# Patient Record
Sex: FEMALE | Race: BLACK OR AFRICAN AMERICAN | Employment: FULL TIME | URBAN - METROPOLITAN AREA
[De-identification: names, ages, dates, MRNs, and addresses within clinical notes are randomized per-mention and may not be internally consistent; named-entity substitution may affect disease eponyms.]

---

## 2018-12-01 LAB — DIABETIC RETINOPATHY: NEGATIVE

## 2019-10-18 LAB
AVERAGE GLUCOSE: NORMAL
HBA1C MFR BLD: 14 %

## 2019-11-05 ENCOUNTER — OFFICE VISIT (OUTPATIENT)
Dept: ENDOCRINOLOGY | Age: 54
End: 2019-11-05
Payer: COMMERCIAL

## 2019-11-05 VITALS
HEART RATE: 101 BPM | WEIGHT: 185 LBS | SYSTOLIC BLOOD PRESSURE: 122 MMHG | BODY MASS INDEX: 28.04 KG/M2 | HEIGHT: 68 IN | OXYGEN SATURATION: 99 % | DIASTOLIC BLOOD PRESSURE: 84 MMHG

## 2019-11-05 DIAGNOSIS — N20.0 NEPHROLITHIASIS: ICD-10-CM

## 2019-11-05 DIAGNOSIS — E78.2 MIXED HYPERLIPIDEMIA: ICD-10-CM

## 2019-11-05 DIAGNOSIS — E83.52 HYPERCALCEMIA: ICD-10-CM

## 2019-11-05 DIAGNOSIS — I10 ESSENTIAL HYPERTENSION: ICD-10-CM

## 2019-11-05 PROCEDURE — 99243 OFF/OP CNSLTJ NEW/EST LOW 30: CPT | Performed by: INTERNAL MEDICINE

## 2019-11-05 RX ORDER — FLASH GLUCOSE SENSOR
KIT MISCELLANEOUS
Qty: 1 EACH | Refills: 0 | COMMUNITY
Start: 2019-11-05 | End: 2020-11-10

## 2019-11-05 RX ORDER — GLIMEPIRIDE 4 MG/1
4 TABLET ORAL
COMMUNITY
Start: 2019-08-29

## 2019-11-05 RX ORDER — FLASH GLUCOSE SCANNING READER
EACH MISCELLANEOUS
Qty: 1 DEVICE | Refills: 0 | COMMUNITY
Start: 2019-11-05 | End: 2020-11-10

## 2019-11-05 RX ORDER — HYDROCHLOROTHIAZIDE 50 MG/1
50 TABLET ORAL DAILY
COMMUNITY

## 2019-11-05 RX ORDER — FLASH GLUCOSE SENSOR
KIT MISCELLANEOUS
Qty: 2 EACH | Refills: 3 | Status: SHIPPED | OUTPATIENT
Start: 2019-11-05 | End: 2020-11-10

## 2019-11-07 PROBLEM — I10 ESSENTIAL HYPERTENSION: Status: ACTIVE | Noted: 2019-11-07

## 2019-11-07 PROBLEM — E78.2 MIXED HYPERLIPIDEMIA: Status: ACTIVE | Noted: 2019-11-07

## 2019-11-07 PROBLEM — E83.52 HYPERCALCEMIA: Status: ACTIVE | Noted: 2019-11-07

## 2019-11-07 PROBLEM — N20.0 NEPHROLITHIASIS: Status: ACTIVE | Noted: 2019-11-07

## 2020-11-02 ENCOUNTER — TELEPHONE (OUTPATIENT)
Dept: ENDOCRINOLOGY | Age: 55
End: 2020-11-02

## 2020-11-03 NOTE — TELEPHONE ENCOUNTER
Left VM for patient that I will call her as soon as I have an appointment open up. Patient to call office back if she has any questions or concerns.

## 2020-11-09 ENCOUNTER — TELEPHONE (OUTPATIENT)
Dept: ENDOCRINOLOGY | Age: 55
End: 2020-11-09

## 2020-11-09 NOTE — TELEPHONE ENCOUNTER
Left message for patient to call office back. Explained she can schedule with someone at our  for anytime on Tuesday.

## 2020-11-09 NOTE — TELEPHONE ENCOUNTER
Patient called back to confirm she wanted to take the appointment the Tuesday before thanksgiving that she received a VM about

## 2020-11-10 ENCOUNTER — TELEPHONE (OUTPATIENT)
Dept: ENDOCRINOLOGY | Age: 55
End: 2020-11-10

## 2020-11-10 ENCOUNTER — OFFICE VISIT (OUTPATIENT)
Dept: ENDOCRINOLOGY | Age: 55
End: 2020-11-10
Payer: COMMERCIAL

## 2020-11-10 VITALS
TEMPERATURE: 97.5 F | HEART RATE: 91 BPM | DIASTOLIC BLOOD PRESSURE: 87 MMHG | BODY MASS INDEX: 27.28 KG/M2 | RESPIRATION RATE: 16 BRPM | SYSTOLIC BLOOD PRESSURE: 144 MMHG | WEIGHT: 180 LBS | HEIGHT: 68 IN

## 2020-11-10 PROCEDURE — 99214 OFFICE O/P EST MOD 30 MIN: CPT | Performed by: INTERNAL MEDICINE

## 2020-11-10 RX ORDER — FLASH GLUCOSE SENSOR
KIT MISCELLANEOUS
Qty: 1 EACH | Refills: 0 | Status: SHIPPED | OUTPATIENT
Start: 2020-11-10 | End: 2022-10-17

## 2020-11-10 RX ORDER — INSULIN GLARGINE 100 [IU]/ML
30 INJECTION, SOLUTION SUBCUTANEOUS NIGHTLY
Qty: 5 PEN | Refills: 3 | Status: SHIPPED | OUTPATIENT
Start: 2020-11-10 | End: 2022-10-17

## 2020-11-10 RX ORDER — PEN NEEDLE, DIABETIC 31 GX5/16"
1 NEEDLE, DISPOSABLE MISCELLANEOUS DAILY
Qty: 100 EACH | Refills: 6 | Status: SHIPPED | OUTPATIENT
Start: 2020-11-10 | End: 2022-10-17

## 2020-11-10 NOTE — PROGRESS NOTES
Vineet Ramírez is a 50 y.o. female is referred to endocrine by Dr Dank Faulkner  for evaluation and management of uncontrolled type 2 diabetes. Vineet Ramírez was diagnosed with Diabetes mellitus in 2004 with prediabetes . Diabetes was diagnosed at routine screening [facility] 38 she managed with diet for a few years on review of chart it appears that patient has uncontrolled diabetes for years and was previously advised by her primary care physician to improve her glycemia but patient continued to have uncontrolled diabetes for years she is noted to have an A1c of 9.1 in 2010 and her diabetes control appears to have worsened over the years since July 2018 her A1c has been 14 or above. Vineet Ramírez got diabetic education in the past.  Comorbid conditions: Neuropathy, Nephropathy and Retinopathy    Current diabetic medications include:metformin and Amaryl 4 mg     INTERIM:    Diabetes   She presents for her follow-up diabetic visit. She has type 2 diabetes mellitus. No MedicAlert identification noted. The initial diagnosis of diabetes was made 15 years ago. Her disease course has been worsening. There are no hypoglycemic associated symptoms. Associated symptoms include polyuria and weight loss. There are no hypoglycemic complications. Symptoms are worsening. Diabetic complications include peripheral neuropathy and retinopathy. Risk factors for coronary artery disease include diabetes mellitus, dyslipidemia, hypertension and post-menopausal. Current diabetic treatment includes oral agent (dual therapy). She is compliant with treatment some of the time. Her weight is decreasing steadily. She is following a generally unhealthy diet. Meal planning includes avoidance of concentrated sweets. She has not had a previous visit with a dietitian. She rarely participates in exercise. Her home blood glucose trend is increasing steadily. An ACE inhibitor/angiotensin II receptor blocker is being taken.  She sees a podiatrist.Eye exam is current. pt has not been seen for over a year and has not taken the medication as prescribed  , she is still reluctant to take insulin injections but due to worsening glycemia she is now decided to take insulin   She was told about options like Omnipod and VGO   She has Freestyle ge but doesn't use it       Weight trend: stable  Prior visit with dietician: no  Current diet: on average, 3 meals per day  Current exercise: occasional    She has been obese but was able to lose almost 80 lbs with diet and exercise since 2015   She has hypercalcemia and was seen in the Er for possible Kidney stones in the past ---she denies any exogenous calcium intake but takes MVI which possibly has calcium  High calcium noted since 12/17.     VIT D deficiency for years  with levels as low as 16 ----she has been taking otc she takes aprox 4000 Iu daily last level was 39 on oct 2018   She has gone through menopausal changes since 2017   Patient also has essential hypertension and hyperlipidemia    Past Medical History:   Diagnosis Date    [facility] mellitus ([facility] [address]     Hypertension       Patient Active Problem List   Diagnosis    Uncontrolled type 2 diabetes mellitus with complication, with long-term current use of insulin ([facility] [address]    Hypercalcemia    Essential hypertension    Mixed hyperlipidemia    Nephrolithiasis     Past Surgical History:   Procedure Laterality Date    BREAST BIOPSY      1980's benign      Social History     Socioeconomic History    Marital status: Single     Spouse name: Not on file    Number of children: Not on file    Years of education: Not on file    Highest education level: Not on file   Occupational History    Not on file   Social Needs    Financial resource strain: Not on file    Food insecurity     Worry: Not on file     Inability: Not on file    Transportation needs     Medical: Not on file     Non-medical: Not on file   Tobacco Use    Smoking status: Never Smoker    Smokeless tobacco: Never Used   Substance and Sexual Activity    Alcohol use: No    Drug use: No    Sexual activity: Not on file   Lifestyle    Physical activity     Days per week: Not on file     Minutes per session: Not on file    Stress: Not on file   Relationships    Social connections     Talks on phone: Not on file     Gets together: Not on file     Attends Sabianist service: Not on file     Active member of club or organization: Not on file     Attends meetings of clubs or organizations: Not on file     Relationship status: Not on file    Intimate partner violence     Fear of current or ex partner: Not on file     Emotionally abused: Not on file     Physically abused: Not on file     Forced sexual activity: Not on file   Other Topics Concern    Not on file   Social History Narrative    Not on file     Family History   Problem Relation Age of Onset    Diabetes Father     Diabetes Maternal Grandmother     Diabetes Maternal Grandfather     Diabetes Paternal Grandmother     Diabetes Paternal Grandfather      Current Outpatient Medications   Medication Sig Dispense Refill    Multiple Vitamins-Minerals (MULTIVITAMIN ADULTS PO) Take by mouth      VITAMIN D PO Take by mouth      insulin glargine (LANTUS SOLOSTAR) 100 UNIT/ML injection pen Inject 30 Units into the skin nightly 5 pen 3    Insulin Pen Needle (B-D UF III MINI PEN NEEDLES) 31G X 5 MM MISC 1 each by Does not apply route daily 100 each 6    Continuous Blood Gluc Sensor (FREESTYLE MIA 14 DAY SENSOR) MISC As directed by physician 1 each 0    metFORMIN (GLUCOPHAGE) 1000 MG tablet Take 1,000 mg by mouth Take 1,000 mg by mouth bid      glimepiride (AMARYL) 4 MG tablet Take 4 mg by mouth Take 4 mg by mouth      hydrochlorothiazide (HYDRODIURIL) 50 MG tablet Take 50 mg by mouth daily       No current facility-administered medications for this visit.       Allergies   Allergen Reactions    Ace Inhibitors      Dry cough per pt     Family Status   Relation Name currently not on statins. ldl 175 in oct 2020     ----Nephrolithiasis  No recent kidney stones  Calcium elevated probably hyperparathyroid will check levels       Reviewed and/or ordered clinical lab results yes   Reviewed and/or ordered radiology tests Yes  Reviewed and/or ordered other diagnostic tests yes   Made a decision to obtain old records yes   Reviewed and summarized old records yes     Harvey Juárez was counseled regarding symptoms of current diagnosis, course and complications of disease if inadequately treated, side effects of medications, diagnosis, treatment options, and prognosis, risks, benefits, complications, and alternatives of treatment, labs, imaging and other studies and treatment targets and goals. She understands instructions and counseling      Return in about 4 weeks (around 12/8/2020). Please note that some or all of this report was generated using voice recognition software. Please notify me in case of any questions about the content of this document, as some errors in transcription may have occurred .

## 2020-11-10 NOTE — TELEPHONE ENCOUNTER
PT was seen in office today and was told to follow up in a month. The first appt the doctor had was 2/9 for an in person appt. She would like to be seen in office before that. Please call Pt and advise of an appt date and time.    #[phone]

## 2020-11-10 NOTE — TELEPHONE ENCOUNTER
Called patient to notify her that I have her on my reminder list to call her in 1 month if there are any cancellations.

## 2020-12-02 NOTE — PROGRESS NOTES
Letter sent out to patient to schedule appt and to call the office to discuss lab results. Multiple VM's left over the past week.

## 2020-12-16 ENCOUNTER — TELEPHONE (OUTPATIENT)
Dept: ENDOCRINOLOGY | Age: 55
End: 2020-12-16

## 2022-01-08 ENCOUNTER — APPOINTMENT (OUTPATIENT)
Dept: CT IMAGING | Age: 57
End: 2022-01-08
Payer: COMMERCIAL

## 2022-01-08 ENCOUNTER — HOSPITAL ENCOUNTER (EMERGENCY)
Age: 57
Discharge: HOME | End: 2022-01-08
Payer: COMMERCIAL

## 2022-01-08 VITALS
OXYGEN SATURATION: 96 % | HEIGHT: 68 IN | SYSTOLIC BLOOD PRESSURE: 110 MMHG | HEART RATE: 100 BPM | TEMPERATURE: 98 F | WEIGHT: 155.42 LBS | RESPIRATION RATE: 18 BRPM | BODY MASS INDEX: 23.56 KG/M2 | DIASTOLIC BLOOD PRESSURE: 95 MMHG

## 2022-01-08 DIAGNOSIS — R10.9 RIGHT FLANK PAIN: ICD-10-CM

## 2022-01-08 DIAGNOSIS — N30.01 ACUTE CYSTITIS WITH HEMATURIA: Primary | ICD-10-CM

## 2022-01-08 DIAGNOSIS — E11.65 TYPE 2 DIABETES MELLITUS WITH HYPERGLYCEMIA, WITHOUT LONG-TERM CURRENT USE OF INSULIN (HCC): ICD-10-CM

## 2022-01-08 DIAGNOSIS — R30.0 DYSURIA: ICD-10-CM

## 2022-01-08 LAB
A/G RATIO: 0.8 (ref 1.1–2.2)
ALBUMIN SERPL-MCNC: 3.4 G/DL (ref 3.4–5)
ALP BLD-CCNC: 171 U/L (ref 40–129)
ALT SERPL-CCNC: 7 U/L (ref 10–40)
ANION GAP SERPL CALCULATED.3IONS-SCNC: 17 MMOL/L (ref 3–16)
AST SERPL-CCNC: 9 U/L (ref 15–37)
BACTERIA: ABNORMAL /HPF
BASOPHILS ABSOLUTE: 0.1 K/UL (ref 0–0.2)
BASOPHILS RELATIVE PERCENT: 0.6 %
BILIRUB SERPL-MCNC: 0.5 MG/DL (ref 0–1)
BILIRUBIN URINE: NEGATIVE
BLOOD, URINE: ABNORMAL
BUN BLDV-MCNC: 15 MG/DL (ref 7–20)
CALCIUM SERPL-MCNC: 10 MG/DL (ref 8.3–10.6)
CHLORIDE BLD-SCNC: 90 MMOL/L (ref 99–110)
CLARITY: ABNORMAL
CO2: 22 MMOL/L (ref 21–32)
COLOR: YELLOW
CREAT SERPL-MCNC: 0.9 MG/DL (ref 0.6–1.1)
EOSINOPHILS ABSOLUTE: 0.1 K/UL (ref 0–0.6)
EOSINOPHILS RELATIVE PERCENT: 0.5 %
EPITHELIAL CELLS, UA: 4 /HPF (ref 0–5)
GFR AFRICAN AMERICAN: >60
GFR NON-AFRICAN AMERICAN: >60
GLUCOSE BLD-MCNC: 338 MG/DL (ref 70–99)
GLUCOSE BLD-MCNC: 490 MG/DL (ref 70–99)
GLUCOSE URINE: >=1000 MG/DL
HCG QUALITATIVE: NEGATIVE
HCT VFR BLD CALC: 41 % (ref 36–48)
HEMOGLOBIN: 13.4 G/DL (ref 12–16)
HYALINE CASTS: 0 /LPF (ref 0–8)
KETONES, URINE: 15 MG/DL
LEUKOCYTE ESTERASE, URINE: ABNORMAL
LIPASE: 46 U/L (ref 13–60)
LYMPHOCYTES ABSOLUTE: 1.3 K/UL (ref 1–5.1)
LYMPHOCYTES RELATIVE PERCENT: 9.5 %
MCH RBC QN AUTO: 27.2 PG (ref 26–34)
MCHC RBC AUTO-ENTMCNC: 32.8 G/DL (ref 31–36)
MCV RBC AUTO: 83.1 FL (ref 80–100)
MICROSCOPIC EXAMINATION: YES
MONOCYTES ABSOLUTE: 1.1 K/UL (ref 0–1.3)
MONOCYTES RELATIVE PERCENT: 8.1 %
NEUTROPHILS ABSOLUTE: 11.2 K/UL (ref 1.7–7.7)
NEUTROPHILS RELATIVE PERCENT: 81.3 %
NITRITE, URINE: POSITIVE
PDW BLD-RTO: 13.3 % (ref 12.4–15.4)
PERFORMED ON: ABNORMAL
PH UA: 6.5 (ref 5–8)
PLATELET # BLD: 383 K/UL (ref 135–450)
PMV BLD AUTO: 8.4 FL (ref 5–10.5)
POTASSIUM REFLEX MAGNESIUM: 3.8 MMOL/L (ref 3.5–5.1)
PROTEIN UA: ABNORMAL MG/DL
RBC # BLD: 4.94 M/UL (ref 4–5.2)
RBC UA: 14 /HPF (ref 0–4)
SODIUM BLD-SCNC: 129 MMOL/L (ref 136–145)
SPECIFIC GRAVITY UA: 1.03 (ref 1–1.03)
TOTAL PROTEIN: 7.7 G/DL (ref 6.4–8.2)
URINE REFLEX TO CULTURE: YES
UROBILINOGEN, URINE: 0.2 E.U./DL
WBC # BLD: 13.8 K/UL (ref 4–11)
WBC UA: >900 /HPF (ref 0–5)

## 2022-01-08 PROCEDURE — 96361 HYDRATE IV INFUSION ADD-ON: CPT

## 2022-01-08 PROCEDURE — 99284 EMERGENCY DEPT VISIT MOD MDM: CPT

## 2022-01-08 PROCEDURE — 6370000000 HC RX 637 (ALT 250 FOR IP): Performed by: NURSE PRACTITIONER

## 2022-01-08 PROCEDURE — 83690 ASSAY OF LIPASE: CPT

## 2022-01-08 PROCEDURE — 87086 URINE CULTURE/COLONY COUNT: CPT

## 2022-01-08 PROCEDURE — 85025 COMPLETE CBC W/AUTO DIFF WBC: CPT

## 2022-01-08 PROCEDURE — 2580000003 HC RX 258: Performed by: NURSE PRACTITIONER

## 2022-01-08 PROCEDURE — 87088 URINE BACTERIA CULTURE: CPT

## 2022-01-08 PROCEDURE — 96375 TX/PRO/DX INJ NEW DRUG ADDON: CPT

## 2022-01-08 PROCEDURE — 6360000002 HC RX W HCPCS: Performed by: NURSE PRACTITIONER

## 2022-01-08 PROCEDURE — 96365 THER/PROPH/DIAG IV INF INIT: CPT

## 2022-01-08 PROCEDURE — 84703 CHORIONIC GONADOTROPIN ASSAY: CPT

## 2022-01-08 PROCEDURE — 81001 URINALYSIS AUTO W/SCOPE: CPT

## 2022-01-08 PROCEDURE — 87186 SC STD MICRODIL/AGAR DIL: CPT

## 2022-01-08 PROCEDURE — 36415 COLL VENOUS BLD VENIPUNCTURE: CPT

## 2022-01-08 PROCEDURE — 74176 CT ABD & PELVIS W/O CONTRAST: CPT

## 2022-01-08 PROCEDURE — 80053 COMPREHEN METABOLIC PANEL: CPT

## 2022-01-08 RX ORDER — KETOROLAC TROMETHAMINE 15 MG/ML
30 INJECTION, SOLUTION INTRAMUSCULAR; INTRAVENOUS ONCE
Status: COMPLETED | OUTPATIENT
Start: 2022-01-08 | End: 2022-01-08

## 2022-01-08 RX ORDER — NITROFURANTOIN 25; 75 MG/1; MG/1
100 CAPSULE ORAL 2 TIMES DAILY
Qty: 20 CAPSULE | Refills: 0 | Status: SHIPPED | OUTPATIENT
Start: 2022-01-08 | End: 2022-01-18

## 2022-01-08 RX ORDER — PHENAZOPYRIDINE HYDROCHLORIDE 100 MG/1
100 TABLET, FILM COATED ORAL 3 TIMES DAILY PRN
Qty: 12 TABLET | Refills: 0 | Status: SHIPPED | OUTPATIENT
Start: 2022-01-08 | End: 2022-01-11

## 2022-01-08 RX ORDER — ACETAMINOPHEN AND CODEINE PHOSPHATE 300; 30 MG/1; MG/1
1 TABLET ORAL 3 TIMES DAILY PRN
Qty: 10 TABLET | Refills: 0 | Status: SHIPPED | OUTPATIENT
Start: 2022-01-08 | End: 2022-01-11

## 2022-01-08 RX ORDER — ONDANSETRON 2 MG/ML
4 INJECTION INTRAMUSCULAR; INTRAVENOUS ONCE
Status: COMPLETED | OUTPATIENT
Start: 2022-01-08 | End: 2022-01-08

## 2022-01-08 RX ORDER — 0.9 % SODIUM CHLORIDE 0.9 %
1000 INTRAVENOUS SOLUTION INTRAVENOUS ONCE
Status: COMPLETED | OUTPATIENT
Start: 2022-01-08 | End: 2022-01-08

## 2022-01-08 RX ORDER — ONDANSETRON 4 MG/1
4 TABLET, ORALLY DISINTEGRATING ORAL EVERY 8 HOURS PRN
Qty: 20 TABLET | Refills: 0 | Status: SHIPPED | OUTPATIENT
Start: 2022-01-08 | End: 2022-10-17

## 2022-01-08 RX ADMIN — KETOROLAC TROMETHAMINE 30 MG: 15 INJECTION, SOLUTION INTRAMUSCULAR; INTRAVENOUS at 17:10

## 2022-01-08 RX ADMIN — INSULIN HUMAN 10 UNITS: 100 INJECTION, SOLUTION PARENTERAL at 17:23

## 2022-01-08 RX ADMIN — ONDANSETRON 4 MG: 2 INJECTION INTRAMUSCULAR; INTRAVENOUS at 17:08

## 2022-01-08 RX ADMIN — SODIUM CHLORIDE 1000 ML: 9 INJECTION, SOLUTION INTRAVENOUS at 17:06

## 2022-01-08 RX ADMIN — CEFTRIAXONE 1000 MG: 1 INJECTION, POWDER, FOR SOLUTION INTRAMUSCULAR; INTRAVENOUS at 17:15

## 2022-01-08 ASSESSMENT — ENCOUNTER SYMPTOMS
DIARRHEA: 0
COLOR CHANGE: 0
COUGH: 0
BACK PAIN: 0
ABDOMINAL PAIN: 1
VOMITING: 1
NAUSEA: 1
WHEEZING: 0
SHORTNESS OF BREATH: 0

## 2022-01-08 ASSESSMENT — PAIN - FUNCTIONAL ASSESSMENT
PAIN_FUNCTIONAL_ASSESSMENT: PREVENTS OR INTERFERES SOME ACTIVE ACTIVITIES AND ADLS
PAIN_FUNCTIONAL_ASSESSMENT: PREVENTS OR INTERFERES SOME ACTIVE ACTIVITIES AND ADLS

## 2022-01-08 ASSESSMENT — PAIN DESCRIPTION - LOCATION
LOCATION: FLANK
LOCATION: FLANK

## 2022-01-08 ASSESSMENT — PAIN SCALES - GENERAL
PAINLEVEL_OUTOF10: 7
PAINLEVEL_OUTOF10: 0
PAINLEVEL_OUTOF10: 7

## 2022-01-08 ASSESSMENT — PAIN DESCRIPTION - DESCRIPTORS
DESCRIPTORS: SHARP
DESCRIPTORS: ACHING

## 2022-01-08 ASSESSMENT — PAIN DESCRIPTION - PROGRESSION: CLINICAL_PROGRESSION: NOT CHANGED

## 2022-01-08 ASSESSMENT — PAIN DESCRIPTION - PAIN TYPE
TYPE: ACUTE PAIN
TYPE: ACUTE PAIN

## 2022-01-08 ASSESSMENT — PAIN DESCRIPTION - ONSET: ONSET: ON-GOING

## 2022-01-08 ASSESSMENT — PAIN DESCRIPTION - FREQUENCY
FREQUENCY: CONTINUOUS
FREQUENCY: CONTINUOUS

## 2022-01-08 ASSESSMENT — PAIN DESCRIPTION - ORIENTATION
ORIENTATION: RIGHT
ORIENTATION: RIGHT

## 2022-01-08 ASSESSMENT — PAIN SCALES - WONG BAKER: WONGBAKER_NUMERICALRESPONSE: 6

## 2022-01-08 NOTE — ED NOTES
Bed: E-43  Expected date:   Expected time:   Means of arrival:   Comments:  Yonathan SILVA flank pain     Darcy Rowell RN  01/08/22 6117

## 2022-01-08 NOTE — ED NOTES
Two unsuccessful attempts made at starting IV. Pod RN asked to take a look.       Danny Blair RN  01/08/22 7781

## 2022-01-08 NOTE — ED NOTES
Pod partner made two unsuccessful attempts at IV start. Charge nurse notified of need for ultrasound guided placement.       Eriberto Jimenes RN  01/08/22 3292

## 2022-01-08 NOTE — ED PROVIDER NOTES
**ADVANCED PRACTICE PROVIDER, I HAVE EVALUATED THIS PATIENT**        [address]      Pt Name: Jonnie Peñaloza  FBL:[id number]  [place] 1965  Date of evaluation: 1/8/2022  Provider: ARMAND Adler CNP      Chief Complaint:    Chief Complaint   Patient presents with    Flank Pain     right sided flank pain onset 3 days ago. nausea this am.  diarrhea for 3 days. denies fever. dysuria         Nursing Notes, Past Medical Hx, Past Surgical Hx, Social Hx, Allergies, and Family Hx were all reviewed and agreed with or any disagreements were addressed in the HPI.    HPI: (Location, Duration, Timing, Severity, Quality, Assoc Sx, Context, Modifying factors)    Chief Complaint of right flank pain wrapping around to his abdomen and burning with urination for the past 3 days. This is a  49 y.o. female who presents with right flank pain that wraps around to the abdomen, also has burning with urination. Patient states she has had the symptoms for the past 3 days. Has had some nausea with one episode of vomiting this morning. Denies any diarrhea or blood in stool. She denies any cough, congestion, fever or chills, she is concerned she has UTI again. Rates pain 7 out of 10. Denies take any medicine for symptoms, no additional complaints, no additional aggravating or relieving factors. Patient presents awake, alert and in no acute distress or toxic appearance.      PastMedical/Surgical History:      Diagnosis Date    Diabetes mellitus ([facility] [address]     Hypertension          Procedure Laterality Date    BREAST BIOPSY      1980's benign        Medications:  Previous Medications    CONTINUOUS BLOOD GLUC SENSOR (FREESTYLE MIA 14 DAY SENSOR) MISC    As directed by physician    GLIMEPIRIDE (AMARYL) 4 MG TABLET    Take 4 mg by mouth Take 4 mg by mouth    HYDROCHLOROTHIAZIDE (HYDRODIURIL) 50 MG TABLET    Take 50 mg by mouth daily    INSULIN GLARGINE (LANTUS SOLOSTAR) 100 UNIT/ML INJECTION PEN    Inject 30 Units into the skin nightly    INSULIN PEN NEEDLE (B-D UF III MINI PEN NEEDLES) 31G X 5 MM MISC    1 each by Does not apply route daily    METFORMIN (GLUCOPHAGE) 1000 MG TABLET    Take 1,000 mg by mouth Take 1,000 mg by mouth bid    MULTIPLE VITAMINS-MINERALS (MULTIVITAMIN ADULTS PO)    Take by mouth    VITAMIN D PO    Take by mouth         Review of Systems:  (2-9 systems needed)  Review of Systems   Constitutional: Negative for chills and fever. HENT: Negative for congestion. Respiratory: Negative for cough, shortness of breath and wheezing. Cardiovascular: Negative for chest pain. Gastrointestinal: Positive for abdominal pain, nausea and vomiting. Negative for diarrhea. Patient complains of right flank pain and right lower quadrant abdominal pain that started 3 days ago along with nausea 1 episode of vomiting this morning but has been dealing with dysuria as well   Genitourinary: Positive for dysuria and flank pain. Negative for frequency, hematuria and urgency. Musculoskeletal: Negative for back pain. Skin: Negative for color change. Neurological: Negative for weakness, numbness and headaches. \"Positives and Pertinent negatives as per HPI\"    Physical Exam:  Physical Exam  Vitals and nursing note reviewed. Constitutional:       Appearance: She is well-developed. She is not diaphoretic. HENT:      Head: Normocephalic. Right Ear: External ear normal.      Left Ear: External ear normal.   Eyes:      General: No scleral icterus. Right eye: No discharge. Left eye: No discharge. Cardiovascular:      Rate and Rhythm: Normal rate. Pulmonary:      Effort: Pulmonary effort is normal. No respiratory distress. Breath sounds: Normal breath sounds. Abdominal:      Palpations: Abdomen is soft. Tenderness: There is no abdominal tenderness. There is right CVA tenderness.       Comments: Abdomen is soft and nondistended. Bowel sounds are hypoactive, patient has right-sided CVA tenderness but no abdominal tenderness, guarding or rebound tenderness. No ascites or rigidity. No rebound tenderness tachycardia's point. Musculoskeletal:         General: Normal range of motion. Cervical back: Normal range of motion and neck supple. Skin:     General: Skin is warm. Capillary Refill: Capillary refill takes less than 2 seconds. Coloration: Skin is not pale. Neurological:      General: No focal deficit present. Mental Status: She is alert and oriented to person, place, and time. GCS: GCS eye subscore is 4. GCS verbal subscore is 5. GCS motor subscore is 6.    Psychiatric:         Behavior: Behavior normal.         MEDICAL DECISION MAKING    Vitals:    Vitals:    01/08/22 1245 01/08/22 1247 01/08/22 1717   BP:  (!) 162/95 139/88   Pulse:  100    Resp:  18    Temp:  98 °F (36.7 °C)    TempSrc:  Oral    SpO2:  98% 96%   Weight: 155 lb 6.8 oz (70.5 kg)     Height: 5' 8\" (1.727 m)         LABS:  Labs Reviewed   CBC WITH AUTO DIFFERENTIAL - Abnormal; Notable for the following components:       Result Value    WBC 13.8 (*)     Neutrophils Absolute 11.2 (*)     All other components within normal limits    Narrative:     Performed at:  [facility]  [employer] [facility] [address]   Phone [phone]   COMPREHENSIVE METABOLIC PANEL W/ REFLEX TO MG FOR LOW K - Abnormal; Notable for the following components:    Sodium 129 (*)     Chloride 90 (*)     Anion Gap 17 (*)     Glucose 490 (*)     Albumin/Globulin Ratio 0.8 (*)     Alkaline Phosphatase 171 (*)     ALT 7 (*)     AST 9 (*)     All other components within normal limits    Narrative:     Performed at:  [facility]  [address] [facility] [employer] [address]   Phone [phone]   URINE RT REFLEX TO CULTURE - Abnormal; Notable for the following components:    Clarity, UA TURBID (*)     Glucose, Ur >=1000 (*)     Ketones, Urine 15 (*)     Blood, Urine SMALL (*)     Protein, UA TRACE (*)     Nitrite, Urine POSITIVE (*)     Leukocyte Esterase, Urine MODERATE (*)     All other components within normal limits    Narrative:     Performed at:  [address] [address]   Phone [phone]   MICROSCOPIC URINALYSIS - Abnormal; Notable for the following components:    Bacteria, UA 4+ (*)     WBC, UA >900 (*)     RBC, UA 14 (*)     All other components within normal limits    Narrative:     Performed at:  [address] [address]   Phone [phone]   POCT GLUCOSE - Abnormal; Notable for the following components:    POC Glucose 338 (*)     All other components within normal limits    Narrative:     Performed at:  [place] [address]   Phone [phone]   CULTURE, URINE   CULTURE, URINE   LIPASE    Narrative:     Performed at:  [address] [address]   Phone [phone]   HCG, SERUM, QUALITATIVE    Narrative:     Performed at:  [address] [address]   Phone [phone] of labs reviewed and were negative at this time or not returned at the time of this note. RADIOLOGY:   Non-plain film images such as CT, Ultrasound and MRI are read by the radiologist. Wanda POSEY, APRN - CNP have directly visualized the radiologic plain film image(s) with the below findings:      Interpretation per the Radiologist below, if available at the time of this note:    CT ABDOMEN PELVIS WO CONTRAST Additional Contrast? None   Final Result      1. No definite urinary tract calculi are identified.   However there appears   to be mild right hydroureter with miriam-ureteral edema seen proximally. This   could represent residual changes from a recently passed calculus versus   infection, correlate clinically. 2.  Patchy and somewhat ground-glass opacity in both lower lung fields   thought to be on the basis of scarring and/or atelectasis unless pneumonitis   is suspected clinically. 3.  A normal appearing appendix is identified. 4.  2.6 cm probable ovarian cyst in the posterior right pelvis though given   lack of intravenous contrast its definite relationship with the right ovary   cannot be confirmed. MEDICAL DECISION MAKING / ED COURSE:    Because of high probability of sudden clinical deterioration of the patient's condition and risk of further deterioration, critical care time required my full attention to the patient's condition; which included chart data review, documentation, medication ordering, reviewing the patient's old records, reevaluation patient's cardiac, pulmonary and neurological status. Reevaluation of vital signs. Consultations with ED attending and admitting physician. Ordering, interpreting reviewing diagnostic testing. Therefore a critical care time was 35 minutes of direct attention to the patient's condition did not include time spent on procedures.     PROCEDURES:   Procedures    None    Patient was given:  Medications   0.9 % sodium chloride bolus (1,000 mLs IntraVENous New Bag 1/8/22 1706)   ketorolac (TORADOL) injection 30 mg (30 mg IntraVENous Given 1/8/22 1710)   ondansetron (ZOFRAN) injection 4 mg (4 mg IntraVENous Given 1/8/22 1708)   cefTRIAXone (ROCEPHIN) 1000 mg IVPB in 50 mL D5W minibag (1,000 mg IntraVENous New Bag 1/8/22 1715)   insulin regular (HUMULIN R;NOVOLIN R) injection 10 Units (10 Units IntraVENous Given 1/8/22 1723)       Patient complains of right flank pain and right lower quadrant abdominal pain that started 3 days ago along with nausea 1 episode of vomiting this morning but has been dealing with dysuria as well. After evaluation and examination the patient and her staff initiated protocols. Upon my exam I ordered IV access, IV fluids, medications and CT scan of the abdomen. CBC shows a leukocytosis with white count 13,800, no acute anemia. Metabolic panel shows a gap of 17, blood sugar 490 and a sodium of 129, patient was ordered fluids and insulin. Alk phos is elevated at 171 however other liver functions and lipase are normal.  Urine shows positive nitrates, greater than 900 WBCs and 4+ bacteria, patient was ordered IV antibiotics. Urine sent for culturing, patient's last urine culture was in October showed E. Coli. She was given insulin, blood sugar improved, however, she does need to monitor this more closely due to being having a UTI. CT the abdomen shows no definitive urinary tract calculi identified however there appears to be mild right hydroureter with periureteral or edema with concerns of recent passing of a calculus. She does have some groundglass opacity in both lung fields however, patient has no upper respiratory or respiratory symptoms. Appendix is normal she does have a questionable ovarian cyst in the right pelvis however, this is a coincidental finding. After patient was given fluids, antibiotics, insulin, she does report feeling better, vital signs are stable, I believe she can go home with outpatient follow-up. At this time no concerns for acute surgical abdomen, no concerns for dehydration, sepsis or other emergent etiology. Therefore, shared medical decision was made to the patient myself we agreed the patient could be discharged home with outpatient follow-up. Patient was discharged home with referral back to PCP, start Macrobid, Zofran and Pyridium as prescribed. She can take Tylenol 3 for pain. Return the ER for worsening symptoms. The patient tolerated their visit well. I evaluated the patient. The physician was available for consultation as needed.   The patient and / or the family were informed of the results of any tests, a time was given to answer questions, a plan was proposed and they agreed with plan. Patient verbalized understanding of discharge instructions and was discharged from the department in stable condition    CLINICAL IMPRESSION:  1. Acute cystitis with hematuria    2. Dysuria    3. Type 2 diabetes mellitus with hyperglycemia, without long-term current use of insulin ([address]    4. Right flank pain        DISPOSITION Decision To Discharge 01/08/2022 07:50:17 PM      PATIENT REFERRED TO:  Biju Baires MD  [address]    Schedule an appointment as soon as possible for a visit in 2 days  Follow-up with your family doctor on Monday or Tuesday of next week for reevaluation    [facility]  [address] [address]  [phone]  Go to   If symptoms worsen      DISCHARGE MEDICATIONS:  New Prescriptions    ACETAMINOPHEN-CODEINE (TYLENOL/CODEINE #3) 300-30 MG PER TABLET    Take 1 tablet by mouth 3 times daily as needed for Pain for up to 3 days.     NITROFURANTOIN, MACROCRYSTAL-MONOHYDRATE, (MACROBID) 100 MG CAPSULE    Take 1 capsule by mouth 2 times daily for 10 days    ONDANSETRON (ZOFRAN ODT) 4 MG DISINTEGRATING TABLET    Take 1 tablet by mouth every 8 hours as needed for Nausea    PHENAZOPYRIDINE (PYRIDIUM) 100 MG TABLET    Take 1 tablet by mouth 3 times daily as needed for Pain       DISCONTINUED MEDICATIONS:  Discontinued Medications    No medications on file              (Please note the MDM and HPI sections of this note were completed with a voice recognition program.  Efforts were made to edit the dictations but occasionally words are mis-transcribed.)    Electronically signed, ARMAND Buck CNP,           ARMAND Buck CNP  01/08/22 1954

## 2022-01-09 NOTE — ED NOTES
Discharge and education instructions reviewed. Patient verbalized understanding, teach-back successful. Patient denied questions at this time. No acute distress noted. Patient instructed to follow-up as noted - return to emergency department if symptoms worsen. Patient verbalized understanding. Discharged per EDMD with discharged instructions.        Hailee Jin RN  01/08/22 0960

## 2022-01-10 LAB
ORGANISM: ABNORMAL
URINE CULTURE, ROUTINE: ABNORMAL

## 2022-10-12 ENCOUNTER — TELEPHONE (OUTPATIENT)
Dept: ENDOCRINOLOGY | Age: 57
End: 2022-10-12

## 2022-10-12 DIAGNOSIS — E83.52 HYPERCALCEMIA: Primary | ICD-10-CM

## 2022-10-12 DIAGNOSIS — E11.9 TYPE 2 DIABETES MELLITUS WITHOUT COMPLICATION, UNSPECIFIED WHETHER LONG TERM INSULIN USE (HCC): ICD-10-CM

## 2022-10-13 DIAGNOSIS — E11.9 TYPE 2 DIABETES MELLITUS WITHOUT COMPLICATION, UNSPECIFIED WHETHER LONG TERM INSULIN USE (HCC): ICD-10-CM

## 2022-10-13 DIAGNOSIS — E83.52 HYPERCALCEMIA: ICD-10-CM

## 2022-10-13 LAB
A/G RATIO: 1.8 (ref 1.1–2.2)
ALBUMIN SERPL-MCNC: 4.4 G/DL (ref 3.4–5)
ALP BLD-CCNC: 137 U/L (ref 40–129)
ALT SERPL-CCNC: 11 U/L (ref 10–40)
ANION GAP SERPL CALCULATED.3IONS-SCNC: 13 MMOL/L (ref 3–16)
AST SERPL-CCNC: 12 U/L (ref 15–37)
BILIRUB SERPL-MCNC: 0.5 MG/DL (ref 0–1)
BUN BLDV-MCNC: 14 MG/DL (ref 7–20)
CALCIUM SERPL-MCNC: 9.5 MG/DL (ref 8.3–10.6)
CHLORIDE BLD-SCNC: 100 MMOL/L (ref 99–110)
CHOLESTEROL, TOTAL: 208 MG/DL (ref 0–199)
CO2: 24 MMOL/L (ref 21–32)
CREAT SERPL-MCNC: 0.8 MG/DL (ref 0.6–1.1)
ESTIMATED AVERAGE GLUCOSE: 378.1 MG/DL
GFR AFRICAN AMERICAN: >60
GFR NON-AFRICAN AMERICAN: >60
GLUCOSE BLD-MCNC: 239 MG/DL (ref 70–99)
HBA1C MFR BLD: 14.8 %
HDLC SERPL-MCNC: 48 MG/DL (ref 40–60)
LDL CHOLESTEROL CALCULATED: 122 MG/DL
PARATHYROID HORMONE INTACT: 38.3 PG/ML (ref 14–72)
PHOSPHORUS: 4.2 MG/DL (ref 2.5–4.9)
POTASSIUM SERPL-SCNC: 4.1 MMOL/L (ref 3.5–5.1)
SODIUM BLD-SCNC: 137 MMOL/L (ref 136–145)
TOTAL PROTEIN: 6.8 G/DL (ref 6.4–8.2)
TRIGL SERPL-MCNC: 191 MG/DL (ref 0–150)
VLDLC SERPL CALC-MCNC: 38 MG/DL

## 2022-10-17 ENCOUNTER — OFFICE VISIT (OUTPATIENT)
Dept: ENDOCRINOLOGY | Age: 57
End: 2022-10-17
Payer: COMMERCIAL

## 2022-10-17 VITALS
BODY MASS INDEX: 25.31 KG/M2 | WEIGHT: 167 LBS | HEIGHT: 68 IN | SYSTOLIC BLOOD PRESSURE: 181 MMHG | DIASTOLIC BLOOD PRESSURE: 100 MMHG | RESPIRATION RATE: 16 BRPM | HEART RATE: 84 BPM

## 2022-10-17 DIAGNOSIS — E11.65 POORLY CONTROLLED TYPE 2 DIABETES MELLITUS WITH COMPLICATION (HCC): Primary | ICD-10-CM

## 2022-10-17 DIAGNOSIS — I10 ESSENTIAL HYPERTENSION: ICD-10-CM

## 2022-10-17 DIAGNOSIS — E78.2 MIXED HYPERLIPIDEMIA: ICD-10-CM

## 2022-10-17 DIAGNOSIS — E11.8 POORLY CONTROLLED TYPE 2 DIABETES MELLITUS WITH COMPLICATION (HCC): Primary | ICD-10-CM

## 2022-10-17 PROCEDURE — 99214 OFFICE O/P EST MOD 30 MIN: CPT | Performed by: INTERNAL MEDICINE

## 2022-10-17 PROCEDURE — 3046F HEMOGLOBIN A1C LEVEL >9.0%: CPT | Performed by: INTERNAL MEDICINE

## 2022-10-17 RX ORDER — DULAGLUTIDE 0.75 MG/.5ML
0.75 INJECTION, SOLUTION SUBCUTANEOUS WEEKLY
Qty: 4 ADJUSTABLE DOSE PRE-FILLED PEN SYRINGE | Refills: 3 | Status: SHIPPED | OUTPATIENT
Start: 2022-10-17

## 2022-10-17 NOTE — PROGRESS NOTES
Irving Rubi is a 56 y.o. female is seen sporadically for management of uncontrolled type 2 diabetes. Irving Rubi was diagnosed with Diabetes mellitus in 2004 with prediabetes . Diabetes was diagnosed at routine screening chelsey ge 38 she managed with diet for a few years on review of chart it appears that patient has uncontrolled diabetes for years and was previously advised by her primary care physician to improve her glycemia but patient continued to have uncontrolled diabetes for years she is noted to have an A1c of 9.1 in 2010 and her diabetes control appears to have worsened over the years since July 2018 her A1c has been 14 or above. Irving Rubi got diabetic education in the past.  Comorbid conditions: Neuropathy, Nephropathy and Retinopathy    Current diabetic medications include:metformin and Amaryl 4 mg     INTERIM:    Diabetes  She presents for her follow-up diabetic visit. She has type 2 diabetes mellitus. No MedicAlert identification noted. The initial diagnosis of diabetes was made 15 years ago. Her disease course has been worsening. There are no hypoglycemic associated symptoms. Associated symptoms include polyuria and weight loss. There are no hypoglycemic complications. Symptoms are worsening. Diabetic complications include peripheral neuropathy and retinopathy. Risk factors for coronary artery disease include diabetes mellitus, dyslipidemia, hypertension and post-menopausal. Current diabetic treatment includes oral agent (dual therapy). She is compliant with treatment some of the time. Her weight is decreasing steadily. She is following a generally unhealthy diet. Meal planning includes avoidance of concentrated sweets. She has not had a previous visit with a dietitian. She rarely participates in exercise. Her home blood glucose trend is increasing steadily. An ACE inhibitor/angiotensin II receptor blocker is being taken. She sees a podiatrist.Eye exam is current.      she has not been seen for over 2 year and takes metformin and Glimeperide every day. Previously she was prescribed insulin which she never took for more than a few weeks    She has Freestyle ge but doesn't use it       Weight trend: stable  Prior visit with dietician: no  Current diet: on average, 3 meals per day  Current exercise: occasional    She has been obese but was able to lose almost 80 lbs with diet and exercise since 2015   She has hypercalcemia and was seen in the Er for possible Kidney stones in the past ---she denies any exogenous calcium intake but takes MVI which possibly has calcium  High calcium noted since 12/17.     VIT D deficiency for years  with levels as low as 16 ----she has been taking otc she takes aprox 4000 Iu daily last level was 39 on oct 2018   She has gone through menopausal changes since 2017   Patient also has essential hypertension and hyperlipidemia    Past Medical History:   Diagnosis Date    Diabetes mellitus ([facility] [address]     Hypertension       Patient Active Problem List   Diagnosis    Uncontrolled type 2 diabetes mellitus with complication, with long-term current use of insulin    Hypercalcemia    Essential hypertension    Mixed hyperlipidemia    Nephrolithiasis     Past Surgical History:   Procedure Laterality Date    BREAST BIOPSY      1980's benign      Social History     Socioeconomic History    Marital status: Single     Spouse name: Not on file    Number of children: Not on file    Years of education: Not on file    Highest education level: Not on file   Occupational History    Not on file   Tobacco Use    Smoking status: Never    Smokeless tobacco: Never   Vaping Use    Vaping Use: Never used   Substance and Sexual Activity    Alcohol use: No    Drug use: No    Sexual activity: Not on file   Other Topics Concern    Not on file   Social History Narrative    Not on file     Social Determinants of Health     Financial Resource Strain: Not on file   Food Insecurity: Not on file   Transportation Needs: Not on file   Physical Activity: Not on file   Stress: Not on file   Social Connections: Not on file   Intimate Partner Violence: Not on file   Housing Stability: Not on file     Family History   Problem Relation Age of Onset    Diabetes Father     Diabetes Maternal Grandmother     Diabetes Maternal Grandfather     Diabetes Paternal Grandmother     Diabetes Paternal Grandfather      Current Outpatient Medications   Medication Sig Dispense Refill    canagliflozin-metFORMIN HCl (INVOKAMET) 150-1000 MG Take 1 tablet by mouth 2 times daily (with meals) 60 tablet 3    Dulaglutide (TRULICITY) 2.35 TC/5.8XT SOPN Inject 0.75 mg into the skin once a week 4 Adjustable Dose Pre-filled Pen Syringe 3    Multiple Vitamins-Minerals (MULTIVITAMIN ADULTS PO) Take by mouth      glimepiride (AMARYL) 4 MG tablet Take 4 mg by mouth Take 4 mg by mouth      hydrochlorothiazide (HYDRODIURIL) 50 MG tablet Take 50 mg by mouth daily       No current facility-administered medications for this visit. Allergies   Allergen Reactions    Ace Inhibitors      Dry cough per pt     Family Status   Relation Name Status    Father  (Not Specified)    MGM  (Not Specified)    MGF  (Not Specified)    PGM  (Not Specified)    PGF  (Not Specified)       Review of Systems  I have reviewed the review of system questionnaire filled by the patient .   Patient was advised to contact PCP for non endocrine signs and symptoms       OBJECTIVE:  BP (!) 181/100   Pulse 84   Resp 16   Ht 5' 8\" (1.727 m)   Wt 167 lb (75.8 kg)   LMP 06/01/2011   BMI 25.39 kg/m²    Wt Readings from Last 3 Encounters:   10/17/22 167 lb (75.8 kg)   01/08/22 155 lb 6.8 oz (70.5 kg)   11/10/20 180 lb (81.6 kg)       Constitutional: no acute distress, well appearing and well nourished  Psychiatric: oriented to person, place and time, judgement and insight and normal, recent and remote memory and intact and mood and affect are normal  Skin: skin and subcutaneous tissue is normal without mass, normal turgor  Head and Face: examination of head and face revealed no abnormalities  Eyes: no lid or conjunctival swelling, erythema or discharge, pupils are normal  Ears/Nose: external inspection of ears and nose revealed no abnormalities, hearing is grossly normal  Oropharynx/Mouth/Face: lips, tongue and gums are normal with no lesions, the voice quality was normal  Neck: neck is supple and symmetric, with midline trachea and no masses, thyroid is normal  Lymphatics: normal cervical lymph nodes, normal supraclavicular nodes  Pulmonary: no increased work of breathing or signs of respiratory distress, lungs are clear to auscultation  Cardiovascular: normal heart rate and rhythm, normal S1 and S2,   Musculoskeletal: normal gait and station and exam of the digits and nails are normal  Neurological: normal coordination and normal general cortical function      Lab Results   Component Value Date/Time    LABA1C 14.8 10/13/2022 07:46 AM    LABA1C 14.0 10/18/2019 12:00 AM         ASSESSMENT/PLAN:  1. Uncontrolled type 2 diabetes mellitus with complication, with long-term current use of insulin 14  A1c 14.8   I had a lengthy discussion with the patient about  her  uncontrolled diabetes. We discussed progression of diabetes in detail and also the incidence of complications associated with uncontrolled diabetes. Kalyn Pa was advised that lifestyle modification is the key to better control of her Diabetes. We discussed carbohydrate restriction in detail. Fear of injection doesn't want to take injection   We discussed other alternatives and I will switch her to Arverne and also start her on Trulicity. Again advised patient that at this level of A1c my first recommendation would be insulin. Patient told me that she does not want to take insulin and only takes it for a few weeks after she sees me and then she will stop taking it.   With current limitations will try SGLT2 inhibitors with metformin along with GLP-1 agonist all possible side effect discussed with the patient in detail. Jaime Arriaga was advised to check her  fingerstick glucose at least 3-4 times daily. She is on  Amaryl and metformin thousand milligrams twice daily. Patient was advised that sending of her fingerstick blood glucose logs is crucial in management of her  diabetes. I will adjust the  doses of diabetic medications  according to sent data. Health Maintenance       Last Eye Exam: advised to have annual dilated eye exam. her last eye exam was within a year she has retinopathy  Last Podiatry Exam: Follows with podiatrist as well as primary care physician  Foot care discussed with the patient  Microalbumin/Creatinine Ratio:  microalbumin to creatinine ratio was elevated at 61    . Education: Reviewed ABCs of diabetes management (respective goals in parentheses): A1C (<7), blood pressure (<130/80), and cholesterol (LDL <100). Additional Education: referred to Diabetes Educator. --- Hypercalcemia  Patient is noted to have hypercalcemia on her blood work for years she also has history of nephrolithiasis  I have ordered PTH vitamin D as well as repeat calcium level after hydration    --- Essential hypertension  Blood pressure control is NOT adequate on hydrochlorothiazide, patient is reluctant to admit that her blood pressure was high today she thinks it is our machine whenever it is taken manually it is always normal she will go back home and check it and if it stays elevated will call the office  Jaime Arriaga denies any chest pain, denies any Shortness Of Breath  she is advised to go the Er if  she develops any of these signs or symptoms. Patient will benefit from being on an ACE inhibitor or ARB but she does not want to add any new medication    --- Mixed hyperlipidemia  She is currently not on statins.   ldl 175 in oct 2020     ----Nephrolithiasis  No recent kidney stones  Calcium elevated probably hyperparathyroid will check levels       Reviewed and/or ordered clinical lab results yes   Reviewed and/or ordered radiology tests Yes  Reviewed and/or ordered other diagnostic tests yes   Made a decision to obtain old records yes   Reviewed and summarized old records yes     Devendra No was counseled regarding symptoms of current diagnosis, course and complications of disease if inadequately treated, side effects of medications, diagnosis, treatment options, and prognosis, risks, benefits, complications, and alternatives of treatment, labs, imaging and other studies and treatment targets and goals. She understands instructions and counseling      Return in about 3 months (around 1/17/2023). Please note that some or all of this report was generated using voice recognition software. Please notify me in case of any questions about the content of this document, as some errors in transcription may have occurred .

## 2022-10-24 ENCOUNTER — TELEPHONE (OUTPATIENT)
Dept: ENDOCRINOLOGY | Age: 57
End: 2022-10-24

## 2022-10-25 NOTE — TELEPHONE ENCOUNTER
Do you want to try to change it to something else or do you want the patient to call insurance to see what is covered?
Fax from [address] has been started on Invokamet 150-1000mg
[facility] - Rx #: [id number]  Outcome  Additional Information Required  This request cannot be processed due to the medication is not covered by the plan.
Left VM for patient with information.
Please advise patient to find out if any of the [place] or Malathi Manner is covered then we can switch it to what ever is covered
None

## 2022-12-12 DIAGNOSIS — E11.65 POORLY CONTROLLED TYPE 2 DIABETES MELLITUS WITH COMPLICATION (HCC): Primary | ICD-10-CM

## 2022-12-12 DIAGNOSIS — E11.8 POORLY CONTROLLED TYPE 2 DIABETES MELLITUS WITH COMPLICATION (HCC): Primary | ICD-10-CM

## 2022-12-13 RX ORDER — DULAGLUTIDE 0.75 MG/.5ML
INJECTION, SOLUTION SUBCUTANEOUS
Qty: 6 ML | Refills: 1 | Status: SHIPPED | OUTPATIENT
Start: 2022-12-13 | End: 2023-02-06

## 2023-02-04 DIAGNOSIS — I10 ESSENTIAL HYPERTENSION: ICD-10-CM

## 2023-02-04 DIAGNOSIS — E11.65 POORLY CONTROLLED TYPE 2 DIABETES MELLITUS WITH COMPLICATION (HCC): ICD-10-CM

## 2023-02-04 DIAGNOSIS — E78.2 MIXED HYPERLIPIDEMIA: ICD-10-CM

## 2023-02-04 DIAGNOSIS — E11.8 POORLY CONTROLLED TYPE 2 DIABETES MELLITUS WITH COMPLICATION (HCC): ICD-10-CM

## 2023-02-04 LAB
A/G RATIO: 1.3 (ref 1.1–2.2)
ALBUMIN SERPL-MCNC: 4 G/DL (ref 3.4–5)
ALP BLD-CCNC: 137 U/L (ref 40–129)
ALT SERPL-CCNC: 10 U/L (ref 10–40)
ANION GAP SERPL CALCULATED.3IONS-SCNC: 13 MMOL/L (ref 3–16)
AST SERPL-CCNC: 12 U/L (ref 15–37)
BILIRUB SERPL-MCNC: 0.5 MG/DL (ref 0–1)
BUN BLDV-MCNC: 22 MG/DL (ref 7–20)
CALCIUM SERPL-MCNC: 10.1 MG/DL (ref 8.3–10.6)
CHLORIDE BLD-SCNC: 103 MMOL/L (ref 99–110)
CHOLESTEROL, TOTAL: 184 MG/DL (ref 0–199)
CO2: 24 MMOL/L (ref 21–32)
CREAT SERPL-MCNC: 0.9 MG/DL (ref 0.6–1.1)
CREATININE URINE: 31.3 MG/DL (ref 28–259)
GFR SERPL CREATININE-BSD FRML MDRD: >60 ML/MIN/{1.73_M2}
GLUCOSE BLD-MCNC: 92 MG/DL (ref 70–99)
HDLC SERPL-MCNC: 44 MG/DL (ref 40–60)
LDL CHOLESTEROL CALCULATED: 107 MG/DL
MICROALBUMIN UR-MCNC: 10.8 MG/DL
MICROALBUMIN/CREAT UR-RTO: 345 MG/G (ref 0–30)
POTASSIUM SERPL-SCNC: 4.8 MMOL/L (ref 3.5–5.1)
SODIUM BLD-SCNC: 140 MMOL/L (ref 136–145)
TOTAL PROTEIN: 7 G/DL (ref 6.4–8.2)
TRIGL SERPL-MCNC: 163 MG/DL (ref 0–150)
TSH SERPL DL<=0.05 MIU/L-ACNC: 1.63 UIU/ML (ref 0.27–4.2)
VLDLC SERPL CALC-MCNC: 33 MG/DL

## 2023-02-05 LAB
ESTIMATED AVERAGE GLUCOSE: 165.7 MG/DL
HBA1C MFR BLD: 7.4 %

## 2023-02-06 ENCOUNTER — TELEPHONE (OUTPATIENT)
Dept: ENDOCRINOLOGY | Age: 58
End: 2023-02-06

## 2023-02-06 ENCOUNTER — OFFICE VISIT (OUTPATIENT)
Dept: ENDOCRINOLOGY | Age: 58
End: 2023-02-06
Payer: COMMERCIAL

## 2023-02-06 VITALS
DIASTOLIC BLOOD PRESSURE: 93 MMHG | RESPIRATION RATE: 18 BRPM | HEART RATE: 95 BPM | HEIGHT: 68 IN | SYSTOLIC BLOOD PRESSURE: 156 MMHG | BODY MASS INDEX: 26.83 KG/M2 | WEIGHT: 177 LBS

## 2023-02-06 DIAGNOSIS — I10 ESSENTIAL HYPERTENSION: ICD-10-CM

## 2023-02-06 DIAGNOSIS — E11.65 POORLY CONTROLLED TYPE 2 DIABETES MELLITUS WITH COMPLICATION (HCC): Primary | ICD-10-CM

## 2023-02-06 DIAGNOSIS — E11.8 POORLY CONTROLLED TYPE 2 DIABETES MELLITUS WITH COMPLICATION (HCC): Primary | ICD-10-CM

## 2023-02-06 DIAGNOSIS — E78.2 MIXED HYPERLIPIDEMIA: ICD-10-CM

## 2023-02-06 PROCEDURE — 3077F SYST BP >= 140 MM HG: CPT | Performed by: INTERNAL MEDICINE

## 2023-02-06 PROCEDURE — 99214 OFFICE O/P EST MOD 30 MIN: CPT | Performed by: INTERNAL MEDICINE

## 2023-02-06 PROCEDURE — 3080F DIAST BP >= 90 MM HG: CPT | Performed by: INTERNAL MEDICINE

## 2023-02-06 PROCEDURE — 3051F HG A1C>EQUAL 7.0%<8.0%: CPT | Performed by: INTERNAL MEDICINE

## 2023-02-06 RX ORDER — LOSARTAN POTASSIUM AND HYDROCHLOROTHIAZIDE 25; 100 MG/1; MG/1
TABLET ORAL
COMMUNITY
Start: 2023-02-01

## 2023-02-06 RX ORDER — AMLODIPINE BESYLATE 5 MG/1
5 TABLET ORAL DAILY
COMMUNITY
Start: 2022-11-28

## 2023-02-06 RX ORDER — DULAGLUTIDE 1.5 MG/.5ML
1.5 INJECTION, SOLUTION SUBCUTANEOUS WEEKLY
Qty: 12 ADJUSTABLE DOSE PRE-FILLED PEN SYRINGE | Refills: 4 | Status: SHIPPED | OUTPATIENT
Start: 2023-02-06

## 2023-02-06 NOTE — PROGRESS NOTES
Ashkan Leone is a 56 y.o. female is seen sporadically for management of uncontrolled type 2 diabetes. Ashkan Leone was diagnosed with Diabetes mellitus in 2004 with prediabetes . Diabetes was diagnosed at routine screening chelsey ge 38 she managed with diet for a few years on review of chart it appears that patient has uncontrolled diabetes for years and was previously advised by her primary care physician to improve her glycemia but patient continued to have uncontrolled diabetes for years she is noted to have an A1c of 9.1 in 2010 and her diabetes control appears to have worsened over the years since July 2018 her A1c has been 14 or above. Ashkan Leone got diabetic education in the past.  Comorbid conditions: Neuropathy, Nephropathy and Retinopathy    Current diabetic medications include:metformin and Amaryl 4 mg     INTERIM:    Diabetes  She presents for her follow-up diabetic visit. She has type 2 diabetes mellitus. No MedicAlert identification noted. The initial diagnosis of diabetes was made 15 years ago. Her disease course has been worsening. There are no hypoglycemic associated symptoms. Associated symptoms include polyuria and weight loss. There are no hypoglycemic complications. Symptoms are worsening. Diabetic complications include peripheral neuropathy and retinopathy. Risk factors for coronary artery disease include diabetes mellitus, dyslipidemia, hypertension and post-menopausal. Current diabetic treatment includes oral agent (dual therapy). She is compliant with treatment some of the time. Her weight is decreasing steadily. She is following a generally unhealthy diet. Meal planning includes avoidance of concentrated sweets. She has not had a previous visit with a dietitian. She rarely participates in exercise. Her home blood glucose trend is increasing steadily. An ACE inhibitor/angiotensin II receptor blocker is being taken. She sees a podiatrist.Eye exam is current.        Since last visit she has been working on her diet and exercise and started taking Trulicity 2.99 mg weekly, she has not lost any weight in fact she had increased her caloric intake to gain weight and has gained 10 pounds since last visit. She has been taking glimepiride and metformin 1000 mg twice a day      Weight trend: stable  Prior visit with dietician: no  Current diet: on average, 3 meals per day  Current exercise: occasional    She has been obese but was able to lose almost 80 lbs with diet and exercise since 2015   She has hypercalcemia and was seen in the Er for possible Kidney stones in the past ---she denies any exogenous calcium intake but takes MVI which possibly has calcium  High calcium noted since 12/17.     VIT D deficiency for years  with levels as low as 16 ----she has been taking otc she takes aprox 4000 Iu daily last level was 39 on oct 2018   She has gone through menopausal changes since 2017   Patient also has essential hypertension and hyperlipidemia    Past Medical History:   Diagnosis Date    Diabetes mellitus ([facility] [address]     Hypertension       Patient Active Problem List   Diagnosis    Uncontrolled type 2 diabetes mellitus with complication, with long-term current use of insulin    Hypercalcemia    Essential hypertension    Mixed hyperlipidemia    Nephrolithiasis     Past Surgical History:   Procedure Laterality Date    BREAST BIOPSY      1980's benign      Social History     Socioeconomic History    Marital status: Single     Spouse name: Not on file    Number of children: Not on file    Years of education: Not on file    Highest education level: Not on file   Occupational History    Not on file   Tobacco Use    Smoking status: Never    Smokeless tobacco: Never   Vaping Use    Vaping Use: Never used   Substance and Sexual Activity    Alcohol use: No    Drug use: No    Sexual activity: Not on file   Other Topics Concern    Not on file   Social History Narrative    Not on file     Social Determinants of Health Financial Resource Strain: Not on file   Food Insecurity: Not on file   Transportation Needs: Not on file   Physical Activity: Not on file   Stress: Not on file   Social Connections: Not on file   Intimate Partner Violence: Not on file   Housing Stability: Not on file     Family History   Problem Relation Age of Onset    Diabetes Father     Diabetes Maternal Grandmother     Diabetes Maternal Grandfather     Diabetes Paternal Grandmother     Diabetes Paternal Grandfather      Current Outpatient Medications   Medication Sig Dispense Refill    amLODIPine (NORVASC) 5 MG tablet Take 5 mg by mouth daily      losartan-hydroCHLOROthiazide (HYZAAR) 100-25 MG per tablet       metFORMIN (GLUCOPHAGE) 1000 MG tablet in the morning and at bedtime      dulaglutide (TRULICITY) 1.5 QO/2.2PL SC injection Inject 0.5 mLs into the skin once a week 12 Adjustable Dose Pre-filled Pen Syringe 4    Multiple Vitamins-Minerals (MULTIVITAMIN ADULTS PO) Take by mouth       No current facility-administered medications for this visit. Allergies   Allergen Reactions    Ace Inhibitors      Dry cough per pt     Family Status   Relation Name Status    Father  (Not Specified)    MGM  (Not Specified)    MGF  (Not Specified)    PGM  (Not Specified)    PGF  (Not Specified)       Review of Systems  I have reviewed the review of system questionnaire filled by the patient .   Patient was advised to contact PCP for non endocrine signs and symptoms       OBJECTIVE:  BP (!) 156/93   Pulse 95   Resp 18   Ht 5' 8\" (1.727 m)   Wt 177 lb (80.3 kg)   LMP 06/01/2011   BMI 26.91 kg/m²    Wt Readings from Last 3 Encounters:   02/06/23 177 lb (80.3 kg)   10/17/22 167 lb (75.8 kg)   01/08/22 155 lb 6.8 oz (70.5 kg)       Constitutional: no acute distress, well appearing and well nourished  Psychiatric: oriented to person, place and time, judgement and insight and normal, recent and remote memory and intact and mood and affect are normal  Skin: skin and subcutaneous tissue is normal without mass, normal turgor  Head and Face: examination of head and face revealed no abnormalities  Eyes: no lid or conjunctival swelling, erythema or discharge, pupils are normal  Ears/Nose: external inspection of ears and nose revealed no abnormalities, hearing is grossly normal  Oropharynx/Mouth/Face: lips, tongue and gums are normal with no lesions, the voice quality was normal  Neck: neck is supple and symmetric, with midline trachea and no masses, thyroid is normal  Lymphatics: normal cervical lymph nodes, normal supraclavicular nodes  Pulmonary: no increased work of breathing or signs of respiratory distress, lungs are clear to auscultation  Cardiovascular: normal heart rate and rhythm, normal S1 and S2,   Musculoskeletal: normal gait and station and exam of the digits and nails are normal  Neurological: normal coordination and normal general cortical function      Lab Results   Component Value Date/Time    LABA1C 7.4 02/04/2023 09:27 AM    LABA1C 14.8 10/13/2022 07:46 AM    LABA1C 14.0 10/18/2019 12:00 AM         ASSESSMENT/PLAN:  1. Uncontrolled type 2 diabetes mellitus with complication, with long-term current use of insulin 14  A1c 14.8>>7.4   I had a lengthy discussion with the patient about  her  uncontrolled diabetes. We discussed progression of diabetes in detail and also the incidence of complications associated with uncontrolled diabetes. Tristan Alexandre was advised that lifestyle modification is the key to better control of her Diabetes. We discussed carbohydrate restriction in detail. Metformin 1000 mg BID  Glimeperide 4 mg daily --- advised patient to stop taking glimepiride  Trulicity 3.40 mg weekly Increase dose to 1.5 mg weekly.   Patient will find out from her insurance if they cover Amadeo Inks which will be added  Patient does have microalbuminuria    Health Maintenance       Last Eye Exam: advised to have annual dilated eye exam. her last eye exam was within a year she has retinopathy  Last Podiatry Exam: Follows with podiatrist as well as primary care physician  Foot care discussed with the patient  Microalbumin/Creatinine Ratio:  microalbumin to creatinine ratio was elevated at 61    . Education: Reviewed ABCs of diabetes management (respective goals in parentheses): A1C (<7), blood pressure (<130/80), and cholesterol (LDL <100). Additional Education: referred to Diabetes Educator. --- Hypercalcemia  Patient is noted to have hypercalcemia on her blood work for years she also has history of nephrolithiasis  I have ordered PTH vitamin D as well as repeat calcium level after hydration    --- Essential hypertension  Blood pressure control is NOT adequate on hydrochlorothiazide,   Advised to keep a blood pressure log and send to primary care physician. Dary Rodriguez denies any chest pain, denies any Shortness Of Breath  she is advised to go the Er if  she develops any of these signs or symptoms. Losartan hydrochlorothiazide combination nowShe is on    --- Mixed hyperlipidemia  She is currently not on statins. ldl 175 in oct 2020     ----Nephrolithiasis  No recent kidney stones  Calcium elevated probably hyperparathyroid will check levels       Reviewed and/or ordered clinical lab results yes   Reviewed and/or ordered radiology tests Yes  Reviewed and/or ordered other diagnostic tests yes   Made a decision to obtain old records yes   Reviewed and summarized old records yes     Dary Rodriguez was counseled regarding symptoms of current diagnosis, course and complications of disease if inadequately treated, side effects of medications, diagnosis, treatment options, and prognosis, risks, benefits, complications, and alternatives of treatment, labs, imaging and other studies and treatment targets and goals. She understands instructions and counseling      Return in about 6 months (around 8/6/2023).     Please note that some or all of this report was generated using voice recognition software. Please notify me in case of any questions about the content of this document, as some errors in transcription may have occurred .

## 2023-04-16 PROCEDURE — 99283 EMERGENCY DEPT VISIT LOW MDM: CPT

## 2023-04-17 ENCOUNTER — HOSPITAL ENCOUNTER (EMERGENCY)
Age: 58
Discharge: HOME | End: 2023-04-17
Payer: COMMERCIAL

## 2023-04-17 VITALS
SYSTOLIC BLOOD PRESSURE: 160 MMHG | BODY MASS INDEX: 26.61 KG/M2 | HEART RATE: 97 BPM | DIASTOLIC BLOOD PRESSURE: 93 MMHG | WEIGHT: 175 LBS | OXYGEN SATURATION: 98 % | RESPIRATION RATE: 18 BRPM | TEMPERATURE: 98.1 F

## 2023-04-17 DIAGNOSIS — L23.7 CONTACT DERMATITIS DUE TO POISON IVY: Primary | ICD-10-CM

## 2023-04-17 PROCEDURE — 6370000000 HC RX 637 (ALT 250 FOR IP): Performed by: PHYSICIAN ASSISTANT

## 2023-04-17 RX ORDER — CETIRIZINE HYDROCHLORIDE 10 MG/1
10 TABLET ORAL
Status: COMPLETED | OUTPATIENT
Start: 2023-04-17 | End: 2023-04-17

## 2023-04-17 RX ORDER — METHYLPREDNISOLONE 4 MG/1
TABLET ORAL
Qty: 21 TABLET | Refills: 0 | Status: SHIPPED | OUTPATIENT
Start: 2023-04-17 | End: 2023-04-17 | Stop reason: SDUPTHER

## 2023-04-17 RX ORDER — METHYLPREDNISOLONE 4 MG/1
TABLET ORAL
Qty: 21 TABLET | Refills: 0 | Status: SHIPPED | OUTPATIENT
Start: 2023-04-17

## 2023-04-17 RX ORDER — PREDNISONE 20 MG/1
40 TABLET ORAL ONCE
Status: COMPLETED | OUTPATIENT
Start: 2023-04-17 | End: 2023-04-17

## 2023-04-17 RX ORDER — HYDROXYZINE PAMOATE 25 MG/1
25-50 CAPSULE ORAL 3 TIMES DAILY PRN
Qty: 30 CAPSULE | Refills: 0 | Status: SHIPPED | OUTPATIENT
Start: 2023-04-17 | End: 2023-05-01

## 2023-04-17 RX ORDER — HYDROXYZINE PAMOATE 25 MG/1
25-50 CAPSULE ORAL 3 TIMES DAILY PRN
Qty: 30 CAPSULE | Refills: 0 | Status: SHIPPED | OUTPATIENT
Start: 2023-04-17 | End: 2023-04-17 | Stop reason: SDUPTHER

## 2023-04-17 RX ADMIN — CETIRIZINE HYDROCHLORIDE 10 MG: 10 TABLET, FILM COATED ORAL at 00:51

## 2023-04-17 RX ADMIN — PREDNISONE 40 MG: 20 TABLET ORAL at 00:51

## 2023-04-17 ASSESSMENT — PAIN - FUNCTIONAL ASSESSMENT: PAIN_FUNCTIONAL_ASSESSMENT: 0-10

## 2023-04-17 ASSESSMENT — PAIN SCALES - GENERAL: PAINLEVEL_OUTOF10: 0

## 2023-04-17 NOTE — ED NOTES
.Pt discharged at this time. Discharge instructions and medications reviewed,  Questions were answered. PT verbalized understanding. Follow up appointments were discussed.          [facility]  04/17/23 [id number]

## 2023-04-17 NOTE — ED PROVIDER NOTES
mis-transcribed.)    Electronically signed, Waldemar Garduno PA-C,           Waldemar Garduno PA-C  04/17/23 [id number]

## 2023-04-17 NOTE — ED TRIAGE NOTES
Patient has complaint of rash on left arm and bilateral legs. Erythema and edema noted on the left arm. No acute distress noted. Patient A&O x4.

## 2023-04-17 NOTE — DISCHARGE INSTRUCTIONS
Home in stable condition to keep skin cool, start using a good moisturizing lotion or cream to help with extreme dryness of skin, use medications as written, and monitor for gradual improvement. Follow with your family doctor in about 5 days for recheck and further care. In the meantime you may still stay on the antibiotic. Return to the ER for unexplained fever over 100.4, difficulty breathing or swallowing, severe worsening pain or redness or other emergent worsening or concern.

## 2023-04-28 DIAGNOSIS — E11.8 POORLY CONTROLLED TYPE 2 DIABETES MELLITUS WITH COMPLICATION (HCC): ICD-10-CM

## 2023-04-28 DIAGNOSIS — I10 ESSENTIAL HYPERTENSION: ICD-10-CM

## 2023-04-28 DIAGNOSIS — E11.65 POORLY CONTROLLED TYPE 2 DIABETES MELLITUS WITH COMPLICATION (HCC): ICD-10-CM

## 2023-04-28 LAB
ALBUMIN SERPL-MCNC: 3.9 G/DL (ref 3.4–5)
ALBUMIN/GLOB SERPL: 1.3 {RATIO} (ref 1.1–2.2)
ALP SERPL-CCNC: 179 U/L (ref 40–129)
ALT SERPL-CCNC: 11 U/L (ref 10–40)
ANION GAP SERPL CALCULATED.3IONS-SCNC: 12 MMOL/L (ref 3–16)
AST SERPL-CCNC: 10 U/L (ref 15–37)
BILIRUB SERPL-MCNC: <0.2 MG/DL (ref 0–1)
BUN SERPL-MCNC: 26 MG/DL (ref 7–20)
CALCIUM SERPL-MCNC: 9.8 MG/DL (ref 8.3–10.6)
CHLORIDE SERPL-SCNC: 102 MMOL/L (ref 99–110)
CHOLEST SERPL-MCNC: 254 MG/DL (ref 0–199)
CO2 SERPL-SCNC: 24 MMOL/L (ref 21–32)
CREAT SERPL-MCNC: 1 MG/DL (ref 0.6–1.1)
CREAT UR-MCNC: 81.1 MG/DL (ref 28–259)
EST. AVERAGE GLUCOSE BLD GHB EST-MCNC: 223.1 MG/DL
GFR SERPLBLD CREATININE-BSD FMLA CKD-EPI: >60 ML/MIN/{1.73_M2}
GLUCOSE SERPL-MCNC: 354 MG/DL (ref 70–99)
HBA1C MFR BLD: 9.4 %
HDLC SERPL-MCNC: 52 MG/DL (ref 40–60)
LDLC SERPL CALC-MCNC: ABNORMAL MG/DL
LDLC SERPL-MCNC: 150 MG/DL
MICROALBUMIN UR DL<=1MG/L-MCNC: 25 MG/DL
MICROALBUMIN/CREAT UR: 308.3 MG/G (ref 0–30)
POTASSIUM SERPL-SCNC: 4.8 MMOL/L (ref 3.5–5.1)
PROT SERPL-MCNC: 6.8 G/DL (ref 6.4–8.2)
SODIUM SERPL-SCNC: 138 MMOL/L (ref 136–145)
TRIGL SERPL-MCNC: 348 MG/DL (ref 0–150)
TSH SERPL DL<=0.005 MIU/L-ACNC: 1.75 UIU/ML (ref 0.27–4.2)
VLDLC SERPL CALC-MCNC: ABNORMAL MG/DL

## 2023-05-19 ENCOUNTER — TELEPHONE (OUTPATIENT)
Dept: ENDOCRINOLOGY | Age: 58
End: 2023-05-19

## 2023-07-03 RX ORDER — DULAGLUTIDE 0.75 MG/.5ML
INJECTION, SOLUTION SUBCUTANEOUS
Qty: 6 ML | Refills: 0 | Status: SHIPPED | OUTPATIENT
Start: 2023-07-03

## 2023-07-20 ENCOUNTER — OFFICE VISIT (OUTPATIENT)
Dept: ENDOCRINOLOGY | Age: 58
End: 2023-07-20
Payer: COMMERCIAL

## 2023-07-20 VITALS
RESPIRATION RATE: 14 BRPM | WEIGHT: 181 LBS | TEMPERATURE: 98 F | DIASTOLIC BLOOD PRESSURE: 85 MMHG | BODY MASS INDEX: 27.43 KG/M2 | HEART RATE: 76 BPM | HEIGHT: 68 IN | SYSTOLIC BLOOD PRESSURE: 170 MMHG

## 2023-07-20 DIAGNOSIS — E78.2 MIXED HYPERLIPIDEMIA: ICD-10-CM

## 2023-07-20 DIAGNOSIS — I10 ESSENTIAL HYPERTENSION: ICD-10-CM

## 2023-07-20 DIAGNOSIS — E11.8 POORLY CONTROLLED TYPE 2 DIABETES MELLITUS WITH COMPLICATION (HCC): Primary | ICD-10-CM

## 2023-07-20 DIAGNOSIS — E11.65 POORLY CONTROLLED TYPE 2 DIABETES MELLITUS WITH COMPLICATION (HCC): Primary | ICD-10-CM

## 2023-07-20 PROCEDURE — 99214 OFFICE O/P EST MOD 30 MIN: CPT | Performed by: INTERNAL MEDICINE

## 2023-07-20 PROCEDURE — 3046F HEMOGLOBIN A1C LEVEL >9.0%: CPT | Performed by: INTERNAL MEDICINE

## 2023-07-20 PROCEDURE — 3077F SYST BP >= 140 MM HG: CPT | Performed by: INTERNAL MEDICINE

## 2023-07-20 PROCEDURE — 3079F DIAST BP 80-89 MM HG: CPT | Performed by: INTERNAL MEDICINE

## 2023-07-20 RX ORDER — GLIMEPIRIDE 4 MG/1
TABLET ORAL
COMMUNITY
Start: 2023-04-14

## 2023-07-20 RX ORDER — DULAGLUTIDE 1.5 MG/.5ML
1.5 INJECTION, SOLUTION SUBCUTANEOUS WEEKLY
Qty: 4 ADJUSTABLE DOSE PRE-FILLED PEN SYRINGE | Refills: 3 | Status: SHIPPED | OUTPATIENT
Start: 2023-07-20

## 2023-07-20 RX ORDER — HYDROXYZINE HYDROCHLORIDE 25 MG/1
TABLET, FILM COATED ORAL
COMMUNITY
Start: 2023-05-16

## 2023-11-01 RX ORDER — DULAGLUTIDE 0.75 MG/.5ML
INJECTION, SOLUTION SUBCUTANEOUS
Qty: 6 ML | OUTPATIENT
Start: 2023-11-01

## 2023-11-06 ENCOUNTER — TELEPHONE (OUTPATIENT)
Dept: ENDOCRINOLOGY | Age: 58
End: 2023-11-06

## 2023-11-06 DIAGNOSIS — E11.8 POORLY CONTROLLED TYPE 2 DIABETES MELLITUS WITH COMPLICATION (HCC): Primary | ICD-10-CM

## 2023-11-06 DIAGNOSIS — E11.65 POORLY CONTROLLED TYPE 2 DIABETES MELLITUS WITH COMPLICATION (HCC): Primary | ICD-10-CM

## 2023-11-06 RX ORDER — DULAGLUTIDE 1.5 MG/.5ML
1.5 INJECTION, SOLUTION SUBCUTANEOUS WEEKLY
Qty: 2 ML | Refills: 2 | Status: SHIPPED | OUTPATIENT
Start: 2023-11-06 | End: 2023-11-06 | Stop reason: SDUPTHER

## 2023-11-06 RX ORDER — DULAGLUTIDE 1.5 MG/.5ML
1.5 INJECTION, SOLUTION SUBCUTANEOUS WEEKLY
Qty: 6 ML | Refills: 1 | Status: SHIPPED | OUTPATIENT
Start: 2023-11-06

## 2023-11-06 NOTE — TELEPHONE ENCOUNTER
Call from [facility] asking to change the rx to a 3 month or 6ML with zero refills per pt's request    Re: Terrence Waggoner # [phone] - Henrique Messer

## 2023-11-06 NOTE — TELEPHONE ENCOUNTER
Refill needed    dulaglutide (TRULICITY) 1.5 QG/9.1CO SC injection   [facility] [id number] - [id number], [address] [place] [address] Lissa Hendrickson, [id number] [facility]   Phone:  [phone]  Fax:  [phone]

## 2024-01-20 DIAGNOSIS — E11.65 POORLY CONTROLLED TYPE 2 DIABETES MELLITUS WITH COMPLICATION (HCC): ICD-10-CM

## 2024-01-20 DIAGNOSIS — E11.8 POORLY CONTROLLED TYPE 2 DIABETES MELLITUS WITH COMPLICATION (HCC): ICD-10-CM

## 2024-01-20 DIAGNOSIS — I10 ESSENTIAL HYPERTENSION: ICD-10-CM

## 2024-01-20 DIAGNOSIS — E78.2 MIXED HYPERLIPIDEMIA: ICD-10-CM

## 2024-01-20 LAB
ALBUMIN SERPL-MCNC: 3.5 G/DL (ref 3.4–5)
ALBUMIN/GLOB SERPL: 1.4 {RATIO} (ref 1.1–2.2)
ALP SERPL-CCNC: 147 U/L (ref 40–129)
ALT SERPL-CCNC: 15 U/L (ref 10–40)
ANION GAP SERPL CALCULATED.3IONS-SCNC: 10 MMOL/L (ref 3–16)
AST SERPL-CCNC: 19 U/L (ref 15–37)
BILIRUB SERPL-MCNC: 0.3 MG/DL (ref 0–1)
BUN SERPL-MCNC: 27 MG/DL (ref 7–20)
CALCIUM SERPL-MCNC: 8.8 MG/DL (ref 8.3–10.6)
CHLORIDE SERPL-SCNC: 107 MMOL/L (ref 99–110)
CHOLEST SERPL-MCNC: 221 MG/DL (ref 0–199)
CO2 SERPL-SCNC: 22 MMOL/L (ref 21–32)
CREAT SERPL-MCNC: 1.3 MG/DL (ref 0.6–1.1)
CREAT UR-MCNC: 77.7 MG/DL (ref 28–259)
EST. AVERAGE GLUCOSE BLD GHB EST-MCNC: 180 MG/DL
GFR SERPLBLD CREATININE-BSD FMLA CKD-EPI: 48 ML/MIN/{1.73_M2}
GLUCOSE SERPL-MCNC: 185 MG/DL (ref 70–99)
HBA1C MFR BLD: 7.9 %
HDLC SERPL-MCNC: 53 MG/DL (ref 40–60)
LDLC SERPL CALC-MCNC: 146 MG/DL
MICROALBUMIN UR DL<=1MG/L-MCNC: 277.2 MG/DL
MICROALBUMIN/CREAT UR: 3567.6 MG/G (ref 0–30)
POTASSIUM SERPL-SCNC: 3.9 MMOL/L (ref 3.5–5.1)
PROT SERPL-MCNC: 6 G/DL (ref 6.4–8.2)
SODIUM SERPL-SCNC: 139 MMOL/L (ref 136–145)
TRIGL SERPL-MCNC: 112 MG/DL (ref 0–150)
TSH SERPL DL<=0.005 MIU/L-ACNC: 1.99 UIU/ML (ref 0.27–4.2)
VLDLC SERPL CALC-MCNC: 22 MG/DL

## 2024-01-23 ENCOUNTER — OFFICE VISIT (OUTPATIENT)
Dept: ENDOCRINOLOGY | Age: 59
End: 2024-01-23
Payer: COMMERCIAL

## 2024-01-23 ENCOUNTER — HOSPITAL ENCOUNTER (INPATIENT)
Age: 59
LOS: 3 days | Discharge: HOME | End: 2024-01-26
Attending: EMERGENCY MEDICINE | Admitting: INTERNAL MEDICINE
Payer: COMMERCIAL

## 2024-01-23 ENCOUNTER — APPOINTMENT (OUTPATIENT)
Dept: GENERAL RADIOLOGY | Age: 59
End: 2024-01-23
Payer: COMMERCIAL

## 2024-01-23 VITALS
TEMPERATURE: 98 F | DIASTOLIC BLOOD PRESSURE: 100 MMHG | BODY MASS INDEX: 29.1 KG/M2 | HEART RATE: 76 BPM | SYSTOLIC BLOOD PRESSURE: 195 MMHG | HEIGHT: 68 IN | WEIGHT: 192 LBS | RESPIRATION RATE: 14 BRPM

## 2024-01-23 DIAGNOSIS — E78.2 MIXED HYPERLIPIDEMIA: ICD-10-CM

## 2024-01-23 DIAGNOSIS — I10 MALIGNANT HYPERTENSION: Primary | ICD-10-CM

## 2024-01-23 DIAGNOSIS — I10 ESSENTIAL HYPERTENSION: ICD-10-CM

## 2024-01-23 DIAGNOSIS — E11.8 POORLY CONTROLLED TYPE 2 DIABETES MELLITUS WITH COMPLICATION (HCC): Primary | ICD-10-CM

## 2024-01-23 DIAGNOSIS — E11.65 POORLY CONTROLLED TYPE 2 DIABETES MELLITUS WITH COMPLICATION (HCC): Primary | ICD-10-CM

## 2024-01-23 PROBLEM — I16.0 HYPERTENSIVE URGENCY: Status: ACTIVE | Noted: 2024-01-23

## 2024-01-23 LAB
ALBUMIN SERPL-MCNC: 3.7 G/DL (ref 3.4–5)
ALBUMIN/GLOB SERPL: 1.1 {RATIO} (ref 1.1–2.2)
ALP SERPL-CCNC: 142 U/L (ref 40–129)
ALT SERPL-CCNC: 20 U/L (ref 10–40)
ANION GAP SERPL CALCULATED.3IONS-SCNC: 13 MMOL/L (ref 3–16)
AST SERPL-CCNC: 28 U/L (ref 15–37)
BACTERIA URNS QL MICRO: NORMAL /HPF
BASOPHILS # BLD: 0.1 K/UL (ref 0–0.2)
BASOPHILS NFR BLD: 1 %
BILIRUB SERPL-MCNC: <0.2 MG/DL (ref 0–1)
BILIRUB UR QL STRIP.AUTO: NEGATIVE
BUN SERPL-MCNC: 26 MG/DL (ref 7–20)
CALCIUM SERPL-MCNC: 9 MG/DL (ref 8.3–10.6)
CHLORIDE SERPL-SCNC: 107 MMOL/L (ref 99–110)
CLARITY UR: CLEAR
CO2 SERPL-SCNC: 21 MMOL/L (ref 21–32)
COLOR UR: YELLOW
CREAT SERPL-MCNC: 1.4 MG/DL (ref 0.6–1.1)
DEPRECATED RDW RBC AUTO: 14.8 % (ref 12.4–15.4)
EOSINOPHIL # BLD: 0.4 K/UL (ref 0–0.6)
EOSINOPHIL NFR BLD: 4.8 %
EPI CELLS #/AREA URNS AUTO: 1 /HPF (ref 0–5)
GFR SERPLBLD CREATININE-BSD FMLA CKD-EPI: 44 ML/MIN/{1.73_M2}
GLUCOSE BLD-MCNC: 133 MG/DL (ref 70–99)
GLUCOSE SERPL-MCNC: 129 MG/DL (ref 70–99)
GLUCOSE UR STRIP.AUTO-MCNC: 100 MG/DL
HCT VFR BLD AUTO: 37.2 % (ref 36–48)
HGB BLD-MCNC: 12.5 G/DL (ref 12–16)
HGB UR QL STRIP.AUTO: ABNORMAL
HYALINE CASTS #/AREA URNS AUTO: 1 /LPF (ref 0–8)
KETONES UR STRIP.AUTO-MCNC: NEGATIVE MG/DL
LEUKOCYTE ESTERASE UR QL STRIP.AUTO: NEGATIVE
LYMPHOCYTES # BLD: 2.6 K/UL (ref 1–5.1)
LYMPHOCYTES NFR BLD: 28 %
MCH RBC QN AUTO: 27.5 PG (ref 26–34)
MCHC RBC AUTO-ENTMCNC: 33.7 G/DL (ref 31–36)
MCV RBC AUTO: 81.6 FL (ref 80–100)
MONOCYTES # BLD: 0.7 K/UL (ref 0–1.3)
MONOCYTES NFR BLD: 7.3 %
NEUTROPHILS # BLD: 5.5 K/UL (ref 1.7–7.7)
NEUTROPHILS NFR BLD: 58.9 %
NITRITE UR QL STRIP.AUTO: NEGATIVE
NT-PROBNP SERPL-MCNC: 1051 PG/ML (ref 0–124)
PERFORMED ON: ABNORMAL
PH UR STRIP.AUTO: 6 [PH] (ref 5–8)
PLATELET # BLD AUTO: 267 K/UL (ref 135–450)
PMV BLD AUTO: 8.3 FL (ref 5–10.5)
POTASSIUM SERPL-SCNC: 4.6 MMOL/L (ref 3.5–5.1)
PROT SERPL-MCNC: 7 G/DL (ref 6.4–8.2)
PROT UR STRIP.AUTO-MCNC: 300 MG/DL
RBC # BLD AUTO: 4.56 M/UL (ref 4–5.2)
RBC CLUMPS #/AREA URNS AUTO: 2 /HPF (ref 0–4)
SODIUM SERPL-SCNC: 141 MMOL/L (ref 136–145)
SP GR UR STRIP.AUTO: 1.01 (ref 1–1.03)
UA COMPLETE W REFLEX CULTURE PNL UR: ABNORMAL
UA DIPSTICK W REFLEX MICRO PNL UR: YES
URN SPEC COLLECT METH UR: ABNORMAL
UROBILINOGEN UR STRIP-ACNC: 0.2 E.U./DL
WBC # BLD AUTO: 9.3 K/UL (ref 4–11)
WBC #/AREA URNS AUTO: 1 /HPF (ref 0–5)

## 2024-01-23 PROCEDURE — 3077F SYST BP >= 140 MM HG: CPT | Performed by: INTERNAL MEDICINE

## 2024-01-23 PROCEDURE — 96365 THER/PROPH/DIAG IV INF INIT: CPT

## 2024-01-23 PROCEDURE — 3051F HG A1C>EQUAL 7.0%<8.0%: CPT | Performed by: INTERNAL MEDICINE

## 2024-01-23 PROCEDURE — 2000000000 HC ICU R&B

## 2024-01-23 PROCEDURE — 6360000002 HC RX W HCPCS: Performed by: INTERNAL MEDICINE

## 2024-01-23 PROCEDURE — 81001 URINALYSIS AUTO W/SCOPE: CPT

## 2024-01-23 PROCEDURE — 3080F DIAST BP >= 90 MM HG: CPT | Performed by: INTERNAL MEDICINE

## 2024-01-23 PROCEDURE — 6360000002 HC RX W HCPCS: Performed by: EMERGENCY MEDICINE

## 2024-01-23 PROCEDURE — 2580000003 HC RX 258: Performed by: EMERGENCY MEDICINE

## 2024-01-23 PROCEDURE — 71046 X-RAY EXAM CHEST 2 VIEWS: CPT

## 2024-01-23 PROCEDURE — 99214 OFFICE O/P EST MOD 30 MIN: CPT | Performed by: INTERNAL MEDICINE

## 2024-01-23 PROCEDURE — 83880 ASSAY OF NATRIURETIC PEPTIDE: CPT

## 2024-01-23 PROCEDURE — 85025 COMPLETE CBC W/AUTO DIFF WBC: CPT

## 2024-01-23 PROCEDURE — 93005 ELECTROCARDIOGRAM TRACING: CPT | Performed by: EMERGENCY MEDICINE

## 2024-01-23 PROCEDURE — 80053 COMPREHEN METABOLIC PANEL: CPT

## 2024-01-23 PROCEDURE — 99285 EMERGENCY DEPT VISIT HI MDM: CPT

## 2024-01-23 RX ORDER — SODIUM CHLORIDE 9 MG/ML
INJECTION, SOLUTION INTRAVENOUS PRN
Status: DISCONTINUED | OUTPATIENT
Start: 2024-01-23 | End: 2024-01-26 | Stop reason: HOSPADM

## 2024-01-23 RX ORDER — POTASSIUM CHLORIDE 20 MEQ/1
40 TABLET, EXTENDED RELEASE ORAL PRN
Status: DISCONTINUED | OUTPATIENT
Start: 2024-01-23 | End: 2024-01-26 | Stop reason: HOSPADM

## 2024-01-23 RX ORDER — MAGNESIUM SULFATE IN WATER 40 MG/ML
2000 INJECTION, SOLUTION INTRAVENOUS PRN
Status: DISCONTINUED | OUTPATIENT
Start: 2024-01-23 | End: 2024-01-26 | Stop reason: HOSPADM

## 2024-01-23 RX ORDER — DEXTROSE MONOHYDRATE 100 MG/ML
INJECTION, SOLUTION INTRAVENOUS CONTINUOUS PRN
Status: DISCONTINUED | OUTPATIENT
Start: 2024-01-23 | End: 2024-01-26 | Stop reason: HOSPADM

## 2024-01-23 RX ORDER — ONDANSETRON 2 MG/ML
4 INJECTION INTRAMUSCULAR; INTRAVENOUS EVERY 6 HOURS PRN
Status: DISCONTINUED | OUTPATIENT
Start: 2024-01-23 | End: 2024-01-26 | Stop reason: HOSPADM

## 2024-01-23 RX ORDER — LOSARTAN POTASSIUM 100 MG/1
100 TABLET ORAL DAILY
Status: DISCONTINUED | OUTPATIENT
Start: 2024-01-24 | End: 2024-01-26 | Stop reason: HOSPADM

## 2024-01-23 RX ORDER — FUROSEMIDE 10 MG/ML
20 INJECTION INTRAMUSCULAR; INTRAVENOUS ONCE
Status: COMPLETED | OUTPATIENT
Start: 2024-01-23 | End: 2024-01-23

## 2024-01-23 RX ORDER — SODIUM CHLORIDE 0.9 % (FLUSH) 0.9 %
5-40 SYRINGE (ML) INJECTION EVERY 12 HOURS SCHEDULED
Status: DISCONTINUED | OUTPATIENT
Start: 2024-01-23 | End: 2024-01-26 | Stop reason: HOSPADM

## 2024-01-23 RX ORDER — POTASSIUM CHLORIDE 7.45 MG/ML
10 INJECTION INTRAVENOUS PRN
Status: DISCONTINUED | OUTPATIENT
Start: 2024-01-23 | End: 2024-01-26 | Stop reason: HOSPADM

## 2024-01-23 RX ORDER — POLYETHYLENE GLYCOL 3350 17 G/17G
17 POWDER, FOR SOLUTION ORAL DAILY PRN
Status: DISCONTINUED | OUTPATIENT
Start: 2024-01-23 | End: 2024-01-26 | Stop reason: HOSPADM

## 2024-01-23 RX ORDER — HYDRALAZINE HYDROCHLORIDE 20 MG/ML
10 INJECTION INTRAMUSCULAR; INTRAVENOUS EVERY 6 HOURS PRN
Status: DISCONTINUED | OUTPATIENT
Start: 2024-01-23 | End: 2024-01-24

## 2024-01-23 RX ORDER — AMOXICILLIN 500 MG
1 CAPSULE ORAL DAILY
COMMUNITY

## 2024-01-23 RX ORDER — ACETAMINOPHEN 325 MG/1
650 TABLET ORAL EVERY 6 HOURS PRN
Status: DISCONTINUED | OUTPATIENT
Start: 2024-01-23 | End: 2024-01-26 | Stop reason: HOSPADM

## 2024-01-23 RX ORDER — SODIUM CHLORIDE 0.9 % (FLUSH) 0.9 %
5-40 SYRINGE (ML) INJECTION PRN
Status: DISCONTINUED | OUTPATIENT
Start: 2024-01-23 | End: 2024-01-26 | Stop reason: HOSPADM

## 2024-01-23 RX ORDER — ENOXAPARIN SODIUM 100 MG/ML
40 INJECTION SUBCUTANEOUS DAILY
Status: DISCONTINUED | OUTPATIENT
Start: 2024-01-24 | End: 2024-01-26 | Stop reason: HOSPADM

## 2024-01-23 RX ORDER — ACETAMINOPHEN 650 MG/1
650 SUPPOSITORY RECTAL EVERY 6 HOURS PRN
Status: DISCONTINUED | OUTPATIENT
Start: 2024-01-23 | End: 2024-01-26 | Stop reason: HOSPADM

## 2024-01-23 RX ORDER — IBUPROFEN 800 MG
400 TABLET ORAL DAILY
COMMUNITY

## 2024-01-23 RX ORDER — AMLODIPINE BESYLATE 5 MG/1
5 TABLET ORAL DAILY
Status: DISCONTINUED | OUTPATIENT
Start: 2024-01-24 | End: 2024-01-24

## 2024-01-23 RX ORDER — INSULIN LISPRO 100 [IU]/ML
0-4 INJECTION, SOLUTION INTRAVENOUS; SUBCUTANEOUS NIGHTLY
Status: DISCONTINUED | OUTPATIENT
Start: 2024-01-23 | End: 2024-01-24

## 2024-01-23 RX ORDER — INSULIN LISPRO 100 [IU]/ML
0-4 INJECTION, SOLUTION INTRAVENOUS; SUBCUTANEOUS
Status: DISCONTINUED | OUTPATIENT
Start: 2024-01-24 | End: 2024-01-24

## 2024-01-23 RX ADMIN — FUROSEMIDE 20 MG: 10 INJECTION, SOLUTION INTRAMUSCULAR; INTRAVENOUS at 22:52

## 2024-01-23 RX ADMIN — SODIUM CHLORIDE 5 MG/HR: 9 INJECTION, SOLUTION INTRAVENOUS at 19:46

## 2024-01-23 ASSESSMENT — LIFESTYLE VARIABLES
HOW OFTEN DO YOU HAVE A DRINK CONTAINING ALCOHOL: NEVER
HOW MANY STANDARD DRINKS CONTAINING ALCOHOL DO YOU HAVE ON A TYPICAL DAY: PATIENT DOES NOT DRINK

## 2024-01-23 ASSESSMENT — PAIN - FUNCTIONAL ASSESSMENT: PAIN_FUNCTIONAL_ASSESSMENT: NONE - DENIES PAIN

## 2024-01-23 ASSESSMENT — PAIN SCALES - GENERAL: PAINLEVEL_OUTOF10: 0

## 2024-01-23 NOTE — PROGRESS NOTES
Galina Nick is a 58 y.o. female is seen sporadically for management of uncontrolled type 2 diabetes. Galina Nick was diagnosed with Diabetes mellitus in 2004 with prediabetes .  Diabetes was diagnosed at routine screening [facility] ge 38 she managed with diet for a few years on review of chart it appears that patient has uncontrolled diabetes for years and was previously advised by her primary care physician to improve her glycemia but patient continued to have uncontrolled diabetes for years she is noted to have an A1c of 9.1 in 2010 and her diabetes control appears to have worsened over the years since July 2018 her A1c has been 14 or above.   Galina Nick got diabetic education in the past.  Comorbid conditions: Neuropathy, Nephropathy and Retinopathy    She has been obese but was able to lose almost 80 lbs with diet and exercise since 2015   She has hypercalcemia and was seen in the Er for possible Kidney stones in the past ---she denies any exogenous calcium intake but takes MVI which possibly has calcium  High calcium noted since 12/17.    VIT D deficiency for years  with levels as low as 16 ----she has been taking otc she takes aprox 4000 Iu daily last level was 36 on oct 2018   She has gone through menopausal changes since 2017   Patient also has essential hypertension and hyperlipidemia    INTERIM:    Diabetes  She presents for her follow-up diabetic visit. She has type 2 diabetes mellitus. No MedicAlert identification noted. The initial diagnosis of diabetes was made 15 years ago. Her disease course has been worsening. There are no hypoglycemic associated symptoms. Associated symptoms include polyuria and weight loss. There are no hypoglycemic complications. Symptoms are worsening. Diabetic complications include peripheral neuropathy and retinopathy. Risk factors for coronary artery disease include diabetes mellitus, dyslipidemia, hypertension and post-menopausal. Current diabetic treatment includes

## 2024-01-23 NOTE — ED PROVIDER NOTES
Epithelial Cells, UA 1 0 - 5 /HPF   EKG 12 Lead   Result Value Ref Range    Ventricular Rate 78 BPM    Atrial Rate 78 BPM    P-R Interval 162 ms    QRS Duration 88 ms    Q-T Interval 408 ms    QTc Calculation (Bazett) 465 ms    P Axis 62 degrees    R Axis 24 degrees    T Axis 53 degrees    Diagnosis       Normal sinus rhythmPossible Left atrial enlargementBorderline ECG       SEP-1  Is this patient to be included in the SEP-1 Core Measure due to severe sepsis or septic shock?   {Dks0AdpHgYg:84415}     Screenings                    Medications Given During ED Visit  Medications   niCARdipine (CARDENE) 25 mg in sodium chloride 0.9 % 250 mL infusion ([id number]) (5 mg/hr IntraVENous New Bag 1/23/24 1946)       Records Reviewed  ***  Labs from 1/2/2024  - Significant albuminuria.  - JAYY with elevated creatinine of 1.3    Social Determinants of Health  {Social Determinants of Health (EM 2023) (Optional):80227}    Chronic Conditions affecting care   has a past medical history of Diabetes mellitus (HCC) and Hypertension.    MDM and ED Course  ***([facility], [place])    Patient presented to the emergency department today after being directed here by her endocrinologist.  She had outpatient labs performed on Saturday that showed JAYY and proteinuria.  Both findings were new.  Patient was also found to have markedly elevated blood pressure.  She was 214/105 on arrival here.  Despite this, patient denied having any associated chest pain, shortness of breath, or other cardiac symptoms.  She also denied neurologic symptoms and headache.  Labs performed here confirmed JAYY with a creatinine of 1.4.  As well as proteinuria.  In addition, patient had a significantly elevated BNP, likely representative of heart strain secondary to her hypertension.  Fortunately, EKG showed no evidence of hypertensive changes such as LVH or ST segment changes.  I have initiated a Cardene IV drip in the emergency department to reduce her afterload.    The total

## 2024-01-24 ENCOUNTER — APPOINTMENT (OUTPATIENT)
Dept: MRI IMAGING | Age: 59
End: 2024-01-24
Payer: COMMERCIAL

## 2024-01-24 LAB
ANION GAP SERPL CALCULATED.3IONS-SCNC: 9 MMOL/L (ref 3–16)
BUN SERPL-MCNC: 23 MG/DL (ref 7–20)
CALCIUM SERPL-MCNC: 8.8 MG/DL (ref 8.3–10.6)
CHLORIDE SERPL-SCNC: 108 MMOL/L (ref 99–110)
CO2 SERPL-SCNC: 24 MMOL/L (ref 21–32)
CREAT SERPL-MCNC: 1.3 MG/DL (ref 0.6–1.1)
EKG ATRIAL RATE: 78 BPM
EKG DIAGNOSIS: NORMAL
EKG P AXIS: 62 DEGREES
EKG P-R INTERVAL: 162 MS
EKG Q-T INTERVAL: 360 MS
EKG QRS DURATION: 88 MS
EKG QTC CALCULATION (BAZETT): 410 MS
EKG R AXIS: 24 DEGREES
EKG T AXIS: 53 DEGREES
EKG VENTRICULAR RATE: 78 BPM
EST. AVERAGE GLUCOSE BLD GHB EST-MCNC: 177.2 MG/DL
GFR SERPLBLD CREATININE-BSD FMLA CKD-EPI: 48 ML/MIN/{1.73_M2}
GLUCOSE BLD-MCNC: 122 MG/DL (ref 70–99)
GLUCOSE BLD-MCNC: 159 MG/DL (ref 70–99)
GLUCOSE BLD-MCNC: 198 MG/DL (ref 70–99)
GLUCOSE BLD-MCNC: 242 MG/DL (ref 70–99)
GLUCOSE SERPL-MCNC: 226 MG/DL (ref 70–99)
HBA1C MFR BLD: 7.8 %
PERFORMED ON: ABNORMAL
POTASSIUM SERPL-SCNC: 4.1 MMOL/L (ref 3.5–5.1)
SODIUM SERPL-SCNC: 141 MMOL/L (ref 136–145)
TSH SERPL DL<=0.005 MIU/L-ACNC: 2.82 UIU/ML (ref 0.27–4.2)

## 2024-01-24 PROCEDURE — 83036 HEMOGLOBIN GLYCOSYLATED A1C: CPT

## 2024-01-24 PROCEDURE — 84244 ASSAY OF RENIN: CPT

## 2024-01-24 PROCEDURE — 82570 ASSAY OF URINE CREATININE: CPT

## 2024-01-24 PROCEDURE — 84443 ASSAY THYROID STIM HORMONE: CPT

## 2024-01-24 PROCEDURE — 2000000000 HC ICU R&B

## 2024-01-24 PROCEDURE — 2580000003 HC RX 258: Performed by: INTERNAL MEDICINE

## 2024-01-24 PROCEDURE — 82043 UR ALBUMIN QUANTITATIVE: CPT

## 2024-01-24 PROCEDURE — 82088 ASSAY OF ALDOSTERONE: CPT

## 2024-01-24 PROCEDURE — 6370000000 HC RX 637 (ALT 250 FOR IP): Performed by: INTERNAL MEDICINE

## 2024-01-24 PROCEDURE — 80048 BASIC METABOLIC PNL TOTAL CA: CPT

## 2024-01-24 PROCEDURE — 2580000003 HC RX 258: Performed by: EMERGENCY MEDICINE

## 2024-01-24 PROCEDURE — 84156 ASSAY OF PROTEIN URINE: CPT

## 2024-01-24 PROCEDURE — 6360000002 HC RX W HCPCS: Performed by: INTERNAL MEDICINE

## 2024-01-24 PROCEDURE — 70551 MRI BRAIN STEM W/O DYE: CPT

## 2024-01-24 PROCEDURE — 6360000002 HC RX W HCPCS: Performed by: EMERGENCY MEDICINE

## 2024-01-24 PROCEDURE — 93306 TTE W/DOPPLER COMPLETE: CPT

## 2024-01-24 PROCEDURE — 93010 ELECTROCARDIOGRAM REPORT: CPT | Performed by: INTERNAL MEDICINE

## 2024-01-24 RX ORDER — INSULIN GLARGINE 100 [IU]/ML
15 INJECTION, SOLUTION SUBCUTANEOUS NIGHTLY
Status: DISCONTINUED | OUTPATIENT
Start: 2024-01-24 | End: 2024-01-26 | Stop reason: HOSPADM

## 2024-01-24 RX ORDER — NIFEDIPINE 30 MG/1
30 TABLET, EXTENDED RELEASE ORAL 2 TIMES DAILY
Status: DISCONTINUED | OUTPATIENT
Start: 2024-01-24 | End: 2024-01-26 | Stop reason: HOSPADM

## 2024-01-24 RX ORDER — HYDRALAZINE HYDROCHLORIDE 20 MG/ML
10 INJECTION INTRAMUSCULAR; INTRAVENOUS EVERY 4 HOURS PRN
Status: DISCONTINUED | OUTPATIENT
Start: 2024-01-24 | End: 2024-01-26 | Stop reason: HOSPADM

## 2024-01-24 RX ORDER — INSULIN LISPRO 100 [IU]/ML
0-8 INJECTION, SOLUTION INTRAVENOUS; SUBCUTANEOUS
Status: DISCONTINUED | OUTPATIENT
Start: 2024-01-24 | End: 2024-01-26 | Stop reason: HOSPADM

## 2024-01-24 RX ORDER — DEXTROSE MONOHYDRATE 100 MG/ML
INJECTION, SOLUTION INTRAVENOUS CONTINUOUS PRN
Status: DISCONTINUED | OUTPATIENT
Start: 2024-01-24 | End: 2024-01-24

## 2024-01-24 RX ORDER — GLUCAGON 1 MG/ML
1 KIT INJECTION PRN
Status: DISCONTINUED | OUTPATIENT
Start: 2024-01-24 | End: 2024-01-26 | Stop reason: HOSPADM

## 2024-01-24 RX ORDER — INSULIN LISPRO 100 [IU]/ML
0-4 INJECTION, SOLUTION INTRAVENOUS; SUBCUTANEOUS NIGHTLY
Status: DISCONTINUED | OUTPATIENT
Start: 2024-01-24 | End: 2024-01-26 | Stop reason: HOSPADM

## 2024-01-24 RX ADMIN — INSULIN LISPRO 2 UNITS: 100 INJECTION, SOLUTION INTRAVENOUS; SUBCUTANEOUS at 11:51

## 2024-01-24 RX ADMIN — AMLODIPINE BESYLATE 5 MG: 5 TABLET ORAL at 08:04

## 2024-01-24 RX ADMIN — SODIUM CHLORIDE, PRESERVATIVE FREE 10 ML: 5 INJECTION INTRAVENOUS at 19:54

## 2024-01-24 RX ADMIN — NIFEDIPINE 30 MG: 30 TABLET, EXTENDED RELEASE ORAL at 19:54

## 2024-01-24 RX ADMIN — HYDRALAZINE HYDROCHLORIDE 10 MG: 20 INJECTION, SOLUTION INTRAMUSCULAR; INTRAVENOUS at 11:51

## 2024-01-24 RX ADMIN — INSULIN GLARGINE 15 UNITS: 100 INJECTION, SOLUTION SUBCUTANEOUS at 22:05

## 2024-01-24 RX ADMIN — NIFEDIPINE 30 MG: 30 TABLET, EXTENDED RELEASE ORAL at 10:45

## 2024-01-24 RX ADMIN — LOSARTAN POTASSIUM 100 MG: 100 TABLET, FILM COATED ORAL at 08:04

## 2024-01-24 RX ADMIN — ENOXAPARIN SODIUM 40 MG: 100 INJECTION SUBCUTANEOUS at 08:30

## 2024-01-24 RX ADMIN — SODIUM CHLORIDE 5 MG/HR: 9 INJECTION, SOLUTION INTRAVENOUS at 01:40

## 2024-01-24 ASSESSMENT — PAIN SCALES - GENERAL
PAINLEVEL_OUTOF10: 0

## 2024-01-24 NOTE — PLAN OF CARE
Problem: Cardiovascular - Adult  Goal: Maintains optimal cardiac output and hemodynamic stability  Outcome: Progressing  Flowsheets (Taken 1/23/2024 2257)  Maintains optimal cardiac output and hemodynamic stability:   Monitor blood pressure and heart rate   Monitor urine output and notify Licensed Independent Practitioner for values outside of normal range

## 2024-01-24 NOTE — PROGRESS NOTES
Pt admitted to CVU 2, attached to tele box 2. Nicardipine infusing @ 5 mg/ hr. BP -158/88 (109). Pt is A&O x4,. PIV in LAC. Pt educated on medications. All questions answered. Call light in reach

## 2024-01-24 NOTE — PROGRESS NOTES
Hospitalist Progress Note      PCP: Tristan Lei MD    Date of Admission: 1/23/2024    Chief Complaint: Blurry vision and HTN    Hospital Course: 59 yo F with DM 2, HTN came to ER with blurry vision.  Admitted as inpatient to CVU for hypertensive urgency.  Started on Cardene gtt.  MRI Brain requested for [place]UZ and UC HTN.  Changed to Nifedipine from Amlodipine.  Renal consulted for CKD.  Renal artery US requested.    Subjective:  Patient with HA.  No CP, SOB, abdominal pain or fevers.         Medications:  Reviewed    Infusion Medications    niCARdipine Stopped (01/24/24 1047)    sodium chloride      dextrose       Scheduled Medications    NIFEdipine  30 mg Oral BID    insulin lispro  0-8 Units SubCUTAneous TID WC    insulin lispro  0-4 Units SubCUTAneous Nightly    sodium chloride flush  5-40 mL IntraVENous 2 times per day    enoxaparin  40 mg SubCUTAneous Daily    losartan  100 mg Oral Daily     PRN Meds: glucagon (rDNA), hydrALAZINE, sodium chloride flush, sodium chloride, potassium chloride **OR** potassium alternative oral replacement **OR** potassium chloride, magnesium sulfate, polyethylene glycol, acetaminophen **OR** acetaminophen, ondansetron, perflutren lipid microspheres, dextrose bolus **OR** dextrose bolus, dextrose      Intake/Output Summary (Last 24 hours) at 1/24/2024 1540  Last data filed at 1/24/2024 1400  Gross per 24 hour   Intake 987.39 ml   Output 2825 ml   Net -1837.61 ml       Physical Exam Performed:    /86   Pulse 96   Temp 98.7 °F (37.1 °C) (Temporal)   Resp 20   Ht 1.727 m (5' 8\")   Wt 86.1 kg (189 lb 14.4 oz)   LMP 06/01/2011   SpO2 100%   BMI 28.87 kg/m²     General appearance: No apparent distress, appears stated age and cooperative.  HEENT: Pupils equal, round, and reactive to light. Conjunctivae/corneas clear.  Neck: Supple, with full range of motion. No jugular venous distention. Trachea midline.  Respiratory:  Normal respiratory effort. Clear to

## 2024-01-24 NOTE — PROGRESS NOTES
Pt returned to CVU from MRI at this time. Connected to monitor, bed in lowest position, call light within reach. Pt denies needs.    Leon Garcia RN  1/24/2024

## 2024-01-24 NOTE — ACP (ADVANCE CARE PLANNING)
Advanced Care Planning Note.    Purpose of Encounter: Advanced care planning in light of hypertensive urgency  Parties In Attendance: Patient  Decisional Capacity: Yes  Subjective: Patient with HA  Objective: Cr 1.3  Goals of Care Determination: Patient wants full support (CPR, vent, surgery, HD, trach, PEG)  Plan:  Cardene gtt, MRI Brain, Echo, Renal artery US, PT/OT, Renal consult  Code Status: Full code   Time spent on Advanced care Plannin minutes  Advanced Care Planning Documents: Completed advanced directives on chart, sister and son share the POA.    Danielito Villasenor MD  2024 3:40 PM

## 2024-01-24 NOTE — ED NOTES
ED TO INPATIENT SBAR HANDOFF    Patient Name: Galina Nick   :  1965  58 y.o.   MRN:  [id number]  Preferred Name    ED Room #:  ED-000202  Family/Caregiver Present no   Restraints no   Sitter no   Sepsis Risk Score Sepsis Risk Score: 2.55    Situation  Code Status: Full Code .    Allergies: Ace inhibitors  Weight: Patient Vitals for the past 96 hrs (Last 3 readings):   Weight   24 1619 87.1 kg (192 lb)     Arrived from: home  Chief Complaint:   Chief Complaint   Patient presents with    Hypertension     Pt coming over to ed after see her endocrinologist, pt hypertension sys 200;s/110's.  Pt denies chest pain, does have some blurry vision and headache since Saturday.  Vision is blurry while reading.       Hospital Problem/Diagnosis:  Principal Problem:    Hypertensive urgency  Resolved Problems:    * No resolved hospital problems. *    Imaging:   XR CHEST (2 VW)   Final Result   No acute cardiopulmonary findings           Abnormal labs:   Abnormal Labs Reviewed   COMPREHENSIVE METABOLIC PANEL W/ REFLEX TO MG FOR LOW K - Abnormal; Notable for the following components:       Result Value    Glucose 129 (*)     BUN 26 (*)     Creatinine 1.4 (*)     Est, Glom Filt Rate 44 (*)     Alkaline Phosphatase 142 (*)     All other components within normal limits   BRAIN NATRIURETIC PEPTIDE - Abnormal; Notable for the following components:    Pro-BNP 1,051 (*)     All other components within normal limits   URINALYSIS WITH REFLEX TO CULTURE - Abnormal; Notable for the following components:    Glucose, Ur 100 (*)     Blood, Urine TRACE (*)     All other components within normal limits     Critical values:      Abnormal Assessment Findings:     Background  History:   Past Medical History:   Diagnosis Date    Diabetes mellitus (HCC)     Hypertension        Assessment    Vitals/MEWS: MEWS Score: 3  Level of Consciousness: Alert (0)   Vitals:    24 2032 24 2047 24   BP: 134/64  126/63 (!) 150/80 (!) 125/96   Pulse: 78 80 81 77   Resp: 15 21 19 20   Temp:       TempSrc:       SpO2: 100% 99% 100% 100%   Weight:       Height:         FiO2 (%):   O2 Flow Rate: O2 Device: None (Room air)    Cardiac Rhythm:    Pain Assessment:  [x] Verbal [] Plascencia Moser Scale  Pain Scale: Pain Assessment  Pain Assessment: None - Denies Pain  Last documented pain score (0-10 scale)    Last documented pain medication administered:   Mental Status: oriented, alert, coherent, logical, thought processes intact, and able to concentrate and follow conversation  Orientation Level:    NIH Score:    C-SSRS: Risk of Suicide: No Risk  Bedside swallow:    Shahbaz Coma Scale (GCS): Shahbaz Coma Scale  Eye Opening: Spontaneous  Best Verbal Response: Oriented  Best Motor Response: Obeys commands  Alpine Coma Scale Score: 15  Active LDA's:   Peripheral IV 01/23/24 Left Antecubital (Active)   Site Assessment Clean, dry & intact 01/23/24 1730   Line Status Brisk blood return 01/23/24 1730     PO Status:   Pertinent or High Risk Medications/Drips:    If Yes, please provide details:   Pending Blood Product Administration:        You may also review the ED PT Care Timeline found under the Summary Nursing Index tab.    Recommendation    Pending orders   Plan for Discharge (if known):   Additional Comments:    If any further questions, please call Sending RN at [phone]    Electronically signed by: Electronically signed by Мария Alamo RN on 1/23/2024 at 10:14 PM

## 2024-01-24 NOTE — PROGRESS NOTES
Dr. Villasenor placed orders to d/c Amlodipine, start pt on Nifedipine and to wean Cardene gtt off (MD aware that pt already took Amlodipine this AM, per MD ok to start nifedipine at this time). BP suitable at this time to stop gtt, Nifedipine given. Will continue to monitor.    Leon Garcia, RN  1/24/2024

## 2024-01-24 NOTE — CONSULTS
[facility]   Nephrology progress Note  [phone]  [phone]   [employer]         Reason for Consult:  hypertensive emergency    HISTORY OF PRESENT ILLNESS:      The patient is a 58 y.o. female with significant past medical history of hypertension, diabetes who presents with hypertensive emergency from endocrinology office. Has long standing history of hypertension and diabetes but reports this has been well controlled. Never had BP spike like this before. Denies chest pain or shortness of breath. Was on cardene drip on admission and is off as of this morning.     Past Medical History:        Diagnosis Date    Diabetes mellitus (HCC)     Hypertension        Past Surgical History:        Procedure Laterality Date    BREAST BIOPSY      1980's benign        Current Medications:    No current facility-administered medications on file prior to encounter.     Current Outpatient Medications on File Prior to Encounter   Medication Sig Dispense Refill    Omega-3 Fatty Acids (FISH OIL) 1200 MG CAPS Take 1,200 mg by mouth daily      Cholecalciferol (VITAMIN D3) 10 MCG (400 UNIT) CAPS Take 1 capsule by mouth daily      dulaglutide (TRULICITY) 1.5 MG/0.5ML SC injection Inject 0.5 mLs into the skin once a week (Patient taking differently: Inject 0.5 mLs into the skin once a week Sundays.) 6 mL 1    glimepiride (AMARYL) 4 MG tablet  (Patient not taking: Reported on 1/23/2024)      hydrOXYzine HCl (ATARAX) 25 MG tablet  (Patient not taking: Reported on 1/23/2024)      empagliflozin (JARDIANCE) 10 MG tablet Take 1 tablet by mouth daily (Patient not taking: Reported on 1/23/2024) 30 tablet 3    methylPREDNISolone (MEDROL, EVANS,) 4 MG tablet Take 6 tabs by mouth day 1, 5 tabs day 2, 4 tabs day 3, 3 tabs day 4, 2 tabs day 5, and 1 tab day 6. (Patient not taking: Reported on 1/23/2024) 21 tablet 0    amLODIPine (NORVASC) 5 MG tablet Take 1 tablet by mouth daily      losartan-hydroCHLOROthiazide (HYZAAR)  Pulse 80   Temp 98.7 °F (37.1 °C) (Temporal)   Resp 20   Ht 1.727 m (5' 8\")   Wt 86.1 kg (189 lb 14.4 oz)   LMP 06/01/2011   SpO2 100%   BMI 28.87 kg/m²   I/O last 3 completed shifts:  In: 399.9 [I.V.:399.9]  Out: 2025 [Urine:2025]  I/O this shift:  In: -   Out: 800 [Urine:800]    Physical Exam:  Gen:  alert, oriented x 3  PERRL , EOM +  Pallor +, No icterus  JVP not raised   CV: RRR no murmur or rub .  Lungs:B/ L air entry, Normal breath sounds   Abd: soft, bowel sounds + , No organomegaly   Ext: No edema, no cyanosis  Skin: Warm.  No rash  Neuro: nonfocal.      DATA:    CBC:   Lab Results   Component Value Date/Time    WBC 9.3 01/23/2024 05:24 PM    RBC 4.56 01/23/2024 05:24 PM    HGB 12.5 01/23/2024 05:24 PM    HCT 37.2 01/23/2024 05:24 PM    MCV 81.6 01/23/2024 05:24 PM    MCH 27.5 01/23/2024 05:24 PM    MCHC 33.7 01/23/2024 05:24 PM    RDW 14.8 01/23/2024 05:24 PM     01/23/2024 05:24 PM    MPV 8.3 01/23/2024 05:24 PM     BMP:    Lab Results   Component Value Date/Time     01/24/2024 03:15 AM    K 4.1 01/24/2024 03:15 AM     01/24/2024 03:15 AM    CO2 24 01/24/2024 03:15 AM    BUN 23 01/24/2024 03:15 AM    LABALBU 3.7 01/23/2024 05:24 PM    CREATININE 1.3 01/24/2024 03:15 AM    CALCIUM 8.8 01/24/2024 03:15 AM    GFRAA >60 10/13/2022 07:46 AM    LABGLOM 48 01/24/2024 03:15 AM    GLUCOSE 226 01/24/2024 03:15 AM     Magnesium:  No results found for: \"MG\"  Phosphorus:    Lab Results   Component Value Date/Time    PHOS 4.2 10/13/2022 07:46 AM     LDH:  No results found for: \"LDH\"  Uric Acid:  No results found for: \"LABURIC\", \"URICACID\"  PT/INR:  No results found for: \"PROTIME\", \"INR\"  U/A:    Lab Results   Component Value Date/Time    COLORU Yellow 01/23/2024 05:24 PM    PROTEINU 300 01/23/2024 05:24 PM    PHUR 6.0 01/23/2024 05:24 PM    WBCUA 1 01/23/2024 05:24 PM    RBCUA 2 01/23/2024 05:24 PM    BACTERIA None Seen 01/23/2024 05:24 PM    CLARITYU Clear 01/23/2024 05:24 PM    SPECGRAV

## 2024-01-24 NOTE — PROGRESS NOTES
Pharmacy Home Medication Reconciliation Note    A medication reconciliation has been completed for Galina Nick 1965    Pharmacy: [facility] [id number] [facility], [place], [place]  Information provided by: patient    The patient's home medication list is as follows:  No current facility-administered medications on file prior to encounter.     Current Outpatient Medications on File Prior to Encounter   Medication Sig Dispense Refill    Omega-3 Fatty Acids (FISH OIL) 1200 MG CAPS Take 1,200 mg by mouth daily      Cholecalciferol (VITAMIN D3) 10 MCG (400 UNIT) CAPS Take 1 capsule by mouth daily      dulaglutide (TRULICITY) 1.5 MG/0.5ML SC injection Inject 0.5 mLs into the skin once a week (Patient taking differently: Inject 0.5 mLs into the skin once a week Sundays.) 6 mL 1    glimepiride (AMARYL) 4 MG tablet  (Patient not taking: Reported on 1/23/2024)      hydrOXYzine HCl (ATARAX) 25 MG tablet  (Patient not taking: Reported on 1/23/2024)      empagliflozin (JARDIANCE) 10 MG tablet Take 1 tablet by mouth daily (Patient not taking: Reported on 1/23/2024) 30 tablet 3    methylPREDNISolone (MEDROL, EVANS,) 4 MG tablet Take 6 tabs by mouth day 1, 5 tabs day 2, 4 tabs day 3, 3 tabs day 4, 2 tabs day 5, and 1 tab day 6. (Patient not taking: Reported on 1/23/2024) 21 tablet 0    amLODIPine (NORVASC) 5 MG tablet Take 1 tablet by mouth daily      losartan-hydroCHLOROthiazide (HYZAAR) 100-25 MG per tablet Take 1 tablet by mouth daily      metFORMIN (GLUCOPHAGE) 1000 MG tablet Take 1 tablet by mouth 2 times daily (with meals)      Multiple Vitamins-Minerals (MULTIVITAMIN ADULTS PO) Take 1 tablet by mouth daily         Patient is no longer taking Jardiance, glimepiride, hydroxyzine, or a Medrol dose pack.    Of note, patient took all AM meds prior to ED arrival.    Timing of last doses updated.    Thank you,  Maeve Seals, CINDIhT

## 2024-01-24 NOTE — ED NOTES
Patient resting in bed.  Patient alert and oriented x4.  GCS 15/15.  Skin appropriate for ethnicity, dry and intact.  No signs of acute distress noted at this time. Regular respiratory pattern, normal respiratory depth, unlabored respirations. Patient is continuous cardiac monitoring. Telemetry Alarms audible and alarms set.  Fall risk precautions in place, call light in reach, bed in lowest position, bed side table within reach,  2/2 bedrails up, bed alarm on, will continue to monitor.   Denies any needs at this time.  Plan of care ongoing.  Call light in reach.

## 2024-01-24 NOTE — H&P
[facility].  Admission History and Physical:      1/23/2024 9:00 PM  Patient: Galina Nick 1965  PCP: Tristan Lei MD    Chief Complaint On Presentation:  Patient was instructed from the patient's endocrinologist office to come to the emergency room for uncontrolled hypertension with reported blood pressure of 200/110 associated with some blurring of vision.    Assessment:  Hypertensive urgency.  History of uncontrolled hypertension.  Insulin-dependent type 2 diabetes mellitus.  Mild JAYY; likely secondary to uncontrolled hypertension.  Plans:  Patient being admitted to the ICU for continuing Cardene drip as initiated in the emergency room for symptomatic uncontrolled blood pressure with a target blood pressure around 170 in the next 6 hours before further lowering it down.  Resume patient home medication including amlodipine, losartan, holding hydrochlorothiazide secondary to mild renal insufficiency.  Hold patient home oral hypoglycemics including Glucophage and start sliding scale coverage with low-dose insulin lispro as per protocol and check A1c.  Start DVT prophylaxis with Lovenox.    History Of Present Illness:  Patient is a very pleasant 58-year-old male with past medical history significant for insulin-dependent type 2 diabetes mellitus, uncontrolled hypertension, being followed up by the endocrinologist who happened to be in his usual state of health until        Care Plan discussed with:   Patient x    Family     RN x         Consultant  ---------------------------  ED physician   ----------         Information:    Reviewed previous records     X   Other        Expected  Disposition:   Home     HH/PT/OT/RN    SNF/LTC    Other:         SAFETY:   Code Status: Full Code   DVT prophylaxis Lovenox  Bladder catheter: No  Disposition: TBD     Time Spent:  Time spent in review of chart, discussion with other providers, including ER physician, nursing, case management, other family members,   and patient himself, evaluation, assessment and strategy: 55 mins      Tom Gupta MD  Hospitalist

## 2024-01-25 LAB
ANION GAP SERPL CALCULATED.3IONS-SCNC: 8 MMOL/L (ref 3–16)
BASOPHILS # BLD: 0 K/UL (ref 0–0.2)
BASOPHILS NFR BLD: 0.6 %
BUN SERPL-MCNC: 29 MG/DL (ref 7–20)
CALCIUM SERPL-MCNC: 8.5 MG/DL (ref 8.3–10.6)
CHLORIDE SERPL-SCNC: 107 MMOL/L (ref 99–110)
CO2 SERPL-SCNC: 24 MMOL/L (ref 21–32)
CREAT SERPL-MCNC: 1.5 MG/DL (ref 0.6–1.1)
CREAT UR-MCNC: 49.6 MG/DL (ref 28–259)
CREAT UR-MCNC: 53.9 MG/DL (ref 28–259)
DEPRECATED RDW RBC AUTO: 14.6 % (ref 12.4–15.4)
EOSINOPHIL # BLD: 0.3 K/UL (ref 0–0.6)
EOSINOPHIL NFR BLD: 3.9 %
EST. AVERAGE GLUCOSE BLD GHB EST-MCNC: 177.2 MG/DL
GFR SERPLBLD CREATININE-BSD FMLA CKD-EPI: 41 ML/MIN/{1.73_M2}
GLUCOSE BLD-MCNC: 177 MG/DL (ref 70–99)
GLUCOSE BLD-MCNC: 192 MG/DL (ref 70–99)
GLUCOSE BLD-MCNC: 223 MG/DL (ref 70–99)
GLUCOSE SERPL-MCNC: 239 MG/DL (ref 70–99)
HBA1C MFR BLD: 7.8 %
HCT VFR BLD AUTO: 31.3 % (ref 36–48)
HGB BLD-MCNC: 10.5 G/DL (ref 12–16)
LYMPHOCYTES # BLD: 1.5 K/UL (ref 1–5.1)
LYMPHOCYTES NFR BLD: 22.6 %
MCH RBC QN AUTO: 27.1 PG (ref 26–34)
MCHC RBC AUTO-ENTMCNC: 33.6 G/DL (ref 31–36)
MCV RBC AUTO: 80.6 FL (ref 80–100)
MICROALBUMIN UR DL<=1MG/L-MCNC: <1.2 MG/DL
MICROALBUMIN/CREAT UR: NORMAL MG/G (ref 0–30)
MONOCYTES # BLD: 0.5 K/UL (ref 0–1.3)
MONOCYTES NFR BLD: 7.7 %
NEUTROPHILS # BLD: 4.3 K/UL (ref 1.7–7.7)
NEUTROPHILS NFR BLD: 65.2 %
PERFORMED ON: ABNORMAL
PLATELET # BLD AUTO: 248 K/UL (ref 135–450)
PMV BLD AUTO: 8.1 FL (ref 5–10.5)
POTASSIUM SERPL-SCNC: 4.4 MMOL/L (ref 3.5–5.1)
PROT UR-MCNC: 333 MG/DL
PROT/CREAT UR-RTO: 6.2 MG/DL
RBC # BLD AUTO: 3.89 M/UL (ref 4–5.2)
SODIUM SERPL-SCNC: 139 MMOL/L (ref 136–145)
WBC # BLD AUTO: 6.7 K/UL (ref 4–11)

## 2024-01-25 PROCEDURE — 6370000000 HC RX 637 (ALT 250 FOR IP): Performed by: INTERNAL MEDICINE

## 2024-01-25 PROCEDURE — 97165 OT EVAL LOW COMPLEX 30 MIN: CPT

## 2024-01-25 PROCEDURE — 6360000002 HC RX W HCPCS: Performed by: INTERNAL MEDICINE

## 2024-01-25 PROCEDURE — 2580000003 HC RX 258: Performed by: INTERNAL MEDICINE

## 2024-01-25 PROCEDURE — 2140000000 HC CCU INTERMEDIATE R&B

## 2024-01-25 PROCEDURE — 99239 HOSP IP/OBS DSCHRG MGMT >30: CPT | Performed by: SURGERY

## 2024-01-25 PROCEDURE — 80048 BASIC METABOLIC PNL TOTAL CA: CPT

## 2024-01-25 PROCEDURE — 85025 COMPLETE CBC W/AUTO DIFF WBC: CPT

## 2024-01-25 PROCEDURE — APPNB30 APP NON BILLABLE TIME 0-30 MINS: Performed by: NURSE PRACTITIONER

## 2024-01-25 PROCEDURE — APPSS60 APP SPLIT SHARED TIME 46-60 MINUTES: Performed by: NURSE PRACTITIONER

## 2024-01-25 PROCEDURE — 97161 PT EVAL LOW COMPLEX 20 MIN: CPT

## 2024-01-25 PROCEDURE — 93975 VASCULAR STUDY: CPT

## 2024-01-25 RX ORDER — LOSARTAN POTASSIUM 100 MG/1
100 TABLET ORAL DAILY
Qty: 30 TABLET | Refills: 3 | Status: SHIPPED | OUTPATIENT
Start: 2024-01-26 | End: 2024-01-26

## 2024-01-25 RX ORDER — NIFEDIPINE 30 MG/1
30 TABLET, EXTENDED RELEASE ORAL 2 TIMES DAILY
Qty: 30 TABLET | Refills: 3 | Status: SHIPPED | OUTPATIENT
Start: 2024-01-25 | End: 2024-01-26

## 2024-01-25 RX ADMIN — NIFEDIPINE 30 MG: 30 TABLET, EXTENDED RELEASE ORAL at 10:10

## 2024-01-25 RX ADMIN — SODIUM CHLORIDE, PRESERVATIVE FREE 10 ML: 5 INJECTION INTRAVENOUS at 10:10

## 2024-01-25 RX ADMIN — LOSARTAN POTASSIUM 100 MG: 100 TABLET, FILM COATED ORAL at 10:10

## 2024-01-25 RX ADMIN — NIFEDIPINE 30 MG: 30 TABLET, EXTENDED RELEASE ORAL at 19:16

## 2024-01-25 RX ADMIN — SODIUM CHLORIDE, PRESERVATIVE FREE 10 ML: 5 INJECTION INTRAVENOUS at 19:16

## 2024-01-25 RX ADMIN — INSULIN GLARGINE 15 UNITS: 100 INJECTION, SOLUTION SUBCUTANEOUS at 19:16

## 2024-01-25 RX ADMIN — INSULIN LISPRO 2 UNITS: 100 INJECTION, SOLUTION INTRAVENOUS; SUBCUTANEOUS at 16:22

## 2024-01-25 RX ADMIN — ENOXAPARIN SODIUM 40 MG: 100 INJECTION SUBCUTANEOUS at 10:10

## 2024-01-25 ASSESSMENT — PAIN SCALES - GENERAL
PAINLEVEL_OUTOF10: 0

## 2024-01-25 NOTE — PROGRESS NOTES
Pt incontinent of urine despite external catheter and urinal attempts. Unable to get accurate I/O's.

## 2024-01-25 NOTE — DISCHARGE INSTRUCTIONS
Follow up with your PCP within in 5-7 days of discharge.  Follow up with nephrology as instructed   Follow up with vascular surgery as instructed   Take all your medications as prescribed.    Check BP in the morning and evening.     Do you have the help you need at home?    What symptoms or health problems do you need to look out for after you leave the hospital? Call your physician if you have any of these symptoms.   :   If you have sudden, severe shortness of breath or shortness of breath while resting.  Pain, tenderness, warmth or redness in your calf.  Temperature  greater than 101?.  Persistent diarrhea, nausea or vomiting.  If you are unable to eat, or unable to drink 5 glasses of fluid a day for more than one day.  If you experience the same pain or other symptoms that brought you to the hospital.  If you become lightheaded or dizzy.      If you think something is seriously wrong go to the nearest emergency room!    Call 911 for any EMERGENCY and go to the nearest hospital!

## 2024-01-25 NOTE — PROGRESS NOTES
Hospitalist Progress Note      PCP: Tristan Lei MD    Date of Admission: 1/23/2024    Chief Complaint: Blurry vision and HTN    Hospital Course: This 59 yo F with DM 2, HTN came to ER with blurry vision.  Admitted as inpatient to CVU for hypertensive urgency.  Started on Cardene gtt.       Interval history  Pt seen and examined today.  Overnight events noted, interval ancillary notes and labs reviewed.   BP controlled with current regimen  Patient very anxious; stated that she lives by herself and want to make sure that her blood pressure is completely controlled before discharge  Worried about renal artery duplex results showing bilateral renal artery stenosis and requesting for vascular surgery consult        Medications:  Reviewed    Infusion Medications    niCARdipine Stopped (01/24/24 1047)    sodium chloride      dextrose       Scheduled Medications    NIFEdipine  30 mg Oral BID    insulin lispro  0-8 Units SubCUTAneous TID WC    insulin lispro  0-4 Units SubCUTAneous Nightly    insulin glargine  15 Units SubCUTAneous Nightly    sodium chloride flush  5-40 mL IntraVENous 2 times per day    enoxaparin  40 mg SubCUTAneous Daily    losartan  100 mg Oral Daily     PRN Meds: glucagon (rDNA), hydrALAZINE, sodium chloride flush, sodium chloride, potassium chloride **OR** potassium alternative oral replacement **OR** potassium chloride, magnesium sulfate, polyethylene glycol, acetaminophen **OR** acetaminophen, ondansetron, perflutren lipid microspheres, dextrose bolus **OR** dextrose bolus, dextrose      Intake/Output Summary (Last 24 hours) at 1/25/2024 1648  Last data filed at 1/25/2024 0000  Gross per 24 hour   Intake 250 ml   Output 400 ml   Net -150 ml         Physical Exam Performed:    BP (!) 140/68   Pulse 84   Temp 98.6 °F (37 °C) (Temporal)   Resp 16   Ht 1.727 m (5' 8\")   Wt 85.9 kg (189 lb 6 oz)   LMP 06/01/2011   SpO2 97%   BMI 28.79 kg/m²     General appearance: No apparent    No acute intracranial abnormality.         XR CHEST (2 VW)   Final Result   No acute cardiopulmonary findings             IP CONSULT TO NEPHROLOGY  IP CONSULT TO VASCULAR SURGERY    Assessment/Plan:    Active Hospital Problems    Diagnosis     Hypertensive urgency [I16.0]      Hypertensive urgency; resolved  Weaned off of nicardipine drip.  Currently on losartan and nifedipine  Echo on 1/23/2024 showed EF of 55 to 60%.  No RWMA noted.  Moderate MR  Nephrology for; appreciate their input    CKD stage III; baseline creatinine 1-1.3.  Creatinine trended up to 1.5  Nephrology on board; appreciate their input  Avoid nephrotoxin, strict intake output, daily    Bilateral renal artery stenosis;  Renal artery duplex showed 66 to 99% stenosis of bilateral renal arteries  Vascular surgery consulted; outpatient follow-up in 2 weeks recommended    Diabetes mellitus; HgbA1c 7.8 on 1/24/24.  Continue Lantus and SSI and monitor blood glucose closely    Headache; likely secondary to hypertensive urgency resolved.  MRI brain negative for any acute pathology.      DVT Prophylaxis: Lovenox  Diet: ADULT DIET; Regular; 4 carb choices (60 gm/meal); Low Fat/Low Chol/High Fiber/2 gm Na  Code Status: Full Code  PT/OT Eval Status: Requested    Dispo - Home    Discussed with patient, nursing and CM.    Hopefully home tomorrow.    PATY GALLARDO MD

## 2024-01-25 NOTE — CONSULTS
radial: 2+  - L radial: 2+  - R DP: 1+  - L DP: 1+    Labs  Lab Results   Component Value Date/Time    WBC 6.7 01/25/2024 03:53 AM    HGB 10.5 01/25/2024 03:53 AM    HCT 31.3 01/25/2024 03:53 AM    MCV 80.6 01/25/2024 03:53 AM     01/25/2024 03:53 AM     Lab Results   Component Value Date/Time     01/25/2024 03:53 AM    K 4.4 01/25/2024 03:53 AM     01/25/2024 03:53 AM    CO2 24 01/25/2024 03:53 AM    PHOS 4.2 10/13/2022 07:46 AM    BUN 29 01/25/2024 03:53 AM    CREATININE 1.5 01/25/2024 03:53 AM      No results found for: \"GLU\"    Scheduled Meds:    NIFEdipine  30 mg Oral BID    insulin lispro  0-8 Units SubCUTAneous TID WC    insulin lispro  0-4 Units SubCUTAneous Nightly    insulin glargine  15 Units SubCUTAneous Nightly    sodium chloride flush  5-40 mL IntraVENous 2 times per day    enoxaparin  40 mg SubCUTAneous Daily    losartan  100 mg Oral Daily     Continuous Infusions:    niCARdipine Stopped (01/24/24 1047)    sodium chloride      dextrose         Imaging:     ECHO 1/24/24:  Conclusions      Summary   Normal left ventricle size, wall thickness, and systolic function with an   estimated ejection fraction of 55-60%. No regional wall motion abnormalities   are seen.   Moderate mitral regurgitation.   The left atrium is dilated.   Mild tricuspid regurgitation. RVSP is 26.81 mmHg with RAP of 3 mmHg.       Renal artery duplex 1/25/24:  Impressions  Right Impression  There is 60-99% stenosis of the proximal right renal artery(ies).  The right renal vein is patent, however Doppler waveform suggests venous  congestion.  The parenchymal resistive index is borderline abnormal in the lower pole.  The right kidney measured 11.06 x 4.74 cm in diameter.  Several hypoechoic structures noted throughout right kidney, likely cysts,  largest measuring  1.02 cm X 1.26 cm.  Left Impression  There is 60-99% stenosis of the proximal left renal artery (ies).  The left renal vein is patent, however Doppler        Electronically signed by ARMAND Tanner CNP on 1/25/2024 at 1:39 PM    VASCULAR STAFF  Seen and discussed with A Stewart ESPINAL yesterday.  Greater than 50% of evaluation including exam, history, image review and medical decision making performed by this MD.  Agree with above history, exam and assessment  Pertinent findings:  Well controlled BP now on 2 meds (new treatemnet combination)  Creat 1.5  Duplex reviewed - suggestive of stenoses bilateral renal arteries but not best quality.  IMP: HTN - acute crisis now well controlled   CRI (creat 1.5)   DM  REC: Observation for now.   As BP well controlled and further imaging would require contrast would follow and plan imaging if BP/creat worsen.   There is no certainty that the documented stenoses are causative to her BP issues.   Intervention may not improve BP control or renal function.   Will follow and discuss with Dr. Naidu.     Victor Manuel Noyola

## 2024-01-25 NOTE — PROGRESS NOTES
Occupational Therapy    [facility] - Inpatient Rehabilitation Department   Phone: [phone]    Occupational Therapy    [x] Initial Evaluation            [] Daily Treatment Note         [x] Discharge Summary      Patient: Galina Nick   : 1965   MRN: [id number]   Date of Service:  2024    Admitting Diagnosis:  Hypertensive urgency  Current Admission Summary: Pt is a 57 yo female who presents to [facility] due to hypertensive urgency.   Past Medical History:  has a past medical history of Diabetes mellitus (HCC) and Hypertension.  Past Surgical History:  has a past surgical history that includes Breast biopsy.    Discharge Recommendations: Galina Nick scored a 24/ on the AM-[facility] ADL Inpatient form.  At this time, no further OT is recommended upon discharge due to patient at independent level.  Recommend patient returns to prior setting with prior services.      DME Required For Discharge: No DME required    Precautions/Restrictions: low fall risk  Positional Restrictions:no positional restrictions    Pre-Admission Information   Lives With: alone                     Type of Home: house  Home Layout: tri-level  Home Access:  5 step to enter with handrail.  Handrails are located on Both side.  Bathroom Layout: tub/shower unit  Bathroom Equipment:  none  Toilet Height: standard height, has elevated tpoilet seat  Home Equipment: no prior equipment  Transfer Assistance: Independent without use of device  Ambulation Assistance:Independent without use of device  ADL Assistance: independent with all ADL's  IADL Assistance: independent with homemaking tasks  Active :        [x] Yes                 [] No  Hand Dominance: [] Left                 [x] Right  Current Employment: full time employment.  Occupation:  at high school  Hobbies: walk, sing, read, Gnosticism, spending time with family  Recent Falls: one in parking lot at work    Examination   Vision:   Vision Corrective

## 2024-01-25 NOTE — PLAN OF CARE
Problem: Respiratory - Adult  Goal: Achieves optimal ventilation and oxygenation  1/25/2024 1416 by Elsie Wagner RN  Outcome: Completed  1/25/2024 1044 by Elsie Wagner RN  Outcome: Adequate for Discharge     Problem: Cardiovascular - Adult  Goal: Maintains optimal cardiac output and hemodynamic stability  1/25/2024 1416 by Elsie Wagner RN  Outcome: Completed  1/25/2024 1044 by Elsie Wagner RN  Outcome: Adequate for Discharge  Goal: Absence of cardiac dysrhythmias or at baseline  1/25/2024 1416 by Elsie Wagner RN  Outcome: Completed  1/25/2024 1044 by Elsie Wagner RN  Outcome: Adequate for Discharge     Problem: Pain  Goal: Verbalizes/displays adequate comfort level or baseline comfort level  1/25/2024 1416 by Elsie Wagner RN  Outcome: Completed  1/25/2024 1044 by Elsie Wagner RN  Outcome: Adequate for Discharge     Problem: Chronic Conditions and Co-morbidities  Goal: Patient's chronic conditions and co-morbidity symptoms are monitored and maintained or improved  1/25/2024 1416 by Elsie Wagner RN  Outcome: Completed  1/25/2024 1044 by Elsie Wagner RN  Outcome: Adequate for Discharge

## 2024-01-25 NOTE — PROGRESS NOTES
[facility]   Nephrology progress Note  [phone]  [phone]   [place]         Reason for Consult:  hypertensive emergency    HISTORY OF PRESENT ILLNESS:      The patient is a 58 y.o. female with significant past medical history of hypertension, diabetes who presents with hypertensive emergency from endocrinology office. Has long standing history of hypertension and diabetes but reports this has been well controlled. Never had BP spike like this before. Denies chest pain or shortness of breath. Was on cardene drip on admission and is off as of this morning.     Interval history:  Feels well. BP is well controlled. Tolerating nifedipine.       Current Medications:    No current facility-administered medications on file prior to encounter.     Current Outpatient Medications on File Prior to Encounter   Medication Sig Dispense Refill    Omega-3 Fatty Acids (FISH OIL) 1200 MG CAPS Take 1,200 mg by mouth daily      Cholecalciferol (VITAMIN D3) 10 MCG (400 UNIT) CAPS Take 1 capsule by mouth daily      dulaglutide (TRULICITY) 1.5 MG/0.5ML SC injection Inject 0.5 mLs into the skin once a week (Patient taking differently: Inject 0.5 mLs into the skin once a week Sundays.) 6 mL 1    glimepiride (AMARYL) 4 MG tablet  (Patient not taking: Reported on 1/23/2024)      hydrOXYzine HCl (ATARAX) 25 MG tablet  (Patient not taking: Reported on 1/23/2024)      empagliflozin (JARDIANCE) 10 MG tablet Take 1 tablet by mouth daily (Patient not taking: Reported on 1/23/2024) 30 tablet 3    methylPREDNISolone (MEDROL, EVANS,) 4 MG tablet Take 6 tabs by mouth day 1, 5 tabs day 2, 4 tabs day 3, 3 tabs day 4, 2 tabs day 5, and 1 tab day 6. (Patient not taking: Reported on 1/23/2024) 21 tablet 0    amLODIPine (NORVASC) 5 MG tablet Take 1 tablet by mouth daily      losartan-hydroCHLOROthiazide (HYZAAR) 100-25 MG per tablet Take 1 tablet by mouth daily      metFORMIN (GLUCOPHAGE) 1000 MG tablet Take 1 tablet by

## 2024-01-25 NOTE — PROGRESS NOTES
[facility] - Inpatient Rehabilitation Department   Phone: [phone]    Physical Therapy    [x] Initial Evaluation            [] Daily Treatment Note         [x] Discharge Summary      Patient: Galina Nick   : 1965   MRN: [id number]   Date of Service:  2024  Admitting Diagnosis: Hypertensive urgency  Current Admission Summary: Pt is a 59 yo female who presents to [facility] due to hypertensive urgency.  Past Medical History:  has a past medical history of Diabetes mellitus (HCC) and Hypertension.  Past Surgical History:  has a past surgical history that includes Breast biopsy.  Discharge Recommendations: Galina Nick scored a 24/ on the AM-[facility] short mobility form.  At this time, no further PT is recommended upon discharge due to pt performing at baseline level of functioning.  Recommend patient returns to prior setting with prior services.     DME Required For Discharge: no DME required at discharge  Precautions/Restrictions: low fall risk  Weight Bearing Restrictions: no restrictions     Required Braces/Orthotics: no braces required  Positional Restrictions:no positional restrictions    Pre-Admission Information   Lives With: alone    Type of Home: house  Home Layout: tri-level  Home Access:  5 step to enter with handrail.  Handrails are located on Both side.  Bathroom Layout: tub/shower unit  Bathroom Equipment:  none  Toilet Height: standard height, has elevated tpoilet seat  Home Equipment: no prior equipment  Transfer Assistance: Independent without use of device  Ambulation Assistance:Independent without use of device  ADL Assistance: independent with all ADL's  IADL Assistance: independent with homemaking tasks  Active :        [x] Yes  [] No  Hand Dominance: [] Left  [x] Right  Current Employment: full time employment.  Occupation:  at high school  Hobbies: walk, sing, read, L[employer]-day, spending time with family  Recent Falls: one in parking lot at

## 2024-01-25 NOTE — CARE COORDINATION
Chart reviewed for discharge planning.    Pt from home. Alert and oriented, independent.     Therapy has no further recommendations on discharge.    CM will follow for any other needs.    Ananya Nunn RN, BSN  [phone]

## 2024-01-25 NOTE — PLAN OF CARE
Problem: Respiratory - Adult  Goal: Achieves optimal ventilation and oxygenation  1/25/2024 1044 by Elsie Wagner RN  Outcome: Adequate for Discharge  1/24/2024 2053 by Debora Cm RN  Outcome: Progressing     Problem: Cardiovascular - Adult  Goal: Maintains optimal cardiac output and hemodynamic stability  1/25/2024 1044 by Elsie Wagner RN  Outcome: Adequate for Discharge  1/24/2024 2053 by Debora Cm RN  Outcome: Progressing  Goal: Absence of cardiac dysrhythmias or at baseline  Outcome: Adequate for Discharge     Problem: Pain  Goal: Verbalizes/displays adequate comfort level or baseline comfort level  1/25/2024 1044 by Elsie Wagner RN  Outcome: Adequate for Discharge  1/24/2024 2053 by Debora Cm RN  Outcome: Progressing     Problem: Chronic Conditions and Co-morbidities  Goal: Patient's chronic conditions and co-morbidity symptoms are monitored and maintained or improved  1/25/2024 1044 by Elsie Wagner RN  Outcome: Adequate for Discharge  1/24/2024 2053 by Debora Cm RN  Outcome: Progressing

## 2024-01-25 NOTE — PLAN OF CARE
Problem: Respiratory - Adult  Goal: Achieves optimal ventilation and oxygenation  Outcome: Progressing     Problem: Cardiovascular - Adult  Goal: Maintains optimal cardiac output and hemodynamic stability  Outcome: Progressing     Problem: Pain  Goal: Verbalizes/displays adequate comfort level or baseline comfort level  Outcome: Progressing     Problem: Chronic Conditions and Co-morbidities  Goal: Patient's chronic conditions and co-morbidity symptoms are monitored and maintained or improved  Outcome: Progressing

## 2024-01-26 VITALS
BODY MASS INDEX: 30.62 KG/M2 | OXYGEN SATURATION: 96 % | HEIGHT: 68 IN | WEIGHT: 202 LBS | RESPIRATION RATE: 16 BRPM | HEART RATE: 74 BPM | SYSTOLIC BLOOD PRESSURE: 143 MMHG | DIASTOLIC BLOOD PRESSURE: 75 MMHG | TEMPERATURE: 97.6 F

## 2024-01-26 PROBLEM — I70.1 RENAL ARTERY STENOSIS (HCC): Status: ACTIVE | Noted: 2024-01-26

## 2024-01-26 PROBLEM — I10 MALIGNANT HYPERTENSION: Status: ACTIVE | Noted: 2024-01-26

## 2024-01-26 LAB
ALBUMIN SERPL-MCNC: 3.5 G/DL (ref 3.4–5)
ANION GAP SERPL CALCULATED.3IONS-SCNC: 8 MMOL/L (ref 3–16)
BUN SERPL-MCNC: 34 MG/DL (ref 7–20)
CALCIUM SERPL-MCNC: 9 MG/DL (ref 8.3–10.6)
CHLORIDE SERPL-SCNC: 108 MMOL/L (ref 99–110)
CO2 SERPL-SCNC: 23 MMOL/L (ref 21–32)
CREAT SERPL-MCNC: 1.4 MG/DL (ref 0.6–1.1)
GFR SERPLBLD CREATININE-BSD FMLA CKD-EPI: 44 ML/MIN/{1.73_M2}
GLUCOSE BLD-MCNC: 215 MG/DL (ref 70–99)
GLUCOSE SERPL-MCNC: 225 MG/DL (ref 70–99)
PERFORMED ON: ABNORMAL
PHOSPHATE SERPL-MCNC: 4.5 MG/DL (ref 2.5–4.9)
POTASSIUM SERPL-SCNC: 4.6 MMOL/L (ref 3.5–5.1)
SODIUM SERPL-SCNC: 139 MMOL/L (ref 136–145)

## 2024-01-26 PROCEDURE — 80069 RENAL FUNCTION PANEL: CPT

## 2024-01-26 PROCEDURE — 6370000000 HC RX 637 (ALT 250 FOR IP): Performed by: INTERNAL MEDICINE

## 2024-01-26 PROCEDURE — 99231 SBSQ HOSP IP/OBS SF/LOW 25: CPT | Performed by: SURGERY

## 2024-01-26 RX ORDER — LOSARTAN POTASSIUM 100 MG/1
100 TABLET ORAL DAILY
Qty: 30 TABLET | Refills: 3 | Status: SHIPPED | OUTPATIENT
Start: 2024-01-26 | End: 2024-01-26

## 2024-01-26 RX ORDER — NIFEDIPINE 60 MG/1
60 TABLET, EXTENDED RELEASE ORAL DAILY
Qty: 90 TABLET | Refills: 3 | Status: SHIPPED | OUTPATIENT
Start: 2024-01-26 | End: 2024-01-26

## 2024-01-26 RX ORDER — NIFEDIPINE 60 MG/1
60 TABLET, EXTENDED RELEASE ORAL DAILY
Qty: 90 TABLET | Refills: 3 | Status: SHIPPED | OUTPATIENT
Start: 2024-01-26

## 2024-01-26 RX ORDER — LOSARTAN POTASSIUM 100 MG/1
100 TABLET ORAL DAILY
Qty: 30 TABLET | Refills: 3 | Status: SHIPPED | OUTPATIENT
Start: 2024-01-26

## 2024-01-26 RX ORDER — NIFEDIPINE 30 MG/1
30 TABLET, EXTENDED RELEASE ORAL 2 TIMES DAILY
Qty: 30 TABLET | Refills: 3 | Status: SHIPPED | OUTPATIENT
Start: 2024-01-26 | End: 2024-01-26 | Stop reason: HOSPADM

## 2024-01-26 RX ADMIN — INSULIN LISPRO 2 UNITS: 100 INJECTION, SOLUTION INTRAVENOUS; SUBCUTANEOUS at 08:53

## 2024-01-26 RX ADMIN — NIFEDIPINE 30 MG: 30 TABLET, EXTENDED RELEASE ORAL at 08:53

## 2024-01-26 RX ADMIN — LOSARTAN POTASSIUM 100 MG: 100 TABLET, FILM COATED ORAL at 08:53

## 2024-01-26 ASSESSMENT — PAIN SCALES - GENERAL
PAINLEVEL_OUTOF10: 0

## 2024-01-26 NOTE — PROGRESS NOTES
VASCULAR    Seen for renal artery stenoses and HTN    SBP well controlled currently (120-145) on 2 meds  No abnormalities on exam    Creat 1.4    A/P: Bilateral renal artery stenoses   HTN - well controlled   CRI    DM   Would recommend observation and nephrology follow up.   Asked pt to f/u with me in 406 weeks.   If needed can proceed with further evaluation understanding that renal aretery treatment does not guarantee BP control.   Will discuss with Dr. Naidu.     Victor Manuel Noyola

## 2024-01-26 NOTE — CARE COORDINATION
Patient discharged 1/26/2024 to home.  All discharge needs met per case management.    Ananya Nunn RN, BSN  [phone]

## 2024-01-26 NOTE — PLAN OF CARE
Problem: Respiratory - Adult  Goal: Achieves optimal ventilation and oxygenation  Problem: Cardiovascular - Adult  Goal: Absence of cardiac dysrhythmias or at baseline    Problem: Chronic Conditions and Co-morbidities  Goal: Patient's chronic conditions and co-morbidity symptoms are monitored and maintained or improved  Recent Flowsheet Documentation  Taken 1/25/2024 2153 by Araceli Norris RN  Care Plan - Patient's Chronic Conditions and Co-Morbidity Symptoms are Monitored and Maintained or Improved:   Monitor and assess patient's chronic conditions and comorbid symptoms for stability, deterioration, or improvement   Collaborate with multidisciplinary team to address chronic and comorbid conditions and prevent exacerbation or deterioration   Update acute care plan with appropriate goals if chronic or comorbid symptoms are exacerbated and prevent overall improvement and discharge     Problem: Safety - Adult  Goal: Free from fall injury  1/25/2024 2154 by Araceli Norris RN  Outcome: Progressing     Problem: Discharge Planning  Goal: Discharge to home or other facility with appropriate resources  1/25/2024 2154 by Araceli Norris RN  Outcome: Progressing  Flowsheets (Taken 1/25/2024 2153)  Discharge to home or other facility with appropriate resources:   Identify barriers to discharge with patient and caregiver   Arrange for needed discharge resources and transportation as appropriate   Identify discharge learning needs (meds, wound care, etc)   Arrange for interpreters to assist at discharge as needed   Refer to discharge planning if patient needs post-hospital services based on physician order or complex needs related to functional status, cognitive ability or social support system     Outcome: Adequate for Discharge  Outcome: Completed

## 2024-01-26 NOTE — PROGRESS NOTES
[facility]   Nephrology progress Note  [phone]  [phone]   Your Policy Manager         Reason for Consult:  hypertensive emergency    HISTORY OF PRESENT ILLNESS:      The patient is a 58 y.o. female with significant past medical history of hypertension, diabetes who presents with hypertensive emergency from endocrinology office. Has long standing history of hypertension and diabetes but reports this has been well controlled. Never had BP spike like this before. Denies chest pain or shortness of breath. Was on cardene drip on admission and is off as of this morning.     Interval history:  Feels well. BP is well controlled.   Getting discharged today.       Current Medications:    No current facility-administered medications on file prior to encounter.     Current Outpatient Medications on File Prior to Encounter   Medication Sig Dispense Refill    Omega-3 Fatty Acids (FISH OIL) 1200 MG CAPS Take 1,200 mg by mouth daily      Cholecalciferol (VITAMIN D3) 10 MCG (400 UNIT) CAPS Take 1 capsule by mouth daily      dulaglutide (TRULICITY) 1.5 MG/0.5ML SC injection Inject 0.5 mLs into the skin once a week (Patient taking differently: Inject 0.5 mLs into the skin once a week Sundays.) 6 mL 1    hydrOXYzine HCl (ATARAX) 25 MG tablet  (Patient not taking: Reported on 1/23/2024)      metFORMIN (GLUCOPHAGE) 1000 MG tablet Take 1 tablet by mouth 2 times daily (with meals)      Multiple Vitamins-Minerals (MULTIVITAMIN ADULTS PO) Take 1 tablet by mouth daily         Allergies:  Ace inhibitors      PHYSICAL EXAM:    Vitals:    /66   Pulse 75   Temp 97.5 °F (36.4 °C) (Temporal)   Resp 16   Ht 1.727 m (5' 8\")   Wt 91.6 kg (202 lb)   LMP 06/01/2011   SpO2 96%   BMI 30.71 kg/m²   I/O last 3 completed shifts:  In: 980 [P.O.:960; I.V.:20]  Out: 1525 [Urine:1525]  No intake/output data recorded.    Physical Exam:  Gen:  alert, oriented x 3  PERRL , EOM +  Pallor +, No icterus  JVP not raised   CV:

## 2024-01-27 ENCOUNTER — APPOINTMENT (OUTPATIENT)
Dept: GENERAL RADIOLOGY | Age: 59
End: 2024-01-27
Payer: COMMERCIAL

## 2024-01-27 ENCOUNTER — HOSPITAL ENCOUNTER (EMERGENCY)
Age: 59
Discharge: HOME | End: 2024-01-27
Attending: EMERGENCY MEDICINE
Payer: COMMERCIAL

## 2024-01-27 VITALS
SYSTOLIC BLOOD PRESSURE: 180 MMHG | HEART RATE: 73 BPM | RESPIRATION RATE: 14 BRPM | OXYGEN SATURATION: 100 % | DIASTOLIC BLOOD PRESSURE: 92 MMHG

## 2024-01-27 DIAGNOSIS — I10 ELEVATED BLOOD PRESSURE READING IN OFFICE WITH DIAGNOSIS OF HYPERTENSION: ICD-10-CM

## 2024-01-27 DIAGNOSIS — Z86.79 HISTORY OF MALIGNANT HYPERTENSION: ICD-10-CM

## 2024-01-27 DIAGNOSIS — F41.1 ANXIETY STATE: ICD-10-CM

## 2024-01-27 DIAGNOSIS — I16.0 HYPERTENSIVE URGENCY: Primary | ICD-10-CM

## 2024-01-27 LAB
ALBUMIN SERPL-MCNC: 3.6 G/DL (ref 3.4–5)
ALBUMIN/GLOB SERPL: 1.4 {RATIO} (ref 1.1–2.2)
ALDOST SERPL-MCNC: 3.9 NG/DL
ALP SERPL-CCNC: 125 U/L (ref 40–129)
ALT SERPL-CCNC: 16 U/L (ref 10–40)
ANION GAP SERPL CALCULATED.3IONS-SCNC: 10 MMOL/L (ref 3–16)
AST SERPL-CCNC: 25 U/L (ref 15–37)
BASOPHILS # BLD: 0.1 K/UL (ref 0–0.2)
BASOPHILS NFR BLD: 1.1 %
BILIRUB SERPL-MCNC: 0.3 MG/DL (ref 0–1)
BUN SERPL-MCNC: 28 MG/DL (ref 7–20)
CALCIUM SERPL-MCNC: 8.9 MG/DL (ref 8.3–10.6)
CHLORIDE SERPL-SCNC: 104 MMOL/L (ref 99–110)
CHP ED QC CHECK: NORMAL
CO2 SERPL-SCNC: 22 MMOL/L (ref 21–32)
CREAT SERPL-MCNC: 1.2 MG/DL (ref 0.6–1.1)
DEPRECATED RDW RBC AUTO: 14.9 % (ref 12.4–15.4)
EOSINOPHIL # BLD: 0.4 K/UL (ref 0–0.6)
EOSINOPHIL NFR BLD: 4.6 %
GFR SERPLBLD CREATININE-BSD FMLA CKD-EPI: 53 ML/MIN/{1.73_M2}
GLUCOSE BLD-MCNC: 139 MG/DL
GLUCOSE BLD-MCNC: 139 MG/DL (ref 70–99)
GLUCOSE SERPL-MCNC: 148 MG/DL (ref 70–99)
HCT VFR BLD AUTO: 35 % (ref 36–48)
HGB BLD-MCNC: 11.5 G/DL (ref 12–16)
LYMPHOCYTES # BLD: 2.1 K/UL (ref 1–5.1)
LYMPHOCYTES NFR BLD: 24.3 %
MCH RBC QN AUTO: 26.6 PG (ref 26–34)
MCHC RBC AUTO-ENTMCNC: 33 G/DL (ref 31–36)
MCV RBC AUTO: 80.6 FL (ref 80–100)
MONOCYTES # BLD: 0.8 K/UL (ref 0–1.3)
MONOCYTES NFR BLD: 9.2 %
NEUTROPHILS # BLD: 5.3 K/UL (ref 1.7–7.7)
NEUTROPHILS NFR BLD: 60.8 %
PERFORMED ON: ABNORMAL
PLATELET # BLD AUTO: 254 K/UL (ref 135–450)
PMV BLD AUTO: 8.8 FL (ref 5–10.5)
POTASSIUM SERPL-SCNC: 4.6 MMOL/L (ref 3.5–5.1)
PROT SERPL-MCNC: 6.1 G/DL (ref 6.4–8.2)
RBC # BLD AUTO: 4.34 M/UL (ref 4–5.2)
SODIUM SERPL-SCNC: 136 MMOL/L (ref 136–145)
TROPONIN, HIGH SENSITIVITY: 38 NG/L (ref 0–14)
WBC # BLD AUTO: 8.8 K/UL (ref 4–11)

## 2024-01-27 PROCEDURE — 80053 COMPREHEN METABOLIC PANEL: CPT

## 2024-01-27 PROCEDURE — 99285 EMERGENCY DEPT VISIT HI MDM: CPT

## 2024-01-27 PROCEDURE — 71046 X-RAY EXAM CHEST 2 VIEWS: CPT

## 2024-01-27 PROCEDURE — 93005 ELECTROCARDIOGRAM TRACING: CPT | Performed by: NURSE PRACTITIONER

## 2024-01-27 PROCEDURE — 96374 THER/PROPH/DIAG INJ IV PUSH: CPT

## 2024-01-27 PROCEDURE — 6360000002 HC RX W HCPCS: Performed by: NURSE PRACTITIONER

## 2024-01-27 PROCEDURE — 84484 ASSAY OF TROPONIN QUANT: CPT

## 2024-01-27 PROCEDURE — 85025 COMPLETE CBC W/AUTO DIFF WBC: CPT

## 2024-01-27 RX ORDER — HYDROXYZINE PAMOATE 25 MG/1
25 CAPSULE ORAL 3 TIMES DAILY PRN
Qty: 15 CAPSULE | Refills: 0 | Status: SHIPPED | OUTPATIENT
Start: 2024-01-27 | End: 2024-02-03

## 2024-01-27 RX ORDER — LORAZEPAM 2 MG/ML
0.5 INJECTION INTRAMUSCULAR ONCE
Status: COMPLETED | OUTPATIENT
Start: 2024-01-27 | End: 2024-01-27

## 2024-01-27 RX ORDER — LABETALOL HYDROCHLORIDE 5 MG/ML
10 INJECTION, SOLUTION INTRAVENOUS ONCE
Status: CANCELLED | OUTPATIENT
Start: 2024-01-27 | End: 2024-01-27

## 2024-01-27 RX ADMIN — LORAZEPAM 0.5 MG: 2 INJECTION INTRAMUSCULAR; INTRAVENOUS at 19:30

## 2024-01-27 ASSESSMENT — LIFESTYLE VARIABLES
HOW MANY STANDARD DRINKS CONTAINING ALCOHOL DO YOU HAVE ON A TYPICAL DAY: 1 OR 2
HOW OFTEN DO YOU HAVE A DRINK CONTAINING ALCOHOL: MONTHLY OR LESS

## 2024-01-28 LAB
EKG ATRIAL RATE: 84 BPM
EKG DIAGNOSIS: NORMAL
EKG P AXIS: 54 DEGREES
EKG P-R INTERVAL: 170 MS
EKG Q-T INTERVAL: 386 MS
EKG QRS DURATION: 70 MS
EKG QTC CALCULATION (BAZETT): 456 MS
EKG R AXIS: 6 DEGREES
EKG T AXIS: 30 DEGREES
EKG VENTRICULAR RATE: 84 BPM
RENIN PLAS-CCNC: 0.4 NG/ML/HR

## 2024-01-28 PROCEDURE — 93010 ELECTROCARDIOGRAM REPORT: CPT | Performed by: INTERNAL MEDICINE

## 2024-01-28 NOTE — ED PROVIDER NOTES
Dr. Lyon and the patient, and myself patient endorses that she is feeling anxious.  Chart review reveals that the patient at 1 time was on Atarax but is no longer on this medication.  Repeat blood pressures show that the patient remains hypotensive.  Will assess her for anxiety as a cause of the elevated blood pressure readings by giving her Ativan 0.5 mg IV.  Will thereafter reassess her blood pressure [LL]   1949 10 minutes after patient was given the Ativan 0.5 mg IV her blood pressure is 144/78 mmHg.  Likely etiology of the elevated blood pressure reading is anxiety/anxiety state. [LL]      ED Course User Index  [LL] Terese Saunders, APRN - CNP        Is this patient to be included in the SEP-1 Core Measure due to severe sepsis or septic shock?   No   Exclusion criteria - the patient is NOT to be included for SEP-1 Core Measure due to:  Infection is not suspected    CONSULTS: (Who and What was discussed)  None      Social Determinants : None    Records Reviewed : None    CC/HPI Summary, DDx, ED Course, and Reassessment: Assessment patient is resting comfortably on stretcher with eyes open with stable vital signs.  She will be discharged home with outpatient follow-up and prescriptions as noted below.  Strict return precautions.  Patient verbalized understanding is agreeable to plan for discharge and follow-up.    Disposition Considerations (include 1 Tests not done, Shared Decision Making, Pt Expectation of Test or Tx.):   Appropriate for outpatient management PCP      I am the Primary Clinician of Record.    FINAL IMPRESSION      1. Hypertensive urgency    2. History of malignant hypertension    3. Elevated blood pressure reading in office with diagnosis of hypertension          DISPOSITION/PLAN     DISPOSITION   Discharged    PATIENT REFERRED TO:  Tristan Lei MD  [address]  [address]  [facility] [place] [address]  [phone]            DISCHARGE MEDICATIONS:     Medication List

## 2024-01-28 NOTE — ED PROVIDER NOTES
[facility] EMERGENCY DEPARTMENT     EMERGENCY DEPARTMENT ENCOUNTER     Location: [facility] EMERGENCY DEPARTMENT  1/27/2024  Note Started: 8:16 PM EST 1/27/24      Patient Identification  Galina Nick is a 58 y.o. female      HPI:Galina Nick was evaluated in the Emergency Department for for elevated blood pressure.  This patient has a history of hypertension.  She was just hospitalized and discharged at [facility] for malignant hypertension.  She was discharged yesterday.  They did make some changes in her medication.  They added nifedipine.  They stopped her hydrochlorothiazide and continued her losartan.  She states she was getting blood pressures in the 200s systolic and 90s diastolic.  She is been compliant with her medications.  She has had some intermittent headache but has no headache now.  No dizziness.  No chest pain.  No shortness of breath.. Although initial history and physical exam information was obtained by DANIA/NPP/MD/DO (who also dictated a record of this visit), I personally saw the patient and performed and made/approved the management plan and take responsibility for the patient management.      PHYSICAL EXAM:  General: Alert black female no acute distress.  HEENT: Head is atraumatic.  Conjunctiva are clear.  Pupils equal round reactive.  No pallor.  Nose is clear.  Oropharynx moist without erythema.  Neck: Supple, nontender.  Heart: Regular rate and rhythm.  No murmurs gallops noted.  Lungs: Breath sounds equal bilaterally and clear.  Abdomen: Soft, nondistended, nontender.  Neuro: Awake, alert, oriented.  No focal motor deficits.    EKG Interpretation  Normal sinus rhythm, rate of 84, poor R wave progression.  Rhythm strip shows a sinus rhythm with a rate of 84, OH interval 170 ms, QRS 70 ms with no other ectopy as interpreted by me.  Compared to EKG dated 1/23/2024, no significant changes noted.    Labs Reviewed   CBC WITH AUTO DIFFERENTIAL -  Abnormal; Notable for the following components:       Result Value    Hemoglobin 11.5 (*)     Hematocrit 35.0 (*)     All other components within normal limits   COMPREHENSIVE METABOLIC PANEL W/ REFLEX TO MG FOR LOW K - Abnormal; Notable for the following components:    Glucose 148 (*)     BUN 28 (*)     Creatinine 1.2 (*)     Est, Glom Filt Rate 53 (*)     Total Protein 6.1 (*)     All other components within normal limits   TROPONIN - Abnormal; Notable for the following components:    Troponin, High Sensitivity 38 (*)     All other components within normal limits   POCT GLUCOSE - Abnormal; Notable for the following components:    POC Glucose 139 (*)     All other components within normal limits   POCT GLUCOSE - Normal     XR CHEST (2 VW)   Final Result   Stable mild left ventricular enlargement.      Mild bibasilar atelectasis vs small infiltrates posteriorly which are less   prominent.               Patient seen and evaluated.  Relevant records reviewed.  MDM patient's blood pressure is elevated.  It trended down somewhat in the emergency department without further treatment.  She is otherwise having no associated symptoms renal function is at her baseline.  She was just discharged yesterday with adjustments in her medications.  At this point in time I would not aggressively try to lower her blood pressure further, I think that she needs to continue her current new blood pressure medication regimen and monitor her blood pressures for the next few days to see how they trend.  She is concerned that anxiety may be affecting her blood pressures.  She admits to some ongoing anxiety.    I independently interpreted the following studies: None    I personally discussed the patients care with None    Is this patient to be included in the SEP-1 Core Measure due to severe sepsis or septic shock?   No   Exclusion criteria - the patient is NOT to be included for SEP-1 Core Measure due to:  Infection is not

## 2024-01-28 NOTE — DISCHARGE SUMMARY
Hospital Medicine Discharge Summary    Patient ID: Galina Nick      Patient's PCP: Tristan Lei MD    Admit Date: 1/23/2024     Discharge Date: 1/26/2024     Admitting Physician: Tom Gupta MD     Discharge Physician: PATY GALLARDO MD     Hospital Course:  This 57 yo F with DM 2, HTN came to ER with blurry vision.        Hypertensive urgency; resolved  Weaned off of nicardipine drip.  BP controlled with losartan and nifedipine  Echo on 1/23/2024 showed EF of 55 to 60%.  No RWMA noted.  Moderate MR  Nephrology input appreciated     CKD stage III; creatinine at baseline( 1-1.3)      Bilateral renal artery stenosis;Renal artery duplex showed 66 to 99% stenosis of bilateral renal arteries  Vascular surgery consulted; outpatient follow-up in 4-6 weeks recommended     Diabetes mellitus; HgbA1c 7.8 on 1/24/24.  Continue current regimen and monitor blood glucose closely     Headache; likely secondary to hypertensive urgency; resolved.  MRI brain negative for any acute pathology.      Physical Exam Performed:     BP (!) 143/75   Pulse 74   Temp 97.6 °F (36.4 °C) (Temporal)   Resp 16   Ht 1.727 m (5' 8\")   Wt 91.6 kg (202 lb)   LMP 06/01/2011   SpO2 96%   BMI 30.71 kg/m²     General appearance: No apparent distress, appears stated age and cooperative.  Eyes: Sclera clear. Pupils equal.  ENT: Moist oral mucosa. Trachea midline, no adenopathy.  Cardiovascular: Regular rhythm, normal S1, S2. No murmur.   Respiratory: Clear to auscultation bilaterally, no wheeze or crackles.   GI: Abdomen soft, no tenderness, not distended, normal bowel sounds  Musculoskeletal:  No cyanosis in digits.  No BLE edema present  Neurology: CN 2-12 grossly intact. No speech or motor deficits  Psych: Not agitated, appropriate affect.  Skin: Warm, dry, normal turgor    Consults:     IP CONSULT TO NEPHROLOGY  IP CONSULT TO VASCULAR SURGERY    Disposition: Home    Condition at Discharge: Stable     Discharge

## 2024-01-28 NOTE — DISCHARGE INSTRUCTIONS
Return to the emergency department for new or worsening symptoms including but not limited to, developing chest pain, nausea, vomiting, shortness of breath, passing out, feeling as if you are going to pass out, sweats, or other symptoms/concerns.    Medications as prescribed.  Utilize the Vistaril for any anxiety as needed.    Trend your blood pressures.  Check your blood pressure no more than twice a day, document/trend these readings and when you go to your follow-up appointment with your primary care provider take these readings with you to help guide management of your blood pressure.    Utilize a low-salt or Mediterranean diet, get some exercise and attempt to lose approximately 10% of your body weight or 20 pounds in your case as doing so generally helps improve your blood pressure.    Follow-up with your primary care provider for reevaluation next 1-2 days.

## 2024-01-30 ASSESSMENT — ENCOUNTER SYMPTOMS
VOMITING: 0
SHORTNESS OF BREATH: 0
ANAL BLEEDING: 0
EYE PAIN: 0
ABDOMINAL PAIN: 0
BACK PAIN: 0
COUGH: 0
NAUSEA: 0
SORE THROAT: 0

## 2024-01-30 NOTE — PROGRESS NOTES
Physician Progress Note      PATIENT:               ASHLY VELEZ  [facility] #:                  [id number]  :                       1965  ADMIT DATE:       2024 4:52 PM  DISCH DATE:        2024 2:19 PM  RESPONDING  PROVIDER #:        Ava Toussaint MD          QUERY TEXT:    Patient admitted with htn urgency.  Noted documentation of Acute Kidney Injury   in HP.  In order to support the diagnosis of JAYY, please include additional   clinical indicators in your documentation.? Or please document if the   diagnosis of JAYY has been ruled out after further study.    The medical record reflects the following:  Risk Factors: 59yo with CKD 3  Clinical Indicators: baseline Scr 1-1.3 (IM). SCr 1.4, 1.3, 1.5, 1.4  HP, \"Mild JAYY; likely secondary to uncontrolled hypertension\"  Treatment: Nephro consult    Defined by Kidney Disease Improving Global Outcomes (KDIGO) clinical practice   guideline for acute kidney injury:  -Increase in SCr by greater than or equal to 0.3 mg/dl within 48 hours; or  -Increase or decrease in SCr to greater than or equal to 1.5 times baseline,   which is known or presumed to have occurred within the prior 7 days; or  -Urine volume < 0.5ml/kg/h for 6 hours.  Options provided:  -- Acute kidney injury evidenced by, Please document evidence as well as a   numerical baseline creatinine, if known.  -- Acute kidney injury ruled out after study  -- Other - I will add my own diagnosis  -- Disagree - Not applicable / Not valid  -- Disagree - Clinically unable to determine / Unknown  -- Refer to Clinical Documentation Reviewer    PROVIDER RESPONSE TEXT:    Acute kidney injury was ruled out after study.    Query created by: Samantha Kelly on 2024 10:39 AM      Electronically signed by:  Ava Toussaint MD 2024 3:32 PM

## 2024-02-04 LAB
ANNOTATION COMMENT IMP: NORMAL
METANEPHS SERPL-SCNC: <0.1 NMOL/L (ref 0–0.49)
NORMETANEPHRINE SERPL-SCNC: 0.39 NMOL/L (ref 0–0.89)

## 2024-02-05 ENCOUNTER — HOSPITAL ENCOUNTER (INPATIENT)
Age: 59
LOS: 5 days | Discharge: HOME | End: 2024-02-10
Attending: STUDENT IN AN ORGANIZED HEALTH CARE EDUCATION/TRAINING PROGRAM | Admitting: INTERNAL MEDICINE
Payer: COMMERCIAL

## 2024-02-05 ENCOUNTER — APPOINTMENT (OUTPATIENT)
Dept: GENERAL RADIOLOGY | Age: 59
End: 2024-02-05
Payer: COMMERCIAL

## 2024-02-05 DIAGNOSIS — E11.8 POORLY CONTROLLED TYPE 2 DIABETES MELLITUS WITH COMPLICATION (HCC): ICD-10-CM

## 2024-02-05 DIAGNOSIS — I16.1 HYPERTENSIVE EMERGENCY: Primary | ICD-10-CM

## 2024-02-05 DIAGNOSIS — E11.65 POORLY CONTROLLED TYPE 2 DIABETES MELLITUS WITH COMPLICATION (HCC): ICD-10-CM

## 2024-02-05 DIAGNOSIS — I70.1 RENAL ARTERY STENOSIS (HCC): ICD-10-CM

## 2024-02-05 LAB
ALBUMIN SERPL-MCNC: 3.8 G/DL (ref 3.4–5)
ALBUMIN/GLOB SERPL: 1.3 {RATIO} (ref 1.1–2.2)
ALP SERPL-CCNC: 141 U/L (ref 40–129)
ALT SERPL-CCNC: 15 U/L (ref 10–40)
ANION GAP SERPL CALCULATED.3IONS-SCNC: 12 MMOL/L (ref 3–16)
AST SERPL-CCNC: 18 U/L (ref 15–37)
BASOPHILS # BLD: 0.1 K/UL (ref 0–0.2)
BASOPHILS NFR BLD: 0.8 %
BILIRUB SERPL-MCNC: 0.5 MG/DL (ref 0–1)
BUN SERPL-MCNC: 21 MG/DL (ref 7–20)
CALCIUM SERPL-MCNC: 9.4 MG/DL (ref 8.3–10.6)
CHLORIDE SERPL-SCNC: 107 MMOL/L (ref 99–110)
CO2 SERPL-SCNC: 21 MMOL/L (ref 21–32)
CREAT SERPL-MCNC: 1 MG/DL (ref 0.6–1.1)
DEPRECATED RDW RBC AUTO: 14.4 % (ref 12.4–15.4)
EKG ATRIAL RATE: 80 BPM
EKG DIAGNOSIS: NORMAL
EKG P AXIS: 60 DEGREES
EKG P-R INTERVAL: 172 MS
EKG Q-T INTERVAL: 394 MS
EKG QRS DURATION: 80 MS
EKG QTC CALCULATION (BAZETT): 454 MS
EKG R AXIS: 13 DEGREES
EKG T AXIS: 53 DEGREES
EKG VENTRICULAR RATE: 80 BPM
EOSINOPHIL # BLD: 0.2 K/UL (ref 0–0.6)
EOSINOPHIL NFR BLD: 3.4 %
GFR SERPLBLD CREATININE-BSD FMLA CKD-EPI: >60 ML/MIN/{1.73_M2}
GLUCOSE BLD-MCNC: 221 MG/DL (ref 70–99)
GLUCOSE SERPL-MCNC: 101 MG/DL (ref 70–99)
HCT VFR BLD AUTO: 35.6 % (ref 36–48)
HGB BLD-MCNC: 11.8 G/DL (ref 12–16)
LYMPHOCYTES # BLD: 1.7 K/UL (ref 1–5.1)
LYMPHOCYTES NFR BLD: 25.4 %
MCH RBC QN AUTO: 26.7 PG (ref 26–34)
MCHC RBC AUTO-ENTMCNC: 33.1 G/DL (ref 31–36)
MCV RBC AUTO: 80.9 FL (ref 80–100)
MONOCYTES # BLD: 0.6 K/UL (ref 0–1.3)
MONOCYTES NFR BLD: 8.2 %
NEUTROPHILS # BLD: 4.2 K/UL (ref 1.7–7.7)
NEUTROPHILS NFR BLD: 62.2 %
NT-PROBNP SERPL-MCNC: 1650 PG/ML (ref 0–124)
PERFORMED ON: ABNORMAL
PLATELET # BLD AUTO: 254 K/UL (ref 135–450)
PMV BLD AUTO: 8.4 FL (ref 5–10.5)
POTASSIUM SERPL-SCNC: 4 MMOL/L (ref 3.5–5.1)
PROT SERPL-MCNC: 6.7 G/DL (ref 6.4–8.2)
RBC # BLD AUTO: 4.41 M/UL (ref 4–5.2)
SODIUM SERPL-SCNC: 140 MMOL/L (ref 136–145)
TROPONIN, HIGH SENSITIVITY: 42 NG/L (ref 0–14)
TROPONIN, HIGH SENSITIVITY: 43 NG/L (ref 0–14)
WBC # BLD AUTO: 6.8 K/UL (ref 4–11)

## 2024-02-05 PROCEDURE — 2580000003 HC RX 258: Performed by: PHYSICIAN ASSISTANT

## 2024-02-05 PROCEDURE — 6370000000 HC RX 637 (ALT 250 FOR IP): Performed by: PHYSICIAN ASSISTANT

## 2024-02-05 PROCEDURE — 80053 COMPREHEN METABOLIC PANEL: CPT

## 2024-02-05 PROCEDURE — 2580000003 HC RX 258: Performed by: INTERNAL MEDICINE

## 2024-02-05 PROCEDURE — 2000000000 HC ICU R&B

## 2024-02-05 PROCEDURE — 6360000002 HC RX W HCPCS: Performed by: PHYSICIAN ASSISTANT

## 2024-02-05 PROCEDURE — 71045 X-RAY EXAM CHEST 1 VIEW: CPT

## 2024-02-05 PROCEDURE — 99285 EMERGENCY DEPT VISIT HI MDM: CPT

## 2024-02-05 PROCEDURE — 93010 ELECTROCARDIOGRAM REPORT: CPT | Performed by: INTERNAL MEDICINE

## 2024-02-05 PROCEDURE — 96374 THER/PROPH/DIAG INJ IV PUSH: CPT

## 2024-02-05 PROCEDURE — 85025 COMPLETE CBC W/AUTO DIFF WBC: CPT

## 2024-02-05 PROCEDURE — 93005 ELECTROCARDIOGRAM TRACING: CPT | Performed by: PHYSICIAN ASSISTANT

## 2024-02-05 PROCEDURE — 83880 ASSAY OF NATRIURETIC PEPTIDE: CPT

## 2024-02-05 PROCEDURE — 6360000002 HC RX W HCPCS: Performed by: INTERNAL MEDICINE

## 2024-02-05 PROCEDURE — 84484 ASSAY OF TROPONIN QUANT: CPT

## 2024-02-05 PROCEDURE — 36415 COLL VENOUS BLD VENIPUNCTURE: CPT

## 2024-02-05 RX ORDER — ASPIRIN 81 MG/1
324 TABLET, CHEWABLE ORAL ONCE
Status: COMPLETED | OUTPATIENT
Start: 2024-02-05 | End: 2024-02-05

## 2024-02-05 RX ORDER — INSULIN LISPRO 100 [IU]/ML
0-4 INJECTION, SOLUTION INTRAVENOUS; SUBCUTANEOUS NIGHTLY
Status: DISCONTINUED | OUTPATIENT
Start: 2024-02-05 | End: 2024-02-10 | Stop reason: HOSPADM

## 2024-02-05 RX ORDER — SODIUM CHLORIDE 0.9 % (FLUSH) 0.9 %
5-40 SYRINGE (ML) INJECTION PRN
Status: DISCONTINUED | OUTPATIENT
Start: 2024-02-05 | End: 2024-02-10 | Stop reason: HOSPADM

## 2024-02-05 RX ORDER — ACETAMINOPHEN 650 MG/1
650 SUPPOSITORY RECTAL EVERY 6 HOURS PRN
Status: DISCONTINUED | OUTPATIENT
Start: 2024-02-05 | End: 2024-02-10 | Stop reason: HOSPADM

## 2024-02-05 RX ORDER — ENOXAPARIN SODIUM 100 MG/ML
40 INJECTION SUBCUTANEOUS DAILY
Status: DISCONTINUED | OUTPATIENT
Start: 2024-02-05 | End: 2024-02-10 | Stop reason: HOSPADM

## 2024-02-05 RX ORDER — POLYETHYLENE GLYCOL 3350 17 G/17G
17 POWDER, FOR SOLUTION ORAL DAILY PRN
Status: DISCONTINUED | OUTPATIENT
Start: 2024-02-05 | End: 2024-02-10 | Stop reason: HOSPADM

## 2024-02-05 RX ORDER — SODIUM CHLORIDE 0.9 % (FLUSH) 0.9 %
5-40 SYRINGE (ML) INJECTION EVERY 12 HOURS SCHEDULED
Status: DISCONTINUED | OUTPATIENT
Start: 2024-02-05 | End: 2024-02-10 | Stop reason: HOSPADM

## 2024-02-05 RX ORDER — CLONIDINE HYDROCHLORIDE 0.1 MG/1
0.1 TABLET ORAL ONCE
Status: COMPLETED | OUTPATIENT
Start: 2024-02-05 | End: 2024-02-05

## 2024-02-05 RX ORDER — ACETAMINOPHEN 325 MG/1
650 TABLET ORAL EVERY 6 HOURS PRN
Status: DISCONTINUED | OUTPATIENT
Start: 2024-02-05 | End: 2024-02-10 | Stop reason: HOSPADM

## 2024-02-05 RX ORDER — INSULIN LISPRO 100 [IU]/ML
0-8 INJECTION, SOLUTION INTRAVENOUS; SUBCUTANEOUS
Status: DISCONTINUED | OUTPATIENT
Start: 2024-02-05 | End: 2024-02-10 | Stop reason: HOSPADM

## 2024-02-05 RX ORDER — SODIUM CHLORIDE 9 MG/ML
INJECTION, SOLUTION INTRAVENOUS PRN
Status: DISCONTINUED | OUTPATIENT
Start: 2024-02-05 | End: 2024-02-10 | Stop reason: HOSPADM

## 2024-02-05 RX ADMIN — SODIUM CHLORIDE 5 MG/HR: 9 INJECTION, SOLUTION INTRAVENOUS at 17:37

## 2024-02-05 RX ADMIN — ENOXAPARIN SODIUM 40 MG: 100 INJECTION SUBCUTANEOUS at 20:37

## 2024-02-05 RX ADMIN — SODIUM CHLORIDE 7.5 MG/HR: 9 INJECTION, SOLUTION INTRAVENOUS at 18:51

## 2024-02-05 RX ADMIN — SODIUM CHLORIDE 6 MG/HR: 9 INJECTION, SOLUTION INTRAVENOUS at 21:22

## 2024-02-05 RX ADMIN — CLONIDINE HYDROCHLORIDE 0.1 MG: 0.1 TABLET ORAL at 13:44

## 2024-02-05 RX ADMIN — ASPIRIN 81 MG 324 MG: 81 TABLET ORAL at 17:29

## 2024-02-05 ASSESSMENT — ENCOUNTER SYMPTOMS
SHORTNESS OF BREATH: 0
VOMITING: 0
CHEST TIGHTNESS: 0
NAUSEA: 0
COUGH: 0
ABDOMINAL PAIN: 0
DIARRHEA: 0

## 2024-02-05 ASSESSMENT — PAIN - FUNCTIONAL ASSESSMENT: PAIN_FUNCTIONAL_ASSESSMENT: NONE - DENIES PAIN

## 2024-02-05 NOTE — ED NOTES
ED TO INPATIENT SBAR HANDOFF    Patient Name: Galina Nick   :  1965  58 y.o.   MRN:  [id number]  Preferred Name  Galina  ED Room #:  ED-0022/22  Family/Caregiver Present no   Restraints no   Sitter no   Sepsis Risk Score Sepsis Risk Score: 2.75    Situation  Code Status: Prior No additional code details.    Allergies: Ace inhibitors  Weight: Patient Vitals for the past 96 hrs (Last 3 readings):   Weight   24 1244 91.6 kg (202 lb)     Arrived from: home  Chief Complaint:   Chief Complaint   Patient presents with    Hypertension     Pt was recently here for hypertension, pt coming in today for blood pressure, spoke to dr torres renal dr sent to ed.       Hospital Problem/Diagnosis:  Principal Problem:    Hypertensive emergency  Resolved Problems:    * No resolved hospital problems. *    Imaging:   XR CHEST PORTABLE   Preliminary Result   1. Mild left basilar parenchymal disease which in part is likely related to   subsegmental atelectasis and/or scar.   2. Bandlike density overlying right perihilar region may represent minimal   subsegmental atelectasis.   3. Short-term follow-up plain film examination is recommended for   re-evaluation.           Abnormal labs:   Abnormal Labs Reviewed   CBC WITH AUTO DIFFERENTIAL - Abnormal; Notable for the following components:       Result Value    Hemoglobin 11.8 (*)     Hematocrit 35.6 (*)     All other components within normal limits   COMPREHENSIVE METABOLIC PANEL - Abnormal; Notable for the following components:    Glucose 101 (*)     BUN 21 (*)     Alkaline Phosphatase 141 (*)     All other components within normal limits   TROPONIN - Abnormal; Notable for the following components:    Troponin, High Sensitivity 43 (*)     All other components within normal limits   BRAIN NATRIURETIC PEPTIDE - Abnormal; Notable for the following components:    Pro-BNP 1,650 (*)     All other components within normal limits   TROPONIN - Abnormal; Notable for the following  PT Care Timeline found under the Summary Nursing Index tab.    Recommendation    Pending orders ed orders done  Plan for Discharge (if known):   Additional Comments: na   If any further questions, please call Sending RN at [phone]    Electronically signed by: Electronically signed by Graciela Wellington RN on 2/5/2024 at 6:22 PM

## 2024-02-05 NOTE — ACP (ADVANCE CARE PLANNING)
Advanced Care Planning Note.    Purpose of Encounter: Advanced care planning in light of hospitalization  Parties In Attendance: Patient,    Decisional Capacity: Yes  Subjective: Patient  understand that this conversation is to address long term care goal  Objective: Patient admitted to the hospital with hypertensive emergency  Goals of Care Determination: Patient would pursue CPR and Intubation if required. Unsure about long term ventilation/tracheostomy, would want family to make the decision at the time if required  Code Status: full code  Time spent on Advanced care Plannin minutes  Advanced Care Planning Documents: documented patient's wishes, would like Children Zane Jack and Sarah  to make medical decisions if unable to make decisions

## 2024-02-05 NOTE — PROGRESS NOTES
Pharmacy Home Medication Reconciliation Note    A medication reconciliation has been completed for Galina Nick 1965    Pharmacy: [facility] [id number], [place], [place]  Information provided by: patient    The patient's home medication list is as follows:  No current facility-administered medications on file prior to encounter.     Current Outpatient Medications on File Prior to Encounter   Medication Sig Dispense Refill    NIFEdipine (PROCARDIA XL) 60 MG extended release tablet Take 1 tablet by mouth daily (Patient taking differently: Take 1 tablet by mouth nightly) 90 tablet 3    losartan (COZAAR) 100 MG tablet Take 1 tablet by mouth daily 30 tablet 3    Omega-3 Fatty Acids (FISH OIL) 1200 MG CAPS Take 1,200 mg by mouth nightly      Cholecalciferol (VITAMIN D3) 10 MCG (400 UNIT) CAPS Take 1 capsule by mouth nightly      dulaglutide (TRULICITY) 1.5 MG/0.5ML SC injection Inject 0.5 mLs into the skin once a week (Patient taking differently: Inject 0.5 mLs into the skin once a week Sundays.) 6 mL 1    metFORMIN (GLUCOPHAGE) 1000 MG tablet Take 1 tablet by mouth 2 times daily (with meals)      Multiple Vitamins-Minerals (MULTIVITAMIN ADULTS PO) Take 1 tablet by mouth nightly         Of note, patient hasn't taken any home meds today other than losartan this morning.    Timing of last doses updated.    Thank you,  Maeve Seals, CINDIhT

## 2024-02-05 NOTE — H&P
HOSPITALISTS HISTORY AND PHYSICAL    2/5/2024 6:12 PM    Patient Information:  GALINA VELEZ is a 58 y.o. female [id number]  PCP:  Tristan Lei MD (Tel: [phone] )    Chief complaint:    Chief Complaint   Patient presents with    Hypertension     Pt was recently here for hypertension, pt coming in today for blood pressure, spoke to dr torres renal dr sent to ed.       History of Present Illness:  Galina Velez is a 58 y.o. female who was hospital last month with hypertensive urgency was found to renal artery stenosis blood pressure meds were titrated up patient discharged home today patient was blood pressure was in the 200s systolic had some palpitations shortness of breath with this no nausea vomiting chest pain or severe headaches patient does not smoke or drink has been compliant with medication      REVIEW OF SYSTEMS:   Constitutional: Negative for fever,chills or night sweats  ENT: Negative for rhinorrhea, epistaxis, hoarseness, sore throat.   Gastrointestinal: Negative for nausea, vomiting, diarrhea  Genitourinary: Negative for polyuria, dysuria   Hematologic/Lymphatic: Negative for bleeding tendency, easy bruising  Musculoskeletal: Negative for myalgias and arthralgias  Neurologic: Negative for confusion,dysarthria.  Skin: Negative for itching,rash  Psychiatric: Negative for depression,anxiety, agitation.  Endocrine: Negative for polydipsia,polyuria,heat /cold intolerance.    Past Medical History:   has a past medical history of Diabetes mellitus (HCC) and Hypertension.     Past Surgical History:   has a past surgical history that includes Breast biopsy.     Medications:  No current facility-administered medications on file prior to encounter.     Current Outpatient Medications on File Prior to Encounter   Medication Sig Dispense Refill    NIFEdipine (PROCARDIA XL) 60 MG extended release tablet

## 2024-02-05 NOTE — PROGRESS NOTES
Admission nurse to ED 22 to initiate admission process, but nurse there to take patient to assigned IP bed 2910.  Admission not initiated.

## 2024-02-05 NOTE — ED PROVIDER NOTES
In addition to the advanced practice provider, I personally saw Glaina Nick and performed a substantive portion of the visit including all aspects of the medical decision making.    Medical Decision Making    Pt with some heart palpitations and a headache earlier which resolved upon arrival to the emergency room.  No chest pain or trouble breathing.  PCP visit from yesterday reviewed, patient was recently admitted for malignant hypertension, prescribed Vistaril for anxiety as she is apparently checking her blood pressure every hour but she is not taking this.  She has 60-99% stenosis of proximal renal artery.  Was recently started on nifedipine for hypertension from her last hospitalization, estimates she has been on this for about 10 days.    On exam pt is resting comfortably  Speech clear  No facial drooping  No nuchal rigidity  Cardiac RRR, no murmur    EKG  The Ekg interpreted by me in the absence of a cardiologist shows.  normal sinus rhythm with a rate of 80  Axis is   Normal  QTc is  normal  Intervals and Durations are unremarkable.      No specific ST-T wave changes appreciated.  No evidence of acute ischemia.   No significant change from prior EKG dated 1/27/2024      SEP-1  Is this patient to be included in the SEP-1 Core Measure due to severe sepsis or septic shock?   No   Exclusion criteria - the patient is NOT to be included for SEP-1 Core Measure due to:  Infection is not suspected    Screenings     [place] Coma Scale  Eye Opening: Spontaneous  Best Verbal Response: Oriented  Best Motor Response: Obeys commands  Shahbaz Coma Scale Score: 15        XR CHEST PORTABLE   Preliminary Result   1. Mild left basilar parenchymal disease which in part is likely related to   subsegmental atelectasis and/or scar.   2. Bandlike density overlying right perihilar region may represent minimal   subsegmental atelectasis.   3. Short-term follow-up plain film examination is recommended for   re-evaluation.        Pt prepared for discharge but then endorses not feeling well and not being comfortable with discharge. In light of elevated WBC; will keep patient one more night. Repeat labs tomorrow AM. Hold tylenol to monitor for fevers. Continue PT/OT. Regular diet. Pain control PRN; use narcotics sparingly for AMS. Encourage oral hydration and incentive spirometry. SCDs. Re-eval tomorrow AM. Hospitalist following.     Julianne Closs, [place]

## 2024-02-06 PROBLEM — E78.5 DYSLIPIDEMIA: Status: ACTIVE | Noted: 2019-11-07

## 2024-02-06 PROBLEM — R06.02 SHORTNESS OF BREATH: Status: ACTIVE | Noted: 2024-02-06

## 2024-02-06 PROBLEM — R79.89 ELEVATED TROPONIN: Status: ACTIVE | Noted: 2024-02-06

## 2024-02-06 PROBLEM — I34.0 NONRHEUMATIC MITRAL VALVE REGURGITATION: Status: ACTIVE | Noted: 2024-02-06

## 2024-02-06 LAB
ANION GAP SERPL CALCULATED.3IONS-SCNC: 9 MMOL/L (ref 3–16)
BASOPHILS # BLD: 0 K/UL (ref 0–0.2)
BASOPHILS NFR BLD: 0.9 %
BUN SERPL-MCNC: 24 MG/DL (ref 7–20)
CALCIUM SERPL-MCNC: 8.6 MG/DL (ref 8.3–10.6)
CHLORIDE SERPL-SCNC: 108 MMOL/L (ref 99–110)
CO2 SERPL-SCNC: 24 MMOL/L (ref 21–32)
CREAT SERPL-MCNC: 1.1 MG/DL (ref 0.6–1.1)
DEPRECATED RDW RBC AUTO: 14.2 % (ref 12.4–15.4)
EOSINOPHIL # BLD: 0.3 K/UL (ref 0–0.6)
EOSINOPHIL NFR BLD: 5.6 %
GFR SERPLBLD CREATININE-BSD FMLA CKD-EPI: 59 ML/MIN/{1.73_M2}
GLUCOSE BLD-MCNC: 140 MG/DL (ref 70–99)
GLUCOSE BLD-MCNC: 170 MG/DL (ref 70–99)
GLUCOSE BLD-MCNC: 181 MG/DL (ref 70–99)
GLUCOSE BLD-MCNC: 268 MG/DL (ref 70–99)
GLUCOSE SERPL-MCNC: 148 MG/DL (ref 70–99)
HCT VFR BLD AUTO: 28.2 % (ref 36–48)
HCT VFR BLD AUTO: 30.7 % (ref 36–48)
HGB BLD-MCNC: 10.2 G/DL (ref 12–16)
HGB BLD-MCNC: 9.4 G/DL (ref 12–16)
LYMPHOCYTES # BLD: 1.4 K/UL (ref 1–5.1)
LYMPHOCYTES NFR BLD: 26.6 %
MCH RBC QN AUTO: 26.8 PG (ref 26–34)
MCHC RBC AUTO-ENTMCNC: 33.3 G/DL (ref 31–36)
MCV RBC AUTO: 80.5 FL (ref 80–100)
MONOCYTES # BLD: 0.5 K/UL (ref 0–1.3)
MONOCYTES NFR BLD: 8.9 %
NEUTROPHILS # BLD: 3.1 K/UL (ref 1.7–7.7)
NEUTROPHILS NFR BLD: 58 %
NT-PROBNP SERPL-MCNC: 1216 PG/ML (ref 0–124)
PERFORMED ON: ABNORMAL
PLATELET # BLD AUTO: 207 K/UL (ref 135–450)
PMV BLD AUTO: 8.4 FL (ref 5–10.5)
POTASSIUM SERPL-SCNC: 3.7 MMOL/L (ref 3.5–5.1)
RBC # BLD AUTO: 3.5 M/UL (ref 4–5.2)
SODIUM SERPL-SCNC: 141 MMOL/L (ref 136–145)
WBC # BLD AUTO: 5.3 K/UL (ref 4–11)

## 2024-02-06 PROCEDURE — APPSS60 APP SPLIT SHARED TIME 46-60 MINUTES: Performed by: NURSE PRACTITIONER

## 2024-02-06 PROCEDURE — 85025 COMPLETE CBC W/AUTO DIFF WBC: CPT

## 2024-02-06 PROCEDURE — 6370000000 HC RX 637 (ALT 250 FOR IP): Performed by: INTERNAL MEDICINE

## 2024-02-06 PROCEDURE — APPNB30 APP NON BILLABLE TIME 0-30 MINS: Performed by: NURSE PRACTITIONER

## 2024-02-06 PROCEDURE — 99254 IP/OBS CNSLTJ NEW/EST MOD 60: CPT | Performed by: INTERNAL MEDICINE

## 2024-02-06 PROCEDURE — 85018 HEMOGLOBIN: CPT

## 2024-02-06 PROCEDURE — 6360000002 HC RX W HCPCS: Performed by: INTERNAL MEDICINE

## 2024-02-06 PROCEDURE — 85014 HEMATOCRIT: CPT

## 2024-02-06 PROCEDURE — 2580000003 HC RX 258: Performed by: INTERNAL MEDICINE

## 2024-02-06 PROCEDURE — 80048 BASIC METABOLIC PNL TOTAL CA: CPT

## 2024-02-06 PROCEDURE — 83880 ASSAY OF NATRIURETIC PEPTIDE: CPT

## 2024-02-06 PROCEDURE — 36415 COLL VENOUS BLD VENIPUNCTURE: CPT

## 2024-02-06 PROCEDURE — 1200000000 HC SEMI PRIVATE

## 2024-02-06 RX ORDER — LOSARTAN POTASSIUM 100 MG/1
100 TABLET ORAL DAILY
Status: DISCONTINUED | OUTPATIENT
Start: 2024-02-06 | End: 2024-02-10 | Stop reason: HOSPADM

## 2024-02-06 RX ORDER — NIFEDIPINE 30 MG/1
30 TABLET, EXTENDED RELEASE ORAL NIGHTLY
Status: DISCONTINUED | OUTPATIENT
Start: 2024-02-06 | End: 2024-02-08

## 2024-02-06 RX ORDER — NIFEDIPINE 30 MG/1
30 TABLET, EXTENDED RELEASE ORAL NIGHTLY
Status: DISCONTINUED | OUTPATIENT
Start: 2024-02-06 | End: 2024-02-06 | Stop reason: SDUPTHER

## 2024-02-06 RX ORDER — NIFEDIPINE 30 MG/1
60 TABLET, EXTENDED RELEASE ORAL DAILY
Status: DISCONTINUED | OUTPATIENT
Start: 2024-02-06 | End: 2024-02-08

## 2024-02-06 RX ORDER — SODIUM CHLORIDE 9 MG/ML
INJECTION, SOLUTION INTRAVENOUS CONTINUOUS
Status: DISCONTINUED | OUTPATIENT
Start: 2024-02-06 | End: 2024-02-08 | Stop reason: ALTCHOICE

## 2024-02-06 RX ADMIN — NIFEDIPINE 30 MG: 30 TABLET, EXTENDED RELEASE ORAL at 20:41

## 2024-02-06 RX ADMIN — SODIUM CHLORIDE: 9 INJECTION, SOLUTION INTRAVENOUS at 13:28

## 2024-02-06 RX ADMIN — ENOXAPARIN SODIUM 40 MG: 100 INJECTION SUBCUTANEOUS at 08:32

## 2024-02-06 RX ADMIN — INSULIN LISPRO 4 UNITS: 100 INJECTION, SOLUTION INTRAVENOUS; SUBCUTANEOUS at 12:06

## 2024-02-06 RX ADMIN — NIFEDIPINE 60 MG: 30 TABLET, EXTENDED RELEASE ORAL at 08:51

## 2024-02-06 RX ADMIN — Medication 10 ML: at 08:22

## 2024-02-06 RX ADMIN — LOSARTAN POTASSIUM 100 MG: 100 TABLET, FILM COATED ORAL at 08:51

## 2024-02-06 ASSESSMENT — ENCOUNTER SYMPTOMS
SHORTNESS OF BREATH: 0
ABDOMINAL PAIN: 0
VOMITING: 0
CONSTIPATION: 0
BACK PAIN: 0
NAUSEA: 0
COUGH: 0
WHEEZING: 0
DIARRHEA: 0

## 2024-02-06 NOTE — PROGRESS NOTES
Multiple failed attempt by Rns to place a [employer] PIV  for ordered CT angiogram.  Will notify PICC Rn to place IV.

## 2024-02-06 NOTE — CONSULTS
Nephrology Consult Note  [phone]   [employer]           Reason for Consult:  Hypertension and h/o renal artery stenosis    HISTORY OF PRESENT ILLNESS:                The patient is a 58 y.o.female with significant past medical history of Hypertension, renal artery stenosis,  DM II came in with c/o high blood pressures   Blood pressure was noted to be up to 201/108 in the ER  Recent hospitalization for hypertensive crisis, workup showed bilateral renal artery stenosis, seen by vascular surgery, no intervention recommended, discharged on losartan and nifedipine to follow-up as outpatient.  She presents again to the ER after noting high blood pressure as outpatient  Admits to compliance with medications  Was started on nicardipine drip on admission, has been off since last night  We are consulted for hypertension and history of renal artery stenosis  She was already seen by vascular surgery, recommended renal angiogram with possible stenting, but she expressed to their team that she would undergo renal artery stenting as a last resort  Patient seen and examined  Diagnosed with hypertension in her mid 30s  Denies smoking or alcohol or illicit drug abuse      Past Medical History:        Diagnosis Date    Diabetes mellitus (HCC)     Hypertension        Past Surgical History:        Procedure Laterality Date    BREAST BIOPSY      1980's benign          Current Medications:    No current facility-administered medications on file prior to encounter.     Current Outpatient Medications on File Prior to Encounter   Medication Sig Dispense Refill    NIFEdipine (PROCARDIA XL) 60 MG extended release tablet Take 1 tablet by mouth daily (Patient taking differently: Take 1 tablet by mouth nightly) 90 tablet 3    losartan (COZAAR) 100 MG tablet Take 1 tablet by mouth daily 30 tablet 3    Omega-3 Fatty Acids (FISH OIL) 1200 MG CAPS Take 1,200 mg by mouth nightly      Cholecalciferol (VITAMIN D3) 10 MCG (400

## 2024-02-06 NOTE — CONSULTS
[facility]   CONSULTATION  [phone]        Reason for Consultation/Chief Complaint: \" Elevated troponin.\"    History of Present Illness:  Galina Nick is a 58 y.o. patient who presented to the hospital with complaints of high blood pressure, systolic in the 200s, as well as some shortness of breath and palpitations.  No nausea, vomiting or chest pain.  Was recently hospitalized for severe systemic hypertension and discharged on medications which she states she has been compliant with.  She states she has been under a lot of stress stress at work.  Has noted blood pressures tend to be elevated earlier in the week on Mondays and Tuesdays.    Past history includes renal artery stenosis diagnosed by renal ultrasound.  She also has hypertension and diabetes mellitus.    Past Medical History:   has a past medical history of Diabetes mellitus (HCC) and Hypertension.    Surgical History:   has a past surgical history that includes Breast biopsy.     Social History:   reports that she has never smoked. She has never used smokeless tobacco. She reports that she does not drink alcohol and does not use drugs.     Family History:  family history includes Diabetes in her father, maternal grandfather, maternal grandmother, paternal grandfather, and paternal grandmother.    Home Medications:  Were reviewed and are listed in nursing record. and/or listed below  Prior to Admission medications    Medication Sig Start Date End Date Taking? Authorizing Provider   NIFEdipine (PROCARDIA XL) 60 MG extended release tablet Take 1 tablet by mouth daily  Patient taking differently: Take 1 tablet by mouth nightly 1/26/24   Ava Toussaint MD   losartan (COZAAR) 100 MG tablet Take 1 tablet by mouth daily 1/26/24   Ava Toussaint MD   Omega-3 Fatty Acids (FISH OIL) 1200 MG CAPS Take 1,200 mg by mouth nightly    Provider, MD Eliane   Cholecalciferol (VITAMIN D3) 10 MCG (400 UNIT) CAPS Take 1 capsule by mouth nightly

## 2024-02-06 NOTE — FLOWSHEET NOTE
02/05/24 2030   Vitals   Temp 98 °F (36.7 °C)   Temp Source Temporal   Pulse 78   Heart Rate Source Monitor   Respirations 18   BP (!) 141/80   MAP (Calculated) 100   MAP (mmHg) 98   BP Location Right upper arm   BP Upper/Lower Upper   BP Method Automatic   Patient Position Semi fowlers   Cardiac Rhythm Sinus rhythm   Pain Assessment   Pain Assessment None - Denies Pain   Oxygen Therapy   SpO2 98 %   Pulse Oximetry Type Intermittent   Pulse Oximeter Device Mode Intermittent   Pulse Oximeter Device Location Finger   O2 Device None (Room air)     Pt admitted to CVU 2912 prior to shift change. VSS. Admission/shift assessment complete. Cardene gtt infusing through PIV. BP cuff in place for Q15 BP checks. Denies   SOB or CP. Pt oriented to room and call light. POC discussed with patient. Pt agreeable to plan and denies needs at this time. Call light in reach and patient resting comfortably in bed.

## 2024-02-06 NOTE — CARE COORDINATION
Case Management Assessment  Initial Evaluation    Date/Time of Evaluation: 2/6/2024 09:03 AM  Assessment Completed by: COURTNEY REICH RN    If patient is discharged prior to next notation, then this note serves as note for discharge by case management.    Patient Name: Galina Nick                   YOB: 1965  Diagnosis: Renal artery stenosis (HCC) [I70.1]  Hypertensive emergency [I16.1]                   Date / Time: 2/5/2024 12:34 PM    Patient Admission Status: Inpatient   Readmission Risk (Low < 19, Mod (19-27), High > 27): Readmission Risk Score: 11.8    Current PCP: Tristan Lei MD  PCP verified by CM? Yes    Chart Reviewed: Yes      History Provided by: Patient  Patient Orientation: Alert and Oriented, Person, Place, Situation    Patient Cognition: Alert    Hospitalization in the last 30 days (Readmission):  Yes    If yes, Readmission Assessment in  Navigator will be completed.    Advance Directives:      Code Status: Full Code   Patient's Primary Decision Maker is: Legal Next of Kin    Supplemental (Other) Decision Maker: Guero Castro  Child - [phone]    Discharge Planning:    Patient lives with: Alone Type of Home: House (tri level one step to enter, 8 steps up and 6 steps down on inside of home)  Primary Care Giver: Self  Patient Support Systems include: Children, Family Members   Current Financial resources: Other (Comment) (BCBS)  Current community resources: None  Current services prior to admission: None            Current DME:              Type of Home Care services:  None    ADLS  Prior functional level: Independent in ADLs/IADLs  Current functional level: Independent in ADLs/IADLs    PT AM-PAC:   /24  OT AM-PAC:   /24    Family can provide assistance at DC: Yes  Would you like Case Management to discuss the discharge plan with any other family members/significant others, and if so, who? No  Plans to Return to Present Housing: Yes  Other Identified

## 2024-02-06 NOTE — CONSULTS
[facility]  Consultation Note    Chief Complaint / Reason for Consultation  Renal artery stenosis     History of Present Illness  Patient is a 58 y.o. female with past medical history of DM and HTN who presented to the ED with complaints of high blood pressure in the 200s.  She was recently admitted 1/23-1/26/24 for hypertensive urgency and blurry vision.  During that admission she was noted to have bilateral renal artery stenosis 60-99% on ultrasound.  At the time of discharge her BP was controlled with oral medications.  Patient reports her BP has been usually running 140-160s but yesterday morning it was 180-190s and then after she took her BP meds it was in the 200s and she started with blurry vision again.  She does report occasional heart palpitations.  Denies chest pain.  She denies tobacco use.  She was started on nicardipine gtt in ED which is currently off.  We have been consulted for further evaluation of renal artery stenosis.     Review of Systems  Denies fevers, chills, chest pain, shortness of breath, nausea, vomiting, hematemesis, diarrhea, constipation, melena, hematochezia, wt changes, vision problems, blindness, hearing problems, facial droop, slurred speech, extremity weakness, extremity numbness, dysuria.    Past Medical History:   Diagnosis Date    Diabetes mellitus (HCC)     Hypertension        Past Surgical History:   Procedure Laterality Date    BREAST BIOPSY      1980's benign        Allergies   Allergen Reactions    Ace Inhibitors      Dry cough per pt       Social History     Socioeconomic History    Marital status: Single     Spouse name: Not on file    Number of children: Not on file    Years of education: Not on file    Highest education level: Not on file   Occupational History    Not on file   Tobacco Use    Smoking status: Never    Smokeless tobacco: Never   Vaping Use    Vaping Use: Never used   Substance and Sexual Activity    Alcohol use: No    Drug

## 2024-02-06 NOTE — PROGRESS NOTES
Med rec completed on admission and noted that home BP medications have not been ordered. Hospitalist messaged d/t Pt requiring cardene gtt for goal of SBP between 140-160. Hospitalist stated he \"will look into it\". No new orders at this time.

## 2024-02-06 NOTE — CARE COORDINATION
02/06/24 0903   Readmission Assessment   Number of Days since last admission? 8-30 days   Previous Disposition Home Alone   Who is being Interviewed Patient   What was the patient's/caregiver's perception as to why they think they needed to return back to the hospital? Other (Comment)  (\"elevated blood pressure\")   Did you visit your Primary Care Physician after you left the hospital, before you returned this time? Yes   Did you see a specialist, such as Cardiac, Pulmonary, Orthopedic Physician, etc. after you left the hospital? No  (states vascular MD office called to schedule an appt and she needs to call them back)   Who advised the patient to return to the hospital? 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 No   In our efforts to provide the best possible care to you and others like you, can you think of anything that we could have done to help you after you left the hospital the first time, so that you might not have needed to return so soon? Other (Comment)  (patient states everything was great and instructions were explained and her questions answered)

## 2024-02-06 NOTE — PROGRESS NOTES
[facility]    2/6/2024 9:52 AM        Name: Galina Nick .              Admitted: 2/5/2024  Primary Care Provider: Tristan Lei MD (Tel: [phone])      Subjective:      Patient lying bed no nausea vomiting chest pain  Reviewed interval ancillary notes    Current Medications  losartan (COZAAR) tablet 100 mg, Daily  NIFEdipine (PROCARDIA XL) extended release tablet 60 mg, Daily  insulin lispro (HUMALOG) injection vial 0-8 Units, TID WC  insulin lispro (HUMALOG) injection vial 0-4 Units, Nightly  sodium chloride flush 0.9 % injection 5-40 mL, 2 times per day  sodium chloride flush 0.9 % injection 5-40 mL, PRN  0.9 % sodium chloride infusion, PRN  enoxaparin (LOVENOX) injection 40 mg, Daily  polyethylene glycol (GLYCOLAX) packet 17 g, Daily PRN  acetaminophen (TYLENOL) tablet 650 mg, Q6H PRN   Or  acetaminophen (TYLENOL) suppository 650 mg, Q6H PRN  niCARdipine (CARDENE) 25 mg in sodium chloride 0.9 % 250 mL infusion ([id number]), Continuous        Objective:  BP (!) 160/80   Pulse 66   Temp 98.6 °F (37 °C) (Temporal)   Resp 18   Ht 1.727 m (5' 8\")   Wt 92.2 kg (203 lb 4.2 oz)   LMP 06/01/2011   SpO2 99%   BMI 30.91 kg/m²     Intake/Output Summary (Last 24 hours) at 2/6/2024 0952  Last data filed at 2/6/2024 0514  Gross per 24 hour   Intake 394.56 ml   Output --   Net 394.56 ml      Wt Readings from Last 3 Encounters:   02/06/24 92.2 kg (203 lb 4.2 oz)   01/26/24 91.6 kg (202 lb)   01/24/24 86.1 kg (189 lb 13.1 oz)       General appearance:  Appears comfortable. AAOx3  HEENT: atraumatic, Pupils equal, muscous membranes moist, no masses appreciated  Cardiovascular: Regular rate and rhythm no murmurs appreciated  Respiratory: CTAB no wheezing  Gastrointestinal: Abdomen soft, non-tender, BS+  EXT: no edema  Neurology: no gross focal deficts  Psychiatry: Appropriate affect. Not agitated  Skin: Warm,

## 2024-02-07 ENCOUNTER — APPOINTMENT (OUTPATIENT)
Dept: CT IMAGING | Age: 59
End: 2024-02-07
Payer: COMMERCIAL

## 2024-02-07 LAB
ANION GAP SERPL CALCULATED.3IONS-SCNC: 10 MMOL/L (ref 3–16)
BUN SERPL-MCNC: 20 MG/DL (ref 7–20)
CALCIUM SERPL-MCNC: 8.8 MG/DL (ref 8.3–10.6)
CHLORIDE SERPL-SCNC: 108 MMOL/L (ref 99–110)
CO2 SERPL-SCNC: 21 MMOL/L (ref 21–32)
CREAT SERPL-MCNC: 1.4 MG/DL (ref 0.6–1.1)
GFR SERPLBLD CREATININE-BSD FMLA CKD-EPI: 44 ML/MIN/{1.73_M2}
GLUCOSE BLD-MCNC: 214 MG/DL (ref 70–99)
GLUCOSE BLD-MCNC: 226 MG/DL (ref 70–99)
GLUCOSE BLD-MCNC: 248 MG/DL (ref 70–99)
GLUCOSE BLD-MCNC: 249 MG/DL (ref 70–99)
GLUCOSE BLD-MCNC: 292 MG/DL (ref 70–99)
GLUCOSE SERPL-MCNC: 295 MG/DL (ref 70–99)
PERFORMED ON: ABNORMAL
POTASSIUM SERPL-SCNC: 4.1 MMOL/L (ref 3.5–5.1)
SODIUM SERPL-SCNC: 139 MMOL/L (ref 136–145)

## 2024-02-07 PROCEDURE — 2580000003 HC RX 258: Performed by: INTERNAL MEDICINE

## 2024-02-07 PROCEDURE — 74174 CTA ABD&PLVS W/CONTRAST: CPT

## 2024-02-07 PROCEDURE — 6360000002 HC RX W HCPCS: Performed by: INTERNAL MEDICINE

## 2024-02-07 PROCEDURE — 6370000000 HC RX 637 (ALT 250 FOR IP): Performed by: INTERNAL MEDICINE

## 2024-02-07 PROCEDURE — 80048 BASIC METABOLIC PNL TOTAL CA: CPT

## 2024-02-07 PROCEDURE — 1200000000 HC SEMI PRIVATE

## 2024-02-07 PROCEDURE — 36415 COLL VENOUS BLD VENIPUNCTURE: CPT

## 2024-02-07 PROCEDURE — APPSS15 APP SPLIT SHARED TIME 0-15 MINUTES: Performed by: NURSE PRACTITIONER

## 2024-02-07 PROCEDURE — APPNB30 APP NON BILLABLE TIME 0-30 MINS: Performed by: NURSE PRACTITIONER

## 2024-02-07 PROCEDURE — 99232 SBSQ HOSP IP/OBS MODERATE 35: CPT | Performed by: INTERNAL MEDICINE

## 2024-02-07 PROCEDURE — 6360000004 HC RX CONTRAST MEDICATION: Performed by: INTERNAL MEDICINE

## 2024-02-07 RX ADMIN — ENOXAPARIN SODIUM 40 MG: 100 INJECTION SUBCUTANEOUS at 08:33

## 2024-02-07 RX ADMIN — INSULIN LISPRO 4 UNITS: 100 INJECTION, SOLUTION INTRAVENOUS; SUBCUTANEOUS at 11:47

## 2024-02-07 RX ADMIN — Medication 10 ML: at 08:37

## 2024-02-07 RX ADMIN — INSULIN LISPRO 2 UNITS: 100 INJECTION, SOLUTION INTRAVENOUS; SUBCUTANEOUS at 08:36

## 2024-02-07 RX ADMIN — LOSARTAN POTASSIUM 100 MG: 100 TABLET, FILM COATED ORAL at 08:33

## 2024-02-07 RX ADMIN — NIFEDIPINE 60 MG: 30 TABLET, EXTENDED RELEASE ORAL at 08:33

## 2024-02-07 RX ADMIN — IOPAMIDOL 75 ML: 755 INJECTION, SOLUTION INTRAVENOUS at 15:18

## 2024-02-07 RX ADMIN — NIFEDIPINE 30 MG: 30 TABLET, EXTENDED RELEASE ORAL at 20:32

## 2024-02-07 RX ADMIN — INSULIN LISPRO 2 UNITS: 100 INJECTION, SOLUTION INTRAVENOUS; SUBCUTANEOUS at 17:04

## 2024-02-07 RX ADMIN — Medication 10 ML: at 00:04

## 2024-02-07 NOTE — PROGRESS NOTES
[facility]    2/7/2024 1:43 PM        Name: Galina Nick .              Admitted: 2/5/2024  Primary Care Provider: Tristan Lei MD (Tel: [phone])      Subjective:      Lying bed no nausea vomiting or chest pain  Current Medications  losartan (COZAAR) tablet 100 mg, Daily  NIFEdipine (PROCARDIA XL) extended release tablet 60 mg, Daily  0.9 % sodium chloride infusion, Continuous  NIFEdipine (PROCARDIA XL) extended release tablet 30 mg, Nightly  insulin lispro (HUMALOG) injection vial 0-8 Units, TID WC  insulin lispro (HUMALOG) injection vial 0-4 Units, Nightly  sodium chloride flush 0.9 % injection 5-40 mL, 2 times per day  sodium chloride flush 0.9 % injection 5-40 mL, PRN  0.9 % sodium chloride infusion, PRN  enoxaparin (LOVENOX) injection 40 mg, Daily  polyethylene glycol (GLYCOLAX) packet 17 g, Daily PRN  acetaminophen (TYLENOL) tablet 650 mg, Q6H PRN   Or  acetaminophen (TYLENOL) suppository 650 mg, Q6H PRN        Objective:  BP (!) 157/91   Pulse 91   Temp 97.2 °F (36.2 °C) (Oral)   Resp 16   Ht 1.727 m (5' 8\")   Wt 86 kg (189 lb 9.6 oz)   LMP 06/01/2011   SpO2 98%   BMI 28.83 kg/m²     Intake/Output Summary (Last 24 hours) at 2/7/2024 1343  Last data filed at 2/6/2024 1513  Gross per 24 hour   Intake 240 ml   Output --   Net 240 ml        Wt Readings from Last 3 Encounters:   02/07/24 86 kg (189 lb 9.6 oz)   01/26/24 91.6 kg (202 lb)   01/24/24 86.1 kg (189 lb 13.1 oz)       General appearance:  Appears comfortable. AAOx3  HEENT: atraumatic, Pupils equal, muscous membranes moist, no masses appreciated  Cardiovascular: Regular rate and rhythm no murmurs appreciated  Respiratory: CTAB no wheezing  Gastrointestinal: Abdomen soft, non-tender, BS+  EXT: no edema  Neurology: no gross focal deficts  Psychiatry: Appropriate affect. Not agitated  Skin: Warm, dry, no rashes appreciated    Labs and  Tests:  CBC:   Recent Labs     02/05/24  1410 02/06/24  0500 02/06/24  0847   WBC 6.8 5.3  --    HGB 11.8* 9.4* 10.2*    207  --        BMP:    Recent Labs     02/05/24  1411 02/06/24  0500 02/07/24  1146    141 139   K 4.0 3.7 4.1    108 108   CO2 21 24 21   BUN 21* 24* 20   CREATININE 1.0 1.1 1.4*   GLUCOSE 101* 148* 295*       Hepatic:   Recent Labs     02/05/24  1411   AST 18   ALT 15   BILITOT 0.5   ALKPHOS 141*       XR CHEST PORTABLE   Final Result   1. Mild left basilar parenchymal disease which in part is likely related to   subsegmental atelectasis and/or scar.   2. Bandlike density overlying right perihilar region may represent minimal   subsegmental atelectasis.   Short-term follow-up plain film examination is recommended for re-evaluation.         CTA ABDOMEN PELVIS W CONTRAST    (Results Pending)       Recent imaging reviewed    Problem List  Principal Problem:    Hypertensive emergency  Active Problems:    Dyslipidemia    Renal artery stenosis (HCC)    Shortness of breath    Elevated troponin    Nonrheumatic mitral valve regurgitation  Resolved Problems:    * No resolved hospital problems. *     Assessment/Plan:   Htn emergency  Titrateupmeds    Delfin: ivf  Plan for cta   Bmp in am   Disucss need fro therapy after cta     DVT prophylaxis lovneox  Code status full code      Jessica Mathew MD   2/7/2024 1:43 PM

## 2024-02-07 NOTE — PROGRESS NOTES
[facility] Daily Progress Note      Admit Date:  2/5/2024    Chief Complaint: Elevated troponin, hypertension    Subjective:  Ms. Nick .  She denies chest discomfort or shortness of breath.    Objective:   BP (!) 157/91   Pulse 91   Temp 97.2 °F (36.2 °C) (Oral)   Resp 16   Ht 1.727 m (5' 8\")   Wt 86 kg (189 lb 9.6 oz)   LMP 06/01/2011   SpO2 98%   BMI 28.83 kg/m²   No intake or output data in the 24 hours ending 02/07/24 1801      Physical Exam:  General:  Awake, alert, oriented x 3, NAD  Skin:  Warm and dry  Neck:  JVD normal  Chest:  normal air entry  Cardiovascular:  RRR S1S2, no S3, no significant murmur  Abdomen:  Soft, ND, NT, No HSM  Extremities:  No edema    Medications:    losartan  100 mg Oral Daily    NIFEdipine  60 mg Oral Daily    NIFEdipine  30 mg Oral Nightly    insulin lispro  0-8 Units SubCUTAneous TID WC    insulin lispro  0-4 Units SubCUTAneous Nightly    sodium chloride flush  5-40 mL IntraVENous 2 times per day    enoxaparin  40 mg SubCUTAneous Daily      sodium chloride 100 mL/hr at 02/06/24 1328    sodium chloride       sodium chloride flush, sodium chloride, polyethylene glycol, acetaminophen **OR** acetaminophen    TELEMETRY (Personally reviewed by me): Sinus     Lab Data:  CBC:   Recent Labs     02/05/24  1410 02/06/24  0500 02/06/24  0847   WBC 6.8 5.3  --    HGB 11.8* 9.4* 10.2*   HCT 35.6* 28.2* 30.7*   MCV 80.9 80.5  --     207  --      BMP:   Recent Labs     02/05/24  1411 02/06/24  0500 02/07/24  1146    141 139   K 4.0 3.7 4.1    108 108   CO2 21 24 21   BUN 21* 24* 20   CREATININE 1.0 1.1 1.4*     LIVER PROFILE:   Recent Labs     02/05/24  1411   AST 18   ALT 15   BILITOT 0.5   ALKPHOS 141*     PT/INR: No results for input(s): \"PROTIME\", \"INR\" in the last 72 hours.  APTT: No results for input(s): \"APTT\" in the last 72 hours.  BNP:  No results for input(s): \"BNP\" in the last 72 hours.  Imaging/Procedures:   CTA Abdomen and Pelvis

## 2024-02-07 NOTE — PLAN OF CARE
Problem: Safety - Adult  Goal: Free from fall injury  Outcome: Progressing     Problem: ABCDS Injury Assessment  Goal: Absence of physical injury  Outcome: Progressing     Problem: Chronic Conditions and Co-morbidities  Goal: Patient's chronic conditions and co-morbidity symptoms are monitored and maintained or improved  Outcome: Progressing     Problem: Discharge Planning  Goal: Discharge to home or other facility with appropriate resources  Outcome: Progressing

## 2024-02-07 NOTE — PROGRESS NOTES
Vascular Progress Note    2/7/2024 9:46 AM    Chief complaint / Reason for visit : renal artery stenosis     Subjective:  Patient resting in bed.  She reports she is doing okay.  No new complaints.  Having difficulty getting IV access for CTA.  -160s, afebrile.     Vital Signs: BP (!) 164/69   Pulse 78   Temp 97.2 °F (36.2 °C) (Oral)   Resp 16   Ht 1.727 m (5' 8\")   Wt 86 kg (189 lb 9.6 oz)   LMP 06/01/2011   SpO2 98%   BMI 28.83 kg/m²      I/O:    Intake/Output Summary (Last 24 hours) at 2/7/2024 0946  Last data filed at 2/6/2024 1513  Gross per 24 hour   Intake 240 ml   Output --   Net 240 ml       Physical Exam:   General: no apparent distress, appears stated age  Chest/Lungs: no accessory muscle use  Cardiac:  regular rate and rhythm  Extremities: warm and well perfused, no signs of cyanosis or ischemia, no significant edema, bilateral upper and lower extremity motorsensory intact    Labs:   Lab Results   Component Value Date/Time     02/06/2024 05:00 AM    K 3.7 02/06/2024 05:00 AM     02/06/2024 05:00 AM    CO2 24 02/06/2024 05:00 AM    BUN 24 02/06/2024 05:00 AM    CREATININE 1.1 02/06/2024 05:00 AM    GFRAA >60 10/13/2022 07:46 AM    LABGLOM 59 02/06/2024 05:00 AM    GLUCOSE 148 02/06/2024 05:00 AM    PHOS 4.5 01/26/2024 09:00 AM    CALCIUM 8.6 02/06/2024 05:00 AM     Lab Results   Component Value Date/Time    WBC 5.3 02/06/2024 05:00 AM    RBC 3.50 02/06/2024 05:00 AM    HGB 10.2 02/06/2024 08:47 AM    HCT 30.7 02/06/2024 08:47 AM    MCV 80.5 02/06/2024 05:00 AM    RDW 14.2 02/06/2024 05:00 AM     02/06/2024 05:00 AM   No results found for: \"INR\", \"PROTIME\"     Imaging:    Renal artery stenosis 1/25/24:  Conclusions      Summary      -There is 60-99% stenosis of the proximal right renal artery(ies).   -The right renal vein is patent, however Doppler waveform suggests venous   congestion.   -The parenchymal resistive index is borderline abnormal in the lower pole.   -Several  hypoechoic structures noted throughout right kidney, likely cysts,   largest measuring   1.02 cm X 1.26 cm.      -There is 60-99% stenosis of the proximal left renal artery (ies).   -The left renal vein is patent, however Doppler waveform suggests venous   congestion.   -The parenchymal resistive index is borderline abnormal in the lower pole.   -Several hypoechoic structures noted throughout left kidney, likely cysts,   largest measuring 1.13 cm X 0.934 cm.    Scheduled Meds:    losartan  100 mg Oral Daily    NIFEdipine  60 mg Oral Daily    NIFEdipine  30 mg Oral Nightly    insulin lispro  0-8 Units SubCUTAneous TID WC    insulin lispro  0-4 Units SubCUTAneous Nightly    sodium chloride flush  5-40 mL IntraVENous 2 times per day    enoxaparin  40 mg SubCUTAneous Daily     Continuous Infusions:    sodium chloride 100 mL/hr at 02/06/24 1328    sodium chloride           Assessment:   Hypertensive urgency - off Nicardipine gtt   Bilateral renal artery stenosis - creatinine stable 1.1  Elevated troponin - cardiology consult   DM - last A1c 7.8 (1/24/24)    Plan:  Agree with CTA abd/pelvis for confirmation of renal artery stenosis.  Patient wants to hold off on renal angiogram with possible stent placement at this time and is in favor of more conservative measures including medication, diet and exercise.   Will plan to arrange apt with Dr. Noyola in 2-3 weeks for follow up.  No further vascular plans at this time but please contact us with any questions or concerns.    Patient is stable from vascular standpoint.  We will sign off for now, but please do not hesitate to contact us with any questions.  Thank you for the consultation.        Patient educated on plan of care and disease process.  All questions answered.        Electronically signed by ARMAND Tanner CNP on 2/7/2024 at 9:46 AM

## 2024-02-07 NOTE — PROGRESS NOTES
Nephrology progress Note  [phone]  [phone]   [email or web address]       Patient:  Galina Nick   : 1965    CC: high BP    Brief HPI   The patient is a 58 y.o.female with significant past medical history of Hypertension, renal artery stenosis,  DM II came in with c/o high blood pressures   Blood pressure was noted to be up to 201/108 in the ER  Recent hospitalization for hypertensive crisis, workup showed bilateral renal artery stenosis, seen by vascular surgery, no intervention recommended, discharged on losartan and nifedipine to follow-up as outpatient.  She presents again to the ER after noting high blood pressure as outpatient  Admits to compliance with medications  Was started on nicardipine drip on admission, has been off since last night  We are consulted for hypertension and history of renal artery stenosis  She was already seen by vascular surgery, recommended renal angiogram with possible stenting, but she expressed to their team that she would undergo renal artery stenting as a last resort      Subjective/interval history  Patient seen and examined  No BMP today  Awaiting CT angiogram  Blood pressure is better controlled  Has been afebrile  On room air  No chest pain or shortness of breath  No visitors at the bedside  Has been afebrile    Meds:  Scheduled Meds:   losartan  100 mg Oral Daily    NIFEdipine  60 mg Oral Daily    NIFEdipine  30 mg Oral Nightly    insulin lispro  0-8 Units SubCUTAneous TID WC    insulin lispro  0-4 Units SubCUTAneous Nightly    sodium chloride flush  5-40 mL IntraVENous 2 times per day    enoxaparin  40 mg SubCUTAneous Daily     Continuous Infusions:   sodium chloride 100 mL/hr at 24 1328    sodium chloride       PRN Meds:.sodium chloride flush, sodium chloride, polyethylene glycol, acetaminophen **OR** acetaminophen      Vitals:  BP (!) 164/69   Pulse 78   Temp 97.2 °F (36.2 °C) (Oral)   Resp 16   Ht 1.727 m (5' 8\")   Wt 86 kg (189 lb 9.6 oz)   LMP  06/01/2011   SpO2 98%   BMI 28.83 kg/m²       Physical Exam  General : AAOx3, not in pain or respiratory distress, resting in bed  HEENT : mucosa moist.  CVS: S1 S2 normal, regular rhythm, no murmurs or rubs.  Lungs: Clear, no wheezing or crackles.  Abd: Soft, bowel sounds normal, non-tender.  Ext: No edema, no cyanosis  Skin: Warm.  No rashes appreciated.  : bladder non-distended, no tenderness over the bladder      Labs:  CBC with Differential:    Lab Results   Component Value Date/Time    WBC 5.3 02/06/2024 05:00 AM    RBC 3.50 02/06/2024 05:00 AM    HGB 10.2 02/06/2024 08:47 AM    HCT 30.7 02/06/2024 08:47 AM     02/06/2024 05:00 AM    MCV 80.5 02/06/2024 05:00 AM    MCH 26.8 02/06/2024 05:00 AM    MCHC 33.3 02/06/2024 05:00 AM    RDW 14.2 02/06/2024 05:00 AM    LYMPHOPCT 26.6 02/06/2024 05:00 AM    MONOPCT 8.9 02/06/2024 05:00 AM    BASOPCT 0.9 02/06/2024 05:00 AM    MONOSABS 0.5 02/06/2024 05:00 AM    LYMPHSABS 1.4 02/06/2024 05:00 AM    EOSABS 0.3 02/06/2024 05:00 AM    BASOSABS 0.0 02/06/2024 05:00 AM     BMP:    Lab Results   Component Value Date/Time     02/06/2024 05:00 AM    K 3.7 02/06/2024 05:00 AM     02/06/2024 05:00 AM    CO2 24 02/06/2024 05:00 AM    BUN 24 02/06/2024 05:00 AM    LABALBU 3.8 02/05/2024 02:11 PM    CREATININE 1.1 02/06/2024 05:00 AM    CALCIUM 8.6 02/06/2024 05:00 AM    GFRAA >60 10/13/2022 07:46 AM    LABGLOM 59 02/06/2024 05:00 AM    GLUCOSE 148 02/06/2024 05:00 AM     Ionized Calcium:  No results found for: \"IONCA\"  Magnesium:  No results found for: \"MG\"  Phosphorus:    Lab Results   Component Value Date/Time    PHOS 4.5 01/26/2024 09:00 AM       Assessment/Plan:      Hypertensive urgency improved  Was on nicardipine gtt.  Presumed to be secondary to bilateral renal artery stenosis  Prior workup in January 2024 with normal TSH, normal PTH, normal ARR, normal plasma metanephrines  Would do CT angiogram for confirmation of diagnosis, discussed with pt, can

## 2024-02-07 NOTE — PROGRESS NOTES
Pt resting comfortably in bed watching television. A & O x4.  All LDA's remain C/D/I. Called dietry for patients breakfast order. Shift assessment completed, vitals stable with a BP of 164/69. Call light, and bedside table  within reach. No further needs at this time.

## 2024-02-08 LAB
ANION GAP SERPL CALCULATED.3IONS-SCNC: 9 MMOL/L (ref 3–16)
BASOPHILS # BLD: 0.1 K/UL (ref 0–0.2)
BASOPHILS NFR BLD: 1 %
BUN SERPL-MCNC: 21 MG/DL (ref 7–20)
CALCIUM SERPL-MCNC: 8.4 MG/DL (ref 8.3–10.6)
CHLORIDE SERPL-SCNC: 110 MMOL/L (ref 99–110)
CO2 SERPL-SCNC: 21 MMOL/L (ref 21–32)
CREAT SERPL-MCNC: 1.3 MG/DL (ref 0.6–1.1)
DEPRECATED RDW RBC AUTO: 14.4 % (ref 12.4–15.4)
EOSINOPHIL # BLD: 0.3 K/UL (ref 0–0.6)
EOSINOPHIL NFR BLD: 4.3 %
GFR SERPLBLD CREATININE-BSD FMLA CKD-EPI: 48 ML/MIN/{1.73_M2}
GLUCOSE BLD-MCNC: 158 MG/DL (ref 70–99)
GLUCOSE BLD-MCNC: 203 MG/DL (ref 70–99)
GLUCOSE BLD-MCNC: 257 MG/DL (ref 70–99)
GLUCOSE BLD-MCNC: 297 MG/DL (ref 70–99)
GLUCOSE SERPL-MCNC: 201 MG/DL (ref 70–99)
HCT VFR BLD AUTO: 31.1 % (ref 36–48)
HGB BLD-MCNC: 10.4 G/DL (ref 12–16)
LYMPHOCYTES # BLD: 1.6 K/UL (ref 1–5.1)
LYMPHOCYTES NFR BLD: 20.5 %
MCH RBC QN AUTO: 27 PG (ref 26–34)
MCHC RBC AUTO-ENTMCNC: 33.5 G/DL (ref 31–36)
MCV RBC AUTO: 80.6 FL (ref 80–100)
MONOCYTES # BLD: 0.6 K/UL (ref 0–1.3)
MONOCYTES NFR BLD: 8.1 %
NEUTROPHILS # BLD: 5.2 K/UL (ref 1.7–7.7)
NEUTROPHILS NFR BLD: 66.1 %
PERFORMED ON: ABNORMAL
PLATELET # BLD AUTO: 216 K/UL (ref 135–450)
PMV BLD AUTO: 8.6 FL (ref 5–10.5)
POTASSIUM SERPL-SCNC: 4 MMOL/L (ref 3.5–5.1)
RBC # BLD AUTO: 3.86 M/UL (ref 4–5.2)
SODIUM SERPL-SCNC: 140 MMOL/L (ref 136–145)
WBC # BLD AUTO: 7.9 K/UL (ref 4–11)

## 2024-02-08 PROCEDURE — 85025 COMPLETE CBC W/AUTO DIFF WBC: CPT

## 2024-02-08 PROCEDURE — 6370000000 HC RX 637 (ALT 250 FOR IP): Performed by: INTERNAL MEDICINE

## 2024-02-08 PROCEDURE — 1200000000 HC SEMI PRIVATE

## 2024-02-08 PROCEDURE — 36415 COLL VENOUS BLD VENIPUNCTURE: CPT

## 2024-02-08 PROCEDURE — 99232 SBSQ HOSP IP/OBS MODERATE 35: CPT | Performed by: INTERNAL MEDICINE

## 2024-02-08 PROCEDURE — 2580000003 HC RX 258: Performed by: INTERNAL MEDICINE

## 2024-02-08 PROCEDURE — 6360000002 HC RX W HCPCS: Performed by: INTERNAL MEDICINE

## 2024-02-08 PROCEDURE — 80048 BASIC METABOLIC PNL TOTAL CA: CPT

## 2024-02-08 RX ORDER — NIFEDIPINE 30 MG/1
30 TABLET, EXTENDED RELEASE ORAL DAILY
Status: DISCONTINUED | OUTPATIENT
Start: 2024-02-09 | End: 2024-02-09

## 2024-02-08 RX ORDER — NIFEDIPINE 30 MG/1
60 TABLET, EXTENDED RELEASE ORAL NIGHTLY
Status: DISCONTINUED | OUTPATIENT
Start: 2024-02-08 | End: 2024-02-09

## 2024-02-08 RX ORDER — NIFEDIPINE 30 MG/1
120 TABLET, EXTENDED RELEASE ORAL DAILY
Status: DISCONTINUED | OUTPATIENT
Start: 2024-02-09 | End: 2024-02-08

## 2024-02-08 RX ORDER — HYDRALAZINE HYDROCHLORIDE 20 MG/ML
10 INJECTION INTRAMUSCULAR; INTRAVENOUS EVERY 8 HOURS PRN
Status: DISCONTINUED | OUTPATIENT
Start: 2024-02-08 | End: 2024-02-10 | Stop reason: HOSPADM

## 2024-02-08 RX ORDER — NIFEDIPINE 30 MG/1
60 TABLET, EXTENDED RELEASE ORAL ONCE
Status: COMPLETED | OUTPATIENT
Start: 2024-02-08 | End: 2024-02-08

## 2024-02-08 RX ADMIN — INSULIN LISPRO 2 UNITS: 100 INJECTION, SOLUTION INTRAVENOUS; SUBCUTANEOUS at 08:19

## 2024-02-08 RX ADMIN — NIFEDIPINE 60 MG: 30 TABLET, EXTENDED RELEASE ORAL at 08:18

## 2024-02-08 RX ADMIN — ENOXAPARIN SODIUM 40 MG: 100 INJECTION SUBCUTANEOUS at 08:20

## 2024-02-08 RX ADMIN — NIFEDIPINE 60 MG: 30 TABLET, EXTENDED RELEASE ORAL at 12:11

## 2024-02-08 RX ADMIN — Medication 10 ML: at 08:20

## 2024-02-08 RX ADMIN — LOSARTAN POTASSIUM 100 MG: 100 TABLET, FILM COATED ORAL at 08:19

## 2024-02-08 RX ADMIN — INSULIN LISPRO 2 UNITS: 100 INJECTION, SOLUTION INTRAVENOUS; SUBCUTANEOUS at 12:11

## 2024-02-08 RX ADMIN — Medication 10 ML: at 22:16

## 2024-02-08 NOTE — PROGRESS NOTES
Nephrology progress Note  [phone]  [phone]   [email or web address]       Patient:  Galina Nick   : 1965    CC: high BP    Brief HPI   The patient is a 58 y.o.female with significant past medical history of Hypertension, renal artery stenosis,  DM II came in with c/o high blood pressures   Blood pressure was noted to be up to 201/108 in the ER  Recent hospitalization for hypertensive crisis, workup showed bilateral renal artery stenosis, seen by vascular surgery, no intervention recommended, discharged on losartan and nifedipine to follow-up as outpatient.  She presents again to the ER after noting high blood pressure as outpatient  Admits to compliance with medications  Was started on nicardipine drip on admission, has been off since last night  We are consulted for hypertension and history of renal artery stenosis  She was already seen by vascular surgery, recommended renal angiogram with possible stenting, but she expressed to their team that she would undergo renal artery stenting as a last resort      Subjective/interval history  Patient seen and examined  Creatinine stable  Had CT angiogram yesterday  Blood pressures better controlled than from admission  Denies chest pain or shortness of breath  Has been afebrile  On room air    Meds:  Scheduled Meds:   [START ON 2024] NIFEdipine  120 mg Oral Daily    losartan  100 mg Oral Daily    insulin lispro  0-8 Units SubCUTAneous TID WC    insulin lispro  0-4 Units SubCUTAneous Nightly    sodium chloride flush  5-40 mL IntraVENous 2 times per day    enoxaparin  40 mg SubCUTAneous Daily     Continuous Infusions:   sodium chloride       PRN Meds:.sodium chloride flush, sodium chloride, polyethylene glycol, acetaminophen **OR** acetaminophen      Vitals:  BP (!) 179/81   Pulse 82   Temp 98.5 °F (36.9 °C) (Oral)   Resp 20   Ht 1.727 m (5' 8\")   Wt 86.9 kg (191 lb 9.6 oz)   LMP 2011   SpO2 96%   BMI 29.13 kg/m²       Physical Exam  General :

## 2024-02-08 NOTE — PLAN OF CARE
Problem: Safety - Adult  Goal: Free from fall injury  2/7/2024 2309 by Marcelino Wills RN  Outcome: Progressing  2/7/2024 1615 by Homero Theodore RN  Outcome: Progressing     Problem: ABCDS Injury Assessment  Goal: Absence of physical injury  2/7/2024 2309 by Marcelino Wills RN  Outcome: Progressing  2/7/2024 1615 by Homero Theodore, RN  Outcome: Progressing     Problem: Chronic Conditions and Co-morbidities  Goal: Patient's chronic conditions and co-morbidity symptoms are monitored and maintained or improved  2/7/2024 2309 by Marcelino Wills RN  Outcome: Progressing  2/7/2024 1615 by Homero Theodore, RN  Outcome: Progressing     Problem: Discharge Planning  Goal: Discharge to home or other facility with appropriate resources  2/7/2024 2309 by Marcelino Wills RN  Outcome: Progressing  2/7/2024 1615 by Homero Theodore, RN  Outcome: Progressing

## 2024-02-08 NOTE — PROGRESS NOTES
[facility]    2/8/2024 1:34 PM        Name: Galina Nick .              Admitted: 2/5/2024  Primary Care Provider: Tristan Lei MD (Tel: [phone])      Subjective:      No n/v or chest bp still 170s systolic  Current Medications  NIFEdipine (PROCARDIA XL) extended release tablet 60 mg, Nightly  [START ON 2/9/2024] NIFEdipine (PROCARDIA XL) extended release tablet 30 mg, Daily  losartan (COZAAR) tablet 100 mg, Daily  insulin lispro (HUMALOG) injection vial 0-8 Units, TID WC  insulin lispro (HUMALOG) injection vial 0-4 Units, Nightly  sodium chloride flush 0.9 % injection 5-40 mL, 2 times per day  sodium chloride flush 0.9 % injection 5-40 mL, PRN  0.9 % sodium chloride infusion, PRN  enoxaparin (LOVENOX) injection 40 mg, Daily  polyethylene glycol (GLYCOLAX) packet 17 g, Daily PRN  acetaminophen (TYLENOL) tablet 650 mg, Q6H PRN   Or  acetaminophen (TYLENOL) suppository 650 mg, Q6H PRN        Objective:  BP (!) 179/81   Pulse 82   Temp 98.5 °F (36.9 °C) (Oral)   Resp 20   Ht 1.727 m (5' 8\")   Wt 86.9 kg (191 lb 9.6 oz)   LMP 06/01/2011   SpO2 96%   BMI 29.13 kg/m²     Intake/Output Summary (Last 24 hours) at 2/8/2024 1334  Last data filed at 2/8/2024 0818  Gross per 24 hour   Intake 240 ml   Output --   Net 240 ml        Wt Readings from Last 3 Encounters:   02/08/24 86.9 kg (191 lb 9.6 oz)   01/26/24 91.6 kg (202 lb)   01/24/24 86.1 kg (189 lb 13.1 oz)       General appearance:  Appears comfortable. AAOx3  HEENT: atraumatic, Pupils equal, muscous membranes moist, no masses appreciated  Cardiovascular: Regular rate and rhythm no murmurs appreciated  Respiratory: CTAB no wheezing  Gastrointestinal: Abdomen soft, non-tender, BS+  EXT: no edema  Neurology: no gross focal deficts  Psychiatry: Appropriate affect. Not agitated  Skin: Warm, dry, no rashes appreciated    Labs and Tests:  CBC:   Recent

## 2024-02-08 NOTE — PROGRESS NOTES
Weight - Scale: 86.9 kg (191 lb 9.6 oz)         General Appearance:  Alert, cooperative, no distress   Head:  Normocephalic, without obvious abnormality   Eyes:  Normal ocular range of motion   Nose: Nares normal, no drainage   Neck: Symmetrical, trachea midline   Lungs:   Clear to auscultation bilaterally   Chest Wall:  No tenderness or deformity   Cardiovascular:  Regular rate and rhythm, S1, S2 normal, no murmur, rub or gallop, No edema, No JVD, peripheral pulses 2+   Abdomen:   Soft, non-tender,    Extremities: Extremities normal, atraumatic   Pulses: 2+ and symmetric   Skin: Skin color, texture, turgor normal   Pysch: Normal mood and affect   Neurologic: Normal gross motor and sensory exam.         Labs  CBC:   Lab Results   Component Value Date/Time    WBC 7.9 02/08/2024 04:31 AM    RBC 3.86 02/08/2024 04:31 AM    HGB 10.4 02/08/2024 04:31 AM    HCT 31.1 02/08/2024 04:31 AM    MCV 80.6 02/08/2024 04:31 AM    RDW 14.4 02/08/2024 04:31 AM     02/08/2024 04:31 AM     CMP:    Lab Results   Component Value Date/Time     02/08/2024 04:31 AM    K 4.0 02/08/2024 04:31 AM     02/08/2024 04:31 AM    CO2 21 02/08/2024 04:31 AM    BUN 21 02/08/2024 04:31 AM    CREATININE 1.3 02/08/2024 04:31 AM    GFRAA >60 10/13/2022 07:46 AM    AGRATIO 1.3 02/05/2024 02:11 PM    LABGLOM 48 02/08/2024 04:31 AM    GLUCOSE 201 02/08/2024 04:31 AM    PROT 6.7 02/05/2024 02:11 PM    CALCIUM 8.4 02/08/2024 04:31 AM    BILITOT 0.5 02/05/2024 02:11 PM    ALKPHOS 141 02/05/2024 02:11 PM    AST 18 02/05/2024 02:11 PM    ALT 15 02/05/2024 02:11 PM     PT/INR:  No results found for: \"PTINR\"  No results found for: \"CKTOTAL\", \"CKMB\", \"CKMBINDEX\", \"TROPONINI\"    EKG:  I have reviewed EKG with the following interpretation:  Impression:  NSR, LVH    Echo: 1/2024  Conclusions      Summary   Normal left ventricle size, wall thickness, and systolic function with an   estimated ejection fraction of 55-60%. No regional wall motion

## 2024-02-09 LAB
ANION GAP SERPL CALCULATED.3IONS-SCNC: 8 MMOL/L (ref 3–16)
BASOPHILS # BLD: 0.1 K/UL (ref 0–0.2)
BASOPHILS NFR BLD: 0.9 %
BUN SERPL-MCNC: 25 MG/DL (ref 7–20)
CALCIUM SERPL-MCNC: 8.6 MG/DL (ref 8.3–10.6)
CHLORIDE SERPL-SCNC: 111 MMOL/L (ref 99–110)
CO2 SERPL-SCNC: 21 MMOL/L (ref 21–32)
CREAT SERPL-MCNC: 1.4 MG/DL (ref 0.6–1.1)
DEPRECATED RDW RBC AUTO: 14.4 % (ref 12.4–15.4)
EOSINOPHIL # BLD: 0.4 K/UL (ref 0–0.6)
EOSINOPHIL NFR BLD: 5.2 %
GFR SERPLBLD CREATININE-BSD FMLA CKD-EPI: 44 ML/MIN/{1.73_M2}
GLUCOSE BLD-MCNC: 186 MG/DL (ref 70–99)
GLUCOSE BLD-MCNC: 202 MG/DL (ref 70–99)
GLUCOSE BLD-MCNC: 279 MG/DL (ref 70–99)
GLUCOSE SERPL-MCNC: 200 MG/DL (ref 70–99)
HCT VFR BLD AUTO: 30.6 % (ref 36–48)
HGB BLD-MCNC: 10.2 G/DL (ref 12–16)
LYMPHOCYTES # BLD: 1.5 K/UL (ref 1–5.1)
LYMPHOCYTES NFR BLD: 18.5 %
MCH RBC QN AUTO: 26.8 PG (ref 26–34)
MCHC RBC AUTO-ENTMCNC: 33.2 G/DL (ref 31–36)
MCV RBC AUTO: 80.5 FL (ref 80–100)
MONOCYTES # BLD: 0.7 K/UL (ref 0–1.3)
MONOCYTES NFR BLD: 9.3 %
NEUTROPHILS # BLD: 5.2 K/UL (ref 1.7–7.7)
NEUTROPHILS NFR BLD: 66.1 %
PERFORMED ON: ABNORMAL
PLATELET # BLD AUTO: 207 K/UL (ref 135–450)
PMV BLD AUTO: 8.7 FL (ref 5–10.5)
POTASSIUM SERPL-SCNC: 4.2 MMOL/L (ref 3.5–5.1)
RBC # BLD AUTO: 3.8 M/UL (ref 4–5.2)
SODIUM SERPL-SCNC: 140 MMOL/L (ref 136–145)
WBC # BLD AUTO: 7.9 K/UL (ref 4–11)

## 2024-02-09 PROCEDURE — 1200000000 HC SEMI PRIVATE

## 2024-02-09 PROCEDURE — 6370000000 HC RX 637 (ALT 250 FOR IP): Performed by: INTERNAL MEDICINE

## 2024-02-09 PROCEDURE — 80048 BASIC METABOLIC PNL TOTAL CA: CPT

## 2024-02-09 PROCEDURE — 2580000003 HC RX 258: Performed by: INTERNAL MEDICINE

## 2024-02-09 PROCEDURE — 99233 SBSQ HOSP IP/OBS HIGH 50: CPT | Performed by: NURSE PRACTITIONER

## 2024-02-09 PROCEDURE — 85025 COMPLETE CBC W/AUTO DIFF WBC: CPT

## 2024-02-09 PROCEDURE — 36415 COLL VENOUS BLD VENIPUNCTURE: CPT

## 2024-02-09 RX ORDER — HYDRALAZINE HYDROCHLORIDE 25 MG/1
25 TABLET, FILM COATED ORAL EVERY 8 HOURS SCHEDULED
Status: DISCONTINUED | OUTPATIENT
Start: 2024-02-09 | End: 2024-02-10 | Stop reason: HOSPADM

## 2024-02-09 RX ORDER — NIFEDIPINE 30 MG/1
120 TABLET, EXTENDED RELEASE ORAL DAILY
Status: DISCONTINUED | OUTPATIENT
Start: 2024-02-09 | End: 2024-02-10 | Stop reason: HOSPADM

## 2024-02-09 RX ADMIN — LOSARTAN POTASSIUM 100 MG: 100 TABLET, FILM COATED ORAL at 08:25

## 2024-02-09 RX ADMIN — INSULIN LISPRO 2 UNITS: 100 INJECTION, SOLUTION INTRAVENOUS; SUBCUTANEOUS at 17:30

## 2024-02-09 RX ADMIN — NIFEDIPINE 120 MG: 30 TABLET, EXTENDED RELEASE ORAL at 10:25

## 2024-02-09 RX ADMIN — Medication 10 ML: at 08:25

## 2024-02-09 NOTE — PROGRESS NOTES
PT states that Dr Agustin, cardiologist, told her that she was only to have 120mg Procardia per day.  Refuses to take more this evening also based on same conversation with cardiology where Dr Agustin told PT that she would not have any more today.  Hospitalist service notified of refusal.

## 2024-02-09 NOTE — PROGRESS NOTES
[facility]    2/9/2024 2:54 PM        Name: Galina Nick .              Admitted: 2/5/2024  Primary Care Provider: Tristan Lei MD (Tel: [phone])      Subjective:    Bp imprving no n/v or chest pain  Current Medications  NIFEdipine (PROCARDIA XL) extended release tablet 120 mg, Daily  hydrALAZINE (APRESOLINE) injection 10 mg, Q8H PRN  losartan (COZAAR) tablet 100 mg, Daily  insulin lispro (HUMALOG) injection vial 0-8 Units, TID WC  insulin lispro (HUMALOG) injection vial 0-4 Units, Nightly  sodium chloride flush 0.9 % injection 5-40 mL, 2 times per day  sodium chloride flush 0.9 % injection 5-40 mL, PRN  0.9 % sodium chloride infusion, PRN  enoxaparin (LOVENOX) injection 40 mg, Daily  polyethylene glycol (GLYCOLAX) packet 17 g, Daily PRN  acetaminophen (TYLENOL) tablet 650 mg, Q6H PRN   Or  acetaminophen (TYLENOL) suppository 650 mg, Q6H PRN        Objective:  BP (!) 164/91   Pulse 89   Temp 97.5 °F (36.4 °C) (Oral)   Resp 20   Ht 1.727 m (5' 8\")   Wt 85.2 kg (187 lb 12.8 oz)   LMP 06/01/2011   SpO2 98%   BMI 28.55 kg/m²   No intake or output data in the 24 hours ending 02/09/24 1454     Wt Readings from Last 3 Encounters:   02/09/24 85.2 kg (187 lb 12.8 oz)   01/26/24 91.6 kg (202 lb)   01/24/24 86.1 kg (189 lb 13.1 oz)       General appearance:  Appears comfortable. AAOx3  HEENT: atraumatic, Pupils equal, muscous membranes moist, no masses appreciated  Cardiovascular: Regular rate and rhythm no murmurs appreciated  Respiratory: CTAB no wheezing  Gastrointestinal: Abdomen soft, non-tender, BS+  EXT: no edema  Neurology: no gross focal deficts  Psychiatry: Appropriate affect. Not agitated  Skin: Warm, dry, no rashes appreciated    Labs and Tests:  CBC:   Recent Labs     02/08/24  0431 02/09/24  0455   WBC 7.9 7.9   HGB 10.4* 10.2*    207       BMP:    Recent Labs     02/07/24  1145

## 2024-02-09 NOTE — PROGRESS NOTES
[facility]   Cardiology Progress Note     Date: 2/9/2024  Admit Date: 2/5/2024     Reason for consultation:     Chief Complaint   Patient presents with    Hypertension     Pt was recently here for hypertension, pt coming in today for blood pressure, spoke to dr torres renal dr sent to ed.         History of Present Illness: History obtained from patient and medical record.     Galina Nick is a 58 y.o. female presented to the hospital with complaints of high blood pressure, systolic in the 200s, as well as some shortness of breath and palpitations.  No nausea, vomiting or chest pain.  Was recently hospitalized for severe systemic hypertension and discharged on medications which she states she has been compliant with.  She states she has been under a lot of stress stress at work.  Has noted blood pressures tend to be elevated earlier in the week on Mondays and Tuesdays.     Past history includes renal artery stenosis diagnosed by renal ultrasound.  She also has hypertension and diabetes mellitus. (per consult note)    Interval Hx: Today, she is feeling ok. Having intermittent heart \"flutters\". No cp or sob. Bp improving. BP increased to 180s/ when I was in room with pt.     Patient seen and examined. Clinical notes reviewed. Telemetry reviewed.  No new complaints today. No major events overnight.   Denies having chest pain, palpitations, shortness of breath, orthopnea/PND, cough, or dizziness at the time of this visit.      Past Medical History:  Past Medical History:   Diagnosis Date    Diabetes mellitus (HCC)     Hypertension         Past Surgical History:    has a past surgical history that includes Breast biopsy.     Social History:  Reviewed.  reports that she has never smoked. She has never used smokeless tobacco. She reports that she does not drink alcohol and does not use drugs.     Allergies:  Allergies   Allergen Reactions    Ace Inhibitors      Dry cough per pt       Family History:  Reviewed.

## 2024-02-09 NOTE — PROGRESS NOTES
moist.  CVS: S1 S2 normal, regular rhythm, no murmurs or rubs.  Lungs: Clear, no wheezing or crackles.  Abd: Soft, bowel sounds normal, non-tender.  Ext: No edema, no cyanosis  : bladder non-distended, no tenderness over the bladder      Labs:  CBC with Differential:    Lab Results   Component Value Date/Time    WBC 7.9 02/09/2024 04:55 AM    RBC 3.80 02/09/2024 04:55 AM    HGB 10.2 02/09/2024 04:55 AM    HCT 30.6 02/09/2024 04:55 AM     02/09/2024 04:55 AM    MCV 80.5 02/09/2024 04:55 AM    MCH 26.8 02/09/2024 04:55 AM    MCHC 33.2 02/09/2024 04:55 AM    RDW 14.4 02/09/2024 04:55 AM    LYMPHOPCT 18.5 02/09/2024 04:55 AM    MONOPCT 9.3 02/09/2024 04:55 AM    BASOPCT 0.9 02/09/2024 04:55 AM    MONOSABS 0.7 02/09/2024 04:55 AM    LYMPHSABS 1.5 02/09/2024 04:55 AM    EOSABS 0.4 02/09/2024 04:55 AM    BASOSABS 0.1 02/09/2024 04:55 AM     BMP:    Lab Results   Component Value Date/Time     02/09/2024 04:55 AM    K 4.2 02/09/2024 04:55 AM     02/09/2024 04:55 AM    CO2 21 02/09/2024 04:55 AM    BUN 25 02/09/2024 04:55 AM    LABALBU 3.8 02/05/2024 02:11 PM    CREATININE 1.4 02/09/2024 04:55 AM    CALCIUM 8.6 02/09/2024 04:55 AM    GFRAA >60 10/13/2022 07:46 AM    LABGLOM 44 02/09/2024 04:55 AM    GLUCOSE 200 02/09/2024 04:55 AM     Ionized Calcium:  No results found for: \"IONCA\"  Magnesium:  No results found for: \"MG\"  Phosphorus:    Lab Results   Component Value Date/Time    PHOS 4.5 01/26/2024 09:00 AM       Assessment/Plan:      Hypertensive urgency improved  Was on nicardipine gtt.  Presumed to be secondary to bilateral renal artery stenosis but CT angiogram with no evidence of renal artery stenosis  Prior workup in January 2024 with normal TSH, normal PTH, normal ARR, normal plasma metanephrines  -continue current regimen for now, nifedipine increased per cardiology  -avoid precipitous drop of BPs    CKD stage IIIa  Risk factors include DM II, HTN  Creatinine has been stable     DM II  HBA1Cs have  been upto 14s in the 2019, lately better controlled    Elevated troponin  Stress test/cardiac CT angiogram in the near future per cardiology    Discussed with Dr. Priyanka Cueto MD  2/9/2024  Office Phone : [phone]    Thank you for allowing us to participate in the care of this pt. I willcontinue to follow along. Please call with questions or concerns.

## 2024-02-10 ENCOUNTER — TELEPHONE (OUTPATIENT)
Dept: CARDIOLOGY CLINIC | Age: 59
End: 2024-02-10

## 2024-02-10 VITALS
HEIGHT: 68 IN | SYSTOLIC BLOOD PRESSURE: 150 MMHG | WEIGHT: 187.9 LBS | OXYGEN SATURATION: 96 % | DIASTOLIC BLOOD PRESSURE: 83 MMHG | TEMPERATURE: 98.4 F | HEART RATE: 85 BPM | BODY MASS INDEX: 28.48 KG/M2 | RESPIRATION RATE: 18 BRPM

## 2024-02-10 LAB
ANION GAP SERPL CALCULATED.3IONS-SCNC: 9 MMOL/L (ref 3–16)
BASOPHILS # BLD: 0.1 K/UL (ref 0–0.2)
BASOPHILS NFR BLD: 1 %
BUN SERPL-MCNC: 19 MG/DL (ref 7–20)
CALCIUM SERPL-MCNC: 8.4 MG/DL (ref 8.3–10.6)
CHLORIDE SERPL-SCNC: 110 MMOL/L (ref 99–110)
CO2 SERPL-SCNC: 22 MMOL/L (ref 21–32)
CREAT SERPL-MCNC: 1.2 MG/DL (ref 0.6–1.1)
DEPRECATED RDW RBC AUTO: 14.4 % (ref 12.4–15.4)
EOSINOPHIL # BLD: 0.4 K/UL (ref 0–0.6)
EOSINOPHIL NFR BLD: 5.9 %
GFR SERPLBLD CREATININE-BSD FMLA CKD-EPI: 53 ML/MIN/{1.73_M2}
GLUCOSE BLD-MCNC: 238 MG/DL (ref 70–99)
GLUCOSE BLD-MCNC: 247 MG/DL (ref 70–99)
GLUCOSE BLD-MCNC: 333 MG/DL (ref 70–99)
GLUCOSE SERPL-MCNC: 195 MG/DL (ref 70–99)
HCT VFR BLD AUTO: 28.5 % (ref 36–48)
HGB BLD-MCNC: 9.5 G/DL (ref 12–16)
LYMPHOCYTES # BLD: 1.7 K/UL (ref 1–5.1)
LYMPHOCYTES NFR BLD: 22.8 %
MCH RBC QN AUTO: 27.2 PG (ref 26–34)
MCHC RBC AUTO-ENTMCNC: 33.5 G/DL (ref 31–36)
MCV RBC AUTO: 81 FL (ref 80–100)
MONOCYTES # BLD: 0.6 K/UL (ref 0–1.3)
MONOCYTES NFR BLD: 8.2 %
NEUTROPHILS # BLD: 4.6 K/UL (ref 1.7–7.7)
NEUTROPHILS NFR BLD: 62.1 %
PERFORMED ON: ABNORMAL
PLATELET # BLD AUTO: 195 K/UL (ref 135–450)
PMV BLD AUTO: 8.4 FL (ref 5–10.5)
POTASSIUM SERPL-SCNC: 3.9 MMOL/L (ref 3.5–5.1)
RBC # BLD AUTO: 3.52 M/UL (ref 4–5.2)
SODIUM SERPL-SCNC: 141 MMOL/L (ref 136–145)
WBC # BLD AUTO: 7.4 K/UL (ref 4–11)

## 2024-02-10 PROCEDURE — 36415 COLL VENOUS BLD VENIPUNCTURE: CPT

## 2024-02-10 PROCEDURE — 2580000003 HC RX 258: Performed by: INTERNAL MEDICINE

## 2024-02-10 PROCEDURE — 99233 SBSQ HOSP IP/OBS HIGH 50: CPT | Performed by: NURSE PRACTITIONER

## 2024-02-10 PROCEDURE — 85025 COMPLETE CBC W/AUTO DIFF WBC: CPT

## 2024-02-10 PROCEDURE — 6360000002 HC RX W HCPCS: Performed by: INTERNAL MEDICINE

## 2024-02-10 PROCEDURE — 6370000000 HC RX 637 (ALT 250 FOR IP): Performed by: INTERNAL MEDICINE

## 2024-02-10 PROCEDURE — 80048 BASIC METABOLIC PNL TOTAL CA: CPT

## 2024-02-10 RX ORDER — DULAGLUTIDE 1.5 MG/.5ML
1.5 INJECTION, SOLUTION SUBCUTANEOUS WEEKLY
Qty: 1 ADJUSTABLE DOSE PRE-FILLED PEN SYRINGE | Refills: 0 | Status: SHIPPED | OUTPATIENT
Start: 2024-02-10

## 2024-02-10 RX ORDER — HYDRALAZINE HYDROCHLORIDE 25 MG/1
25 TABLET, FILM COATED ORAL EVERY 8 HOURS SCHEDULED
Qty: 90 TABLET | Refills: 3 | Status: SHIPPED | OUTPATIENT
Start: 2024-02-10

## 2024-02-10 RX ORDER — NIFEDIPINE 30 MG/1
120 TABLET, EXTENDED RELEASE ORAL DAILY
Qty: 30 TABLET | Refills: 3 | Status: SHIPPED | OUTPATIENT
Start: 2024-02-11

## 2024-02-10 RX ADMIN — INSULIN LISPRO 2 UNITS: 100 INJECTION, SOLUTION INTRAVENOUS; SUBCUTANEOUS at 08:30

## 2024-02-10 RX ADMIN — LOSARTAN POTASSIUM 100 MG: 100 TABLET, FILM COATED ORAL at 08:30

## 2024-02-10 RX ADMIN — NIFEDIPINE 120 MG: 30 TABLET, EXTENDED RELEASE ORAL at 08:30

## 2024-02-10 RX ADMIN — ENOXAPARIN SODIUM 40 MG: 100 INJECTION SUBCUTANEOUS at 08:31

## 2024-02-10 RX ADMIN — Medication 10 ML: at 08:31

## 2024-02-10 RX ADMIN — INSULIN LISPRO 6 UNITS: 100 INJECTION, SOLUTION INTRAVENOUS; SUBCUTANEOUS at 12:13

## 2024-02-10 NOTE — PROGRESS NOTES
N/V/D, constipation, or black/tarry stools  Genito-Urinary: Negative for dysuria, incontinence, or hematuria  Musculoskeletal: Negative for joint swelling, muscle pain  Neurological/Psych: Negative for confusion, seizures, headaches, or TIA-like symptoms. No anxiety or depression.     Physical Examination:  Vitals:    02/10/24 0512   BP: (!) 154/74   Pulse:    Resp:    Temp:    SpO2:       No intake/output data recorded.   Wt Readings from Last 3 Encounters:   02/10/24 85.2 kg (187 lb 14.4 oz)   01/26/24 91.6 kg (202 lb)   01/24/24 86.1 kg (189 lb 13.1 oz)     No intake or output data in the 24 hours ending 02/10/24 0735      Telemetry: Personally Reviewed  - Sinus rhythm   Constitutional: Cooperative and in no apparent distress, and appears well nourished  Skin: Warm and pink; no pallor, cyanosis, clubbing. + scattered bruising   HEENT: Symmetric and normocephalic. PERRL.  Conjunctiva pink with clear sclera. Mucus membranes moist.   Cardiovascular: Regular rate and rhythm. S1/S2 present without murmurs, no rubs or gallops. Peripheral pulses present. No elevation of JVP. No peripheral edema  Respiratory: Respirations symmetric and unlabored. Lungs clear to auscultation bilaterally, no wheezing, crackles, or rhonchi  Gastrointestinal: Abdomen soft and round. Without tenderness.  Musculoskeletal: Bilateral upper and lower extremity strength 5/5 with full ROM  Neurologic/Psych: Awake and orientated to person, place and time. Calm affect, appropriate mood    Pertinent labs, diagnostic, device, and imaging results reviewed as a part of this visit    Labs:    BMP:   Recent Labs     02/08/24  0431 02/09/24  0455 02/10/24  0436    140 141   K 4.0 4.2 3.9    111* 110   CO2 21 21 22   BUN 21* 25* 19   CREATININE 1.3* 1.4* 1.2*     Estimated Creatinine Clearance: 60 mL/min (A) (based on SCr of 1.2 mg/dL (H)).   CBC:   Recent Labs     02/08/24  0431 02/09/24  0455 02/10/24  0436   WBC 7.9 7.9 7.4   HGB 10.4* 10.2*  9.5*   HCT 31.1* 30.6* 28.5*   MCV 80.6 80.5 81.0    207 195     Thyroid:   Lab Results   Component Value Date/Time    TSH 1.99 2024 08:54 AM     Lipids:   Lab Results   Component Value Date/Time    CHOL 221 2024 08:54 AM    HDL 53 2024 08:54 AM    TRIG 112 2024 08:54 AM     LFTS:   Lab Results   Component Value Date/Time    ALT 15 2024 02:11 PM    AST 18 2024 02:11 PM    ALKPHOS 141 2024 02:11 PM    PROT 6.7 2024 02:11 PM    AGRATIO 1.3 2024 02:11 PM    BILITOT 0.5 2024 02:11 PM     Cardiac Enzymes: No results found for: \"CKTOTAL\", \"CKMB\", \"CKMBINDEX\", \"TROPONINI\"  Coags: No results found for: \"PROTIME\", \"INR\"    2017 ACC/AHA HF Guidelines:   intravenous iron replacement in patients with New York Heart Association (NYHA) class II and III HF and iron deficiency(ferritin <100 ng/ml or 100-300 ng/ml if transferrin saturation <20%), to improve functional status and QoL.    EC24  Normal sinus rhythm Possible Left atrial enlargement Borderline ECG    ECHO:  24  Summary   Normal left ventricle size, wall thickness, and systolic function with an   estimated ejection fraction of 55-60%. No regional wall motion abnormalities   are seen.   Moderate mitral regurgitation.   The left atrium is dilated.   Mild tricuspid regurgitation. RVSP is 26.81 mmHg with RAP of 3 mmHg.      CTA abd pelvis: 24  IMPRESSION:  1. Mild atherosclerosis with normal patency of the branches of the abdominal  aorta including the bilateral renal arteries.  There is a small accessory  artery on the right supplying the lower pole.  2. Thickening of the stomach and adjacent inflammatory changes suggesting  gastritis.  Trace free fluid in Alfaro's pouch in the right pericolic  gutter is favored to be related.  3. No extravasation of contrast in the bowel to suggest active  gastrointestinal bleeding.  4. 1.7 cm cyst or dominant follicle in the right ovary.  No follow-up

## 2024-02-10 NOTE — PROGRESS NOTES
RN discharge summary from 3A to home.     This patient has had a discharge order placed. They are returning home and being picked up in the lobby. Discharge paperwork has been printed, highlighted, and gone over with the patient by this RN. Patient understands teaching and has no further questions at this time. IV has been removed with no complications. Telemetry has been removed. Pt has all belongings present.      Dax Salgado RN, BSN

## 2024-02-10 NOTE — PLAN OF CARE
Problem: Safety - Adult  Goal: Free from fall injury  2/10/2024 1433 by Dax Salgado RN  Outcome: Adequate for Discharge  2/10/2024 0938 by Dax Salgado RN  Outcome: Progressing     Problem: ABCDS Injury Assessment  Goal: Absence of physical injury  2/10/2024 1433 by Dax Salgado RN  Outcome: Adequate for Discharge  2/10/2024 0938 by Dax Salgado RN  Outcome: Progressing     Problem: Chronic Conditions and Co-morbidities  Goal: Patient's chronic conditions and co-morbidity symptoms are monitored and maintained or improved  2/10/2024 1433 by Dax Salgado RN  Outcome: Adequate for Discharge  2/10/2024 0938 by Dax Salgado RN  Outcome: Progressing     Problem: Discharge Planning  Goal: Discharge to home or other facility with appropriate resources  2/10/2024 1433 by Dax Salgado RN  Outcome: Adequate for Discharge  2/10/2024 0938 by Dax Salgado RN  Outcome: Progressing

## 2024-02-10 NOTE — DISCHARGE SUMMARY
Patient: Galina Nick     Gender: female  : 1965   Age: 58 y.o.  MRN: [id number]    Admitting Physician: Jessica Mathew MD  Discharge Physician: Jeremias Ceja MD     Code Status: Full Code     Admit Date: 2024   Discharge Date:  2/10/24     Disposition:  Home    Discharge Diagnoses:  Hypertensive emergency  Renal artery stenosis on CTA  Type 2 diabetes mellitus    Active Hospital Problems    Diagnosis Date Noted    Shortness of breath [R06.02] 2024    Elevated troponin [R79.89] 2024    Nonrheumatic mitral valve regurgitation [I34.0] 2024    Hypertensive emergency [I16.1] 2024    Renal artery stenosis (HCC) [I70.1] 2024    Dyslipidemia [E78.5] 2019       Follow-up appointments:  one week    Outpatient to do list: none     Condition at Discharge:  Stable    Hospital Course:   58-year-old admitted to the hospital with elevated blood pressures  On the CTA she was noted to have renal artery stenosis  Nephrology and cardiology were consulted  She had mildly elevated troponin which was secondary to hypertensive emergency  Nifedipine dose was increased  Hydralazine was added  Blood pressure still between 160 systolic and 170 systolic however medications which are started hence will give it some time  Overall patient doing well and is being discharged home  Will follow-up with nephrology    Discharge Medications:   Current Discharge Medication List        START taking these medications    Details   hydrALAZINE (APRESOLINE) 25 MG tablet Take 1 tablet by mouth every 8 hours  Qty: 90 tablet, Refills: 3      NIFEdipine (PROCARDIA XL) 30 MG extended release tablet Take 4 tablets by mouth daily  Qty: 30 tablet, Refills: 3           Current Discharge Medication List        CONTINUE these medications which have CHANGED    Details   dulaglutide (TRULICITY) 1.5 MG/0.5ML SC injection Inject 0.5 mLs into the skin once a week Sundays.  Qty: 1 Adjustable Dose Pre-filled Pen Syringe,

## 2024-02-10 NOTE — PLAN OF CARE
Problem: Safety - Adult  Goal: Free from fall injury  2/10/2024 0938 by Dax Salgado RN  Outcome: Progressing  2/10/2024 0011 by Marley Overton RN  Outcome: Progressing     Problem: ABCDS Injury Assessment  Goal: Absence of physical injury  2/10/2024 0938 by Dax Salgado RN  Outcome: Progressing  2/10/2024 0011 by Marley Overton RN  Outcome: Progressing     Problem: Chronic Conditions and Co-morbidities  Goal: Patient's chronic conditions and co-morbidity symptoms are monitored and maintained or improved  2/10/2024 0938 by Dax Salgado RN  Outcome: Progressing  2/10/2024 0011 by Marley Overton RN  Outcome: Progressing     Problem: Discharge Planning  Goal: Discharge to home or other facility with appropriate resources  2/10/2024 0938 by Dax Salgado RN  Outcome: Progressing  2/10/2024 0011 by Marley Overton RN  Outcome: Progressing

## 2024-02-10 NOTE — TELEPHONE ENCOUNTER
Pt prefers to see Dr. Gamez in follow up. She would like to cancel NP appt. Can we reschedule with Dr. Gamez? Pt would be ok with [facility] Hermos[facility] so she can be seen sooner.

## 2024-02-10 NOTE — PROGRESS NOTES
Assessment completed. VSS. Patient awake, alert, and in bed. Medications given per MAR. . New orders placed from Cardiology for patient to start PO hydralazine tonight. Patient refused at this time and would like to discuss further with MD tomorrow. No complaints of pain or discomfort at this time. Safety precautions in place. Call light within reach. Will continue to monitor.

## 2024-02-10 NOTE — DISCHARGE INSTR - COC
Continuity of Care Form    Patient Name: Galina Nick   :  1965  MRN:  [id number]    Admit date:  2024  Discharge date:  ***    Code Status Order: Full Code   Advance Directives:     Admitting Physician:  Jessica Mathew MD  PCP: Tristan Lei MD    Discharging Nurse: ***  Discharging Hospital Unit/Room#: 3AN-3311/3311-01  Discharging Unit Phone Number: ***    Emergency Contact:   Extended Emergency Contact Information  Primary Emergency Contact: [place] [address] [facility]  Home Phone: [phone]  Relation: Brother/Sister  Secondary Emergency Contact: Ebony Pitt  Home Phone: [phone]  Work Phone: [phone]  Relation: Brother/Sister    Past Surgical History:  Past Surgical History:   Procedure Laterality Date    BREAST BIOPSY       benign        Immunization History:   Immunization History   Administered Date(s) Administered    COVID-19, J&J, (age 18y+), IM, 0.5 mL 2021       Active Problems:  Patient Active Problem List   Diagnosis Code    Uncontrolled type 2 diabetes mellitus with complication, with long-term current use of insulin [id number]    Hypercalcemia E83.52    Essential hypertension I10    Dyslipidemia E78.5    Nephrolithiasis N20.0    Hypertensive urgency I16.0    Renal artery stenosis (HCC) I70.1    Malignant hypertension I10    Hypertensive emergency I16.1    Shortness of breath R06.02    Elevated troponin R79.89    Nonrheumatic mitral valve regurgitation I34.0       Isolation/Infection:   Isolation            No Isolation          Patient Infection Status       None to display            Nurse Assessment:  Last Vital Signs: BP (!) 150/83   Pulse 85   Temp 98.4 °F (36.9 °C) (Oral)   Resp 18   Ht 1.727 m (5' 8\")   Wt 85.2 kg (187 lb 14.4 oz)   LMP 2011   SpO2 96%   BMI 28.57 kg/m²     Last documented pain score (0-10 scale):    Last Weight:   Wt Readings from Last 1 Encounters:   02/10/24 85.2 kg

## 2024-02-10 NOTE — PROGRESS NOTES
troponin  Stress test/cardiac CT angiogram in the near future per cardiology    Discussed with Dr. Brenna Villasenor MD  2/10/2024  Office Phone : [phone]    Thank you for allowing us to participate in the care of this pt. I willcontinue to follow along. Please call with questions or concerns.

## 2024-02-10 NOTE — PROGRESS NOTES
Shift assessment completed. Routine vitals stable. Scheduled medications given. Patient is awake, alert and oriented x4. Respirations are easy and unlabored. Patient does not appear to be in distress, resting comfortably at this time. Call light within reach. Denies needs at this time. Pain 0/10 .Will continue to monitor.    Dax Salgado RN, BSN

## 2024-02-19 NOTE — PROGRESS NOTES
[facility]    2024    Galina Nick (:  1965) is a 58 y.o. female here today for hospital follow up for Htn and elevated troponin.     Referring Provider: Tristan Lei MD    HISTORY: Galina Nick has a pmh of Htn, renal artery stenosis. She was seen originally in consult during admission to [facility] 24 for hypertensive urgency, she had been seen by her nephrologist who advised her to come in.   She did have a renal duplex with 60-99% stenosis of prox left renal artery, she was to follow up with Dr Noyola.     Today she feels her stamina is off. She has not gone back to her normal walking but has returned to work. She feels exhausted and is scared to start walking just yet. She has been keeping track of her blood pressures at home which have been elevated. She also still feels palpitations. She is now wearing compression stockings to help with edema, she also feels puffiness in her eyes that she did not have before.       REVIEW OF SYSTEMS:  A complete review of systems was reviewed and is negative except as noted in the history of present illness.    Prior to Visit Medications    Medication Sig Taking? Authorizing Provider   amLODIPine (NORVASC) 5 MG tablet Take 1 tablet by mouth daily Yes Timo Gamez MD   metoprolol succinate (TOPROL XL) 50 MG extended release tablet Take 1 tablet by mouth daily Yes Timo Gamez MD   losartan-hydroCHLOROthiazide (HYZAAR) 100-25 MG per tablet Take 1 tablet by mouth daily Yes Timo Gamez MD   dulaglutide (TRULICITY) 1.5 MG/0.5ML SC injection Inject 0.5 mLs into the skin once a week Sundays. Yes Jeremias Ceja MD   losartan (COZAAR) 100 MG tablet Take 1 tablet by mouth daily Yes Ava Toussaint MD   Omega-3 Fatty Acids (FISH OIL) 1200 MG CAPS Take 1,200 mg by mouth nightly Yes Eliane Estrada MD   Cholecalciferol (VITAMIN D3) 10 MCG (400 UNIT) CAPS Take 1 capsule by mouth nightly Yes Eliane Estrada MD   metFORMIN

## 2024-02-20 ENCOUNTER — OFFICE VISIT (OUTPATIENT)
Age: 59
End: 2024-02-20
Payer: COMMERCIAL

## 2024-02-20 ENCOUNTER — TELEPHONE (OUTPATIENT)
Age: 59
End: 2024-02-20

## 2024-02-20 VITALS
SYSTOLIC BLOOD PRESSURE: 164 MMHG | BODY MASS INDEX: 28.97 KG/M2 | HEIGHT: 68 IN | HEART RATE: 85 BPM | OXYGEN SATURATION: 99 % | DIASTOLIC BLOOD PRESSURE: 90 MMHG | WEIGHT: 191.14 LBS

## 2024-02-20 DIAGNOSIS — R79.89 ELEVATED TROPONIN: ICD-10-CM

## 2024-02-20 DIAGNOSIS — E78.5 DYSLIPIDEMIA: ICD-10-CM

## 2024-02-20 DIAGNOSIS — I34.0 NONRHEUMATIC MITRAL VALVE REGURGITATION: ICD-10-CM

## 2024-02-20 DIAGNOSIS — I10 ESSENTIAL HYPERTENSION: Primary | ICD-10-CM

## 2024-02-20 DIAGNOSIS — R06.02 SHORTNESS OF BREATH: ICD-10-CM

## 2024-02-20 PROCEDURE — 3080F DIAST BP >= 90 MM HG: CPT | Performed by: INTERNAL MEDICINE

## 2024-02-20 PROCEDURE — 3077F SYST BP >= 140 MM HG: CPT | Performed by: INTERNAL MEDICINE

## 2024-02-20 PROCEDURE — 99214 OFFICE O/P EST MOD 30 MIN: CPT | Performed by: INTERNAL MEDICINE

## 2024-02-20 RX ORDER — VALSARTAN AND HYDROCHLOROTHIAZIDE 320; 25 MG/1; MG/1
1 TABLET, FILM COATED ORAL DAILY
Qty: 90 TABLET | Refills: 3 | Status: SHIPPED | OUTPATIENT
Start: 2024-02-20 | End: 2024-02-20

## 2024-02-20 RX ORDER — AMLODIPINE BESYLATE 5 MG/1
5 TABLET ORAL DAILY
Qty: 90 TABLET | Refills: 0 | Status: SHIPPED | OUTPATIENT
Start: 2024-02-20

## 2024-02-20 RX ORDER — LOSARTAN POTASSIUM AND HYDROCHLOROTHIAZIDE 25; 100 MG/1; MG/1
1 TABLET ORAL DAILY
Qty: 90 TABLET | Refills: 3 | Status: SHIPPED | OUTPATIENT
Start: 2024-02-20

## 2024-02-20 RX ORDER — METOPROLOL SUCCINATE 50 MG/1
50 TABLET, EXTENDED RELEASE ORAL DAILY
Qty: 30 TABLET | Refills: 3 | Status: SHIPPED | OUTPATIENT
Start: 2024-02-20

## 2024-02-20 NOTE — PATIENT INSTRUCTIONS
Follow up with Dr Noyola for your kidney's   Monitor Blood pressure and heart rate  Check some blood work in 2 weeks   Less than 48 oz of fluid daily   Schedule Cardiac CTA - the day before your CTA take 50 mg Toprol morning AND night. Take 100 mg Toprol the morning of your CTA.     Re start: amlodipine 5 mg, losartan-hctz 320-25 mg  START: Toprol Xl 50 mg  Do not take hydralazine  Stop nifedipine

## 2024-02-20 NOTE — TELEPHONE ENCOUNTER
I spoke to Tiffanie and verified medication, pt should  Losartan-HCTZ 100-25 MG, not valsartan. I called pt to let her know and pt verbalized understanding.

## 2024-02-20 NOTE — TELEPHONE ENCOUNTER
Patient has a question regarding medication that was prescribed to her today. Correct dosage?  Please call her back. Thank you.    [phone]

## 2024-02-20 NOTE — TELEPHONE ENCOUNTER
I called pt to verify what medication she is supposed to be taking, she was told losartan, but valsartan was sent to her pharmacy.     Please advise.

## 2024-02-20 NOTE — TELEPHONE ENCOUNTER
I attempted to call pt to see if she is able to come in at 1145 for her appt with SHARON LIM to call back

## 2024-02-26 RX ORDER — METOPROLOL SUCCINATE 50 MG/1
50 TABLET, EXTENDED RELEASE ORAL DAILY
Qty: 90 TABLET | Refills: 3 | Status: SHIPPED | OUTPATIENT
Start: 2024-02-26

## 2024-03-01 DIAGNOSIS — E11.8 POORLY CONTROLLED TYPE 2 DIABETES MELLITUS WITH COMPLICATION (HCC): Primary | ICD-10-CM

## 2024-03-01 DIAGNOSIS — E11.65 POORLY CONTROLLED TYPE 2 DIABETES MELLITUS WITH COMPLICATION (HCC): Primary | ICD-10-CM

## 2024-03-01 RX ORDER — DULAGLUTIDE 0.75 MG/.5ML
INJECTION, SOLUTION SUBCUTANEOUS
Qty: 6 ML | Refills: 0 | Status: SHIPPED | OUTPATIENT
Start: 2024-03-01 | End: 2024-03-01 | Stop reason: SDUPTHER

## 2024-03-01 RX ORDER — DULAGLUTIDE 0.75 MG/.5ML
INJECTION, SOLUTION SUBCUTANEOUS
Qty: 6 ML | Refills: 0 | Status: SHIPPED | OUTPATIENT
Start: 2024-03-01

## 2024-03-01 NOTE — PROGRESS NOTES
Patient's Trulicity 1.5 mg is on back order. Pharmacy states they have 0.75 mg dose and patient is requesting prescription be sent in.     Sent 90 day supply per patient.

## 2024-03-07 PROBLEM — R79.89 ELEVATED TROPONIN: Status: RESOLVED | Noted: 2024-02-06 | Resolved: 2024-03-07

## 2024-03-23 DIAGNOSIS — R06.02 SHORTNESS OF BREATH: ICD-10-CM

## 2024-03-23 LAB
ANION GAP SERPL CALCULATED.3IONS-SCNC: 11 MMOL/L (ref 3–16)
BUN SERPL-MCNC: 26 MG/DL (ref 7–20)
CALCIUM SERPL-MCNC: 9.3 MG/DL (ref 8.3–10.6)
CHLORIDE SERPL-SCNC: 108 MMOL/L (ref 99–110)
CO2 SERPL-SCNC: 20 MMOL/L (ref 21–32)
CREAT SERPL-MCNC: 1.3 MG/DL (ref 0.6–1.1)
GFR SERPLBLD CREATININE-BSD FMLA CKD-EPI: 48 ML/MIN/{1.73_M2}
GLUCOSE SERPL-MCNC: 144 MG/DL (ref 70–99)
POTASSIUM SERPL-SCNC: 4.6 MMOL/L (ref 3.5–5.1)
SODIUM SERPL-SCNC: 139 MMOL/L (ref 136–145)

## 2024-03-25 ENCOUNTER — TELEPHONE (OUTPATIENT)
Dept: CARDIOLOGY CLINIC | Age: 59
End: 2024-03-25

## 2024-03-25 NOTE — TELEPHONE ENCOUNTER
Barby Armando RN       Could you call and ask for recent vitals and if she has any other symptoms?

## 2024-03-25 NOTE — TELEPHONE ENCOUNTER
Pt states she has had blurry vision since starting metoprolol at the beginning of March. Please call to advise.

## 2024-03-29 ENCOUNTER — TELEPHONE (OUTPATIENT)
Dept: INTERVENTIONAL RADIOLOGY/VASCULAR | Age: 59
End: 2024-03-29

## 2024-04-03 ENCOUNTER — TELEPHONE (OUTPATIENT)
Dept: CARDIOLOGY CLINIC | Age: 59
End: 2024-04-03

## 2024-04-03 NOTE — TELEPHONE ENCOUNTER
Pt called states she has a CTA tomorrow and was given instructions on taking her Metoprolol and she is not sure what they told her. She is asking for a phone call to discuss Metoprolol and other meds to make sure she takes the right pills and dose as to not mess up the CTA. Pt Number is [phone]. Please advise. Thank you.

## 2024-04-03 NOTE — TELEPHONE ENCOUNTER
Please let pt know to take her normal 50 mg Toprol Xl this morning, an extra dose of Toprol Xl 50 mg tonight. Tomorrow morning before CTA she should take 2 Torprol Xl (100 mg).

## 2024-04-04 ENCOUNTER — HOSPITAL ENCOUNTER (OUTPATIENT)
Dept: CT IMAGING | Age: 59
Discharge: HOME | End: 2024-04-04
Attending: INTERNAL MEDICINE
Payer: COMMERCIAL

## 2024-04-04 VITALS
WEIGHT: 195 LBS | HEIGHT: 68 IN | SYSTOLIC BLOOD PRESSURE: 227 MMHG | OXYGEN SATURATION: 99 % | DIASTOLIC BLOOD PRESSURE: 95 MMHG | HEART RATE: 66 BPM | BODY MASS INDEX: 29.55 KG/M2 | RESPIRATION RATE: 16 BRPM

## 2024-04-04 DIAGNOSIS — R79.89 ELEVATED TROPONIN: ICD-10-CM

## 2024-04-04 DIAGNOSIS — R06.02 SHORTNESS OF BREATH: ICD-10-CM

## 2024-04-04 PROCEDURE — 75574 CT ANGIO HRT W/3D IMAGE: CPT

## 2024-04-04 PROCEDURE — 6360000004 HC RX CONTRAST MEDICATION: Performed by: INTERNAL MEDICINE

## 2024-04-04 PROCEDURE — 6370000000 HC RX 637 (ALT 250 FOR IP): Performed by: RADIOLOGY

## 2024-04-04 RX ORDER — NITROGLYCERIN 0.4 MG/1
0.4 TABLET SUBLINGUAL ONCE
Status: COMPLETED | OUTPATIENT
Start: 2024-04-04 | End: 2024-04-04

## 2024-04-04 RX ADMIN — IOPAMIDOL 85 ML: 755 INJECTION, SOLUTION INTRAVENOUS at 09:08

## 2024-04-04 RX ADMIN — NITROGLYCERIN 0.4 MG: 0.4 TABLET, ORALLY DISINTEGRATING SUBLINGUAL at 09:17

## 2024-04-04 NOTE — TELEPHONE ENCOUNTER
Checked on Patient after she had her CTA on 04/04/2024, in which she was very appreciative for the direction before the procedure and the checking on after the fact.   tj

## 2024-04-04 NOTE — PROGRESS NOTES
[facility]    2024    Galina Nick (:  1965) is a 58 y.o. female here today for hospital follow up for Htn and elevated troponin.     Referring Provider: Tristan Lei MD    HISTORY: Galina Nick has a pmh of Htn, renal artery stenosis. She was seen originally in consult during admission to [facility] 24 for hypertensive urgency, she had been seen by her nephrologist who advised her to come in.   She did have a renal duplex with 60-99% stenosis of prox left renal artery, she was to follow up with Dr Noyola.     She was seen in February with reports of her stamina being \"off\", she has not gone back to her normal walking,she is exhausted and scared to do too much. She has Cardiac CTA yesterday and is here to discuss results.     Today, she complains SOB w/ walking to her car. Feels overall fatigued. Sleeping well. Checks BP at home, -150's.       REVIEW OF SYSTEMS:  A complete review of systems was reviewed and is negative except as noted in the history of present illness.    Prior to Visit Medications    Medication Sig Taking? Authorizing Provider   aspirin 81 MG EC tablet Take 1 tablet by mouth daily Yes Timo Gamez MD   amLODIPine (NORVASC) 10 MG tablet Take 1 tablet by mouth daily Yes Timo Gamez MD   valsartan-hydroCHLOROthiazide (DIOVAN-HCT) 320-12.5 MG per tablet Take 1 tablet by mouth daily Yes Timo Gamez MD   rosuvastatin (CRESTOR) 20 MG tablet Take 1 tablet by mouth daily Yes Timo Gamez MD   metoprolol succinate (TOPROL XL) 50 MG extended release tablet Take 1 tablet by mouth daily Yes Timo Gamez MD   losartan-hydroCHLOROthiazide (HYZAAR) 100-25 MG per tablet Take 1 tablet by mouth daily Yes Timo Gamez MD   dulaglutide (TRULICITY) 1.5 MG/0.5ML SC injection Inject 0.5 mLs into the skin once a week Sundays. Yes Jeremias Ceja MD   Omega-3 Fatty Acids (FISH OIL) 1200 MG CAPS Take 1,200 mg by mouth nightly Yes Provider,

## 2024-04-04 NOTE — TELEPHONE ENCOUNTER
That should be ok if she already took it at 6 am. If she held off, she should take it now. Thanks.

## 2024-04-04 NOTE — FLOWSHEET NOTE
Pt tolerated procedure well. VSS. IV removed without difficulty. Pt given d/c instructions and stated understanding. Released in stable condition to home.  BP

## 2024-04-04 NOTE — PROGRESS NOTES
[facility]    2024    Galina Nick (:  1965) is a 58 y.o. female here today for hospital follow up for Htn and elevated troponin.     Referring Provider: Tristan Lei MD    HISTORY: Galina Nick has a pmh of Htn, renal artery stenosis. She was seen originally in consult during admission to [facility] 24 for hypertensive urgency, she had been seen by her nephrologist who advised her to come in.   She did have a renal duplex with 60-99% stenosis of prox left renal artery, she was to follow up with Dr Noyola.     She was seen in February with reports of her stamina being \"off\", she has not gone back to her normal walking,she is exhausted and scared to do too much. She has Cardiac CTA yesterday and is here to discuss results.     Today ***      REVIEW OF SYSTEMS:  A complete review of systems was reviewed and is negative except as noted in the history of present illness.    Prior to Visit Medications    Medication Sig Taking? Authorizing Provider   Dulaglutide (TRULICITY) 0.75 MG/0.5ML SOPN INJECT 0.75 MG UNDER THE SKIN ONCE WEEKLY  Stephanie Morgan MD   metoprolol succinate (TOPROL XL) 50 MG extended release tablet Take 1 tablet by mouth daily  Timo Gamez MD   amLODIPine (NORVASC) 5 MG tablet Take 1 tablet by mouth daily  Timo Gamez MD   losartan-hydroCHLOROthiazide (HYZAAR) 100-25 MG per tablet Take 1 tablet by mouth daily  Timo Gamez MD   dulaglutide (TRULICITY) 1.5 MG/0.5ML SC injection Inject 0.5 mLs into the skin once a week Sundays.  Jeremias Ceja MD   losartan (COZAAR) 100 MG tablet Take 1 tablet by mouth daily  Ava Toussaint MD   Omega-3 Fatty Acids (FISH OIL) 1200 MG CAPS Take 1,200 mg by mouth nightly  Eliane Estrada MD   Cholecalciferol (VITAMIN D3) 10 MCG (400 UNIT) CAPS Take 1 capsule by mouth nightly  Eliane Estrada MD   metFORMIN (GLUCOPHAGE) 1000 MG tablet Take 1 tablet by mouth 2 times daily (with meals)  Eliane Estrada

## 2024-04-05 ENCOUNTER — OFFICE VISIT (OUTPATIENT)
Dept: CARDIOLOGY CLINIC | Age: 59
End: 2024-04-05
Payer: COMMERCIAL

## 2024-04-05 VITALS
HEIGHT: 68 IN | DIASTOLIC BLOOD PRESSURE: 90 MMHG | WEIGHT: 188 LBS | OXYGEN SATURATION: 96 % | SYSTOLIC BLOOD PRESSURE: 180 MMHG | HEART RATE: 63 BPM | BODY MASS INDEX: 28.49 KG/M2

## 2024-04-05 DIAGNOSIS — R09.89 LEFT CAROTID BRUIT: ICD-10-CM

## 2024-04-05 DIAGNOSIS — E78.5 DYSLIPIDEMIA: Primary | ICD-10-CM

## 2024-04-05 DIAGNOSIS — I25.10 CORONARY ARTERY DISEASE INVOLVING NATIVE HEART WITHOUT ANGINA PECTORIS, UNSPECIFIED VESSEL OR LESION TYPE: ICD-10-CM

## 2024-04-05 DIAGNOSIS — I34.0 NONRHEUMATIC MITRAL VALVE REGURGITATION: ICD-10-CM

## 2024-04-05 PROCEDURE — 99214 OFFICE O/P EST MOD 30 MIN: CPT | Performed by: INTERNAL MEDICINE

## 2024-04-05 PROCEDURE — 3075F SYST BP GE 130 - 139MM HG: CPT | Performed by: INTERNAL MEDICINE

## 2024-04-05 PROCEDURE — 3079F DIAST BP 80-89 MM HG: CPT | Performed by: INTERNAL MEDICINE

## 2024-04-05 RX ORDER — ROSUVASTATIN CALCIUM 20 MG/1
20 TABLET, COATED ORAL DAILY
Qty: 90 TABLET | Refills: 1 | Status: SHIPPED | OUTPATIENT
Start: 2024-04-05

## 2024-04-05 RX ORDER — ASPIRIN 81 MG/1
81 TABLET ORAL DAILY
Qty: 90 TABLET | Refills: 3 | Status: SHIPPED | OUTPATIENT
Start: 2024-04-05

## 2024-04-05 RX ORDER — AMLODIPINE BESYLATE 10 MG/1
10 TABLET ORAL DAILY
Qty: 90 TABLET | Refills: 0 | Status: SHIPPED | OUTPATIENT
Start: 2024-04-05

## 2024-04-05 RX ORDER — AMLODIPINE BESYLATE 5 MG/1
5 TABLET ORAL DAILY
Qty: 90 TABLET | Refills: 2 | Status: CANCELLED | OUTPATIENT
Start: 2024-04-05

## 2024-04-05 RX ORDER — VALSARTAN AND HYDROCHLOROTHIAZIDE 320; 12.5 MG/1; MG/1
1 TABLET, FILM COATED ORAL DAILY
Qty: 90 TABLET | Refills: 1 | Status: SHIPPED | OUTPATIENT
Start: 2024-04-05

## 2024-04-05 NOTE — PATIENT INSTRUCTIONS
Increase amlodipine 10 mg  Finish losartan, then transition to valsartan-HCTZ 320-12.5mg daily  Check fasting lipids/LFT in 2 months  Start crestor, goal LDL <70  Start Aspirin 81 mg daily  Increase amlodipine 10mg  Carotid duplex soon for eval of left carotid bruit  RTC for BP check with home machine  Consider clonidine or labetolol if no improvement in BP

## 2024-04-09 DIAGNOSIS — R06.02 SHORTNESS OF BREATH: ICD-10-CM

## 2024-04-09 DIAGNOSIS — E78.5 DYSLIPIDEMIA: Primary | ICD-10-CM

## 2024-04-09 DIAGNOSIS — R93.1 ABNORMAL COMPUTED TOMOGRAPHY ANGIOGRAPHY OF HEART: ICD-10-CM

## 2024-04-10 ENCOUNTER — TELEPHONE (OUTPATIENT)
Dept: CARDIOLOGY CLINIC | Age: 59
End: 2024-04-10

## 2024-04-10 NOTE — TELEPHONE ENCOUNTER
----- Message from Tiffanie Yanez RN sent at 4/9/2024  4:30 PM EDT -----  Dr Gamez reviewed cardiac CTA, he feels it is normal, but, the test picked up some artifact so he would like for her to have a stress test (since she has been so short of breath) to see if it matches up with artifact or if maybe we do need to do something else. I will place order for stress test and she can call central to get that completed. If she has any questions, I can address later this week.

## 2024-04-10 NOTE — TELEPHONE ENCOUNTER
LMOM for pt to call back for message from  [facility]. Unable to leave results message per HIPAA.

## 2024-04-12 ENCOUNTER — NURSE ONLY (OUTPATIENT)
Dept: CARDIOLOGY CLINIC | Age: 59
End: 2024-04-12

## 2024-04-12 ENCOUNTER — HOSPITAL ENCOUNTER (OUTPATIENT)
Dept: VASCULAR LAB | Age: 59
Discharge: HOME | End: 2024-04-12
Payer: COMMERCIAL

## 2024-04-12 VITALS — SYSTOLIC BLOOD PRESSURE: 189 MMHG | HEART RATE: 63 BPM | OXYGEN SATURATION: 97 % | DIASTOLIC BLOOD PRESSURE: 92 MMHG

## 2024-04-12 DIAGNOSIS — E78.5 DYSLIPIDEMIA: ICD-10-CM

## 2024-04-12 DIAGNOSIS — R09.89 LEFT CAROTID BRUIT: ICD-10-CM

## 2024-04-12 PROCEDURE — 93880 EXTRACRANIAL BILAT STUDY: CPT

## 2024-04-12 NOTE — PROGRESS NOTES
PT came in for BP check, BP as checked manually and with BP monitor from home. First checked BP was 160/100 and second BP was  189/92

## 2024-04-18 ENCOUNTER — TELEPHONE (OUTPATIENT)
Dept: CARDIOLOGY CLINIC | Age: 59
End: 2024-04-18

## 2024-04-18 NOTE — TELEPHONE ENCOUNTER
Spoke to pt. She said her bp was high all last week.  She said she want to come in for her BP check tomorrow so that she get checked manually. She want to make sure if her bp is really high or if she needs to get a new bp cuff .  Please advise  Thank you

## 2024-04-18 NOTE — TELEPHONE ENCOUNTER
Called and spoke with patient regarding her blood pressures. She said she was unsure of results from last week's blood pressure check in office and would like to be seen to compare home cuff with manual blood pressure. She is concerned that meds may need adjusted. She is agreeable to scheduling appointment with NP tomorrow. She is scheduled for NP Juani Sweet at 1330. Pt voiced understanding and was in agreement with plan.

## 2024-04-18 NOTE — TELEPHONE ENCOUNTER
Pt called asking if she can come in This Friday the 19th for a BP Check. Her BP was 189/92 this morning and she is asking to come in tomorrow. Pt is at work now and can be reached at [phone]. Please advise. Thank you.

## 2024-04-19 ENCOUNTER — OFFICE VISIT (OUTPATIENT)
Dept: CARDIOLOGY CLINIC | Age: 59
End: 2024-04-19
Payer: COMMERCIAL

## 2024-04-19 ENCOUNTER — HOSPITAL ENCOUNTER (OUTPATIENT)
Age: 59
Discharge: HOME | End: 2024-04-19
Payer: COMMERCIAL

## 2024-04-19 VITALS
HEART RATE: 64 BPM | OXYGEN SATURATION: 99 % | HEIGHT: 68 IN | DIASTOLIC BLOOD PRESSURE: 70 MMHG | BODY MASS INDEX: 29.33 KG/M2 | SYSTOLIC BLOOD PRESSURE: 172 MMHG | WEIGHT: 193.5 LBS

## 2024-04-19 DIAGNOSIS — I25.10 CORONARY ARTERY DISEASE INVOLVING NATIVE HEART WITHOUT ANGINA PECTORIS, UNSPECIFIED VESSEL OR LESION TYPE: ICD-10-CM

## 2024-04-19 DIAGNOSIS — I10 ESSENTIAL HYPERTENSION: ICD-10-CM

## 2024-04-19 DIAGNOSIS — E78.5 DYSLIPIDEMIA: ICD-10-CM

## 2024-04-19 DIAGNOSIS — I10 ESSENTIAL HYPERTENSION: Primary | ICD-10-CM

## 2024-04-19 LAB
ANION GAP SERPL CALCULATED.3IONS-SCNC: 10 MMOL/L (ref 3–16)
BUN SERPL-MCNC: 22 MG/DL (ref 7–20)
CALCIUM SERPL-MCNC: 9.1 MG/DL (ref 8.3–10.6)
CHLORIDE SERPL-SCNC: 111 MMOL/L (ref 99–110)
CO2 SERPL-SCNC: 22 MMOL/L (ref 21–32)
CREAT SERPL-MCNC: 1.3 MG/DL (ref 0.6–1.1)
GFR SERPLBLD CREATININE-BSD FMLA CKD-EPI: 48 ML/MIN/{1.73_M2}
GLUCOSE SERPL-MCNC: 79 MG/DL (ref 70–99)
POTASSIUM SERPL-SCNC: 4.2 MMOL/L (ref 3.5–5.1)
SODIUM SERPL-SCNC: 143 MMOL/L (ref 136–145)

## 2024-04-19 PROCEDURE — 36415 COLL VENOUS BLD VENIPUNCTURE: CPT

## 2024-04-19 PROCEDURE — 99214 OFFICE O/P EST MOD 30 MIN: CPT

## 2024-04-19 PROCEDURE — 3077F SYST BP >= 140 MM HG: CPT

## 2024-04-19 PROCEDURE — 80048 BASIC METABOLIC PNL TOTAL CA: CPT

## 2024-04-19 PROCEDURE — 3078F DIAST BP <80 MM HG: CPT

## 2024-04-19 NOTE — PATIENT INSTRUCTIONS
Blood work today  Sleep study  Follow up with Dr Noyola for renal stenosis per Franco   Work on losing weight  Avoid sodium   Ill call you Monday after lab results are back

## 2024-04-26 ENCOUNTER — TELEPHONE (OUTPATIENT)
Dept: CARDIOLOGY CLINIC | Age: 59
End: 2024-04-26

## 2024-04-26 ENCOUNTER — HOSPITAL ENCOUNTER (OUTPATIENT)
Dept: NON INVASIVE DIAGNOSTICS | Age: 59
Discharge: HOME | End: 2024-04-26
Attending: INTERNAL MEDICINE
Payer: COMMERCIAL

## 2024-04-26 DIAGNOSIS — R06.02 SHORTNESS OF BREATH: ICD-10-CM

## 2024-04-26 DIAGNOSIS — R93.1 ABNORMAL COMPUTED TOMOGRAPHY ANGIOGRAPHY OF HEART: ICD-10-CM

## 2024-04-26 PROCEDURE — 3430000000 HC RX DIAGNOSTIC RADIOPHARMACEUTICAL: Performed by: INTERNAL MEDICINE

## 2024-04-26 PROCEDURE — 78452 HT MUSCLE IMAGE SPECT MULT: CPT | Performed by: INTERNAL MEDICINE

## 2024-04-26 PROCEDURE — A9502 TC99M TETROFOSMIN: HCPCS | Performed by: INTERNAL MEDICINE

## 2024-04-26 PROCEDURE — 93017 CV STRESS TEST TRACING ONLY: CPT | Performed by: INTERNAL MEDICINE

## 2024-04-26 PROCEDURE — 6360000002 HC RX W HCPCS: Performed by: INTERNAL MEDICINE

## 2024-04-26 RX ORDER — REGADENOSON 0.08 MG/ML
0.4 INJECTION, SOLUTION INTRAVENOUS
Status: COMPLETED | OUTPATIENT
Start: 2024-04-26 | End: 2024-04-26

## 2024-04-26 RX ADMIN — TETROFOSMIN 30 MILLICURIE: 1.38 INJECTION, POWDER, LYOPHILIZED, FOR SOLUTION INTRAVENOUS at 09:47

## 2024-04-26 RX ADMIN — TETROFOSMIN 10 MILLICURIE: 1.38 INJECTION, POWDER, LYOPHILIZED, FOR SOLUTION INTRAVENOUS at 07:42

## 2024-04-26 RX ADMIN — REGADENOSON 0.4 MG: 0.08 INJECTION, SOLUTION INTRAVENOUS at 09:41

## 2024-04-26 NOTE — TELEPHONE ENCOUNTER
----- Message from ARMAND Meyers - CNP sent at 4/26/2024  9:28 AM EDT -----  Please call patient and let her know her labs look good.

## 2024-04-26 NOTE — PROGRESS NOTES
Patient instructed on Parveen Protocol Stress Test Procedure including possible side effects and adverse reactions.  Verbalizes knowledge and understanding and denies having any questions.

## 2024-04-26 NOTE — PROGRESS NOTES
Discussed lab work with patient. Creatine still high. Remain on lower dose of hydrochlorothiazide. Call me next week with blood pressure readings.   
Patient called wanting to know results.  
for better blood pressure control    Repeat cholesterol for better lipid management. May need to increase Rosuvastatin   Overall the patient is stable from CV standpoint.        I have addressed the patient's cardiac risk factors which include dyslipidemia and hypertension, and adjusted pharmacologic treatment as needed. In addition, I have reinforced the need for patient directed risk factor modification.  Further evaluation will be based upon the patient's clinical course and testing results.  All questions and concerns were addressed to the patient. Alternatives to my treatment were discussed.    The patient verbalizes understanding not to stop medications without discussing with us.    The patient is not currently smoking. The risks related to smoking were reviewed with the patient. Recommend maintaining a smoke-free lifestyle. Products available for smoking cessation were discussed.    Patient is on a beta blocker   Patient is on an arb  Patient is on a statin      Antiplatelet  therapy has been recommended / prescribed for this patient. Education conducted on adverse reactions including bleeding were discussed.    Angiotension receptor blocker therapy has been prescribed / recommended. Daily weights, low sodium diet were discussed. Patient instructed to call the office with a weight gain: 3lbs over night and/or 5lbs in one week, swelling, shortness of breath, orthopnea, and/or PND.    Diet & Exercise:  The patient is counseled to follow a low salt, low saturated fat / Cholesterol diet to assure blood pressure remains controlled for cardiovascular risk factor modification.  The patient is counseled to lose weight to control cardiovascular risk factors.  Exercise program discussed: To improve overall cardiovascular health, the patient is instructed to increase cardiovascular related activities with a goal of 150 min/week of moderate level activity or 10,000 steps per day. Encouraged to perform as much activity

## 2024-04-26 NOTE — PROGRESS NOTES
Pt walked on treadmill and BP dropped during walking on treadmill. Pt fatigued and requested to stop. Pt started at baseline with a /85. BP dropped 148/78 with walking. Dr. Agustin made aware and spoke with Dr. Gamez. Ok to proceed with Lexiscan at this time. Pt's BP back at baseline of 182/88. Instructed on Lexiscan Stress Test Procedure including possible side effects/ adverse reactions. Patient verbalizes  understanding and denies having any questions .See Epic Cardiology

## 2024-05-02 ENCOUNTER — TELEPHONE (OUTPATIENT)
Dept: CARDIOLOGY CLINIC | Age: 59
End: 2024-05-02

## 2024-05-02 RX ORDER — VALSARTAN 320 MG/1
320 TABLET ORAL DAILY
Qty: 90 TABLET | Refills: 1 | Status: SHIPPED | OUTPATIENT
Start: 2024-05-02

## 2024-05-02 RX ORDER — FUROSEMIDE 20 MG/1
40 TABLET ORAL DAILY
Qty: 60 TABLET | Refills: 3 | Status: SHIPPED | OUTPATIENT
Start: 2024-05-02

## 2024-05-02 RX ORDER — CHLORTHALIDONE 25 MG/1
25 TABLET ORAL DAILY
Qty: 30 TABLET | Refills: 3 | Status: SHIPPED | OUTPATIENT
Start: 2024-05-02

## 2024-05-02 RX ORDER — AMLODIPINE BESYLATE 10 MG/1
5 TABLET ORAL DAILY
Qty: 90 TABLET | Refills: 0 | Status: SHIPPED | OUTPATIENT
Start: 2024-05-02

## 2024-05-02 NOTE — TELEPHONE ENCOUNTER
Pt called and states she is swelling a lot and wants to know if she should go back on her hydroCHLOROthiazide. She would like to have [facility] make that decision and let her know. Pt feels [facility] is not in the loop when she leaves messages because she hears back from MA's most of the time. She really would like to know [facility] is making all her health care decision. Pt can be reached at [phone]. Please advise.

## 2024-05-02 NOTE — TELEPHONE ENCOUNTER
I called and left a message with the patient. I want to see how her blood pressure is. HCTZ decreased due to worsening renal function.

## 2024-05-02 NOTE — TELEPHONE ENCOUNTER
Called and spoke with Galina. Her legs are swelling she states from her knees down to her ankles. Decrease Amlodipine to 5 mg. Stop Valsartan HCTZ. Start tomorrow Valsartan 320 mg daily, Chlorthalidone 25 mg daily, Lasix 40 mg daily for lower leg swelling. ( Start today). Repeat blood work in one week. Keep appointment with me next week. Call Monday and let me know how your legs are doing.

## 2024-05-02 NOTE — TELEPHONE ENCOUNTER
Can you look into this. KJC is out this week, but your last office note advised her to continue the low dose HCTZ, so I am not sure why she is not taking it?   Also, remind her that the MAs are not allowed to give any info unless it was from the MD/RN so he is in the loop when she calls, however, he is out of office this week.   I am in [place] today with LES. thanks

## 2024-05-07 NOTE — PROGRESS NOTES
office with a weight gain: 3lbs over night and/or 5lbs in one week, swelling, shortness of breath, orthopnea, and/or PND.    Diet & Exercise:  The patient is counseled to follow a low salt, low saturated fat / Cholesterol diet to assure blood pressure remains controlled for cardiovascular risk factor modification.  The patient is counseled to lose weight to control cardiovascular risk factors.  Exercise program discussed: To improve overall cardiovascular health, the patient is instructed to increase cardiovascular related activities with a goal of 150 min/week of moderate level activity or 10,000 steps per day. Encouraged to perform as much activity as tolerated.    Thank you for allowing us to participate in the care of Galina Nick.    ARMAND [facility]   Office: [phone]    Documentation of today's visit sent to PCP,    NOTE:  This report was transcribed using voice recognition software.  Every effort was made to ensure accuracy; however, inadvertent computerized transcription errors may be present.

## 2024-05-09 ENCOUNTER — HOSPITAL ENCOUNTER (OUTPATIENT)
Age: 59
Discharge: HOME | End: 2024-05-09
Payer: COMMERCIAL

## 2024-05-09 DIAGNOSIS — I25.10 CORONARY ARTERY DISEASE INVOLVING NATIVE HEART WITHOUT ANGINA PECTORIS, UNSPECIFIED VESSEL OR LESION TYPE: Primary | ICD-10-CM

## 2024-05-09 DIAGNOSIS — I25.10 CORONARY ARTERY DISEASE INVOLVING NATIVE HEART WITHOUT ANGINA PECTORIS, UNSPECIFIED VESSEL OR LESION TYPE: ICD-10-CM

## 2024-05-09 LAB
ANION GAP SERPL CALCULATED.3IONS-SCNC: 13 MMOL/L (ref 3–16)
BUN SERPL-MCNC: 26 MG/DL (ref 7–20)
CALCIUM SERPL-MCNC: 9 MG/DL (ref 8.3–10.6)
CHLORIDE SERPL-SCNC: 106 MMOL/L (ref 99–110)
CO2 SERPL-SCNC: 21 MMOL/L (ref 21–32)
CREAT SERPL-MCNC: 1.6 MG/DL (ref 0.6–1.1)
DEPRECATED RDW RBC AUTO: 15.7 % (ref 12.4–15.4)
GFR SERPLBLD CREATININE-BSD FMLA CKD-EPI: 37 ML/MIN/{1.73_M2}
GLUCOSE SERPL-MCNC: 182 MG/DL (ref 70–99)
HCT VFR BLD AUTO: 33.7 % (ref 36–48)
HGB BLD-MCNC: 11.2 G/DL (ref 12–16)
MCH RBC QN AUTO: 27.3 PG (ref 26–34)
MCHC RBC AUTO-ENTMCNC: 33.2 G/DL (ref 31–36)
MCV RBC AUTO: 82.3 FL (ref 80–100)
NT-PROBNP SERPL-MCNC: 1704 PG/ML (ref 0–124)
PLATELET # BLD AUTO: 300 K/UL (ref 135–450)
PMV BLD AUTO: 9.6 FL (ref 5–10.5)
POTASSIUM SERPL-SCNC: 4 MMOL/L (ref 3.5–5.1)
RBC # BLD AUTO: 4.09 M/UL (ref 4–5.2)
SODIUM SERPL-SCNC: 140 MMOL/L (ref 136–145)
WBC # BLD AUTO: 9.1 K/UL (ref 4–11)

## 2024-05-09 PROCEDURE — 83880 ASSAY OF NATRIURETIC PEPTIDE: CPT

## 2024-05-09 PROCEDURE — 80048 BASIC METABOLIC PNL TOTAL CA: CPT

## 2024-05-09 PROCEDURE — 36415 COLL VENOUS BLD VENIPUNCTURE: CPT

## 2024-05-09 PROCEDURE — 85027 COMPLETE CBC AUTOMATED: CPT

## 2024-05-10 ENCOUNTER — OFFICE VISIT (OUTPATIENT)
Dept: CARDIOLOGY CLINIC | Age: 59
End: 2024-05-10
Payer: COMMERCIAL

## 2024-05-10 ENCOUNTER — HOSPITAL ENCOUNTER (OUTPATIENT)
Age: 59
Discharge: HOME | End: 2024-05-10
Payer: COMMERCIAL

## 2024-05-10 VITALS
DIASTOLIC BLOOD PRESSURE: 74 MMHG | HEART RATE: 71 BPM | WEIGHT: 188.8 LBS | BODY MASS INDEX: 28.61 KG/M2 | SYSTOLIC BLOOD PRESSURE: 162 MMHG | HEIGHT: 68 IN | OXYGEN SATURATION: 99 %

## 2024-05-10 DIAGNOSIS — I25.10 CORONARY ARTERY DISEASE INVOLVING NATIVE HEART WITHOUT ANGINA PECTORIS, UNSPECIFIED VESSEL OR LESION TYPE: ICD-10-CM

## 2024-05-10 DIAGNOSIS — I25.10 CORONARY ARTERY DISEASE INVOLVING NATIVE HEART WITHOUT ANGINA PECTORIS, UNSPECIFIED VESSEL OR LESION TYPE: Primary | ICD-10-CM

## 2024-05-10 LAB
FERRITIN SERPL IA-MCNC: 213.2 NG/ML (ref 15–150)
IRON SATN MFR SERPL: 27 % (ref 15–50)
IRON SERPL-MCNC: 67 UG/DL (ref 37–145)
TIBC SERPL-MCNC: 246 UG/DL (ref 260–445)

## 2024-05-10 PROCEDURE — 99214 OFFICE O/P EST MOD 30 MIN: CPT

## 2024-05-10 PROCEDURE — 36415 COLL VENOUS BLD VENIPUNCTURE: CPT

## 2024-05-10 PROCEDURE — 83550 IRON BINDING TEST: CPT

## 2024-05-10 PROCEDURE — 3077F SYST BP >= 140 MM HG: CPT

## 2024-05-10 PROCEDURE — 3078F DIAST BP <80 MM HG: CPT

## 2024-05-10 PROCEDURE — 82728 ASSAY OF FERRITIN: CPT

## 2024-05-10 PROCEDURE — 83540 ASSAY OF IRON: CPT

## 2024-05-10 RX ORDER — CHLORTHALIDONE 25 MG/1
25 TABLET ORAL DAILY
Qty: 90 TABLET | Refills: 1 | Status: SHIPPED | OUTPATIENT
Start: 2024-05-10

## 2024-05-10 RX ORDER — AMLODIPINE BESYLATE 5 MG/1
5 TABLET ORAL DAILY
Qty: 90 TABLET | Refills: 1 | Status: SHIPPED | OUTPATIENT
Start: 2024-05-10

## 2024-05-13 ENCOUNTER — TELEPHONE (OUTPATIENT)
Dept: CARDIOLOGY CLINIC | Age: 59
End: 2024-05-13

## 2024-05-13 NOTE — TELEPHONE ENCOUNTER
Spoke with the patient and advised her of the results below per  NPJ. Patient voiced understanding. Call complete

## 2024-05-13 NOTE — TELEPHONE ENCOUNTER
----- Message from ARMAND Meyers - CNP sent at 5/10/2024  4:43 PM EDT -----  Iron and TIBC Ferritin   Please call the patient and let he know her labs look good. Stable.

## 2024-05-20 ENCOUNTER — TELEPHONE (OUTPATIENT)
Dept: CARDIOLOGY CLINIC | Age: 59
End: 2024-05-20

## 2024-05-20 NOTE — TELEPHONE ENCOUNTER
Tried to reach pt. LMOM for to CB. Is she having any other symptoms. Radiating pain, dizziness , SOB, sweating vomiting? We would like vitals if possible. Tried 3 times.

## 2024-05-20 NOTE — TELEPHONE ENCOUNTER
I spoke with pt and tried to get further information. She refused to discuss any further and insisted on a CB from [facility].

## 2024-05-20 NOTE — TELEPHONE ENCOUNTER
Pt states that starting Friday 5/17 she has began to experience intermittent chest pain when sitting, laying, or standing still. Requesting a call from NPJ or [facility] on what they advise.    Please call to discuss

## 2024-05-31 ENCOUNTER — HOSPITAL ENCOUNTER (OUTPATIENT)
Age: 59
Discharge: HOME | End: 2024-05-31
Attending: INTERNAL MEDICINE
Payer: COMMERCIAL

## 2024-05-31 VITALS
SYSTOLIC BLOOD PRESSURE: 162 MMHG | BODY MASS INDEX: 28.49 KG/M2 | DIASTOLIC BLOOD PRESSURE: 74 MMHG | HEIGHT: 68 IN | WEIGHT: 188 LBS

## 2024-05-31 DIAGNOSIS — I34.0 NONRHEUMATIC MITRAL VALVE REGURGITATION: ICD-10-CM

## 2024-05-31 LAB
ECHO AO ASC DIAM: 3.1 CM
ECHO AO ASCENDING AORTA INDEX: 1.56 CM/M2
ECHO AO ROOT DIAM: 2.7 CM
ECHO AO ROOT INDEX: 1.36 CM/M2
ECHO AV AREA PEAK VELOCITY: 2.2 CM2
ECHO AV AREA VTI: 2.3 CM2
ECHO AV AREA/BSA PEAK VELOCITY: 1.1 CM2/M2
ECHO AV AREA/BSA VTI: 1.2 CM2/M2
ECHO AV MEAN GRADIENT: 6 MMHG
ECHO AV MEAN VELOCITY: 1.1 M/S
ECHO AV PEAK GRADIENT: 10 MMHG
ECHO AV PEAK VELOCITY: 1.6 M/S
ECHO AV VELOCITY RATIO: 0.81
ECHO AV VTI: 36.8 CM
ECHO BSA: 2.02 M2
ECHO EST RA PRESSURE: 3 MMHG
ECHO LA AREA 2C: 22.6 CM2
ECHO LA AREA 4C: 17.5 CM2
ECHO LA DIAMETER INDEX: 2.31 CM/M2
ECHO LA DIAMETER: 4.6 CM
ECHO LA MAJOR AXIS: 5.2 CM
ECHO LA MINOR AXIS: 5.7 CM
ECHO LA TO AORTIC ROOT RATIO: 1.7
ECHO LA VOL BP: 62 ML (ref 22–52)
ECHO LA VOL MOD A2C: 74 ML (ref 22–52)
ECHO LA VOL MOD A4C: 48 ML (ref 22–52)
ECHO LA VOL/BSA BIPLANE: 31 ML/M2 (ref 16–34)
ECHO LA VOLUME INDEX MOD A2C: 37 ML/M2 (ref 16–34)
ECHO LA VOLUME INDEX MOD A4C: 24 ML/M2 (ref 16–34)
ECHO LV E' LATERAL VELOCITY: 6 CM/S
ECHO LV E' SEPTAL VELOCITY: 7 CM/S
ECHO LV EDV A2C: 132 ML
ECHO LV EDV A4C: 117 ML
ECHO LV EDV INDEX A4C: 59 ML/M2
ECHO LV EDV NDEX A2C: 66 ML/M2
ECHO LV EJECTION FRACTION A2C: 68 %
ECHO LV EJECTION FRACTION A4C: 60 %
ECHO LV EJECTION FRACTION BIPLANE: 64 % (ref 55–100)
ECHO LV ESV A2C: 43 ML
ECHO LV ESV A4C: 47 ML
ECHO LV ESV INDEX A2C: 22 ML/M2
ECHO LV ESV INDEX A4C: 24 ML/M2
ECHO LV FRACTIONAL SHORTENING: 39 % (ref 28–44)
ECHO LV INTERNAL DIMENSION DIASTOLE INDEX: 2.56 CM/M2
ECHO LV INTERNAL DIMENSION DIASTOLIC: 5.1 CM (ref 3.9–5.3)
ECHO LV INTERNAL DIMENSION SYSTOLIC INDEX: 1.56 CM/M2
ECHO LV INTERNAL DIMENSION SYSTOLIC: 3.1 CM
ECHO LV IVSD: 0.8 CM (ref 0.6–0.9)
ECHO LV MASS 2D: 163.6 G (ref 67–162)
ECHO LV MASS INDEX 2D: 82.2 G/M2 (ref 43–95)
ECHO LV POSTERIOR WALL DIASTOLIC: 1 CM (ref 0.6–0.9)
ECHO LV RELATIVE WALL THICKNESS RATIO: 0.39
ECHO LVOT AREA: 2.5 CM2
ECHO LVOT AV VTI INDEX: 0.9
ECHO LVOT DIAM: 1.8 CM
ECHO LVOT MEAN GRADIENT: 4 MMHG
ECHO LVOT PEAK GRADIENT: 7 MMHG
ECHO LVOT PEAK VELOCITY: 1.3 M/S
ECHO LVOT STROKE VOLUME INDEX: 42.4 ML/M2
ECHO LVOT SV: 84.4 ML
ECHO LVOT VTI: 33.2 CM
ECHO MV A VELOCITY: 1.29 M/S
ECHO MV AREA VTI: 2.1 CM2
ECHO MV E VELOCITY: 0.99 M/S
ECHO MV E/A RATIO: 0.77
ECHO MV E/E' LATERAL: 16.5
ECHO MV E/E' RATIO (AVERAGED): 15.32
ECHO MV E/E' SEPTAL: 14.14
ECHO MV LVOT VTI INDEX: 1.22
ECHO MV MAX VELOCITY: 1.5 M/S
ECHO MV MEAN GRADIENT: 4 MMHG
ECHO MV MEAN VELOCITY: 0.9 M/S
ECHO MV PEAK GRADIENT: 9 MMHG
ECHO MV VTI: 40.5 CM
ECHO PULMONARY ARTERY END DIASTOLIC PRESSURE: 5 MMHG
ECHO PV ACCELERATION TIME (AT): 153 MS
ECHO PV MAX VELOCITY: 0.9 M/S
ECHO PV PEAK GRADIENT: 3 MMHG
ECHO PV REGURGITANT MAX VELOCITY: 1.2 M/S
ECHO RA AREA 4C: 12.1 CM2
ECHO RA END SYSTOLIC VOLUME APICAL 4 CHAMBER INDEX BSA: 14 ML/M2
ECHO RA VOLUME: 27 ML
ECHO RIGHT VENTRICULAR SYSTOLIC PRESSURE (RVSP): 35 MMHG
ECHO RV BASAL DIMENSION: 4 CM
ECHO RV FREE WALL PEAK S': 11 CM/S
ECHO RV LONGITUDINAL DIMENSION: 7.8 CM
ECHO RV MID DIMENSION: 2.6 CM
ECHO RV TAPSE: 2.5 CM (ref 1.7–?)
ECHO TV REGURGITANT MAX VELOCITY: 2.85 M/S
ECHO TV REGURGITANT PEAK GRADIENT: 32 MMHG

## 2024-05-31 PROCEDURE — 93306 TTE W/DOPPLER COMPLETE: CPT

## 2024-06-04 ENCOUNTER — TELEPHONE (OUTPATIENT)
Age: 59
End: 2024-06-04

## 2024-06-04 NOTE — TELEPHONE ENCOUNTER
Returned call to patient.  No answer.  Left a voicemail message to either call or [facility] [employer] me.  Apparently multiple calls placed to the patient without answer.

## 2024-06-12 ENCOUNTER — TELEPHONE (OUTPATIENT)
Dept: CARDIOLOGY CLINIC | Age: 59
End: 2024-06-12

## 2024-06-12 NOTE — TELEPHONE ENCOUNTER
Patient called to reach clinical staff returning a call regarding recent test results.     Can be reached at [phone]

## 2024-06-12 NOTE — TELEPHONE ENCOUNTER
Patient calling to ask for the results of the ECHO test she had recently.  Please contact patient with the information.

## 2024-06-14 ENCOUNTER — TELEPHONE (OUTPATIENT)
Dept: CARDIOLOGY | Age: 59
End: 2024-06-14

## 2024-06-14 DIAGNOSIS — I25.10 CORONARY ARTERY DISEASE DUE TO LIPID RICH PLAQUE: ICD-10-CM

## 2024-06-14 DIAGNOSIS — R94.39 ABNORMAL STRESS ECG: ICD-10-CM

## 2024-06-14 DIAGNOSIS — I25.83 CORONARY ARTERY DISEASE DUE TO LIPID RICH PLAQUE: ICD-10-CM

## 2024-06-14 DIAGNOSIS — R93.89 ABNORMAL COMPUTED TOMOGRAPHY ANGIOGRAPHY (CTA): ICD-10-CM

## 2024-06-14 DIAGNOSIS — I10 ESSENTIAL HYPERTENSION: Primary | ICD-10-CM

## 2024-06-14 NOTE — TELEPHONE ENCOUNTER
Called and spoke with patient regarding test results.  Discussed echo results in detail with recommendation for surveillance of the tricuspid regurgitation with continued echocardiography.  Discussed prognosis and management.  Also reviewed results of stress test and prior cardiac CTA with the patient.  Given abnormal blood pressure response on stress test and also some imaging, likely artifact, but cannot exclude ischemia in addition to cardiac CTA that did not suggest severe CAD but has some areas that were not well-visualized.  Recommend cardiac catheterization for definitive assessment of coronary anatomy.  There is also some discrepancy between renal artery duplex and CT.  If renal function permits, will attempt Janeen of renal artery as well.  Performance of the procedure as well as the associated risk, benefits and alternatives have been discussed.  Good pertinent questions have been asked and answered.

## 2024-06-17 ENCOUNTER — TELEPHONE (OUTPATIENT)
Dept: CARDIOLOGY CLINIC | Age: 59
End: 2024-06-17

## 2024-06-17 NOTE — TELEPHONE ENCOUNTER
----- Message from Tiffanie Yanez RN sent at 6/14/2024  5:42 PM EDT -----  Regarding: lhc  Me again.   Please set this patient up for a LHC with [facility]. She will need 2 hrs IV hydration prior. Please also let her know to have labs done about 5 days prior to check kidney function. Thank you    Left Heart Cath Patient Pre-procedure Instructions    Do NOT eat or drink anything after midnight morning of procedure    MEDICATIONS:  Your medications have been reviewed. Please follow medication instructions below   It is okay to drink a sip of water with meds morning of procedure  Diuretics- Hold diuretics (water pill) for comfort morning of procedure. Your diuretics to hold: Furosemide (Lasix) 2 days prior.   HOLD Valsartan for 2 days prior.   Hold Chlorthalidone the morning of procedure   #could change based on renal labs    Transportation: Bring someone to drive you home.     Scheduling: Daniella MARSHALL is our full time procedure . She will call you to schedule your procedure.    Allergies: Do you have an allergy to dye or contrast? No.

## 2024-06-18 ENCOUNTER — HOSPITAL ENCOUNTER (INPATIENT)
Age: 59
LOS: 9 days | Discharge: HOME | DRG: 682 | End: 2024-06-27
Attending: EMERGENCY MEDICINE | Admitting: PEDIATRICS
Payer: COMMERCIAL

## 2024-06-18 ENCOUNTER — APPOINTMENT (OUTPATIENT)
Dept: GENERAL RADIOLOGY | Age: 59
DRG: 682 | End: 2024-06-18
Payer: COMMERCIAL

## 2024-06-18 DIAGNOSIS — I21.4 NSTEMI (NON-ST ELEVATED MYOCARDIAL INFARCTION) (HCC): ICD-10-CM

## 2024-06-18 DIAGNOSIS — N17.9 ACUTE KIDNEY INJURY SUPERIMPOSED ON CHRONIC KIDNEY DISEASE (HCC): ICD-10-CM

## 2024-06-18 DIAGNOSIS — R79.89 ELEVATED TROPONIN: ICD-10-CM

## 2024-06-18 DIAGNOSIS — R55 NEAR SYNCOPE: Primary | ICD-10-CM

## 2024-06-18 DIAGNOSIS — N18.9 ACUTE KIDNEY INJURY SUPERIMPOSED ON CHRONIC KIDNEY DISEASE (HCC): ICD-10-CM

## 2024-06-18 DIAGNOSIS — R00.2 PALPITATIONS: ICD-10-CM

## 2024-06-18 LAB
ANION GAP SERPL CALCULATED.3IONS-SCNC: 12 MMOL/L (ref 3–16)
BASOPHILS # BLD: 0.1 K/UL (ref 0–0.2)
BASOPHILS NFR BLD: 1.2 %
BUN SERPL-MCNC: 31 MG/DL (ref 7–20)
CALCIUM SERPL-MCNC: 8.7 MG/DL (ref 8.3–10.6)
CHLORIDE SERPL-SCNC: 102 MMOL/L (ref 99–110)
CO2 SERPL-SCNC: 22 MMOL/L (ref 21–32)
CREAT SERPL-MCNC: 2.1 MG/DL (ref 0.6–1.1)
DEPRECATED RDW RBC AUTO: 14.5 % (ref 12.4–15.4)
EKG ATRIAL RATE: 83 BPM
EKG DIAGNOSIS: NORMAL
EKG P AXIS: 48 DEGREES
EKG P-R INTERVAL: 168 MS
EKG Q-T INTERVAL: 396 MS
EKG QRS DURATION: 80 MS
EKG QTC CALCULATION (BAZETT): 465 MS
EKG R AXIS: -5 DEGREES
EKG T AXIS: 37 DEGREES
EKG VENTRICULAR RATE: 83 BPM
EOSINOPHIL # BLD: 0.1 K/UL (ref 0–0.6)
EOSINOPHIL NFR BLD: 2 %
GFR SERPLBLD CREATININE-BSD FMLA CKD-EPI: 27 ML/MIN/{1.73_M2}
GLUCOSE BLD-MCNC: 297 MG/DL (ref 70–99)
GLUCOSE SERPL-MCNC: 328 MG/DL (ref 70–99)
HCT VFR BLD AUTO: 34.3 % (ref 36–48)
HGB BLD-MCNC: 11.6 G/DL (ref 12–16)
LYMPHOCYTES # BLD: 1.5 K/UL (ref 1–5.1)
LYMPHOCYTES NFR BLD: 23.2 %
MAGNESIUM SERPL-MCNC: 2.1 MG/DL (ref 1.8–2.4)
MCH RBC QN AUTO: 27.2 PG (ref 26–34)
MCHC RBC AUTO-ENTMCNC: 33.7 G/DL (ref 31–36)
MCV RBC AUTO: 80.8 FL (ref 80–100)
MONOCYTES # BLD: 0.4 K/UL (ref 0–1.3)
MONOCYTES NFR BLD: 5.7 %
NEUTROPHILS # BLD: 4.4 K/UL (ref 1.7–7.7)
NEUTROPHILS NFR BLD: 67.9 %
PERFORMED ON: ABNORMAL
PLATELET # BLD AUTO: 245 K/UL (ref 135–450)
PMV BLD AUTO: 9.6 FL (ref 5–10.5)
POTASSIUM SERPL-SCNC: 3.4 MMOL/L (ref 3.5–5.1)
RBC # BLD AUTO: 4.25 M/UL (ref 4–5.2)
REASON FOR REJECTION: NORMAL
REJECTED TEST: NORMAL
SODIUM SERPL-SCNC: 136 MMOL/L (ref 136–145)
TROPONIN, HIGH SENSITIVITY: 78 NG/L (ref 0–14)
TROPONIN, HIGH SENSITIVITY: 79 NG/L (ref 0–14)
WBC # BLD AUTO: 6.5 K/UL (ref 4–11)

## 2024-06-18 PROCEDURE — 6370000000 HC RX 637 (ALT 250 FOR IP): Performed by: PEDIATRICS

## 2024-06-18 PROCEDURE — 1200000000 HC SEMI PRIVATE

## 2024-06-18 PROCEDURE — 36415 COLL VENOUS BLD VENIPUNCTURE: CPT

## 2024-06-18 PROCEDURE — 2580000003 HC RX 258: Performed by: PEDIATRICS

## 2024-06-18 PROCEDURE — 6370000000 HC RX 637 (ALT 250 FOR IP): Performed by: EMERGENCY MEDICINE

## 2024-06-18 PROCEDURE — 2580000003 HC RX 258: Performed by: EMERGENCY MEDICINE

## 2024-06-18 PROCEDURE — 71045 X-RAY EXAM CHEST 1 VIEW: CPT

## 2024-06-18 PROCEDURE — 83735 ASSAY OF MAGNESIUM: CPT

## 2024-06-18 PROCEDURE — 85025 COMPLETE CBC W/AUTO DIFF WBC: CPT

## 2024-06-18 PROCEDURE — 84484 ASSAY OF TROPONIN QUANT: CPT

## 2024-06-18 PROCEDURE — 99285 EMERGENCY DEPT VISIT HI MDM: CPT

## 2024-06-18 PROCEDURE — 80048 BASIC METABOLIC PNL TOTAL CA: CPT

## 2024-06-18 RX ORDER — POTASSIUM CHLORIDE 20 MEQ/1
40 TABLET, EXTENDED RELEASE ORAL PRN
Status: DISCONTINUED | OUTPATIENT
Start: 2024-06-18 | End: 2024-06-27 | Stop reason: HOSPADM

## 2024-06-18 RX ORDER — ASPIRIN 81 MG/1
81 TABLET ORAL DAILY
Status: DISCONTINUED | OUTPATIENT
Start: 2024-06-19 | End: 2024-06-27 | Stop reason: HOSPADM

## 2024-06-18 RX ORDER — ENOXAPARIN SODIUM 100 MG/ML
40 INJECTION SUBCUTANEOUS DAILY
Status: DISCONTINUED | OUTPATIENT
Start: 2024-06-19 | End: 2024-06-26

## 2024-06-18 RX ORDER — INSULIN LISPRO 100 [IU]/ML
0-4 INJECTION, SOLUTION INTRAVENOUS; SUBCUTANEOUS NIGHTLY
Status: DISCONTINUED | OUTPATIENT
Start: 2024-06-18 | End: 2024-06-19

## 2024-06-18 RX ORDER — DEXTROSE MONOHYDRATE 100 MG/ML
INJECTION, SOLUTION INTRAVENOUS CONTINUOUS PRN
Status: DISCONTINUED | OUTPATIENT
Start: 2024-06-18 | End: 2024-06-19 | Stop reason: SDUPTHER

## 2024-06-18 RX ORDER — M-VIT,TX,IRON,MINS/CALC/FOLIC 27MG-0.4MG
1 TABLET ORAL NIGHTLY
Status: DISCONTINUED | OUTPATIENT
Start: 2024-06-18 | End: 2024-06-27 | Stop reason: HOSPADM

## 2024-06-18 RX ORDER — ONDANSETRON 2 MG/ML
4 INJECTION INTRAMUSCULAR; INTRAVENOUS ONCE
Status: DISCONTINUED | OUTPATIENT
Start: 2024-06-18 | End: 2024-06-18

## 2024-06-18 RX ORDER — INSULIN LISPRO 100 [IU]/ML
0-4 INJECTION, SOLUTION INTRAVENOUS; SUBCUTANEOUS
Status: DISCONTINUED | OUTPATIENT
Start: 2024-06-19 | End: 2024-06-19

## 2024-06-18 RX ORDER — AMOXICILLIN 500 MG
1 CAPSULE ORAL NIGHTLY
Status: DISCONTINUED | OUTPATIENT
Start: 2024-06-18 | End: 2024-06-18

## 2024-06-18 RX ORDER — SODIUM CHLORIDE 0.9 % (FLUSH) 0.9 %
5-40 SYRINGE (ML) INJECTION PRN
Status: DISCONTINUED | OUTPATIENT
Start: 2024-06-18 | End: 2024-06-27 | Stop reason: HOSPADM

## 2024-06-18 RX ORDER — ONDANSETRON 4 MG/1
4 TABLET, ORALLY DISINTEGRATING ORAL EVERY 8 HOURS PRN
Status: DISCONTINUED | OUTPATIENT
Start: 2024-06-18 | End: 2024-06-27 | Stop reason: HOSPADM

## 2024-06-18 RX ORDER — POLYETHYLENE GLYCOL 3350 17 G/17G
17 POWDER, FOR SOLUTION ORAL DAILY PRN
Status: DISCONTINUED | OUTPATIENT
Start: 2024-06-18 | End: 2024-06-27 | Stop reason: HOSPADM

## 2024-06-18 RX ORDER — GLUCAGON 1 MG/ML
1 KIT INJECTION PRN
Status: DISCONTINUED | OUTPATIENT
Start: 2024-06-18 | End: 2024-06-19 | Stop reason: SDUPTHER

## 2024-06-18 RX ORDER — SODIUM CHLORIDE 9 MG/ML
INJECTION, SOLUTION INTRAVENOUS PRN
Status: DISCONTINUED | OUTPATIENT
Start: 2024-06-18 | End: 2024-06-27 | Stop reason: HOSPADM

## 2024-06-18 RX ORDER — VALSARTAN 160 MG/1
320 TABLET ORAL DAILY
Status: DISCONTINUED | OUTPATIENT
Start: 2024-06-19 | End: 2024-06-19

## 2024-06-18 RX ORDER — METOPROLOL SUCCINATE 50 MG/1
50 TABLET, EXTENDED RELEASE ORAL DAILY
Status: DISCONTINUED | OUTPATIENT
Start: 2024-06-19 | End: 2024-06-21

## 2024-06-18 RX ORDER — ONDANSETRON 2 MG/ML
4 INJECTION INTRAMUSCULAR; INTRAVENOUS EVERY 6 HOURS PRN
Status: DISCONTINUED | OUTPATIENT
Start: 2024-06-18 | End: 2024-06-27 | Stop reason: HOSPADM

## 2024-06-18 RX ORDER — ACETAMINOPHEN 500 MG
1000 TABLET ORAL ONCE
Status: COMPLETED | OUTPATIENT
Start: 2024-06-18 | End: 2024-06-18

## 2024-06-18 RX ORDER — AMLODIPINE BESYLATE 5 MG/1
5 TABLET ORAL DAILY
Status: DISCONTINUED | OUTPATIENT
Start: 2024-06-19 | End: 2024-06-19

## 2024-06-18 RX ORDER — MAGNESIUM SULFATE IN WATER 40 MG/ML
2000 INJECTION, SOLUTION INTRAVENOUS PRN
Status: DISCONTINUED | OUTPATIENT
Start: 2024-06-18 | End: 2024-06-27 | Stop reason: HOSPADM

## 2024-06-18 RX ORDER — 0.9 % SODIUM CHLORIDE 0.9 %
1000 INTRAVENOUS SOLUTION INTRAVENOUS ONCE
Status: COMPLETED | OUTPATIENT
Start: 2024-06-18 | End: 2024-06-18

## 2024-06-18 RX ORDER — SODIUM CHLORIDE, SODIUM LACTATE, POTASSIUM CHLORIDE, CALCIUM CHLORIDE 600; 310; 30; 20 MG/100ML; MG/100ML; MG/100ML; MG/100ML
INJECTION, SOLUTION INTRAVENOUS CONTINUOUS
Status: DISCONTINUED | OUTPATIENT
Start: 2024-06-18 | End: 2024-06-22

## 2024-06-18 RX ORDER — ACETAMINOPHEN 325 MG/1
650 TABLET ORAL EVERY 6 HOURS PRN
Status: DISCONTINUED | OUTPATIENT
Start: 2024-06-18 | End: 2024-06-21 | Stop reason: SDUPTHER

## 2024-06-18 RX ORDER — CHLORTHALIDONE 25 MG/1
25 TABLET ORAL DAILY
Status: DISCONTINUED | OUTPATIENT
Start: 2024-06-19 | End: 2024-06-23

## 2024-06-18 RX ORDER — ACETAMINOPHEN 650 MG/1
650 SUPPOSITORY RECTAL EVERY 6 HOURS PRN
Status: DISCONTINUED | OUTPATIENT
Start: 2024-06-18 | End: 2024-06-21 | Stop reason: SDUPTHER

## 2024-06-18 RX ORDER — SODIUM CHLORIDE 0.9 % (FLUSH) 0.9 %
5-40 SYRINGE (ML) INJECTION EVERY 12 HOURS SCHEDULED
Status: DISCONTINUED | OUTPATIENT
Start: 2024-06-18 | End: 2024-06-27 | Stop reason: HOSPADM

## 2024-06-18 RX ORDER — POTASSIUM CHLORIDE 7.45 MG/ML
10 INJECTION INTRAVENOUS PRN
Status: DISCONTINUED | OUTPATIENT
Start: 2024-06-18 | End: 2024-06-27 | Stop reason: HOSPADM

## 2024-06-18 RX ADMIN — SODIUM CHLORIDE, PRESERVATIVE FREE 10 ML: 5 INJECTION INTRAVENOUS at 23:57

## 2024-06-18 RX ADMIN — SODIUM CHLORIDE 1000 ML: 9 INJECTION, SOLUTION INTRAVENOUS at 21:01

## 2024-06-18 RX ADMIN — Medication 1 TABLET: at 23:56

## 2024-06-18 RX ADMIN — Medication 400 UNITS: at 23:56

## 2024-06-18 RX ADMIN — ACETAMINOPHEN 1000 MG: 500 TABLET ORAL at 19:04

## 2024-06-18 ASSESSMENT — PAIN SCALES - GENERAL
PAINLEVEL_OUTOF10: 6
PAINLEVEL_OUTOF10: 0

## 2024-06-18 ASSESSMENT — PAIN DESCRIPTION - DESCRIPTORS: DESCRIPTORS: DISCOMFORT

## 2024-06-18 ASSESSMENT — PAIN - FUNCTIONAL ASSESSMENT: PAIN_FUNCTIONAL_ASSESSMENT: 0-10

## 2024-06-18 ASSESSMENT — PAIN DESCRIPTION - LOCATION: LOCATION: HEAD

## 2024-06-18 NOTE — ED PROVIDER NOTES
Emergency Department Encounter  Location: [facility] EMERGENCY DEPARTMENT    Patient: Galina Nick  MRN: [id number]  : 1965  Date of evaluation: 2024  ED Provider: Alberto Daly MD    21:30p.m.  Galina Nick was checked out to me by Dr. Josselin Oneil. Please see his/her initial documentation for details of the patient's initial ED presentation, physical exam and completed studies.    In brief, Galina Nick is a 58 y.o. female that presented to the emergency department.  To the emergency department for lightheadedness, palpitations, feeling like she was in a pass out but did not actually lose consciousness.  Patient states that she had a recent echo done which shows no signs of ischemia but just feels very lightheaded.  Fatigued.  At this time awaiting laboratory workup patient had no focal neurologic deficits EKG showed sinus rhythm with no acute ST changes.  Chest x-ray was negative.    I have reviewed and interpreted all of the currently available lab results and diagnostics from this visit:  Results for orders placed or performed during the hospital encounter of 24   CBC with Auto Differential   Result Value Ref Range    WBC 6.5 4.0 - 11.0 K/uL    RBC 4.25 4.00 - 5.20 M/uL    Hemoglobin 11.6 (L) 12.0 - 16.0 g/dL    Hematocrit 34.3 (L) 36.0 - 48.0 %    MCV 80.8 80.0 - 100.0 fL    MCH 27.2 26.0 - 34.0 pg    MCHC 33.7 31.0 - 36.0 g/dL    RDW 14.5 12.4 - 15.4 %    Platelets 245 135 - 450 K/uL    MPV 9.6 5.0 - 10.5 fL    Neutrophils % 67.9 %    Lymphocytes % 23.2 %    Monocytes % 5.7 %    Eosinophils % 2.0 %    Basophils % 1.2 %    Neutrophils Absolute 4.4 1.7 - 7.7 K/uL    Lymphocytes Absolute 1.5 1.0 - 5.1 K/uL    Monocytes Absolute 0.4 0.0 - 1.3 K/uL    Eosinophils Absolute 0.1 0.0 - 0.6 K/uL    Basophils Absolute 0.1 0.0 - 0.2 K/uL   SPECIMEN REJECTION   Result Value Ref Range    Rejected Test BMP MG TRP5     Reason for Rejection see below    Basic Metabolic Panel    Result Value Ref Range    Sodium 136 136 - 145 mmol/L    Potassium 3.4 (L) 3.5 - 5.1 mmol/L    Chloride 102 99 - 110 mmol/L    CO2 22 21 - 32 mmol/L    Anion Gap 12 3 - 16    Glucose 328 (H) 70 - 99 mg/dL    BUN 31 (H) 7 - 20 mg/dL    Creatinine 2.1 (H) 0.6 - 1.1 mg/dL    Est, Glom Filt Rate 27 (A) >60    Calcium 8.7 8.3 - 10.6 mg/dL   Magnesium   Result Value Ref Range    Magnesium 2.10 1.80 - 2.40 mg/dL   Troponin   Result Value Ref Range    Troponin, High Sensitivity 78 (H) 0 - 14 ng/L   EKG 12 Lead   Result Value Ref Range    Ventricular Rate 83 BPM    Atrial Rate 83 BPM    P-R Interval 168 ms    QRS Duration 80 ms    Q-T Interval 396 ms    QTc Calculation (Bazett) 465 ms    P Axis 48 degrees    R Axis -5 degrees    T Axis 37 degrees    Diagnosis       Normal sinus rhythmMinimal voltage criteria for LVH, may be normal variant ( R in aVL )Borderline ECG     XR CHEST PORTABLE    Result Date: 6/18/2024  EXAMINATION: ONE XRAY VIEW OF THE CHEST 6/18/2024 6:23 pm COMPARISON: 02/05/2024. HISTORY: ORDERING SYSTEM PROVIDED HISTORY: sob TECHNOLOGIST PROVIDED HISTORY: Reason for exam:->sob Reason for Exam: sob FINDINGS: The heart size is within normal limits. The pulmonary vasculature is also within normal limits. No acute infiltrates are seen. No pneumothoraces are noted.     No acute cardiopulmonary process.      Final ED Course and MDM:  In brief, Galina Nick is a 58 y.o. female whose care was signed out to me by the outgoing provider. In brief, laboratory workup shows JAYY with signs of dehydration and mild hypokalemia.  Patient does have elevated troponin at compared to previous.  This may be due to her renal function she has no active chest pain at this time she still feels weak and lightheaded when she walks.  Will give IV fluids.  Patient was given Zofran for nausea.  Will plan on admission at this time.    ED Medication Orders (From admission, onward)      Start Ordered     Status Ordering Provider

## 2024-06-18 NOTE — ED PROVIDER NOTES
[facility] EMERGENCY DEPARTMENT  EMERGENCY DEPARTMENT ENCOUNTER        Pt Name: Galina Nick  MRN: [id number]  Birthdate 1965  Date of evaluation: 6/18/2024  Provider: Josselin Oneil DO  PCP: Tristan Lei MD  Note Started: 6:03 PM EDT 6/18/24    CHIEF COMPLAINT      Palpitations, Presyncope    HISTORY OF PRESENT ILLNESS: 1 or more Elements     Chief Complaint   Patient presents with    Palpitations     Felt like she was going to pass out but did not. Also feels like heart is racing. HA also.     History from : Patient  Limitations to history : None    Galina Nick is a 58 y.o. female who presents to the emergency department secondary to concern for palpitations and presyncope.  Also reports generalized fatigue and just feeling unwell.  States that she had an episode today where she felt like she was going to faint, but did not actually lose consciousness.  States that during this time she had episodes of palpitations and still feels like her heart is racing on arrival to the ED.  Reports episodes like these in the past with her last episode being on Sunday.  She does see a cardiologist and had a recent 2D echo done which showed no evidence of ischemia.    Past medical history noted below. Aside from what is stated above denies any other symptoms or modifying factors.   reports that she has never smoked. She has never used smokeless tobacco. She reports that she does not drink alcohol and does not use drugs.  Nursing Notes were all reviewed and agreed with or any disagreements addressed in HPI/MDM.  REVIEW OF SYSTEMS :    Review of Systems   Constitutional:  Negative for chills and fever.   HENT:  Negative for congestion and rhinorrhea.    Eyes:  Negative for pain and redness.   Respiratory:  Negative for cough and shortness of breath.    Cardiovascular:  Positive for palpitations. Negative for chest pain and leg swelling.   Gastrointestinal:  Negative for abdominal pain,  Tympanic membrane and external ear normal.      Nose: Nose normal. No congestion or rhinorrhea.      Mouth/Throat:      Mouth: Mucous membranes are moist.      Pharynx: No oropharyngeal exudate or posterior oropharyngeal erythema.   Eyes:      General:         Right eye: No discharge.         Left eye: No discharge.      Extraocular Movements: Extraocular movements intact.      Pupils: Pupils are equal, round, and reactive to light.   Cardiovascular:      Rate and Rhythm: Normal rate and regular rhythm.      Pulses: Normal pulses.      Heart sounds: Normal heart sounds. No murmur heard.  Pulmonary:      Effort: Pulmonary effort is normal. No respiratory distress.      Breath sounds: Normal breath sounds. No wheezing.   Abdominal:      General: Abdomen is flat. There is no distension.      Palpations: Abdomen is soft.      Tenderness: There is no abdominal tenderness.   Musculoskeletal:         General: No swelling or tenderness. Normal range of motion.      Cervical back: Normal range of motion. No rigidity or tenderness.   Skin:     General: Skin is warm and dry.      Capillary Refill: Capillary refill takes less than 2 seconds.      Coloration: Skin is not jaundiced.      Findings: No erythema.   Neurological:      General: No focal deficit present.      Mental Status: She is alert and oriented to person, place, and time.      Cranial Nerves: No cranial nerve deficit.   Psychiatric:         Mood and Affect: Mood normal.         Behavior: Behavior normal.       DIAGNOSTIC RESULTS   EKG: interpreted by the Emergency Department Physician who either signs or Co-signs this chart in the absence of a cardiologist.  Indication: Palpitations  Interpretation: Normal sinus rhythm with no acute ST elevation.  Ventricular rate of 83 bpm.  Left-sided axis deviation. QTc of 465.    RADIOLOGY:   Non-plain film images such as CT, Ultrasound and MRI are read by the radiologist.   Plain radiographic images are visualized and

## 2024-06-19 PROBLEM — I25.10 CORONARY ARTERY DISEASE DUE TO LIPID RICH PLAQUE: Status: ACTIVE | Noted: 2024-06-19

## 2024-06-19 PROBLEM — R55 NEAR SYNCOPE: Status: ACTIVE | Noted: 2024-06-19

## 2024-06-19 PROBLEM — I25.83 CORONARY ARTERY DISEASE DUE TO LIPID RICH PLAQUE: Status: ACTIVE | Noted: 2024-06-19

## 2024-06-19 LAB
AMORPHOUS: PRESENT
ANION GAP SERPL CALCULATED.3IONS-SCNC: 11 MMOL/L (ref 3–16)
BACTERIA URNS QL MICRO: ABNORMAL /HPF
BASOPHILS # BLD: 0.1 K/UL (ref 0–0.2)
BASOPHILS NFR BLD: 1 %
BILIRUB UR QL STRIP.AUTO: NEGATIVE
BUN SERPL-MCNC: 27 MG/DL (ref 7–20)
C3 SERPL-MCNC: 145.3 MG/DL (ref 90–180)
C4 SERPL-MCNC: 35.1 MG/DL (ref 10–40)
CALCIUM SERPL-MCNC: 8.4 MG/DL (ref 8.3–10.6)
CHLORIDE SERPL-SCNC: 107 MMOL/L (ref 99–110)
CHOLEST SERPL-MCNC: 262 MG/DL (ref 0–199)
CLARITY UR: CLEAR
CO2 SERPL-SCNC: 22 MMOL/L (ref 21–32)
COLOR UR: YELLOW
CREAT SERPL-MCNC: 1.9 MG/DL (ref 0.6–1.1)
DEPRECATED RDW RBC AUTO: 14.3 % (ref 12.4–15.4)
EOSINOPHIL # BLD: 0.2 K/UL (ref 0–0.6)
EOSINOPHIL NFR BLD: 2.7 %
EPI CELLS #/AREA URNS AUTO: 2 /HPF (ref 0–5)
EST. AVERAGE GLUCOSE BLD GHB EST-MCNC: 214.5 MG/DL
GFR SERPLBLD CREATININE-BSD FMLA CKD-EPI: 30 ML/MIN/{1.73_M2}
GLUCOSE BLD-MCNC: 121 MG/DL (ref 70–99)
GLUCOSE BLD-MCNC: 200 MG/DL (ref 70–99)
GLUCOSE BLD-MCNC: 308 MG/DL (ref 70–99)
GLUCOSE BLD-MCNC: 350 MG/DL (ref 70–99)
GLUCOSE SERPL-MCNC: 351 MG/DL (ref 70–99)
GLUCOSE UR STRIP.AUTO-MCNC: 500 MG/DL
HBA1C MFR BLD: 9.1 %
HCT VFR BLD AUTO: 32 % (ref 36–48)
HDLC SERPL-MCNC: 44 MG/DL (ref 40–60)
HGB BLD-MCNC: 10.6 G/DL (ref 12–16)
HGB UR QL STRIP.AUTO: ABNORMAL
HYALINE CASTS #/AREA URNS AUTO: 2 /LPF (ref 0–8)
KETONES UR STRIP.AUTO-MCNC: NEGATIVE MG/DL
LDLC SERPL CALC-MCNC: 179 MG/DL
LEUKOCYTE ESTERASE UR QL STRIP.AUTO: NEGATIVE
LYMPHOCYTES # BLD: 1.6 K/UL (ref 1–5.1)
LYMPHOCYTES NFR BLD: 26.1 %
MAGNESIUM SERPL-MCNC: 1.9 MG/DL (ref 1.8–2.4)
MCH RBC QN AUTO: 27 PG (ref 26–34)
MCHC RBC AUTO-ENTMCNC: 33.1 G/DL (ref 31–36)
MCV RBC AUTO: 81.5 FL (ref 80–100)
MONOCYTES # BLD: 0.6 K/UL (ref 0–1.3)
MONOCYTES NFR BLD: 9 %
MUCUS: PRESENT
NEUTROPHILS # BLD: 3.8 K/UL (ref 1.7–7.7)
NEUTROPHILS NFR BLD: 61.2 %
NITRITE UR QL STRIP.AUTO: NEGATIVE
PERFORMED ON: ABNORMAL
PH UR STRIP.AUTO: 6.5 [PH] (ref 5–8)
PLATELET # BLD AUTO: 231 K/UL (ref 135–450)
PMV BLD AUTO: 9.5 FL (ref 5–10.5)
POTASSIUM SERPL-SCNC: 3.2 MMOL/L (ref 3.5–5.1)
POTASSIUM SERPL-SCNC: 3.3 MMOL/L (ref 3.5–5.1)
PROT UR STRIP.AUTO-MCNC: >=1000 MG/DL
RBC # BLD AUTO: 3.92 M/UL (ref 4–5.2)
RBC CLUMPS #/AREA URNS AUTO: 5 /HPF (ref 0–4)
SODIUM SERPL-SCNC: 140 MMOL/L (ref 136–145)
SP GR UR STRIP.AUTO: 1.01 (ref 1–1.03)
TRIGL SERPL-MCNC: 193 MG/DL (ref 0–150)
TROPONIN, HIGH SENSITIVITY: 70 NG/L (ref 0–14)
UA DIPSTICK W REFLEX MICRO PNL UR: YES
URN SPEC COLLECT METH UR: ABNORMAL
UROBILINOGEN UR STRIP-ACNC: 0.2 E.U./DL
VLDLC SERPL CALC-MCNC: 39 MG/DL
WBC # BLD AUTO: 6.2 K/UL (ref 4–11)
WBC #/AREA URNS AUTO: 2 /HPF (ref 0–5)

## 2024-06-19 PROCEDURE — 84166 PROTEIN E-PHORESIS/URINE/CSF: CPT

## 2024-06-19 PROCEDURE — 85025 COMPLETE CBC W/AUTO DIFF WBC: CPT

## 2024-06-19 PROCEDURE — 81003 URINALYSIS AUTO W/O SCOPE: CPT

## 2024-06-19 PROCEDURE — 84300 ASSAY OF URINE SODIUM: CPT

## 2024-06-19 PROCEDURE — 82570 ASSAY OF URINE CREATININE: CPT

## 2024-06-19 PROCEDURE — 1200000000 HC SEMI PRIVATE

## 2024-06-19 PROCEDURE — 99223 1ST HOSP IP/OBS HIGH 75: CPT | Performed by: INTERNAL MEDICINE

## 2024-06-19 PROCEDURE — 80048 BASIC METABOLIC PNL TOTAL CA: CPT

## 2024-06-19 PROCEDURE — 84484 ASSAY OF TROPONIN QUANT: CPT

## 2024-06-19 PROCEDURE — 83036 HEMOGLOBIN GLYCOSYLATED A1C: CPT

## 2024-06-19 PROCEDURE — 86160 COMPLEMENT ANTIGEN: CPT

## 2024-06-19 PROCEDURE — 86038 ANTINUCLEAR ANTIBODIES: CPT

## 2024-06-19 PROCEDURE — 86335 IMMUNFIX E-PHORSIS/URINE/CSF: CPT

## 2024-06-19 PROCEDURE — 84165 PROTEIN E-PHORESIS SERUM: CPT

## 2024-06-19 PROCEDURE — 6370000000 HC RX 637 (ALT 250 FOR IP): Performed by: NURSE PRACTITIONER

## 2024-06-19 PROCEDURE — 86334 IMMUNOFIX E-PHORESIS SERUM: CPT

## 2024-06-19 PROCEDURE — 6360000002 HC RX W HCPCS: Performed by: NURSE PRACTITIONER

## 2024-06-19 PROCEDURE — 80061 LIPID PANEL: CPT

## 2024-06-19 PROCEDURE — 84132 ASSAY OF SERUM POTASSIUM: CPT

## 2024-06-19 PROCEDURE — 2580000003 HC RX 258: Performed by: PEDIATRICS

## 2024-06-19 PROCEDURE — 6370000000 HC RX 637 (ALT 250 FOR IP): Performed by: PEDIATRICS

## 2024-06-19 PROCEDURE — 36415 COLL VENOUS BLD VENIPUNCTURE: CPT

## 2024-06-19 PROCEDURE — 84156 ASSAY OF PROTEIN URINE: CPT

## 2024-06-19 PROCEDURE — 6360000002 HC RX W HCPCS: Performed by: PEDIATRICS

## 2024-06-19 PROCEDURE — 6370000000 HC RX 637 (ALT 250 FOR IP): Performed by: STUDENT IN AN ORGANIZED HEALTH CARE EDUCATION/TRAINING PROGRAM

## 2024-06-19 PROCEDURE — 84155 ASSAY OF PROTEIN SERUM: CPT

## 2024-06-19 PROCEDURE — 83735 ASSAY OF MAGNESIUM: CPT

## 2024-06-19 RX ORDER — INSULIN LISPRO 100 [IU]/ML
0-8 INJECTION, SOLUTION INTRAVENOUS; SUBCUTANEOUS
Status: DISCONTINUED | OUTPATIENT
Start: 2024-06-19 | End: 2024-06-19

## 2024-06-19 RX ORDER — INSULIN LISPRO 100 [IU]/ML
0-4 INJECTION, SOLUTION INTRAVENOUS; SUBCUTANEOUS NIGHTLY
Status: DISCONTINUED | OUTPATIENT
Start: 2024-06-19 | End: 2024-06-19

## 2024-06-19 RX ORDER — GLUCAGON 1 MG/ML
1 KIT INJECTION PRN
Status: DISCONTINUED | OUTPATIENT
Start: 2024-06-19 | End: 2024-06-27 | Stop reason: HOSPADM

## 2024-06-19 RX ORDER — INSULIN LISPRO 100 [IU]/ML
0-4 INJECTION, SOLUTION INTRAVENOUS; SUBCUTANEOUS NIGHTLY
Status: DISCONTINUED | OUTPATIENT
Start: 2024-06-19 | End: 2024-06-21

## 2024-06-19 RX ORDER — VALSARTAN 160 MG/1
320 TABLET ORAL DAILY
Status: DISCONTINUED | OUTPATIENT
Start: 2024-06-19 | End: 2024-06-20

## 2024-06-19 RX ORDER — HYDRALAZINE HYDROCHLORIDE 20 MG/ML
10 INJECTION INTRAMUSCULAR; INTRAVENOUS EVERY 6 HOURS PRN
Status: DISCONTINUED | OUTPATIENT
Start: 2024-06-19 | End: 2024-06-20

## 2024-06-19 RX ORDER — INSULIN LISPRO 100 [IU]/ML
0-16 INJECTION, SOLUTION INTRAVENOUS; SUBCUTANEOUS
Status: DISCONTINUED | OUTPATIENT
Start: 2024-06-19 | End: 2024-06-21

## 2024-06-19 RX ORDER — AMLODIPINE BESYLATE 5 MG/1
5 TABLET ORAL ONCE
Status: COMPLETED | OUTPATIENT
Start: 2024-06-19 | End: 2024-06-19

## 2024-06-19 RX ORDER — DEXTROSE MONOHYDRATE 100 MG/ML
INJECTION, SOLUTION INTRAVENOUS CONTINUOUS PRN
Status: DISCONTINUED | OUTPATIENT
Start: 2024-06-19 | End: 2024-06-27 | Stop reason: HOSPADM

## 2024-06-19 RX ORDER — INSULIN GLARGINE 100 [IU]/ML
25 INJECTION, SOLUTION SUBCUTANEOUS DAILY
Status: DISCONTINUED | OUTPATIENT
Start: 2024-06-19 | End: 2024-06-20

## 2024-06-19 RX ORDER — AMLODIPINE BESYLATE 5 MG/1
10 TABLET ORAL DAILY
Status: DISCONTINUED | OUTPATIENT
Start: 2024-06-20 | End: 2024-06-24

## 2024-06-19 RX ADMIN — SODIUM CHLORIDE, POTASSIUM CHLORIDE, SODIUM LACTATE AND CALCIUM CHLORIDE: 600; 310; 30; 20 INJECTION, SOLUTION INTRAVENOUS at 20:05

## 2024-06-19 RX ADMIN — METOPROLOL SUCCINATE 50 MG: 50 TABLET, EXTENDED RELEASE ORAL at 09:12

## 2024-06-19 RX ADMIN — SODIUM CHLORIDE, POTASSIUM CHLORIDE, SODIUM LACTATE AND CALCIUM CHLORIDE: 600; 310; 30; 20 INJECTION, SOLUTION INTRAVENOUS at 10:21

## 2024-06-19 RX ADMIN — Medication 400 UNITS: at 20:03

## 2024-06-19 RX ADMIN — INSULIN LISPRO 12 UNITS: 100 INJECTION, SOLUTION INTRAVENOUS; SUBCUTANEOUS at 12:31

## 2024-06-19 RX ADMIN — ASPIRIN 81 MG: 81 TABLET, COATED ORAL at 09:10

## 2024-06-19 RX ADMIN — SODIUM CHLORIDE, POTASSIUM CHLORIDE, SODIUM LACTATE AND CALCIUM CHLORIDE: 600; 310; 30; 20 INJECTION, SOLUTION INTRAVENOUS at 00:00

## 2024-06-19 RX ADMIN — POTASSIUM BICARBONATE 40 MEQ: 782 TABLET, EFFERVESCENT ORAL at 06:08

## 2024-06-19 RX ADMIN — CHLORTHALIDONE 25 MG: 25 TABLET ORAL at 09:12

## 2024-06-19 RX ADMIN — SODIUM CHLORIDE, PRESERVATIVE FREE 10 ML: 5 INJECTION INTRAVENOUS at 20:01

## 2024-06-19 RX ADMIN — HYDRALAZINE HYDROCHLORIDE 10 MG: 20 INJECTION INTRAMUSCULAR; INTRAVENOUS at 20:00

## 2024-06-19 RX ADMIN — INSULIN LISPRO 4 UNITS: 100 INJECTION, SOLUTION INTRAVENOUS; SUBCUTANEOUS at 09:13

## 2024-06-19 RX ADMIN — ENOXAPARIN SODIUM 40 MG: 100 INJECTION SUBCUTANEOUS at 12:31

## 2024-06-19 RX ADMIN — Medication 1 TABLET: at 20:03

## 2024-06-19 RX ADMIN — AMLODIPINE BESYLATE 5 MG: 5 TABLET ORAL at 15:48

## 2024-06-19 RX ADMIN — AMLODIPINE BESYLATE 5 MG: 5 TABLET ORAL at 09:12

## 2024-06-19 RX ADMIN — SODIUM CHLORIDE, PRESERVATIVE FREE 10 ML: 5 INJECTION INTRAVENOUS at 09:18

## 2024-06-19 RX ADMIN — VALSARTAN 320 MG: 160 TABLET ORAL at 04:31

## 2024-06-19 RX ADMIN — INSULIN GLARGINE 25 UNITS: 100 INJECTION, SOLUTION SUBCUTANEOUS at 09:18

## 2024-06-19 ASSESSMENT — PAIN DESCRIPTION - LOCATION: LOCATION: HEAD

## 2024-06-19 ASSESSMENT — PAIN SCALES - GENERAL
PAINLEVEL_OUTOF10: 0
PAINLEVEL_OUTOF10: 5
PAINLEVEL_OUTOF10: 0

## 2024-06-19 NOTE — PROGRESS NOTES
How does patient ambulate?   [x]Low Fall Risk (ambulates by themselves without support)  []Stand by assist   []Contact Guard   []Front wheel walker  []Wheelchair   []Steady  []Bed bound  []History of Lower Extremity Amputation  []Unknown, did not assess in the emergency department   How does patient take pills?  [x]Whole with Water  []Crushed in applesauce  []Crushed in pudding  []Other  []Unknown no oral medications were given in the ED  Is patient alert?   [x]Alert  []Drowsy but responds to voice  []Doesn't respond to voice but responds to painful stimuli  []Unresponsive  Is patient oriented?   [x]To person  [x]To place  [x]To time  [x]To situation  []Confused  []Agitated  [x]Follows commands  If patient is disoriented or from a Skill Nursing Facility has family been notified of admission?   []Yes   [x]No  Patient belongings?   [x]Cell phone  []Wallet   []Dentures  [x]Clothing  Any specific patient or family belongings/needs/dynamics?     Miscellaneous comments/pending orders?      If there are any additional questions please reach out to the Emergency Department.

## 2024-06-19 NOTE — H&P
V2.0  History and Physical      Name:  Galina Nick /Age/Sex: 1965  (58 y.o. female)   MRN & CSN:  [id number] & [id number] Encounter Date/Time: 2024 9:13 PM EDT   Location:  [facility] PCP: Tristan Lei MD       Hospital Day: 1    Assessment and Plan:   Galina Nick is a 58 y.o. female with a pmh of     Past Medical History:   Diagnosis Date    Diabetes mellitus (HCC)     Hypertension          who presents with JAYY (acute kidney injury) ([facility])      Hospital Problems             Last Modified POA    * (Principal) JAYY (acute kidney injury) ([facility]) 2024 Yes       JAYY on CKD:   - LR @ 100 ml/hr   - trend Cr   - avoid nephrotoxins as able   - check UA for spec grav     Elevated TNI, hx of CAD:   - aspirin 81 mg po daily  - hx of cardiac CTA on 24   - s/p spect 24   - cardiac cath as outpatient planned   - serial TNI to trend   - she denies chest pain      - EKG without ischemic changes     Dizziness, presyncope, hx of MV regurg:  - PMH of nonrheumatic mitral valve regurg - moderate MR on echo 24  - echo 24 with EF 55% and grade II diastolic dysfunction, moderate MR   - orthostatic vitals   - IVF support   - telemetry   - s/p carotid doppler 24 - <50% stenosis bilat   - cardiology consulted (? Need for outpatient heart monitor, and re elevated TNI which maybe just from elevated Cr, but there was heart cath planned)      HTN, renal artery stenosis (prox left renal artery 60-99% stenosis on renal duplex 24):   - amlodipine 5 mg po daily   - chlorthalidone 25 mg po daily  - metoprolol 50 mg po daily    - valsartan 320 mg po daily     DM2:   - presenting glucose of 328 and unclear why, she reports only having eaten a pear earlier today. UA pending. Denies cough. Has leg wound appears in process of healing but doubt acute infection at this time.   - last A1c of 7.8 on 24   - A1c with AM labs   - dulaglutide q week - HELD   - metformin 1000 mg po BID - HELD  02/05/2024. HISTORY: ORDERING SYSTEM PROVIDED HISTORY: sob TECHNOLOGIST PROVIDED HISTORY: Reason for exam:->sob Reason for Exam: sob FINDINGS: The heart size is within normal limits. The pulmonary vasculature is also within normal limits. No acute infiltrates are seen. No pneumothoraces are noted.     No acute cardiopulmonary process.         Electronically signed by Papito Esquivel MD on 6/18/2024 at 10:12 PM

## 2024-06-19 NOTE — PROGRESS NOTES
[facility]    6/19/2024 8:56 AM        Name: Galina Nick .              Admitted: 6/18/2024  Primary Care Provider: Tristan Lei MD (Tel: [phone])      Subjective:  .    Pt seen this am while resting in the bed  Reports that she has been feeling poorly for the past few weeks.    Was running errands yesterday in the 98 degree heat and felt as if she may \"pass out\"  she was able to sit down and felt better after a few minutes.    She has been on diuretic therapy and not checking her BG levels.  BG was 350 this am .        Reviewed interval ancillary notes    Current Medications  valsartan (DIOVAN) tablet 320 mg, Daily  sodium chloride flush 0.9 % injection 5-40 mL, 2 times per day  sodium chloride flush 0.9 % injection 5-40 mL, PRN  0.9 % sodium chloride infusion, PRN  potassium chloride (KLOR-CON M) extended release tablet 40 mEq, PRN   Or  potassium bicarb-citric acid (EFFER-K) effervescent tablet 40 mEq, PRN   Or  potassium chloride 10 mEq/100 mL IVPB (Peripheral Line), PRN  magnesium sulfate 2000 mg in 50 mL IVPB premix, PRN  enoxaparin (LOVENOX) injection 40 mg, Daily  ondansetron (ZOFRAN-ODT) disintegrating tablet 4 mg, Q8H PRN   Or  ondansetron (ZOFRAN) injection 4 mg, Q6H PRN  polyethylene glycol (GLYCOLAX) packet 17 g, Daily PRN  acetaminophen (TYLENOL) tablet 650 mg, Q6H PRN   Or  acetaminophen (TYLENOL) suppository 650 mg, Q6H PRN  lactated ringers IV soln infusion, Continuous  amLODIPine (NORVASC) tablet 5 mg, Daily  aspirin EC tablet 81 mg, Daily  chlorthalidone (HYGROTON) tablet 25 mg, Daily  cholecalciferol (VITAMIN D3) tablet TABS 400 Units, Nightly  metoprolol succinate (TOPROL XL) extended release tablet 50 mg, Daily  therapeutic multivitamin-minerals 1 tablet, Nightly  dextrose bolus 10% 125 mL, PRN   Or  dextrose bolus 10% 250 mL, PRN  glucagon injection 1 mg, PRN  dextrose 10 % infusion, Continuous PRN  insulin lispro (HUMALOG,ADMELOG)  Normal wall thickness. Normal wall motion. Grade II diastolic dysfunction with increased LAP.    Right Ventricle: Right ventricle is mildly dilated. Normal systolic function. TAPSE is 2.5 cm. RV Peak S' is 11 cm/s.    Aortic Valve: Trileaflet valve.    Mitral Valve: Mild regurgitation with a centrally directed jet.    Tricuspid Valve: Moderate regurgitation with an eccentrically directed jet and may underestimate severity. The estimated RVSP is 35 mmHg.    Image quality is adequate.        Problem List  Principal Problem:    JAYY (acute kidney injury) (HCC)  Resolved Problems:    * No resolved hospital problems. *       Assessment & Plan:   JAYY: likely due to diuretic use and hyperglycemia:  her lasix and ARB are currently on hold ,  creat is trending down.  Nephrology to follow   Uncontrolled T2DM:  BG values reviewed .  I have added lantus 25 units q am and increased humalog correction.  May need to add prandial.  AIC 9.1  Will follow closely   Hypertension:  ARB and lasix on hold.  I have increased amlodipine to 10 mg.  Will also add prn hydralazine .  Consider increasing BB therapy   The patient did not have syncope. Recent echo reviewed.  Cardiology to follow       Diet: ADULT DIET; Regular; 4 carb choices (60 gm/meal)  Code:Full Code  DVT PPX      Juanita Avelar, APRN - CNP   6/19/2024 8:56 AM

## 2024-06-19 NOTE — CONSULTS
[facility]   Nephrology progress Note  [phone]  [phone]   [employer]           Reason for Consult:  JAYY , Hypertension    HISTORY OF PRESENT ILLNESS:      The patient is a 58 y.o. female with significant past medical history of with past medical history of hypertension, T2DM, CKD, hyperlipidemia, mitral regurgitation, renal artery stenosis, presented to the ER with who presents to the ER after having a fainting spell first on Sunday afternoon and then on 6/18/2021 Sunday she got to her family picnic and when she got up to get something to drink she passed out she was sat in the chair after a few minutes she felt better.  She denies any seizures any tongue bite or urinary or fecal incontinence.  Family members say that she pale in color and had left face.  A similar episode occurred on day of admission this week when she was running errands and walking to her car she felt like she was going to pass out.  This time she did report some palpitations.  She had no chest pain no shortness of breath no fever chills rigors.  There is no nausea vomiting diarrhea.  She denied any dysuria or gross hematuria.    She has had diabetes mellitus type 2 for at least 20 years and sees [facility] and has had injections in both eyes so assume she has retinopathy  She has had high blood pressure for 20 to 25 years.  She also had high blood pressure when she was pregnant with her first child 38 years ago  There is no history of kidney stone disease  There is no history of hematuria or dysuria    Medications prior to admission:  chlorthalidone, amlodipine, furosemide, metformin, and metoprolol along with valsartan    Past Medical History:        Diagnosis Date    Diabetes mellitus (HCC)     Hypertension        Past Surgical History:        Procedure Laterality Date    BREAST BIOPSY      1980's benign        Current Medications:    No current facility-administered medications on file prior to  file     Social Determinants of Health     Financial Resource Strain: Not on file   Food Insecurity: No Food Insecurity (6/18/2024)    Hunger Vital Sign     Worried About Running Out of Food in the Last Year: Never true     Ran Out of Food in the Last Year: Never true   Transportation Needs: No Transportation Needs (6/18/2024)    PRAPARE - Transportation     Lack of Transportation (Medical): No     Lack of Transportation (Non-Medical): No   Physical Activity: Not on file   Stress: Not on file   Social Connections: Not on file   Intimate Partner Violence: Not on file   Housing Stability: Low Risk  (6/18/2024)    Housing Stability Vital Sign     Unable to Pay for Housing in the Last Year: No     Number of Places Lived in the Last Year: 1     Unstable Housing in the Last Year: No       Family History:       Problem Relation Age of Onset    Diabetes Father     Diabetes Maternal Grandmother     Diabetes Maternal Grandfather     Diabetes Paternal Grandmother     Diabetes Paternal Grandfather          REVIEW OF SYSTEMS:      10 pt ROS done, relevant features as in [place], rest negative       PHYSICAL EXAM:    Vitals:    BP (!) 205/90   Pulse 72   Temp 97.2 °F (36.2 °C)   Resp 16   Ht 1.727 m (5' 8\")   Wt 81.6 kg (179 lb 12.8 oz)   LMP 06/01/2011   SpO2 100%   BMI 27.34 kg/m²   I/O last 3 completed shifts:  In: 360 [P.O.:360]  Out: 1550 [Urine:1550]  No intake/output data recorded.    Physical Exam:  Gen:  alert, oriented x 3  PERRL , EOM +  Pallor +, No icterus  JVP not raised   CV: RRR no murmur or rub .  Lungs:B/ L air entry, Normal breath sounds   Abd: soft, bowel sounds + , No organomegaly   Ext: No edema, no cyanosis  Skin: Warm.  No rash  Neuro: nonfocal.      DATA:    CBC with Differential:    Lab Results   Component Value Date/Time    WBC 6.2 06/19/2024 05:26 AM    RBC 3.92 06/19/2024 05:26 AM    HGB 10.6 06/19/2024 05:26 AM    HCT 32.0 06/19/2024 05:26 AM     06/19/2024 05:26 AM    MCV 81.5 06/19/2024

## 2024-06-19 NOTE — PROGRESS NOTES
Pharmacy Home Medication Reconciliation Note    A medication reconciliation has been completed for Galina Nick 1965    Pharmacy: [facility] [address], [place]  Information provided by: patient    The patient's home medication list is as follows:  No current facility-administered medications on file prior to encounter.     Current Outpatient Medications on File Prior to Encounter   Medication Sig Dispense Refill    chlorthalidone (HYGROTON) 25 MG tablet Take 1 tablet by mouth daily 90 tablet 1    amLODIPine (NORVASC) 5 MG tablet Take 1 tablet by mouth daily 90 tablet 1    valsartan (DIOVAN) 320 MG tablet Take 1 tablet by mouth daily 90 tablet 1    furosemide (LASIX) 20 MG tablet Take 2 tablets by mouth daily (Patient taking differently: Take 2 tablets by mouth daily as needed (Swelling.)) 60 tablet 3    aspirin 81 MG EC tablet Take 1 tablet by mouth daily 90 tablet 3    rosuvastatin (CRESTOR) 20 MG tablet Take 1 tablet by mouth daily (Patient not taking: Reported on 6/18/2024) 90 tablet 1    Dulaglutide (TRULICITY) 0.75 MG/0.5ML SOPN INJECT 0.75 MG UNDER THE SKIN ONCE WEEKLY (Patient taking differently: Inject 0.5 mLs into the skin once a week INJECT 0.75 MG UNDER THE SKIN ONCE WEEKLY (Sundays).) 6 mL 0    metoprolol succinate (TOPROL XL) 50 MG extended release tablet Take 1 tablet by mouth daily 90 tablet 3    [DISCONTINUED] dulaglutide (TRULICITY) 1.5 MG/0.5ML SC injection Inject 0.5 mLs into the skin once a week Sundays. (Patient not taking: Reported on 5/10/2024) 1 Adjustable Dose Pre-filled Pen Syringe 0    Omega-3 Fatty Acids (FISH OIL) 1200 MG CAPS Take 1,200 mg by mouth nightly      Cholecalciferol (VITAMIN D3) 10 MCG (400 UNIT) CAPS Take 1 capsule by mouth nightly      metFORMIN (GLUCOPHAGE) 1000 MG tablet Take 1 tablet by mouth 2 times daily (with meals)      Multiple Vitamins-Minerals (MULTIVITAMIN ADULTS PO) Take 1 tablet by mouth nightly         Patient is no longer taking

## 2024-06-19 NOTE — CARE COORDINATION
Discharge Planning Note:  Chart reviewed for discharge needs  and it appears that patient has minimal needs for discharge at this time.   Risk Score 13 %     Primary Care Physician is Tristan Lei MD    Primary insurance is [facility]    Please notify case management if any discharge needs are identified.      Case management will continue to follow progress and update discharge plan as needed.

## 2024-06-19 NOTE — CONSULTS
evaluation will be based upon the patient's clinical course and testing results.    All questions and concerns were addressed to the patient/family. Alternatives to my treatment were discussed. The note was completed using EMR. Every effort was made to ensure accuracy; however, inadvertent computerized transcription errors may be present.    Timo Gamez M.D.

## 2024-06-20 LAB
ANA SER QL IA: NEGATIVE
ANION GAP SERPL CALCULATED.3IONS-SCNC: 8 MMOL/L (ref 3–16)
BASOPHILS # BLD: 0.1 K/UL (ref 0–0.2)
BASOPHILS NFR BLD: 1 %
BUN SERPL-MCNC: 25 MG/DL (ref 7–20)
CALCIUM SERPL-MCNC: 8.6 MG/DL (ref 8.3–10.6)
CHLORIDE SERPL-SCNC: 108 MMOL/L (ref 99–110)
CO2 SERPL-SCNC: 25 MMOL/L (ref 21–32)
CREAT SERPL-MCNC: 1.9 MG/DL (ref 0.6–1.1)
CREAT UR-MCNC: 61.4 MG/DL (ref 28–259)
DEPRECATED RDW RBC AUTO: 14.5 % (ref 12.4–15.4)
EOSINOPHIL # BLD: 0.2 K/UL (ref 0–0.6)
EOSINOPHIL NFR BLD: 2.5 %
EST. AVERAGE GLUCOSE BLD GHB EST-MCNC: 214.5 MG/DL
GFR SERPLBLD CREATININE-BSD FMLA CKD-EPI: 30 ML/MIN/{1.73_M2}
GLUCOSE BLD-MCNC: 129 MG/DL (ref 70–99)
GLUCOSE BLD-MCNC: 180 MG/DL (ref 70–99)
GLUCOSE BLD-MCNC: 240 MG/DL (ref 70–99)
GLUCOSE BLD-MCNC: 253 MG/DL (ref 70–99)
GLUCOSE SERPL-MCNC: 270 MG/DL (ref 70–99)
HBA1C MFR BLD: 9.1 %
HCT VFR BLD AUTO: 29.8 % (ref 36–48)
HGB BLD-MCNC: 10.1 G/DL (ref 12–16)
LYMPHOCYTES # BLD: 2.4 K/UL (ref 1–5.1)
LYMPHOCYTES NFR BLD: 36.3 %
MCH RBC QN AUTO: 27.4 PG (ref 26–34)
MCHC RBC AUTO-ENTMCNC: 34 G/DL (ref 31–36)
MCV RBC AUTO: 80.6 FL (ref 80–100)
MONOCYTES # BLD: 0.5 K/UL (ref 0–1.3)
MONOCYTES NFR BLD: 8 %
NEUTROPHILS # BLD: 3.5 K/UL (ref 1.7–7.7)
NEUTROPHILS NFR BLD: 52.2 %
PERFORMED ON: ABNORMAL
PLATELET # BLD AUTO: 234 K/UL (ref 135–450)
PMV BLD AUTO: 8.7 FL (ref 5–10.5)
POTASSIUM SERPL-SCNC: 3.8 MMOL/L (ref 3.5–5.1)
PROT UR-MCNC: 0.54 G/DL
PROT UR-MCNC: 542 MG/DL
RBC # BLD AUTO: 3.7 M/UL (ref 4–5.2)
SODIUM SERPL-SCNC: 141 MMOL/L (ref 136–145)
SODIUM UR-SCNC: 79 MMOL/L
WBC # BLD AUTO: 6.6 K/UL (ref 4–11)

## 2024-06-20 PROCEDURE — 6370000000 HC RX 637 (ALT 250 FOR IP): Performed by: NURSE PRACTITIONER

## 2024-06-20 PROCEDURE — 6370000000 HC RX 637 (ALT 250 FOR IP): Performed by: PEDIATRICS

## 2024-06-20 PROCEDURE — 6370000000 HC RX 637 (ALT 250 FOR IP): Performed by: STUDENT IN AN ORGANIZED HEALTH CARE EDUCATION/TRAINING PROGRAM

## 2024-06-20 PROCEDURE — 2580000003 HC RX 258: Performed by: PEDIATRICS

## 2024-06-20 PROCEDURE — 2580000003 HC RX 258: Performed by: INTERNAL MEDICINE

## 2024-06-20 PROCEDURE — 86038 ANTINUCLEAR ANTIBODIES: CPT

## 2024-06-20 PROCEDURE — 85025 COMPLETE CBC W/AUTO DIFF WBC: CPT

## 2024-06-20 PROCEDURE — 6360000002 HC RX W HCPCS: Performed by: PEDIATRICS

## 2024-06-20 PROCEDURE — 99232 SBSQ HOSP IP/OBS MODERATE 35: CPT | Performed by: INTERNAL MEDICINE

## 2024-06-20 PROCEDURE — 36415 COLL VENOUS BLD VENIPUNCTURE: CPT

## 2024-06-20 PROCEDURE — 80048 BASIC METABOLIC PNL TOTAL CA: CPT

## 2024-06-20 PROCEDURE — 1200000000 HC SEMI PRIVATE

## 2024-06-20 RX ORDER — INSULIN GLARGINE 100 [IU]/ML
35 INJECTION, SOLUTION SUBCUTANEOUS DAILY
Status: DISCONTINUED | OUTPATIENT
Start: 2024-06-20 | End: 2024-06-27 | Stop reason: HOSPADM

## 2024-06-20 RX ORDER — HYDRALAZINE HYDROCHLORIDE 25 MG/1
25 TABLET, FILM COATED ORAL EVERY 8 HOURS SCHEDULED
Status: DISCONTINUED | OUTPATIENT
Start: 2024-06-20 | End: 2024-06-21

## 2024-06-20 RX ORDER — VALSARTAN 160 MG/1
320 TABLET ORAL DAILY
Status: DISCONTINUED | OUTPATIENT
Start: 2024-06-20 | End: 2024-06-22

## 2024-06-20 RX ADMIN — AMLODIPINE BESYLATE 10 MG: 5 TABLET ORAL at 08:35

## 2024-06-20 RX ADMIN — METOPROLOL SUCCINATE 50 MG: 50 TABLET, EXTENDED RELEASE ORAL at 08:35

## 2024-06-20 RX ADMIN — HYDRALAZINE HYDROCHLORIDE 25 MG: 25 TABLET ORAL at 20:19

## 2024-06-20 RX ADMIN — SODIUM CHLORIDE, PRESERVATIVE FREE 10 ML: 5 INJECTION INTRAVENOUS at 08:37

## 2024-06-20 RX ADMIN — INSULIN LISPRO 8 UNITS: 100 INJECTION, SOLUTION INTRAVENOUS; SUBCUTANEOUS at 08:36

## 2024-06-20 RX ADMIN — ASPIRIN 81 MG: 81 TABLET, COATED ORAL at 08:35

## 2024-06-20 RX ADMIN — INSULIN LISPRO 4 UNITS: 100 INJECTION, SOLUTION INTRAVENOUS; SUBCUTANEOUS at 17:55

## 2024-06-20 RX ADMIN — SODIUM CHLORIDE, POTASSIUM CHLORIDE, SODIUM LACTATE AND CALCIUM CHLORIDE: 600; 310; 30; 20 INJECTION, SOLUTION INTRAVENOUS at 21:27

## 2024-06-20 RX ADMIN — INSULIN GLARGINE 35 UNITS: 100 INJECTION, SOLUTION SUBCUTANEOUS at 08:35

## 2024-06-20 RX ADMIN — ENOXAPARIN SODIUM 40 MG: 100 INJECTION SUBCUTANEOUS at 08:36

## 2024-06-20 RX ADMIN — Medication 400 UNITS: at 20:18

## 2024-06-20 RX ADMIN — SODIUM CHLORIDE, POTASSIUM CHLORIDE, SODIUM LACTATE AND CALCIUM CHLORIDE: 600; 310; 30; 20 INJECTION, SOLUTION INTRAVENOUS at 05:56

## 2024-06-20 RX ADMIN — HYDRALAZINE HYDROCHLORIDE 25 MG: 25 TABLET ORAL at 14:05

## 2024-06-20 RX ADMIN — Medication 1 TABLET: at 20:18

## 2024-06-20 RX ADMIN — SODIUM CHLORIDE, PRESERVATIVE FREE 10 ML: 5 INJECTION INTRAVENOUS at 20:19

## 2024-06-20 RX ADMIN — POTASSIUM BICARBONATE 40 MEQ: 782 TABLET, EFFERVESCENT ORAL at 00:17

## 2024-06-20 ASSESSMENT — PAIN SCALES - GENERAL
PAINLEVEL_OUTOF10: 0

## 2024-06-20 NOTE — PROGRESS NOTES
[facility] [place] Daily Progress Note      Admit Date:  6/18/2024      Cardiology consult: Palpitations    Subjective:  Ms. Nick denies chest discomfort or shortness of breath..       History of present illness:   Galina Nick has a  PMH of severe hypertension, diabetes mellitus, renal artery stenosis, CAD who presented to the hospital with complaints of nearly passing out.  Sunday had an episode of feeling light headed as though she was going to pass out. Had another episode yesterday.  Felt her heart racing with yesterday's episode.  Patient is well-known to me.     Objective:   BP (!) 156/83   Pulse 68   Temp 97.2 °F (36.2 °C)   Resp 16   Ht 1.727 m (5' 8\")   Wt 82.7 kg (182 lb 4.8 oz)   LMP 06/01/2011   SpO2 98%   BMI 27.72 kg/m²     Intake/Output Summary (Last 24 hours) at 6/20/2024 1154  Last data filed at 6/20/2024 0200  Gross per 24 hour   Intake 240 ml   Output --   Net 240 ml       Physical Exam:  General:  Awake, alert, oriented x 3, NAD  Skin:  Warm and dry  Neck:  JVD normal  Chest:  normal air entry  Cardiovascular:  RRR S1S2, no S3, 2/6 systolic at 2nd left intercostal space  Abdomen:  Soft, ND, NT, No HSM  Extremities:  No edema    Medications:    insulin glargine  35 Units SubCUTAneous Daily    hydrALAZINE  25 mg Oral 3 times per day    valsartan  320 mg Oral Daily    insulin lispro  0-16 Units SubCUTAneous TID WC    insulin lispro  0-4 Units SubCUTAneous Nightly    amLODIPine  10 mg Oral Daily    sodium chloride flush  5-40 mL IntraVENous 2 times per day    enoxaparin  40 mg SubCUTAneous Daily    aspirin  81 mg Oral Daily    [Held by provider] chlorthalidone  25 mg Oral Daily    cholecalciferol  400 Units Oral Nightly    metoprolol succinate  50 mg Oral Daily    therapeutic multivitamin-minerals  1 tablet Oral Nightly      dextrose      sodium chloride      lactated ringers IV soln 100 mL/hr at 06/20/24 0556     dextrose bolus **OR** dextrose bolus, glucagon (rDNA), dextrose,

## 2024-06-20 NOTE — PROGRESS NOTES
06/19/2024 12:00 AM    PROTEINU >=1000 06/19/2024 12:00 AM    PHUR 6.5 06/19/2024 12:00 AM    PHUR 6.0 01/23/2024 05:24 PM    WBCUA 2 06/19/2024 12:00 AM    RBCUA 5 06/19/2024 12:00 AM    MUCUS Present 06/19/2024 12:00 AM    BACTERIA None Seen 06/19/2024 12:00 AM    CLARITYU Clear 06/19/2024 12:00 AM    LEUKOCYTESUR Negative 06/19/2024 12:00 AM    UROBILINOGEN 0.2 06/19/2024 12:00 AM    BILIRUBINUR Negative 06/19/2024 12:00 AM    BLOODU SMALL 06/19/2024 12:00 AM    GLUCOSEU 500 06/19/2024 12:00 AM    GLUCOSEU >=1000 07/14/2011 07:38 PM    AMORPHOUS Present 06/19/2024 12:00 AM           IMPRESSION/RECOMMENDATIONS:      Syncope:Being investigated    Hypertension  She per home meds is both on chlorthalidone and furosemide, will discontinue furosemide   Restart Valsartan     Renal artery stenosis    Progressive CKD 3 b   Creatinine 1.9  fractional excretion sodium> 1  Significant proteinuria  Per primary nephrologist Dr. Winslow  currently likely due to diabetic nephropathy/hypertension    Renal osteodystrophy  Phosphorus 4.5    Proteinuria  P/Cr : 9  ABI- P  C3 normal at 145  C4 normal at 35  Iimmunofixation- p   Restart ARB   Coronary artery disease  Plans for cardiac cath  At this time I do not think we will get any more improvement in renal function  Would suggest we proceed with a cardiac cath if that is needed and minimize dye used  Would reduce IV hydration to 50/hr       Thank you for allowing me to participate in the care of this patient.  I will continue to follow along.  Please call with questions.    Romero Rodriguez MD, MD  6/20/2024  [facility]

## 2024-06-20 NOTE — PROGRESS NOTES
[facility]    6/20/2024 7:40 AM        Name: Galina Nick .              Admitted: 6/18/2024  Primary Care Provider: Tristan Lei MD (Tel: [phone])      Subjective:  .    Pt seen this am while resting in the bed  Feels well this am.  We reviewed her labs and BP results       Bg values elevated despite lantus/ humalog    Will increase lantus to 35 units q AM  On high dose correction    I increased CCB yesterday and added prn hydralazine.  BP remains elevated today       Reviewed interval ancillary notes    Current Medications  [Held by provider] valsartan (DIOVAN) tablet 320 mg, Daily  dextrose bolus 10% 125 mL, PRN   Or  dextrose bolus 10% 250 mL, PRN  glucagon injection 1 mg, PRN  dextrose 10 % infusion, Continuous PRN  insulin glargine (LANTUS) injection vial 25 Units, Daily  insulin lispro (HUMALOG,ADMELOG) injection vial 0-16 Units, TID WC  insulin lispro (HUMALOG,ADMELOG) injection vial 0-4 Units, Nightly  amLODIPine (NORVASC) tablet 10 mg, Daily  hydrALAZINE (APRESOLINE) injection 10 mg, Q6H PRN  sodium chloride flush 0.9 % injection 5-40 mL, 2 times per day  sodium chloride flush 0.9 % injection 5-40 mL, PRN  0.9 % sodium chloride infusion, PRN  potassium chloride (KLOR-CON M) extended release tablet 40 mEq, PRN   Or  potassium bicarb-citric acid (EFFER-K) effervescent tablet 40 mEq, PRN   Or  potassium chloride 10 mEq/100 mL IVPB (Peripheral Line), PRN  magnesium sulfate 2000 mg in 50 mL IVPB premix, PRN  enoxaparin (LOVENOX) injection 40 mg, Daily  ondansetron (ZOFRAN-ODT) disintegrating tablet 4 mg, Q8H PRN   Or  ondansetron (ZOFRAN) injection 4 mg, Q6H PRN  polyethylene glycol (GLYCOLAX) packet 17 g, Daily PRN  acetaminophen (TYLENOL) tablet 650 mg, Q6H PRN   Or  acetaminophen (TYLENOL) suppository 650 mg, Q6H PRN  lactated ringers IV soln infusion, Continuous  aspirin EC tablet 81 mg, Daily  [Held by provider] chlorthalidone (HYGROTON) tablet 25

## 2024-06-20 NOTE — PLAN OF CARE
Problem: Discharge Planning  Goal: Discharge to home or other facility with appropriate resources  Outcome: Progressing     Problem: Pain  Goal: Verbalizes/displays adequate comfort level or baseline comfort level  6/20/2024 1258 by Calli Langford RN  Outcome: Progressing     Problem: Safety - Adult  Goal: Free from fall injury  6/20/2024 1258 by Calli Langford, RN  Outcome: Progressing    Problem: ABCDS Injury Assessment  Goal: Absence of physical injury  Outcome: Progressing

## 2024-06-20 NOTE — PROGRESS NOTES
Physician Progress Note      PATIENT:               ASHLY VELEZ  [facility] #:                  [id number]  :                       1965  ADMIT DATE:       2024 6:03 PM  DISCH DATE:  RESPONDING  PROVIDER #:        ROSELYN Still CNP          QUERY TEXT:    Patient admitted with JAYY on CKD.  Noted documentation of Acute Kidney Injury   in  H&P and  PN.  In order to support the diagnosis of JAYY, please include additional clinical   indicators and baseline creatinine in your documentation.? Or please document   if the diagnosis of JAYY has been ruled out after further study.    The medical record reflects the following:  Risk Factors: 58 y.o. female with PMH of HTN, DM, CKD  Clinical Indicators: Last recorded creatinine 1.6 from 24 Presenting   creatinine 2.1  Treatment: IVF, Nephrology consult, Monitor daily labs    Defined by Kidney Disease Improving Global Outcomes (KDIGO) clinical practice   guideline for acute kidney injury:  -Increase in SCr by greater than or equal to 0.3 mg/dl within 48 hours; or  -Increase or decrease in SCr to greater than or equal to 1.5 times baseline,   which is known or presumed to have occurred within the prior 7 days; or  -Urine volume < 0.5ml/kg/h for 6 hours.  Options provided:  -- Acute kidney injury ruled out after study  -- Acute kidney injury evidenced by, Please document evidence as well as a   numerical baseline creatinine, if known.  -- Other - I will add my own diagnosis  -- Disagree - Not applicable / Not valid  -- Disagree - Clinically unable to determine / Unknown  -- Refer to Clinical Documentation Reviewer    PROVIDER RESPONSE TEXT:    This patient has acute kidney injury as evidenced by baseline creat of 1.3    Query created by: Matt Faye on 2024 2:48 PM      Electronically signed by:  ROSELYN Still CNP 2024 11:24   AM

## 2024-06-20 NOTE — PLAN OF CARE
Problem: Pain  Goal: Verbalizes/displays adequate comfort level or baseline comfort level  6/20/2024 0203 by Claudia Velásquez, RN  Outcome: Progressing     Problem: Safety - Adult  Goal: Free from fall injury  6/20/2024 0203 by Claudia Velásquez, RN  Outcome: Progressing

## 2024-06-21 PROBLEM — I21.4 NSTEMI (NON-ST ELEVATED MYOCARDIAL INFARCTION) (HCC): Status: ACTIVE | Noted: 2024-06-18

## 2024-06-21 LAB
ALBUMIN SERPL ELPH-MCNC: 2.2 G/DL (ref 3.1–4.9)
ALPHA1 GLOB SERPL ELPH-MCNC: 0.2 G/DL (ref 0.2–0.4)
ALPHA2 GLOB SERPL ELPH-MCNC: 0.7 G/DL (ref 0.4–1.1)
ANA SER QL IA: NEGATIVE
ANION GAP SERPL CALCULATED.3IONS-SCNC: 11 MMOL/L (ref 3–16)
B-GLOBULIN SERPL ELPH-MCNC: 1.2 G/DL (ref 0.9–1.6)
BACTERIA URNS QL MICRO: NORMAL /HPF
BASOPHILS # BLD: 0.1 K/UL (ref 0–0.2)
BASOPHILS NFR BLD: 1 %
BILIRUB UR QL STRIP.AUTO: NEGATIVE
BUN SERPL-MCNC: 22 MG/DL (ref 7–20)
CALCIUM SERPL-MCNC: 8.6 MG/DL (ref 8.3–10.6)
CHLORIDE SERPL-SCNC: 108 MMOL/L (ref 99–110)
CLARITY UR: CLEAR
CO2 SERPL-SCNC: 25 MMOL/L (ref 21–32)
COLOR UR: YELLOW
CREAT SERPL-MCNC: 1.9 MG/DL (ref 0.6–1.1)
DEPRECATED RDW RBC AUTO: 14.6 % (ref 12.4–15.4)
ECHO BSA: 1.98 M2
EOSINOPHIL # BLD: 0.2 K/UL (ref 0–0.6)
EOSINOPHIL NFR BLD: 3.3 %
EPI CELLS #/AREA URNS AUTO: 1 /HPF (ref 0–5)
GAMMA GLOB SERPL ELPH-MCNC: 0.6 G/DL (ref 0.6–1.8)
GFR SERPLBLD CREATININE-BSD FMLA CKD-EPI: 30 ML/MIN/{1.73_M2}
GLUCOSE BLD-MCNC: 154 MG/DL (ref 70–99)
GLUCOSE BLD-MCNC: 158 MG/DL (ref 70–99)
GLUCOSE BLD-MCNC: 168 MG/DL (ref 70–99)
GLUCOSE BLD-MCNC: 310 MG/DL (ref 70–99)
GLUCOSE SERPL-MCNC: 318 MG/DL (ref 70–99)
GLUCOSE UR STRIP.AUTO-MCNC: 500 MG/DL
HCT VFR BLD AUTO: 29.6 % (ref 36–48)
HGB BLD-MCNC: 10.1 G/DL (ref 12–16)
HGB UR QL STRIP.AUTO: NEGATIVE
HYALINE CASTS #/AREA URNS AUTO: 2 /LPF (ref 0–8)
KETONES UR STRIP.AUTO-MCNC: NEGATIVE MG/DL
LEUKOCYTE ESTERASE UR QL STRIP.AUTO: NEGATIVE
LYMPHOCYTES # BLD: 1.9 K/UL (ref 1–5.1)
LYMPHOCYTES NFR BLD: 29.8 %
MCH RBC QN AUTO: 27.4 PG (ref 26–34)
MCHC RBC AUTO-ENTMCNC: 34 G/DL (ref 31–36)
MCV RBC AUTO: 80.6 FL (ref 80–100)
MONOCYTES # BLD: 0.5 K/UL (ref 0–1.3)
MONOCYTES NFR BLD: 8.6 %
NEUTROPHILS # BLD: 3.7 K/UL (ref 1.7–7.7)
NEUTROPHILS NFR BLD: 57.3 %
NITRITE UR QL STRIP.AUTO: NEGATIVE
PERFORMED ON: ABNORMAL
PH UR STRIP.AUTO: 7 [PH] (ref 5–8)
PLATELET # BLD AUTO: 248 K/UL (ref 135–450)
PMV BLD AUTO: 9.3 FL (ref 5–10.5)
POTASSIUM SERPL-SCNC: 3.6 MMOL/L (ref 3.5–5.1)
PROT PATTERN UR ELPH-IMP: NORMAL
PROT SERPL-MCNC: 4.9 G/DL (ref 6.4–8.2)
PROT UR STRIP.AUTO-MCNC: >=1000 MG/DL
RBC # BLD AUTO: 3.68 M/UL (ref 4–5.2)
RBC CLUMPS #/AREA URNS AUTO: 4 /HPF (ref 0–4)
SODIUM SERPL-SCNC: 144 MMOL/L (ref 136–145)
SP GR UR STRIP.AUTO: 1.02 (ref 1–1.03)
SPE/IFE INTERPRETATION: NORMAL
UA COMPLETE W REFLEX CULTURE PNL UR: ABNORMAL
UA DIPSTICK W REFLEX MICRO PNL UR: YES
URN SPEC COLLECT METH UR: ABNORMAL
UROBILINOGEN UR STRIP-ACNC: 0.2 E.U./DL
WBC # BLD AUTO: 6.4 K/UL (ref 4–11)
WBC #/AREA URNS AUTO: 2 /HPF (ref 0–5)

## 2024-06-21 PROCEDURE — 2500000003 HC RX 250 WO HCPCS

## 2024-06-21 PROCEDURE — B2111ZZ FLUOROSCOPY OF MULTIPLE CORONARY ARTERIES USING LOW OSMOLAR CONTRAST: ICD-10-PCS | Performed by: INTERNAL MEDICINE

## 2024-06-21 PROCEDURE — 85025 COMPLETE CBC W/AUTO DIFF WBC: CPT

## 2024-06-21 PROCEDURE — 80048 BASIC METABOLIC PNL TOTAL CA: CPT

## 2024-06-21 PROCEDURE — 2709999900 HC NON-CHARGEABLE SUPPLY: Performed by: INTERNAL MEDICINE

## 2024-06-21 PROCEDURE — 99233 SBSQ HOSP IP/OBS HIGH 50: CPT | Performed by: INTERNAL MEDICINE

## 2024-06-21 PROCEDURE — 93458 L HRT ARTERY/VENTRICLE ANGIO: CPT

## 2024-06-21 PROCEDURE — 6360000002 HC RX W HCPCS

## 2024-06-21 PROCEDURE — 6370000000 HC RX 637 (ALT 250 FOR IP): Performed by: NURSE PRACTITIONER

## 2024-06-21 PROCEDURE — 6370000000 HC RX 637 (ALT 250 FOR IP): Performed by: INTERNAL MEDICINE

## 2024-06-21 PROCEDURE — 93458 L HRT ARTERY/VENTRICLE ANGIO: CPT | Performed by: INTERNAL MEDICINE

## 2024-06-21 PROCEDURE — 2580000003 HC RX 258: Performed by: INTERNAL MEDICINE

## 2024-06-21 PROCEDURE — 2500000003 HC RX 250 WO HCPCS: Performed by: INTERNAL MEDICINE

## 2024-06-21 PROCEDURE — 99152 MOD SED SAME PHYS/QHP 5/>YRS: CPT | Performed by: INTERNAL MEDICINE

## 2024-06-21 PROCEDURE — B2151ZZ FLUOROSCOPY OF LEFT HEART USING LOW OSMOLAR CONTRAST: ICD-10-PCS | Performed by: INTERNAL MEDICINE

## 2024-06-21 PROCEDURE — C1769 GUIDE WIRE: HCPCS | Performed by: INTERNAL MEDICINE

## 2024-06-21 PROCEDURE — C1894 INTRO/SHEATH, NON-LASER: HCPCS | Performed by: INTERNAL MEDICINE

## 2024-06-21 PROCEDURE — 6370000000 HC RX 637 (ALT 250 FOR IP): Performed by: PEDIATRICS

## 2024-06-21 PROCEDURE — 6360000004 HC RX CONTRAST MEDICATION: Performed by: INTERNAL MEDICINE

## 2024-06-21 PROCEDURE — 2060000000 HC ICU INTERMEDIATE R&B

## 2024-06-21 PROCEDURE — 4A023N7 MEASUREMENT OF CARDIAC SAMPLING AND PRESSURE, LEFT HEART, PERCUTANEOUS APPROACH: ICD-10-PCS | Performed by: INTERNAL MEDICINE

## 2024-06-21 PROCEDURE — 2580000003 HC RX 258

## 2024-06-21 PROCEDURE — 87086 URINE CULTURE/COLONY COUNT: CPT

## 2024-06-21 PROCEDURE — 81001 URINALYSIS AUTO W/SCOPE: CPT

## 2024-06-21 PROCEDURE — 99153 MOD SED SAME PHYS/QHP EA: CPT | Performed by: INTERNAL MEDICINE

## 2024-06-21 PROCEDURE — 6360000002 HC RX W HCPCS: Performed by: INTERNAL MEDICINE

## 2024-06-21 PROCEDURE — 2580000003 HC RX 258: Performed by: PEDIATRICS

## 2024-06-21 PROCEDURE — 36415 COLL VENOUS BLD VENIPUNCTURE: CPT

## 2024-06-21 RX ORDER — OXYCODONE HYDROCHLORIDE AND ACETAMINOPHEN 5; 325 MG/1; MG/1
1 TABLET ORAL EVERY 4 HOURS PRN
Status: DISCONTINUED | OUTPATIENT
Start: 2024-06-21 | End: 2024-06-27 | Stop reason: HOSPADM

## 2024-06-21 RX ORDER — ACETAMINOPHEN 325 MG/1
650 TABLET ORAL EVERY 4 HOURS PRN
Status: DISCONTINUED | OUTPATIENT
Start: 2024-06-21 | End: 2024-06-27 | Stop reason: HOSPADM

## 2024-06-21 RX ORDER — SODIUM CHLORIDE 0.9 % (FLUSH) 0.9 %
5-40 SYRINGE (ML) INJECTION PRN
Status: DISCONTINUED | OUTPATIENT
Start: 2024-06-21 | End: 2024-06-27 | Stop reason: HOSPADM

## 2024-06-21 RX ORDER — SODIUM CHLORIDE 9 MG/ML
INJECTION, SOLUTION INTRAVENOUS CONTINUOUS
Status: DISCONTINUED | OUTPATIENT
Start: 2024-06-21 | End: 2024-06-22

## 2024-06-21 RX ORDER — MIDAZOLAM HYDROCHLORIDE 1 MG/ML
INJECTION INTRAMUSCULAR; INTRAVENOUS PRN
Status: DISCONTINUED | OUTPATIENT
Start: 2024-06-21 | End: 2024-06-21 | Stop reason: HOSPADM

## 2024-06-21 RX ORDER — SODIUM CHLORIDE 9 MG/ML
INJECTION, SOLUTION INTRAVENOUS CONTINUOUS
Status: DISCONTINUED | OUTPATIENT
Start: 2024-06-21 | End: 2024-06-21 | Stop reason: HOSPADM

## 2024-06-21 RX ORDER — INSULIN LISPRO 100 [IU]/ML
6 INJECTION, SOLUTION INTRAVENOUS; SUBCUTANEOUS
Status: DISCONTINUED | OUTPATIENT
Start: 2024-06-21 | End: 2024-06-23

## 2024-06-21 RX ORDER — CIPROFLOXACIN 500 MG/1
500 TABLET, FILM COATED ORAL DAILY
Status: COMPLETED | OUTPATIENT
Start: 2024-06-21 | End: 2024-06-23

## 2024-06-21 RX ORDER — LABETALOL 200 MG/1
100 TABLET, FILM COATED ORAL EVERY 12 HOURS SCHEDULED
Status: DISCONTINUED | OUTPATIENT
Start: 2024-06-21 | End: 2024-06-21

## 2024-06-21 RX ORDER — FENTANYL CITRATE 50 UG/ML
INJECTION, SOLUTION INTRAMUSCULAR; INTRAVENOUS PRN
Status: DISCONTINUED | OUTPATIENT
Start: 2024-06-21 | End: 2024-06-21 | Stop reason: HOSPADM

## 2024-06-21 RX ORDER — SODIUM CHLORIDE 9 MG/ML
INJECTION, SOLUTION INTRAVENOUS PRN
Status: DISCONTINUED | OUTPATIENT
Start: 2024-06-21 | End: 2024-06-27 | Stop reason: HOSPADM

## 2024-06-21 RX ORDER — SODIUM CHLORIDE 0.9 % (FLUSH) 0.9 %
5-40 SYRINGE (ML) INJECTION PRN
Status: DISCONTINUED | OUTPATIENT
Start: 2024-06-21 | End: 2024-06-21 | Stop reason: HOSPADM

## 2024-06-21 RX ORDER — OXYCODONE HYDROCHLORIDE AND ACETAMINOPHEN 5; 325 MG/1; MG/1
2 TABLET ORAL EVERY 4 HOURS PRN
Status: DISCONTINUED | OUTPATIENT
Start: 2024-06-21 | End: 2024-06-27 | Stop reason: HOSPADM

## 2024-06-21 RX ORDER — SODIUM CHLORIDE 9 MG/ML
INJECTION, SOLUTION INTRAVENOUS PRN
Status: DISCONTINUED | OUTPATIENT
Start: 2024-06-21 | End: 2024-06-21 | Stop reason: SDUPTHER

## 2024-06-21 RX ORDER — INSULIN LISPRO 100 [IU]/ML
0-4 INJECTION, SOLUTION INTRAVENOUS; SUBCUTANEOUS NIGHTLY
Status: DISCONTINUED | OUTPATIENT
Start: 2024-06-21 | End: 2024-06-25

## 2024-06-21 RX ORDER — SODIUM CHLORIDE 0.9 % (FLUSH) 0.9 %
5-40 SYRINGE (ML) INJECTION EVERY 12 HOURS SCHEDULED
Status: DISCONTINUED | OUTPATIENT
Start: 2024-06-21 | End: 2024-06-27 | Stop reason: HOSPADM

## 2024-06-21 RX ORDER — LABETALOL 200 MG/1
200 TABLET, FILM COATED ORAL EVERY 12 HOURS SCHEDULED
Status: DISCONTINUED | OUTPATIENT
Start: 2024-06-21 | End: 2024-06-21

## 2024-06-21 RX ORDER — LABETALOL 200 MG/1
100 TABLET, FILM COATED ORAL EVERY 12 HOURS SCHEDULED
Status: DISCONTINUED | OUTPATIENT
Start: 2024-06-21 | End: 2024-06-23

## 2024-06-21 RX ORDER — INSULIN LISPRO 100 [IU]/ML
0-4 INJECTION, SOLUTION INTRAVENOUS; SUBCUTANEOUS
Status: DISCONTINUED | OUTPATIENT
Start: 2024-06-21 | End: 2024-06-25

## 2024-06-21 RX ORDER — SODIUM CHLORIDE 0.9 % (FLUSH) 0.9 %
5-40 SYRINGE (ML) INJECTION EVERY 12 HOURS SCHEDULED
Status: DISCONTINUED | OUTPATIENT
Start: 2024-06-21 | End: 2024-06-21 | Stop reason: HOSPADM

## 2024-06-21 RX ORDER — LIDOCAINE HYDROCHLORIDE 10 MG/ML
INJECTION, SOLUTION INFILTRATION; PERINEURAL PRN
Status: DISCONTINUED | OUTPATIENT
Start: 2024-06-21 | End: 2024-06-21 | Stop reason: HOSPADM

## 2024-06-21 RX ADMIN — SODIUM CHLORIDE, PRESERVATIVE FREE 10 ML: 5 INJECTION INTRAVENOUS at 20:47

## 2024-06-21 RX ADMIN — ASPIRIN 81 MG: 81 TABLET, COATED ORAL at 08:17

## 2024-06-21 RX ADMIN — CIPROFLOXACIN HYDROCHLORIDE 500 MG: 500 TABLET, FILM COATED ORAL at 16:23

## 2024-06-21 RX ADMIN — METOPROLOL SUCCINATE 50 MG: 50 TABLET, EXTENDED RELEASE ORAL at 08:17

## 2024-06-21 RX ADMIN — SODIUM CHLORIDE: 9 INJECTION, SOLUTION INTRAVENOUS at 10:23

## 2024-06-21 RX ADMIN — INSULIN LISPRO 3 UNITS: 100 INJECTION, SOLUTION INTRAVENOUS; SUBCUTANEOUS at 08:17

## 2024-06-21 RX ADMIN — Medication 1 TABLET: at 20:45

## 2024-06-21 RX ADMIN — Medication 400 UNITS: at 20:45

## 2024-06-21 RX ADMIN — AMLODIPINE BESYLATE 10 MG: 5 TABLET ORAL at 08:17

## 2024-06-21 RX ADMIN — HYDRALAZINE HYDROCHLORIDE 25 MG: 25 TABLET ORAL at 04:09

## 2024-06-21 RX ADMIN — SODIUM CHLORIDE: 9 INJECTION, SOLUTION INTRAVENOUS at 19:30

## 2024-06-21 RX ADMIN — LABETALOL HYDROCHLORIDE 100 MG: 200 TABLET, FILM COATED ORAL at 16:23

## 2024-06-21 RX ADMIN — INSULIN GLARGINE 35 UNITS: 100 INJECTION, SOLUTION SUBCUTANEOUS at 08:17

## 2024-06-21 RX ADMIN — SODIUM CHLORIDE, PRESERVATIVE FREE 10 ML: 5 INJECTION INTRAVENOUS at 08:24

## 2024-06-21 ASSESSMENT — PAIN SCALES - GENERAL
PAINLEVEL_OUTOF10: 0

## 2024-06-21 NOTE — SEDATION DOCUMENTATION
Brief Pre-Op Note/Sedation Assessment      Galina Nick  1965  [id number]  9:15 AM    Planned Procedure: Cardiac Catheterization Procedure  Post Procedure Plan: Return to same level of care  Consent: I have discussed with the patient and/or the patient representative the indication, alternatives, and the possible risks and/or complications of the planned procedure and the anesthesia methods. The patient and/or patient representative appear to understand and agree to proceed.        Chief Complaint:   Chest Pain/Pressure  Anginal Equivalent  NSTEMI      Indications for Cath Procedure:  Presentation:  ACS > 24 hrs, New Onset Angina <= 2 months, Worsening Angina, and Suspected CAD  2.  Anginal Classification within 2 weeks:  CCS IV - Inability to perform any activity without angina or angina at rest, i.e., severe limitation  3.  Angina Symptoms Assessment:  Atypical Chest Pain  4.  Heart Failure Class within last 2 weeks:  No symptoms  5.  Cardiovascular Instability:  No    Prior Ischemic Workup/Eval:  Pre-Procedural Medications: Yes: Aspirin, Beta Blockers, and STATIN  2.   Stress Test Completed?  Yes:  Stress or Imaging Studies Performed (within ANY time period):   Type:  Cardiac CTA  Results:  Positive:  Abnormal CTA/MRI Extent of Ischemia:  Intermediate    Does Patient need surgery?  Cath Valve Surgery:  No    Pre-Procedure Medical History:  Vital Signs:  BP (!) 192/89   Pulse 77   Temp 97.9 °F (36.6 °C) (Temporal)   Resp 16   Ht 1.727 m (5' 8\")   Wt 82.8 kg (182 lb 9.6 oz)   LMP 06/01/2011   SpO2 99%   BMI 27.76 kg/m²     Allergies:    Allergies   Allergen Reactions    Ace Inhibitors      Dry cough per pt     Medications:    Current Facility-Administered Medications   Medication Dose Route Frequency Provider Last Rate Last Admin    insulin lispro (HUMALOG,ADMELOG) injection vial 6 Units  6 Units SubCUTAneous TID Juanita Solorio, APRN - CNP        insulin lispro (HUMALOG,ADMELOG)  Papito SPANN MD        Or    potassium bicarb-citric acid (EFFER-K) effervescent tablet 40 mEq  40 mEq Oral PRN Papito Esquivel MD   40 mEq at 06/20/24 0017    Or    potassium chloride 10 mEq/100 mL IVPB (Peripheral Line)  10 mEq IntraVENous PRN Papito Esquivel MD        magnesium sulfate 2000 mg in 50 mL IVPB premix  2,000 mg IntraVENous PRN Papito Esquivel MD        enoxaparin (LOVENOX) injection 40 mg  40 mg SubCUTAneous Daily Papito Esquivel MD   40 mg at 06/20/24 0836    ondansetron (ZOFRAN-ODT) disintegrating tablet 4 mg  4 mg Oral Q8H PRN Papito Esquivel MD        Or    ondansetron (ZOFRAN) injection 4 mg  4 mg IntraVENous Q6H PRN Papito Esquivel MD        polyethylene glycol (GLYCOLAX) packet 17 g  17 g Oral Daily PRN Papito Esquivel MD        acetaminophen (TYLENOL) tablet 650 mg  650 mg Oral Q6H PRN Papito Esquivel MD        Or    acetaminophen (TYLENOL) suppository 650 mg  650 mg Rectal Q6H PRN Papito Esquivel MD        lactated ringers IV soln infusion   IntraVENous Continuous Romero Rodriguez MD 50 mL/hr at 06/20/24 2127 New Bag at 06/20/24 2127    aspirin EC tablet 81 mg  81 mg Oral Daily Papito Esquivel MD   81 mg at 06/21/24 0817    [Held by provider] chlorthalidone (HYGROTON) tablet 25 mg  25 mg Oral Daily Papito Esquivel MD   25 mg at 06/19/24 0912    cholecalciferol (VITAMIN D3) tablet TABS 400 Units  400 Units Oral Nightly Papito Esquivel MD   400 Units at 06/20/24 2018    metoprolol succinate (TOPROL XL) extended release tablet 50 mg  50 mg Oral Daily Papito Esquivel MD   50 mg at 06/21/24 0817    therapeutic multivitamin-minerals 1 tablet  1 tablet Oral Nightly Papito Esquivel MD   1 tablet at 06/20/24 2018       Past Medical History:    Past Medical History:   Diagnosis Date    CAD (coronary artery disease)     Diabetes mellitus (HCC)     Dyslipidemia     Hypertension        Surgical History:    Past Surgical History:

## 2024-06-21 NOTE — PROGRESS NOTES
[facility]    6/21/2024 7:30 AM        Name: Galina Nick .              Admitted: 6/18/2024  Primary Care Provider: Tristan Lei MD (Tel: [phone])      Subjective:  .    Pt seen this am with RN at bedside following C  No stent required.  She is feeling ok.    No reports of pain.  Is a little drowsy  We reviewed med/ BG values etc.        Reviewed interval ancillary notes    Current Medications  insulin glargine (LANTUS) injection vial 35 Units, Daily  hydrALAZINE (APRESOLINE) tablet 25 mg, 3 times per day  [Held by provider] valsartan (DIOVAN) tablet 320 mg, Daily  dextrose bolus 10% 125 mL, PRN   Or  dextrose bolus 10% 250 mL, PRN  glucagon injection 1 mg, PRN  dextrose 10 % infusion, Continuous PRN  insulin lispro (HUMALOG,ADMELOG) injection vial 0-16 Units, TID WC  insulin lispro (HUMALOG,ADMELOG) injection vial 0-4 Units, Nightly  amLODIPine (NORVASC) tablet 10 mg, Daily  sodium chloride flush 0.9 % injection 5-40 mL, 2 times per day  sodium chloride flush 0.9 % injection 5-40 mL, PRN  0.9 % sodium chloride infusion, PRN  potassium chloride (KLOR-CON M) extended release tablet 40 mEq, PRN   Or  potassium bicarb-citric acid (EFFER-K) effervescent tablet 40 mEq, PRN   Or  potassium chloride 10 mEq/100 mL IVPB (Peripheral Line), PRN  magnesium sulfate 2000 mg in 50 mL IVPB premix, PRN  enoxaparin (LOVENOX) injection 40 mg, Daily  ondansetron (ZOFRAN-ODT) disintegrating tablet 4 mg, Q8H PRN   Or  ondansetron (ZOFRAN) injection 4 mg, Q6H PRN  polyethylene glycol (GLYCOLAX) packet 17 g, Daily PRN  acetaminophen (TYLENOL) tablet 650 mg, Q6H PRN   Or  acetaminophen (TYLENOL) suppository 650 mg, Q6H PRN  lactated ringers IV soln infusion, Continuous  aspirin EC tablet 81 mg, Daily  [Held by provider] chlorthalidone (HYGROTON) tablet 25 mg, Daily  cholecalciferol (VITAMIN D3) tablet TABS 400 Units, Nightly  metoprolol succinate (TOPROL XL) extended release

## 2024-06-21 NOTE — PROGRESS NOTES
CTA pelvis  CTA PELVIS:   Vascular: There is normal patency of the bilateral iliac and visualized  femoral arteries with no aneurysm.     Nonvascular: A cystic structure in the right ovary measures up to 1.7 cm.  The bladder and uterus are unremarkable.  There is no free fluid.     IMPRESSION:  1. Mild atherosclerosis with normal patency of the branches of the abdominal  aorta including the bilateral renal arteries.  There is a small accessory  artery on the right supplying the lower pole.  2. Thickening of the stomach and adjacent inflammatory changes suggesting  gastritis.  Trace free fluid in Alfaro's pouch in the right pericolic  gutter is favored to be related.  3. No extravasation of contrast in the bowel to suggest active  gastrointestinal bleeding.  4. 1.7 cm cyst or dominant follicle in the right ovary.  No follow-up imaging  is recommended.     Renal duplex 1/24/24  Summary   There is 60-99% stenosis of the proximal right renal artery(ies).  The right renal vein is patent, however Doppler waveform suggests venous  congestion.  The parenchymal resistive index is borderline abnormal in the lower pole.  Several hypoechoic structures noted throughout right kidney, likely cysts,  largest measuring  1.02 cm X 1.26 cm.   There is 60-99% stenosis of the proximal left renal artery (ies).  The left renal vein is patent, however Doppler waveform suggests venous  congestion.  The parenchymal resistive index is borderline abnormal in the lower pole.  Several hypoechoic structures noted throughout left kidney, likely cysts,  largest measuring 1.13 cm X 0.934 cm.  Recommendations   Recommend referral to a Vascular specialist, if clinically indicated,  regarding significant renal artery stenosis.  Clinical correlation for likely renal cysts with consideration for further  imaging if not previously or recently done.     Echo 1/24/24  Summary   Normal left ventricle size, wall thickness, and systolic function with an    nonobstructive. Abnormal duplex but normal abd cta.       Dyslipidemia  Plan: High intensity statin for goal LDL less than 70.  Was on rosuvastatin; now resumed.    Discussed with patient and Dr. Gamez who agree with performing LHC today with minimal contrast dose.    Rafael James MD 6/21/2024 10:32 AM

## 2024-06-21 NOTE — PLAN OF CARE
Problem: Discharge Planning  Goal: Discharge to home or other facility with appropriate resources  6/21/2024 1129 by Rosa Haddad RN  Outcome: Progressing  6/20/2024 2136 by Maribeth Hammer RN  Outcome: Progressing     Problem: Pain  Goal: Verbalizes/displays adequate comfort level or baseline comfort level  6/21/2024 1129 by Rosa Haddad RN  Outcome: Progressing  6/20/2024 2136 by Maribeth Hammer RN  Outcome: Progressing     Problem: Safety - Adult  Goal: Free from fall injury  6/21/2024 1129 by Rosa Haddad RN  Outcome: Progressing  6/20/2024 2136 by Maribeth Hammer RN  Outcome: Progressing     Problem: ABCDS Injury Assessment  Goal: Absence of physical injury  6/21/2024 1129 by Rosa Haddad RN  Outcome: Progressing  6/20/2024 2136 by Maribeth Hammer RN  Outcome: Progressing     Problem: Chronic Conditions and Co-morbidities  Goal: Patient's chronic conditions and co-morbidity symptoms are monitored and maintained or improved  Outcome: Progressing     Problem: Anxiety  Goal: Will report anxiety at manageable levels  Description: INTERVENTIONS:  1. Administer medication as ordered  2. Teach and rehearse alternative coping skills  3. Provide emotional support with 1:1 interaction with staff  Outcome: Progressing     Problem: Behavior  Goal: Pt/Family maintain appropriate behavior and adhere to behavioral management agreement, if implemented  Description: INTERVENTIONS:  1. Assess patient/family's coping skills and  non-compliant behavior (including use of illegal substances)  2. Notify security of behavior or suspected illegal substances which indicate the need for search of the family and/or belongings  3. Encourage verbalization of thoughts and concerns in a socially appropriate manner  4. Utilize positive, consistent limit setting strategies supporting safety of patient, staff and others  5. Encourage participation in the decision making process about the

## 2024-06-21 NOTE — PLAN OF CARE
Problem: Discharge Planning  Goal: Discharge to home or other facility with appropriate resources  6/20/2024 2136 by Maribeth Hammer RN  Outcome: Progressing  6/20/2024 1258 by Calli Langford RN  Outcome: Progressing     Problem: Pain  Goal: Verbalizes/displays adequate comfort level or baseline comfort level  6/20/2024 2136 by Maribeth Hammer RN  Outcome: Progressing  6/20/2024 1258 by Calli Langford RN  Outcome: Progressing     Problem: Safety - Adult  Goal: Free from fall injury  6/20/2024 2136 by Maribeth Hammer RN  Outcome: Progressing  6/20/2024 1258 by Calli Langford RN  Outcome: Progressing     Problem: ABCDS Injury Assessment  Goal: Absence of physical injury  6/20/2024 2136 by Maribeth Hammer RN  Outcome: Progressing  6/20/2024 1258 by Calli Langford RN  Outcome: Progressing

## 2024-06-21 NOTE — PROGRESS NOTES
, foul smelling cloudy urien         PHYSICAL EXAM:    Vitals:    BP (!) 192/89   Pulse 77   Temp 97.9 °F (36.6 °C) (Temporal)   Resp 16   Ht 1.727 m (5' 8\")   Wt 82.8 kg (182 lb 9.6 oz)   LMP 06/01/2011   SpO2 99%   BMI 27.76 kg/m²   I/O last 3 completed shifts:  In: 240 [P.O.:240]  Out: -   No intake/output data recorded.    Physical Exam:  Gen:  alert, oriented x 3  PERRL , EOM +  Pallor +, No icterus  JVP not raised   CV: RRR no murmur or rub .  Lungs:B/ L air entry, Normal breath sounds   Abd: soft, bowel sounds + , No organomegaly   Ext: No edema, no cyanosis  Skin: Warm.  No rash  Neuro: nonfocal.      DATA:    CBC with Differential:    Lab Results   Component Value Date/Time    WBC 6.4 06/21/2024 04:43 AM    RBC 3.68 06/21/2024 04:43 AM    HGB 10.1 06/21/2024 04:43 AM    HCT 29.6 06/21/2024 04:43 AM     06/21/2024 04:43 AM    MCV 80.6 06/21/2024 04:43 AM    MCH 27.4 06/21/2024 04:43 AM    MCHC 34.0 06/21/2024 04:43 AM    RDW 14.6 06/21/2024 04:43 AM    LYMPHOPCT 29.8 06/21/2024 04:43 AM    MONOPCT 8.6 06/21/2024 04:43 AM    EOSPCT 3.3 06/21/2024 04:43 AM    BASOPCT 1.0 06/21/2024 04:43 AM    MONOSABS 0.5 06/21/2024 04:43 AM    EOSABS 0.2 06/21/2024 04:43 AM    BASOSABS 0.1 06/21/2024 04:43 AM     CMP:    Lab Results   Component Value Date/Time     06/21/2024 04:43 AM    K 3.6 06/21/2024 04:43 AM     06/21/2024 04:43 AM    CO2 25 06/21/2024 04:43 AM    BUN 22 06/21/2024 04:43 AM    CREATININE 1.9 06/21/2024 04:43 AM    GFRAA >60 10/13/2022 07:46 AM    AGRATIO 1.3 02/05/2024 02:11 PM    LABGLOM 30 06/21/2024 04:43 AM    LABGLOM 48 04/19/2024 03:00 PM    GLUCOSE 318 06/21/2024 04:43 AM    CALCIUM 8.6 06/21/2024 04:43 AM    BILITOT 0.5 02/05/2024 02:11 PM    ALKPHOS 141 02/05/2024 02:11 PM    AST 18 02/05/2024 02:11 PM    ALT 15 02/05/2024 02:11 PM     Phosphorus:    Lab Results   Component Value Date/Time    PHOS 4.5 01/26/2024 09:00 AM     Uric Acid:  No results found for: \"LABURIC\",  \"URICACID\"  Last 3 Troponin:  No results found for: \"TROPONINI\"  U/A:    Lab Results   Component Value Date/Time    COLORU Yellow 06/19/2024 12:00 AM    PROTEINU >=1000 06/19/2024 12:00 AM    PHUR 6.5 06/19/2024 12:00 AM    PHUR 6.0 01/23/2024 05:24 PM    WBCUA 2 06/19/2024 12:00 AM    RBCUA 5 06/19/2024 12:00 AM    MUCUS Present 06/19/2024 12:00 AM    BACTERIA None Seen 06/19/2024 12:00 AM    CLARITYU Clear 06/19/2024 12:00 AM    LEUKOCYTESUR Negative 06/19/2024 12:00 AM    UROBILINOGEN 0.2 06/19/2024 12:00 AM    BILIRUBINUR Negative 06/19/2024 12:00 AM    BLOODU SMALL 06/19/2024 12:00 AM    GLUCOSEU 500 06/19/2024 12:00 AM    GLUCOSEU >=1000 07/14/2011 07:38 PM    AMORPHOUS Present 06/19/2024 12:00 AM           IMPRESSION/RECOMMENDATIONS:      Syncope:Being investigated    Hypertension  156-198/     Renal artery stenosis    Progressive CKD 3 b   Creatinine 1.9, unchanged   fractional excretion sodium> 1  Significant proteinuria  Per primary nephrologist Dr. Winslow  currently likely due to diabetic nephropathy/hypertension    Renal osteodystrophy  Phosphorus 4.5    Proteinuria( likely diabetic nephropathy )  P/Cr : 9  ABI-negative  C3 normal at 145  C4 normal at 35  Iimmunofixation- p  Restarted ARB   Coronary artery disease  Cath no critical lesion   UTI ?   Ua and C/s  Empirical ciproflox 500 mg daily x 3    Thank you for allowing me to participate in the care of this patient.  I will continue to follow along.  Please call with questions.    Romero Rodriguez MD, MD  6/21/2024  [facility]

## 2024-06-22 LAB
ANION GAP SERPL CALCULATED.3IONS-SCNC: 8 MMOL/L (ref 3–16)
BACTERIA UR CULT: NORMAL
BASOPHILS # BLD: 0.1 K/UL (ref 0–0.2)
BASOPHILS NFR BLD: 0.8 %
BUN SERPL-MCNC: 21 MG/DL (ref 7–20)
CALCIUM SERPL-MCNC: 8.2 MG/DL (ref 8.3–10.6)
CHLORIDE SERPL-SCNC: 111 MMOL/L (ref 99–110)
CO2 SERPL-SCNC: 23 MMOL/L (ref 21–32)
CREAT SERPL-MCNC: 1.8 MG/DL (ref 0.6–1.1)
DEPRECATED RDW RBC AUTO: 14.8 % (ref 12.4–15.4)
EOSINOPHIL # BLD: 0.2 K/UL (ref 0–0.6)
EOSINOPHIL NFR BLD: 2.6 %
FERRITIN SERPL IA-MCNC: 175.3 NG/ML (ref 15–150)
GFR SERPLBLD CREATININE-BSD FMLA CKD-EPI: 33 ML/MIN/{1.73_M2}
GLUCOSE BLD-MCNC: 159 MG/DL (ref 70–99)
GLUCOSE BLD-MCNC: 186 MG/DL (ref 70–99)
GLUCOSE BLD-MCNC: 206 MG/DL (ref 70–99)
GLUCOSE BLD-MCNC: 333 MG/DL (ref 70–99)
GLUCOSE SERPL-MCNC: 160 MG/DL (ref 70–99)
HCT VFR BLD AUTO: 25.8 % (ref 36–48)
HGB BLD-MCNC: 8.8 G/DL (ref 12–16)
IRON SATN MFR SERPL: 28 % (ref 15–50)
IRON SERPL-MCNC: 55 UG/DL (ref 37–145)
LYMPHOCYTES # BLD: 2.2 K/UL (ref 1–5.1)
LYMPHOCYTES NFR BLD: 31.4 %
MAGNESIUM SERPL-MCNC: 1.7 MG/DL (ref 1.8–2.4)
MCH RBC QN AUTO: 27.7 PG (ref 26–34)
MCHC RBC AUTO-ENTMCNC: 34.1 G/DL (ref 31–36)
MCV RBC AUTO: 81.2 FL (ref 80–100)
MONOCYTES # BLD: 0.6 K/UL (ref 0–1.3)
MONOCYTES NFR BLD: 8.1 %
NEUTROPHILS # BLD: 3.9 K/UL (ref 1.7–7.7)
NEUTROPHILS NFR BLD: 57.1 %
PERFORMED ON: ABNORMAL
PLATELET # BLD AUTO: 212 K/UL (ref 135–450)
PMV BLD AUTO: 8.3 FL (ref 5–10.5)
POTASSIUM SERPL-SCNC: 3.4 MMOL/L (ref 3.5–5.1)
RBC # BLD AUTO: 3.18 M/UL (ref 4–5.2)
SODIUM SERPL-SCNC: 142 MMOL/L (ref 136–145)
TIBC SERPL-MCNC: 193 UG/DL (ref 260–445)
WBC # BLD AUTO: 6.8 K/UL (ref 4–11)

## 2024-06-22 PROCEDURE — 82728 ASSAY OF FERRITIN: CPT

## 2024-06-22 PROCEDURE — 83550 IRON BINDING TEST: CPT

## 2024-06-22 PROCEDURE — 2060000000 HC ICU INTERMEDIATE R&B

## 2024-06-22 PROCEDURE — 6370000000 HC RX 637 (ALT 250 FOR IP): Performed by: INTERNAL MEDICINE

## 2024-06-22 PROCEDURE — 36415 COLL VENOUS BLD VENIPUNCTURE: CPT

## 2024-06-22 PROCEDURE — 6360000002 HC RX W HCPCS: Performed by: NURSE PRACTITIONER

## 2024-06-22 PROCEDURE — 6370000000 HC RX 637 (ALT 250 FOR IP): Performed by: PEDIATRICS

## 2024-06-22 PROCEDURE — 83735 ASSAY OF MAGNESIUM: CPT

## 2024-06-22 PROCEDURE — 6370000000 HC RX 637 (ALT 250 FOR IP): Performed by: NURSE PRACTITIONER

## 2024-06-22 PROCEDURE — 80048 BASIC METABOLIC PNL TOTAL CA: CPT

## 2024-06-22 PROCEDURE — 1200000000 HC SEMI PRIVATE

## 2024-06-22 PROCEDURE — 2580000003 HC RX 258: Performed by: INTERNAL MEDICINE

## 2024-06-22 PROCEDURE — 83540 ASSAY OF IRON: CPT

## 2024-06-22 PROCEDURE — 85025 COMPLETE CBC W/AUTO DIFF WBC: CPT

## 2024-06-22 RX ORDER — MAGNESIUM SULFATE 1 G/100ML
1000 INJECTION INTRAVENOUS ONCE
Status: COMPLETED | OUTPATIENT
Start: 2024-06-22 | End: 2024-06-22

## 2024-06-22 RX ORDER — ROSUVASTATIN CALCIUM 10 MG/1
10 TABLET, COATED ORAL NIGHTLY
Status: DISCONTINUED | OUTPATIENT
Start: 2024-06-22 | End: 2024-06-27 | Stop reason: HOSPADM

## 2024-06-22 RX ORDER — HYDRALAZINE HYDROCHLORIDE 25 MG/1
25 TABLET, FILM COATED ORAL ONCE
Status: DISCONTINUED | OUTPATIENT
Start: 2024-06-22 | End: 2024-06-22

## 2024-06-22 RX ORDER — POTASSIUM CHLORIDE 20 MEQ/1
20 TABLET, EXTENDED RELEASE ORAL ONCE
Status: DISCONTINUED | OUTPATIENT
Start: 2024-06-22 | End: 2024-06-26

## 2024-06-22 RX ORDER — VALSARTAN 160 MG/1
320 TABLET ORAL DAILY
Status: DISCONTINUED | OUTPATIENT
Start: 2024-06-22 | End: 2024-06-27 | Stop reason: HOSPADM

## 2024-06-22 RX ORDER — HYDRALAZINE HYDROCHLORIDE 25 MG/1
25 TABLET, FILM COATED ORAL EVERY 8 HOURS SCHEDULED
Status: DISCONTINUED | OUTPATIENT
Start: 2024-06-22 | End: 2024-06-23

## 2024-06-22 RX ADMIN — INSULIN LISPRO 3 UNITS: 100 INJECTION, SOLUTION INTRAVENOUS; SUBCUTANEOUS at 12:30

## 2024-06-22 RX ADMIN — Medication 1 TABLET: at 20:40

## 2024-06-22 RX ADMIN — ASPIRIN 81 MG: 81 TABLET, COATED ORAL at 08:58

## 2024-06-22 RX ADMIN — LABETALOL HYDROCHLORIDE 100 MG: 200 TABLET, FILM COATED ORAL at 08:58

## 2024-06-22 RX ADMIN — POTASSIUM CHLORIDE 40 MEQ: 1500 TABLET, EXTENDED RELEASE ORAL at 08:57

## 2024-06-22 RX ADMIN — Medication 400 UNITS: at 20:40

## 2024-06-22 RX ADMIN — MAGNESIUM SULFATE HEPTAHYDRATE 1000 MG: 1 INJECTION, SOLUTION INTRAVENOUS at 11:21

## 2024-06-22 RX ADMIN — AMLODIPINE BESYLATE 10 MG: 5 TABLET ORAL at 08:58

## 2024-06-22 RX ADMIN — INSULIN GLARGINE 35 UNITS: 100 INJECTION, SOLUTION SUBCUTANEOUS at 08:56

## 2024-06-22 RX ADMIN — HYDRALAZINE HYDROCHLORIDE 25 MG: 25 TABLET ORAL at 15:00

## 2024-06-22 RX ADMIN — LABETALOL HYDROCHLORIDE 100 MG: 200 TABLET, FILM COATED ORAL at 20:40

## 2024-06-22 RX ADMIN — HYDRALAZINE HYDROCHLORIDE 25 MG: 25 TABLET ORAL at 22:03

## 2024-06-22 RX ADMIN — CIPROFLOXACIN HYDROCHLORIDE 500 MG: 500 TABLET, FILM COATED ORAL at 08:58

## 2024-06-22 RX ADMIN — INSULIN LISPRO 6 UNITS: 100 INJECTION, SOLUTION INTRAVENOUS; SUBCUTANEOUS at 12:29

## 2024-06-22 RX ADMIN — SODIUM CHLORIDE, PRESERVATIVE FREE 10 ML: 5 INJECTION INTRAVENOUS at 20:42

## 2024-06-22 RX ADMIN — VALSARTAN 320 MG: 160 TABLET ORAL at 16:48

## 2024-06-22 RX ADMIN — ROSUVASTATIN CALCIUM 10 MG: 10 TABLET, FILM COATED ORAL at 20:42

## 2024-06-22 RX ADMIN — INSULIN LISPRO 1 UNITS: 100 INJECTION, SOLUTION INTRAVENOUS; SUBCUTANEOUS at 17:06

## 2024-06-22 RX ADMIN — INSULIN LISPRO 6 UNITS: 100 INJECTION, SOLUTION INTRAVENOUS; SUBCUTANEOUS at 17:06

## 2024-06-22 ASSESSMENT — PAIN SCALES - GENERAL: PAINLEVEL_OUTOF10: 0

## 2024-06-22 NOTE — PLAN OF CARE
Problem: Safety - Adult  Goal: Free from fall injury  6/21/2024 2335 by Beatrice Morris, RN  Outcome: Progressing

## 2024-06-22 NOTE — PROGRESS NOTES
[facility]   Nephrology progress Note  [phone]  [phone]   [facility]           Reason for Consult:  JAYY , Hypertension  The patient is a 58 y.o. female with significant past medical history of with past medical history of hypertension, T2DM, CKD, hyperlipidemia, mitral regurgitation, renal artery stenosis, presented to the ER with who presents to the ER after having a fainting spell first on Sunday afternoon and then on 6/18/2021 Sunday she got to her family picnic and when she got up to get something to drink she passed out she was sat in the chair after a few minutes she felt better.  She denies any seizures any tongue bite or urinary or fecal incontinence.  Family members say that she pale in color and had left face.  A similar episode occurred on day of admission this week when she was running errands and walking to her car she felt like she was going to pass out.  This time she did report some palpitations.  She had no chest pain no shortness of breath no fever chills rigors.  There is no nausea vomiting diarrhea.  She denied any dysuria or gross hematuria.    She has had diabetes mellitus type 2 for at least 20 years and sees [facility] and has had injections in both eyes so assume she has retinopathy  She has had high blood pressure for 20 to 25 years.  She also had high blood pressure when she was pregnant with her first child 38 years ago  There is no history of kidney stone disease  There is no history of hematuria or dysuria    Cr 1.4  ( 4/24) , 1.6 ( 5/24) , Cardiac CTA 4/4/24  Regarding ANN: Abnormal duplex but normal abd cta.     Medications prior to admission:  chlorthalidone, amlodipine, furosemide, metformin, and metoprolol along with valsartan      Interval History:    Doing ok  Feeling pretty well  BP elevated       PHYSICAL EXAM:    Vitals:    BP (!) 167/73   Pulse 73   Temp 97.7 °F (36.5 °C) (Temporal)   Resp 16   Ht 1.727 m (5' 8\")   Wt 85

## 2024-06-22 NOTE — PROGRESS NOTES
manage the BP   The patient did not have syncope. Recent echo reviewed.  Cardiology is following   Appreciate all consults   Home in the next 24 hours if BP improved and creat stable       Diet: ADULT DIET; Regular; 5 carb choices (75 gm/meal)  Code:Full Code  DVT PPX      ARMAND Sanchez - CNP   6/22/2024 8:01 AM

## 2024-06-22 NOTE — PLAN OF CARE
Problem: Discharge Planning  Goal: Discharge to home or other facility with appropriate resources  Outcome: Progressing     Problem: Pain  Goal: Verbalizes/displays adequate comfort level or baseline comfort level  Outcome: Progressing     Problem: Safety - Adult  Goal: Free from fall injury  Outcome: Progressing     Problem: ABCDS Injury Assessment  Goal: Absence of physical injury  Outcome: Progressing     Problem: Chronic Conditions and Co-morbidities  Goal: Patient's chronic conditions and co-morbidity symptoms are monitored and maintained or improved  Outcome: Progressing     Problem: Anxiety  Goal: Will report anxiety at manageable levels  Description: INTERVENTIONS:  1. Administer medication as ordered  2. Teach and rehearse alternative coping skills  3. Provide emotional support with 1:1 interaction with staff  Outcome: Progressing     Problem: Behavior  Goal: Pt/Family maintain appropriate behavior and adhere to behavioral management agreement, if implemented  Description: INTERVENTIONS:  1. Assess patient/family's coping skills and  non-compliant behavior (including use of illegal substances)  2. Notify security of behavior or suspected illegal substances which indicate the need for search of the family and/or belongings  3. Encourage verbalization of thoughts and concerns in a socially appropriate manner  4. Utilize positive, consistent limit setting strategies supporting safety of patient, staff and others  5. Encourage participation in the decision making process about the behavioral management agreement  6. If a visitor's behavior poses a threat to safety call refer to organization policy.  7. Initiate consult with , Psychosocial CNS, Spiritual Care as appropriate  Outcome: Progressing     Problem: Neurosensory - Adult  Goal: Achieves stable or improved neurological status  Outcome: Progressing     Problem: Respiratory - Adult  Goal: Achieves optimal ventilation and oxygenation  Outcome:  Progressing     Problem: Cardiovascular - Adult  Goal: Maintains optimal cardiac output and hemodynamic stability  Outcome: Progressing  Goal: Absence of cardiac dysrhythmias or at baseline  Outcome: Progressing     Problem: Skin/Tissue Integrity - Adult  Goal: Skin integrity remains intact  Outcome: Progressing  Goal: Oral mucous membranes remain intact  Outcome: Progressing     Problem: Musculoskeletal - Adult  Goal: Return mobility to safest level of function  Outcome: Progressing  Goal: Maintain proper alignment of affected body part  Outcome: Progressing  Goal: Return ADL status to a safe level of function  Outcome: Progressing     Problem: Gastrointestinal - Adult  Goal: Minimal or absence of nausea and vomiting  Outcome: Progressing  Goal: Maintains or returns to baseline bowel function  Outcome: Progressing  Goal: Maintains adequate nutritional intake  Outcome: Progressing     Problem: Genitourinary - Adult  Goal: Absence of urinary retention  Outcome: Progressing     Problem: Infection - Adult  Goal: Absence of infection at discharge  Outcome: Progressing  Goal: Absence of infection during hospitalization  Outcome: Progressing     Problem: Metabolic/Fluid and Electrolytes - Adult  Goal: Electrolytes maintained within normal limits  Outcome: Progressing  Goal: Hemodynamic stability and optimal renal function maintained  Outcome: Progressing  Goal: Glucose maintained within prescribed range  Outcome: Progressing     Problem: Hematologic - Adult  Goal: Maintains hematologic stability  Outcome: Progressing

## 2024-06-23 LAB
ANION GAP SERPL CALCULATED.3IONS-SCNC: 10 MMOL/L (ref 3–16)
BASOPHILS # BLD: 0.1 K/UL (ref 0–0.2)
BASOPHILS NFR BLD: 1.2 %
BUN SERPL-MCNC: 22 MG/DL (ref 7–20)
CALCIUM SERPL-MCNC: 8.4 MG/DL (ref 8.3–10.6)
CHLORIDE SERPL-SCNC: 108 MMOL/L (ref 99–110)
CO2 SERPL-SCNC: 21 MMOL/L (ref 21–32)
CREAT SERPL-MCNC: 1.9 MG/DL (ref 0.6–1.1)
DEPRECATED RDW RBC AUTO: 14.7 % (ref 12.4–15.4)
EOSINOPHIL # BLD: 0.2 K/UL (ref 0–0.6)
EOSINOPHIL NFR BLD: 2.7 %
GFR SERPLBLD CREATININE-BSD FMLA CKD-EPI: 30 ML/MIN/{1.73_M2}
GLUCOSE BLD-MCNC: 106 MG/DL (ref 70–99)
GLUCOSE BLD-MCNC: 117 MG/DL (ref 70–99)
GLUCOSE BLD-MCNC: 200 MG/DL (ref 70–99)
GLUCOSE BLD-MCNC: 243 MG/DL (ref 70–99)
GLUCOSE SERPL-MCNC: 206 MG/DL (ref 70–99)
HCT VFR BLD AUTO: 28 % (ref 36–48)
HGB BLD-MCNC: 9.3 G/DL (ref 12–16)
LYMPHOCYTES # BLD: 1.4 K/UL (ref 1–5.1)
LYMPHOCYTES NFR BLD: 20.1 %
MAGNESIUM SERPL-MCNC: 2.1 MG/DL (ref 1.8–2.4)
MCH RBC QN AUTO: 27 PG (ref 26–34)
MCHC RBC AUTO-ENTMCNC: 33.2 G/DL (ref 31–36)
MCV RBC AUTO: 81.4 FL (ref 80–100)
MONOCYTES # BLD: 0.5 K/UL (ref 0–1.3)
MONOCYTES NFR BLD: 7.2 %
NEUTROPHILS # BLD: 4.8 K/UL (ref 1.7–7.7)
NEUTROPHILS NFR BLD: 68.8 %
PERFORMED ON: ABNORMAL
PLATELET # BLD AUTO: 215 K/UL (ref 135–450)
PMV BLD AUTO: 8.9 FL (ref 5–10.5)
POTASSIUM SERPL-SCNC: 4.2 MMOL/L (ref 3.5–5.1)
RBC # BLD AUTO: 3.44 M/UL (ref 4–5.2)
SODIUM SERPL-SCNC: 139 MMOL/L (ref 136–145)
WBC # BLD AUTO: 7 K/UL (ref 4–11)

## 2024-06-23 PROCEDURE — 85025 COMPLETE CBC W/AUTO DIFF WBC: CPT

## 2024-06-23 PROCEDURE — 6370000000 HC RX 637 (ALT 250 FOR IP): Performed by: NURSE PRACTITIONER

## 2024-06-23 PROCEDURE — 1200000000 HC SEMI PRIVATE

## 2024-06-23 PROCEDURE — 80048 BASIC METABOLIC PNL TOTAL CA: CPT

## 2024-06-23 PROCEDURE — 6370000000 HC RX 637 (ALT 250 FOR IP): Performed by: PHYSICIAN ASSISTANT

## 2024-06-23 PROCEDURE — 6370000000 HC RX 637 (ALT 250 FOR IP): Performed by: INTERNAL MEDICINE

## 2024-06-23 PROCEDURE — 2580000003 HC RX 258: Performed by: INTERNAL MEDICINE

## 2024-06-23 PROCEDURE — 6370000000 HC RX 637 (ALT 250 FOR IP): Performed by: PEDIATRICS

## 2024-06-23 PROCEDURE — 2060000000 HC ICU INTERMEDIATE R&B

## 2024-06-23 PROCEDURE — 2580000003 HC RX 258: Performed by: PEDIATRICS

## 2024-06-23 PROCEDURE — 83735 ASSAY OF MAGNESIUM: CPT

## 2024-06-23 PROCEDURE — 36415 COLL VENOUS BLD VENIPUNCTURE: CPT

## 2024-06-23 RX ORDER — CARVEDILOL 25 MG/1
25 TABLET ORAL 2 TIMES DAILY WITH MEALS
Status: DISCONTINUED | OUTPATIENT
Start: 2024-06-23 | End: 2024-06-27 | Stop reason: HOSPADM

## 2024-06-23 RX ORDER — CHLORTHALIDONE 25 MG/1
25 TABLET ORAL DAILY
Status: DISCONTINUED | OUTPATIENT
Start: 2024-06-23 | End: 2024-06-26

## 2024-06-23 RX ORDER — INSULIN LISPRO 100 [IU]/ML
10 INJECTION, SOLUTION INTRAVENOUS; SUBCUTANEOUS
Status: DISCONTINUED | OUTPATIENT
Start: 2024-06-23 | End: 2024-06-27 | Stop reason: HOSPADM

## 2024-06-23 RX ADMIN — LABETALOL HYDROCHLORIDE 100 MG: 200 TABLET, FILM COATED ORAL at 08:12

## 2024-06-23 RX ADMIN — SODIUM CHLORIDE, PRESERVATIVE FREE 10 ML: 5 INJECTION INTRAVENOUS at 19:39

## 2024-06-23 RX ADMIN — INSULIN GLARGINE 35 UNITS: 100 INJECTION, SOLUTION SUBCUTANEOUS at 08:13

## 2024-06-23 RX ADMIN — ROSUVASTATIN CALCIUM 10 MG: 10 TABLET, FILM COATED ORAL at 19:39

## 2024-06-23 RX ADMIN — CIPROFLOXACIN HYDROCHLORIDE 500 MG: 500 TABLET, FILM COATED ORAL at 08:12

## 2024-06-23 RX ADMIN — CARVEDILOL 25 MG: 25 TABLET, FILM COATED ORAL at 17:31

## 2024-06-23 RX ADMIN — HYDRALAZINE HYDROCHLORIDE 25 MG: 25 TABLET ORAL at 06:01

## 2024-06-23 RX ADMIN — CHLORTHALIDONE 25 MG: 25 TABLET ORAL at 12:37

## 2024-06-23 RX ADMIN — Medication 400 UNITS: at 19:39

## 2024-06-23 RX ADMIN — Medication 1 TABLET: at 19:39

## 2024-06-23 RX ADMIN — SODIUM CHLORIDE, PRESERVATIVE FREE 10 ML: 5 INJECTION INTRAVENOUS at 08:17

## 2024-06-23 RX ADMIN — INSULIN LISPRO 1 UNITS: 100 INJECTION, SOLUTION INTRAVENOUS; SUBCUTANEOUS at 08:13

## 2024-06-23 RX ADMIN — INSULIN LISPRO 10 UNITS: 100 INJECTION, SOLUTION INTRAVENOUS; SUBCUTANEOUS at 12:37

## 2024-06-23 RX ADMIN — SODIUM CHLORIDE, PRESERVATIVE FREE 10 ML: 5 INJECTION INTRAVENOUS at 08:12

## 2024-06-23 RX ADMIN — INSULIN LISPRO 6 UNITS: 100 INJECTION, SOLUTION INTRAVENOUS; SUBCUTANEOUS at 08:16

## 2024-06-23 RX ADMIN — ASPIRIN 81 MG: 81 TABLET, COATED ORAL at 08:12

## 2024-06-23 RX ADMIN — INSULIN LISPRO 1 UNITS: 100 INJECTION, SOLUTION INTRAVENOUS; SUBCUTANEOUS at 12:37

## 2024-06-23 RX ADMIN — AMLODIPINE BESYLATE 10 MG: 5 TABLET ORAL at 08:13

## 2024-06-23 RX ADMIN — INSULIN LISPRO 10 UNITS: 100 INJECTION, SOLUTION INTRAVENOUS; SUBCUTANEOUS at 17:30

## 2024-06-23 RX ADMIN — VALSARTAN 320 MG: 160 TABLET ORAL at 08:12

## 2024-06-23 ASSESSMENT — PAIN SCALES - GENERAL: PAINLEVEL_OUTOF10: 0

## 2024-06-23 NOTE — DISCHARGE INSTRUCTIONS
Radial Angiogram      Care of your puncture site:  Remove clear bandage 24 hours after the procedure.  May shower in 24 hours  Inspect the site daily and gently clean using soap and water.  Dry thoroughly and apply a Band-Aid.    Normal Observations:  Soreness or tenderness which may last one week.  Mild oozing from the incision site.  Possible bruising that could last 2 weeks.    Activity:  You may resume driving 24 hours following the procedure.  You may resume normal activity in 3 days or after the wound heals.  Avoid lifting more than 10 pounds for 3 days with affected arm.    Nutrition:  Regular diet or Renal.  Drink at least 8 to 10 glasses of decaffeinated, non-alcoholic fluid for the next 24 hours to flush the x-ray dye used for your angiogram out of your body.    Call your doctor immediately if your condition worsens, for any other concerns, for a follow-up appointment or if you experience any of the following:  Significant bleeding that does not stop after 10 minutes of applying firm pressure on the puncture site.  Increased swelling of the wrist.  Unusual pain, numbness, or tingling of the wrist/arm.   Any signs of infection such as: redness, yellow drainage at the site, swelling or pain.    Other Instructions:  Hold Metformin or Metformin containing drugs for 48 hours after procedure.

## 2024-06-23 NOTE — PROGRESS NOTES
[facility]    6/23/2024 12:08 PM        Name: Galina Nick .              Admitted: 6/18/2024  Primary Care Provider: Tristan Lei MD (Tel: [phone])      Subjective:  .  Patient feeling ok. No new complaints. Tells me her leg swelling has been much better since she has been here. She works as a  and sits most of the day at home and work. She reports she has a lot of stress, anxiety, fear of unknown and is wondering if some of that is contributing to her elevated BP. She lives alone but has several grown children.      Reviewed interval ancillary notes    Current Medications  insulin lispro (HUMALOG,ADMELOG) injection vial 10 Units, TID WC  carvedilol (COREG) tablet 25 mg, BID WC  chlorthalidone (HYGROTON) tablet 25 mg, Daily  potassium chloride (KLOR-CON M) extended release tablet 20 mEq, Once  rosuvastatin (CRESTOR) tablet 10 mg, Nightly  valsartan (DIOVAN) tablet 320 mg, Daily  insulin lispro (HUMALOG,ADMELOG) injection vial 0-4 Units, TID WC  insulin lispro (HUMALOG,ADMELOG) injection vial 0-4 Units, Nightly  sodium chloride flush 0.9 % injection 5-40 mL, 2 times per day  sodium chloride flush 0.9 % injection 5-40 mL, PRN  0.9 % sodium chloride infusion, PRN  acetaminophen (TYLENOL) tablet 650 mg, Q4H PRN  oxyCODONE-acetaminophen (PERCOCET) 5-325 MG per tablet 1 tablet, Q4H PRN   Or  oxyCODONE-acetaminophen (PERCOCET) 5-325 MG per tablet 2 tablet, Q4H PRN  insulin glargine (LANTUS) injection vial 35 Units, Daily  dextrose bolus 10% 125 mL, PRN   Or  dextrose bolus 10% 250 mL, PRN  glucagon injection 1 mg, PRN  dextrose 10 % infusion, Continuous PRN  amLODIPine (NORVASC) tablet 10 mg, Daily  sodium chloride flush 0.9 % injection 5-40 mL, 2 times per day  sodium chloride flush 0.9 % injection 5-40 mL, PRN  0.9 % sodium chloride infusion, PRN  potassium chloride (KLOR-CON M) extended release tablet 40 mEq, PRN   Or  potassium bicarb-citric

## 2024-06-23 NOTE — PROGRESS NOTES
[facility]   Nephrology progress Note  [phone]  [phone]   [employer]           Reason for Consult:  JAYY , Hypertension  The patient is a 58 y.o. female with significant past medical history of with past medical history of hypertension, T2DM, CKD, hyperlipidemia, mitral regurgitation, renal artery stenosis, presented to the ER with who presents to the ER after having a fainting spell first on Sunday afternoon and then on 6/18/2021 Sunday she got to her family picnic and when she got up to get something to drink she passed out she was sat in the chair after a few minutes she felt better.  She denies any seizures any tongue bite or urinary or fecal incontinence.  Family members say that she pale in color and had left face.  A similar episode occurred on day of admission this week when she was running errands and walking to her car she felt like she was going to pass out.  This time she did report some palpitations.  She had no chest pain no shortness of breath no fever chills rigors.  There is no nausea vomiting diarrhea.  She denied any dysuria or gross hematuria.    She has had diabetes mellitus type 2 for at least 20 years and sees [employer] [facility] [place] and has had injections in both eyes so assume she has retinopathy  She has had high blood pressure for 20 to 25 years.  She also had high blood pressure when she was pregnant with her first child 38 years ago  There is no history of kidney stone disease  There is no history of hematuria or dysuria    Cr 1.4  ( 4/24) , 1.6 ( 5/24) , Cardiac CTA 4/4/24  Regarding ANN: Abnormal duplex but normal abd cta.     Medications prior to admission:  chlorthalidone, amlodipine, furosemide, metformin, and metoprolol along with valsartan      Interval History:    Doing ok  Feeling pretty well  BP elevated       PHYSICAL EXAM:    Vitals:    BP (!) 168/89   Pulse 77   Temp 98.1 °F (36.7 °C) (Temporal)   Resp 16   Ht 1.727 m (5' 8\")   Wt 77.3

## 2024-06-23 NOTE — PLAN OF CARE
Problem: Discharge Planning  Goal: Discharge to home or other facility with appropriate resources  Outcome: Progressing     Problem: Pain  Goal: Verbalizes/displays adequate comfort level or baseline comfort level  Outcome: Progressing     Problem: Safety - Adult  Goal: Free from fall injury  Outcome: Progressing     Problem: ABCDS Injury Assessment  Goal: Absence of physical injury  Outcome: Progressing     Problem: Chronic Conditions and Co-morbidities  Goal: Patient's chronic conditions and co-morbidity symptoms are monitored and maintained or improved  Outcome: Progressing     Problem: Anxiety  Goal: Will report anxiety at manageable levels  Description: INTERVENTIONS:  1. Administer medication as ordered  2. Teach and rehearse alternative coping skills  3. Provide emotional support with 1:1 interaction with staff  Outcome: Progressing     Problem: Behavior  Goal: Pt/Family maintain appropriate behavior and adhere to behavioral management agreement, if implemented  Description: INTERVENTIONS:  1. Assess patient/family's coping skills and  non-compliant behavior (including use of illegal substances)  2. Notify security of behavior or suspected illegal substances which indicate the need for search of the family and/or belongings  3. Encourage verbalization of thoughts and concerns in a socially appropriate manner  4. Utilize positive, consistent limit setting strategies supporting safety of patient, staff and others  5. Encourage participation in the decision making process about the behavioral management agreement  6. If a visitor's behavior poses a threat to safety call refer to organization policy.  7. Initiate consult with , Psychosocial CNS, Spiritual Care as appropriate  Outcome: Progressing     Problem: Neurosensory - Adult  Goal: Achieves stable or improved neurological status  Outcome: Progressing     Problem: Respiratory - Adult  Goal: Achieves optimal ventilation and oxygenation  Outcome:

## 2024-06-24 ENCOUNTER — APPOINTMENT (OUTPATIENT)
Dept: MRI IMAGING | Age: 59
DRG: 682 | End: 2024-06-24
Payer: COMMERCIAL

## 2024-06-24 ENCOUNTER — TELEPHONE (OUTPATIENT)
Dept: CARDIOLOGY CLINIC | Age: 59
End: 2024-06-24

## 2024-06-24 LAB
ANION GAP SERPL CALCULATED.3IONS-SCNC: 8 MMOL/L (ref 3–16)
BUN SERPL-MCNC: 22 MG/DL (ref 7–20)
CALCIUM SERPL-MCNC: 8.5 MG/DL (ref 8.3–10.6)
CHLORIDE SERPL-SCNC: 111 MMOL/L (ref 99–110)
CO2 SERPL-SCNC: 22 MMOL/L (ref 21–32)
CREAT SERPL-MCNC: 1.9 MG/DL (ref 0.6–1.1)
GFR SERPLBLD CREATININE-BSD FMLA CKD-EPI: 30 ML/MIN/{1.73_M2}
GLUCOSE BLD-MCNC: 103 MG/DL (ref 70–99)
GLUCOSE BLD-MCNC: 157 MG/DL (ref 70–99)
GLUCOSE BLD-MCNC: 215 MG/DL (ref 70–99)
GLUCOSE BLD-MCNC: 89 MG/DL (ref 70–99)
GLUCOSE SERPL-MCNC: 146 MG/DL (ref 70–99)
PERFORMED ON: ABNORMAL
PERFORMED ON: NORMAL
POTASSIUM SERPL-SCNC: 4.1 MMOL/L (ref 3.5–5.1)
SODIUM SERPL-SCNC: 141 MMOL/L (ref 136–145)

## 2024-06-24 PROCEDURE — 2580000003 HC RX 258: Performed by: INTERNAL MEDICINE

## 2024-06-24 PROCEDURE — 6370000000 HC RX 637 (ALT 250 FOR IP): Performed by: PEDIATRICS

## 2024-06-24 PROCEDURE — 1200000000 HC SEMI PRIVATE

## 2024-06-24 PROCEDURE — 6370000000 HC RX 637 (ALT 250 FOR IP): Performed by: PHYSICIAN ASSISTANT

## 2024-06-24 PROCEDURE — 6370000000 HC RX 637 (ALT 250 FOR IP): Performed by: INTERNAL MEDICINE

## 2024-06-24 PROCEDURE — 94760 N-INVAS EAR/PLS OXIMETRY 1: CPT

## 2024-06-24 PROCEDURE — 80048 BASIC METABOLIC PNL TOTAL CA: CPT

## 2024-06-24 PROCEDURE — 2580000003 HC RX 258: Performed by: PEDIATRICS

## 2024-06-24 PROCEDURE — 6370000000 HC RX 637 (ALT 250 FOR IP): Performed by: NURSE PRACTITIONER

## 2024-06-24 PROCEDURE — 70551 MRI BRAIN STEM W/O DYE: CPT

## 2024-06-24 PROCEDURE — 36415 COLL VENOUS BLD VENIPUNCTURE: CPT

## 2024-06-24 RX ORDER — NIFEDIPINE 30 MG/1
60 TABLET, EXTENDED RELEASE ORAL NIGHTLY
Status: DISCONTINUED | OUTPATIENT
Start: 2024-06-24 | End: 2024-06-27 | Stop reason: HOSPADM

## 2024-06-24 RX ADMIN — ASPIRIN 81 MG: 81 TABLET, COATED ORAL at 07:57

## 2024-06-24 RX ADMIN — ROSUVASTATIN CALCIUM 10 MG: 10 TABLET, FILM COATED ORAL at 21:36

## 2024-06-24 RX ADMIN — SODIUM CHLORIDE, PRESERVATIVE FREE 10 ML: 5 INJECTION INTRAVENOUS at 07:59

## 2024-06-24 RX ADMIN — NIFEDIPINE 60 MG: 30 TABLET, EXTENDED RELEASE ORAL at 21:36

## 2024-06-24 RX ADMIN — SODIUM CHLORIDE, PRESERVATIVE FREE 10 ML: 5 INJECTION INTRAVENOUS at 21:37

## 2024-06-24 RX ADMIN — Medication 1 TABLET: at 21:36

## 2024-06-24 RX ADMIN — INSULIN GLARGINE 35 UNITS: 100 INJECTION, SOLUTION SUBCUTANEOUS at 07:59

## 2024-06-24 RX ADMIN — CARVEDILOL 25 MG: 25 TABLET, FILM COATED ORAL at 07:57

## 2024-06-24 RX ADMIN — SODIUM CHLORIDE, PRESERVATIVE FREE 10 ML: 5 INJECTION INTRAVENOUS at 08:01

## 2024-06-24 RX ADMIN — INSULIN LISPRO 1 UNITS: 100 INJECTION, SOLUTION INTRAVENOUS; SUBCUTANEOUS at 16:42

## 2024-06-24 RX ADMIN — CARVEDILOL 25 MG: 25 TABLET, FILM COATED ORAL at 16:42

## 2024-06-24 RX ADMIN — AMLODIPINE BESYLATE 10 MG: 5 TABLET ORAL at 07:57

## 2024-06-24 RX ADMIN — INSULIN LISPRO 10 UNITS: 100 INJECTION, SOLUTION INTRAVENOUS; SUBCUTANEOUS at 16:42

## 2024-06-24 RX ADMIN — CHLORTHALIDONE 25 MG: 25 TABLET ORAL at 07:57

## 2024-06-24 RX ADMIN — INSULIN LISPRO 10 UNITS: 100 INJECTION, SOLUTION INTRAVENOUS; SUBCUTANEOUS at 07:59

## 2024-06-24 RX ADMIN — VALSARTAN 320 MG: 160 TABLET ORAL at 07:57

## 2024-06-24 RX ADMIN — Medication 400 UNITS: at 21:36

## 2024-06-24 ASSESSMENT — PAIN SCALES - GENERAL
PAINLEVEL_OUTOF10: 0
PAINLEVEL_OUTOF10: 0

## 2024-06-24 NOTE — PLAN OF CARE
Problem: Safety - Adult  Goal: Free from fall injury  6/23/2024 2058 by Beatrice Morris, RN  Outcome: Progressing

## 2024-06-24 NOTE — PLAN OF CARE
Problem: Discharge Planning  Goal: Discharge to home or other facility with appropriate resources  Outcome: Progressing     Problem: Pain  Goal: Verbalizes/displays adequate comfort level or baseline comfort level  Outcome: Progressing     Problem: Safety - Adult  Goal: Free from fall injury  6/24/2024 0909 by Rosa Haddad, RN  Outcome: Progressing  6/23/2024 2058 by Beatrice Morris, RN  Outcome: Progressing     Problem: ABCDS Injury Assessment  Goal: Absence of physical injury  Outcome: Progressing     Problem: Chronic Conditions and Co-morbidities  Goal: Patient's chronic conditions and co-morbidity symptoms are monitored and maintained or improved  Outcome: Progressing     Problem: Anxiety  Goal: Will report anxiety at manageable levels  Description: INTERVENTIONS:  1. Administer medication as ordered  2. Teach and rehearse alternative coping skills  3. Provide emotional support with 1:1 interaction with staff  Outcome: Progressing     Problem: Behavior  Goal: Pt/Family maintain appropriate behavior and adhere to behavioral management agreement, if implemented  Description: INTERVENTIONS:  1. Assess patient/family's coping skills and  non-compliant behavior (including use of illegal substances)  2. Notify security of behavior or suspected illegal substances which indicate the need for search of the family and/or belongings  3. Encourage verbalization of thoughts and concerns in a socially appropriate manner  4. Utilize positive, consistent limit setting strategies supporting safety of patient, staff and others  5. Encourage participation in the decision making process about the behavioral management agreement  6. If a visitor's behavior poses a threat to safety call refer to organization policy.  7. Initiate consult with , Psychosocial CNS, Spiritual Care as appropriate  Outcome: Progressing     Problem: Neurosensory - Adult  Goal: Achieves stable or improved neurological

## 2024-06-24 NOTE — PROGRESS NOTES
Nephrology progress Note  [phone]  [phone]   [employer]       Patient:  Galina Nick   : 1965    Brief HPI    The patient is a 58 y.o.female with significant past medical history of Hypertension, renal artery stenosis,  DM II came in with palpitations  Found to have a blood pressure of 170/90 on presentation    Prior hospitalization for hypertensive crisis, required nicardipine gtt. during that admission  During another prior hospitalization, workup showed bilateral renal artery stenosis, seen by vascular surgery, no intervention recommended    Subjective/Interval history    Pt seen and examined  Blood pressures not very well-controlled  Denies chest pain or shortness of breath  Complains of blurry vision  No visitors at the bedside      Meds:  Scheduled Meds:   insulin lispro  10 Units SubCUTAneous TID WC    carvedilol  25 mg Oral BID WC    chlorthalidone  25 mg Oral Daily    potassium chloride  20 mEq Oral Once    rosuvastatin  10 mg Oral Nightly    valsartan  320 mg Oral Daily    insulin lispro  0-4 Units SubCUTAneous TID WC    insulin lispro  0-4 Units SubCUTAneous Nightly    sodium chloride flush  5-40 mL IntraVENous 2 times per day    insulin glargine  35 Units SubCUTAneous Daily    amLODIPine  10 mg Oral Daily    sodium chloride flush  5-40 mL IntraVENous 2 times per day    enoxaparin  40 mg SubCUTAneous Daily    aspirin  81 mg Oral Daily    cholecalciferol  400 Units Oral Nightly    therapeutic multivitamin-minerals  1 tablet Oral Nightly     Continuous Infusions:   sodium chloride      dextrose      sodium chloride       PRN Meds:.sodium chloride flush, sodium chloride, acetaminophen, oxyCODONE-acetaminophen **OR** oxyCODONE-acetaminophen, dextrose bolus **OR** dextrose bolus, glucagon (rDNA), dextrose, sodium chloride flush, sodium chloride, potassium chloride **OR** potassium alternative oral replacement **OR** potassium chloride, magnesium sulfate, ondansetron **OR**

## 2024-06-24 NOTE — TELEPHONE ENCOUNTER
Pt called to ask if [facility] will call her to touch bases with her re: the results as when he came in the room after her LHC on 06/21 she was asleep.  Please advise.  Thank you

## 2024-06-24 NOTE — PROGRESS NOTES
[facility]    6/24/2024 12:26 PM        Name: Galina Nick .              Admitted: 6/18/2024  Primary Care Provider: Tristan Lei MD (Tel: [phone])      Subjective:  .  Patient feeling ok. Has had some blurred vision since yesterday. She had similar symptoms when she started previous b blocker. She does not want insulin at [place].      Reviewed interval ancillary notes    Current Medications  NIFEdipine (PROCARDIA XL) extended release tablet 60 mg, Nightly  insulin lispro (HUMALOG,ADMELOG) injection vial 10 Units, TID WC  carvedilol (COREG) tablet 25 mg, BID WC  chlorthalidone (HYGROTON) tablet 25 mg, Daily  potassium chloride (KLOR-CON M) extended release tablet 20 mEq, Once  rosuvastatin (CRESTOR) tablet 10 mg, Nightly  valsartan (DIOVAN) tablet 320 mg, Daily  insulin lispro (HUMALOG,ADMELOG) injection vial 0-4 Units, TID WC  insulin lispro (HUMALOG,ADMELOG) injection vial 0-4 Units, Nightly  sodium chloride flush 0.9 % injection 5-40 mL, 2 times per day  sodium chloride flush 0.9 % injection 5-40 mL, PRN  0.9 % sodium chloride infusion, PRN  acetaminophen (TYLENOL) tablet 650 mg, Q4H PRN  oxyCODONE-acetaminophen (PERCOCET) 5-325 MG per tablet 1 tablet, Q4H PRN   Or  oxyCODONE-acetaminophen (PERCOCET) 5-325 MG per tablet 2 tablet, Q4H PRN  insulin glargine (LANTUS) injection vial 35 Units, Daily  dextrose bolus 10% 125 mL, PRN   Or  dextrose bolus 10% 250 mL, PRN  glucagon injection 1 mg, PRN  dextrose 10 % infusion, Continuous PRN  sodium chloride flush 0.9 % injection 5-40 mL, 2 times per day  sodium chloride flush 0.9 % injection 5-40 mL, PRN  0.9 % sodium chloride infusion, PRN  potassium chloride (KLOR-CON M) extended release tablet 40 mEq, PRN   Or  potassium bicarb-citric acid (EFFER-K) effervescent tablet 40 mEq, PRN   Or  potassium chloride 10 mEq/100 mL IVPB (Peripheral Line), PRN  magnesium sulfate 2000 mg in 50 mL IVPB premix, PRN  enoxaparin

## 2024-06-25 LAB
ANION GAP SERPL CALCULATED.3IONS-SCNC: 8 MMOL/L (ref 3–16)
BASOPHILS # BLD: 0.1 K/UL (ref 0–0.2)
BASOPHILS NFR BLD: 1.5 %
BUN SERPL-MCNC: 29 MG/DL (ref 7–20)
CALCIUM SERPL-MCNC: 8.3 MG/DL (ref 8.3–10.6)
CHLORIDE SERPL-SCNC: 110 MMOL/L (ref 99–110)
CO2 SERPL-SCNC: 24 MMOL/L (ref 21–32)
CREAT SERPL-MCNC: 2.2 MG/DL (ref 0.6–1.1)
DEPRECATED RDW RBC AUTO: 15 % (ref 12.4–15.4)
EOSINOPHIL # BLD: 0.3 K/UL (ref 0–0.6)
EOSINOPHIL NFR BLD: 3.7 %
GFR SERPLBLD CREATININE-BSD FMLA CKD-EPI: 26 ML/MIN/{1.73_M2}
GLUCOSE BLD-MCNC: 146 MG/DL (ref 70–99)
GLUCOSE BLD-MCNC: 209 MG/DL (ref 70–99)
GLUCOSE BLD-MCNC: 224 MG/DL (ref 70–99)
GLUCOSE BLD-MCNC: 244 MG/DL (ref 70–99)
GLUCOSE SERPL-MCNC: 206 MG/DL (ref 70–99)
HCT VFR BLD AUTO: 26.6 % (ref 36–48)
HGB BLD-MCNC: 8.9 G/DL (ref 12–16)
LYMPHOCYTES # BLD: 1.5 K/UL (ref 1–5.1)
LYMPHOCYTES NFR BLD: 21.4 %
MCH RBC QN AUTO: 27.2 PG (ref 26–34)
MCHC RBC AUTO-ENTMCNC: 33.4 G/DL (ref 31–36)
MCV RBC AUTO: 81.4 FL (ref 80–100)
MONOCYTES # BLD: 0.5 K/UL (ref 0–1.3)
MONOCYTES NFR BLD: 7.8 %
NEUTROPHILS # BLD: 4.6 K/UL (ref 1.7–7.7)
NEUTROPHILS NFR BLD: 65.6 %
PERFORMED ON: ABNORMAL
PLATELET # BLD AUTO: 227 K/UL (ref 135–450)
PMV BLD AUTO: 8.9 FL (ref 5–10.5)
POTASSIUM SERPL-SCNC: 4.2 MMOL/L (ref 3.5–5.1)
RBC # BLD AUTO: 3.27 M/UL (ref 4–5.2)
SODIUM SERPL-SCNC: 142 MMOL/L (ref 136–145)
WBC # BLD AUTO: 7 K/UL (ref 4–11)

## 2024-06-25 PROCEDURE — 1200000000 HC SEMI PRIVATE

## 2024-06-25 PROCEDURE — 80048 BASIC METABOLIC PNL TOTAL CA: CPT

## 2024-06-25 PROCEDURE — 85025 COMPLETE CBC W/AUTO DIFF WBC: CPT

## 2024-06-25 PROCEDURE — 6370000000 HC RX 637 (ALT 250 FOR IP): Performed by: NURSE PRACTITIONER

## 2024-06-25 PROCEDURE — 94760 N-INVAS EAR/PLS OXIMETRY 1: CPT

## 2024-06-25 PROCEDURE — 6370000000 HC RX 637 (ALT 250 FOR IP): Performed by: PEDIATRICS

## 2024-06-25 PROCEDURE — 6370000000 HC RX 637 (ALT 250 FOR IP): Performed by: INTERNAL MEDICINE

## 2024-06-25 PROCEDURE — 2580000003 HC RX 258: Performed by: PEDIATRICS

## 2024-06-25 PROCEDURE — 2580000003 HC RX 258: Performed by: INTERNAL MEDICINE

## 2024-06-25 PROCEDURE — 36415 COLL VENOUS BLD VENIPUNCTURE: CPT

## 2024-06-25 PROCEDURE — 99233 SBSQ HOSP IP/OBS HIGH 50: CPT | Performed by: INTERNAL MEDICINE

## 2024-06-25 PROCEDURE — 6370000000 HC RX 637 (ALT 250 FOR IP): Performed by: PHYSICIAN ASSISTANT

## 2024-06-25 RX ORDER — INSULIN LISPRO 100 [IU]/ML
0-4 INJECTION, SOLUTION INTRAVENOUS; SUBCUTANEOUS NIGHTLY
Status: DISCONTINUED | OUTPATIENT
Start: 2024-06-25 | End: 2024-06-27 | Stop reason: HOSPADM

## 2024-06-25 RX ORDER — INSULIN LISPRO 100 [IU]/ML
0-8 INJECTION, SOLUTION INTRAVENOUS; SUBCUTANEOUS
Status: DISCONTINUED | OUTPATIENT
Start: 2024-06-25 | End: 2024-06-27 | Stop reason: HOSPADM

## 2024-06-25 RX ADMIN — CARVEDILOL 25 MG: 25 TABLET, FILM COATED ORAL at 15:58

## 2024-06-25 RX ADMIN — CHLORTHALIDONE 25 MG: 25 TABLET ORAL at 08:38

## 2024-06-25 RX ADMIN — INSULIN LISPRO 10 UNITS: 100 INJECTION, SOLUTION INTRAVENOUS; SUBCUTANEOUS at 08:38

## 2024-06-25 RX ADMIN — ROSUVASTATIN CALCIUM 10 MG: 10 TABLET, FILM COATED ORAL at 20:24

## 2024-06-25 RX ADMIN — INSULIN LISPRO 10 UNITS: 100 INJECTION, SOLUTION INTRAVENOUS; SUBCUTANEOUS at 17:53

## 2024-06-25 RX ADMIN — VALSARTAN 320 MG: 160 TABLET ORAL at 08:38

## 2024-06-25 RX ADMIN — SODIUM CHLORIDE, PRESERVATIVE FREE 10 ML: 5 INJECTION INTRAVENOUS at 20:25

## 2024-06-25 RX ADMIN — CARVEDILOL 25 MG: 25 TABLET, FILM COATED ORAL at 08:38

## 2024-06-25 RX ADMIN — INSULIN LISPRO 2 UNITS: 100 INJECTION, SOLUTION INTRAVENOUS; SUBCUTANEOUS at 17:53

## 2024-06-25 RX ADMIN — SODIUM CHLORIDE, PRESERVATIVE FREE 10 ML: 5 INJECTION INTRAVENOUS at 08:39

## 2024-06-25 RX ADMIN — NIFEDIPINE 60 MG: 30 TABLET, EXTENDED RELEASE ORAL at 20:24

## 2024-06-25 RX ADMIN — ASPIRIN 81 MG: 81 TABLET, COATED ORAL at 08:38

## 2024-06-25 RX ADMIN — INSULIN LISPRO 1 UNITS: 100 INJECTION, SOLUTION INTRAVENOUS; SUBCUTANEOUS at 08:38

## 2024-06-25 RX ADMIN — Medication 400 UNITS: at 20:24

## 2024-06-25 RX ADMIN — Medication 1 TABLET: at 20:24

## 2024-06-25 RX ADMIN — INSULIN GLARGINE 35 UNITS: 100 INJECTION, SOLUTION SUBCUTANEOUS at 08:39

## 2024-06-25 ASSESSMENT — PAIN SCALES - GENERAL
PAINLEVEL_OUTOF10: 0

## 2024-06-25 NOTE — PROGRESS NOTES
[facility]    6/25/2024 2:52 PM        Name: Galina Nick .              Admitted: 6/18/2024  Primary Care Provider: Tristan Lei MD (Tel: [phone])      Subjective:  .  Patient feeling ok.  She tells me she did not stop nifedipine on her own.  It was discontinued by cardiology.  Blood pressure is better today.    Reviewed interval ancillary notes    Current Medications  insulin lispro (HUMALOG,ADMELOG) injection vial 0-8 Units, TID WC  insulin lispro (HUMALOG,ADMELOG) injection vial 0-4 Units, Nightly  NIFEdipine (PROCARDIA XL) extended release tablet 60 mg, Nightly  insulin lispro (HUMALOG,ADMELOG) injection vial 10 Units, TID WC  carvedilol (COREG) tablet 25 mg, BID WC  chlorthalidone (HYGROTON) tablet 25 mg, Daily  potassium chloride (KLOR-CON M) extended release tablet 20 mEq, Once  rosuvastatin (CRESTOR) tablet 10 mg, Nightly  valsartan (DIOVAN) tablet 320 mg, Daily  sodium chloride flush 0.9 % injection 5-40 mL, 2 times per day  sodium chloride flush 0.9 % injection 5-40 mL, PRN  0.9 % sodium chloride infusion, PRN  acetaminophen (TYLENOL) tablet 650 mg, Q4H PRN  oxyCODONE-acetaminophen (PERCOCET) 5-325 MG per tablet 1 tablet, Q4H PRN   Or  oxyCODONE-acetaminophen (PERCOCET) 5-325 MG per tablet 2 tablet, Q4H PRN  insulin glargine (LANTUS) injection vial 35 Units, Daily  dextrose bolus 10% 125 mL, PRN   Or  dextrose bolus 10% 250 mL, PRN  glucagon injection 1 mg, PRN  dextrose 10 % infusion, Continuous PRN  sodium chloride flush 0.9 % injection 5-40 mL, 2 times per day  sodium chloride flush 0.9 % injection 5-40 mL, PRN  0.9 % sodium chloride infusion, PRN  potassium chloride (KLOR-CON M) extended release tablet 40 mEq, PRN   Or  potassium bicarb-citric acid (EFFER-K) effervescent tablet 40 mEq, PRN   Or  potassium chloride 10 mEq/100 mL IVPB (Peripheral Line), PRN  magnesium sulfate 2000 mg in 50 mL IVPB premix, PRN  enoxaparin (LOVENOX) injection 40

## 2024-06-25 NOTE — PROGRESS NOTES
Nephrology progress Note  [phone]  [phone]   [employer]       Patient:  Galina Nick   : 1965    Brief HPI    The patient is a 58 y.o.female with significant past medical history of Hypertension, renal artery stenosis,  DM II came in with palpitations  Found to have a blood pressure of 170/90 on presentation    Prior hospitalization for hypertensive crisis, required nicardipine gtt. during that admission  During another prior hospitalization, workup showed bilateral renal artery stenosis, seen by vascular surgery, no intervention recommended    Subjective/Interval history    Pt seen and examined  Creatinine worse  Blood pressures well-controlled  Has been afebrile  On room air    Meds:  Scheduled Meds:   NIFEdipine  60 mg Oral Nightly    insulin lispro  10 Units SubCUTAneous TID WC    carvedilol  25 mg Oral BID WC    chlorthalidone  25 mg Oral Daily    potassium chloride  20 mEq Oral Once    rosuvastatin  10 mg Oral Nightly    valsartan  320 mg Oral Daily    insulin lispro  0-4 Units SubCUTAneous TID WC    insulin lispro  0-4 Units SubCUTAneous Nightly    sodium chloride flush  5-40 mL IntraVENous 2 times per day    insulin glargine  35 Units SubCUTAneous Daily    sodium chloride flush  5-40 mL IntraVENous 2 times per day    enoxaparin  40 mg SubCUTAneous Daily    aspirin  81 mg Oral Daily    cholecalciferol  400 Units Oral Nightly    therapeutic multivitamin-minerals  1 tablet Oral Nightly     Continuous Infusions:   sodium chloride      dextrose      sodium chloride       PRN Meds:.sodium chloride flush, sodium chloride, acetaminophen, oxyCODONE-acetaminophen **OR** oxyCODONE-acetaminophen, dextrose bolus **OR** dextrose bolus, glucagon (rDNA), dextrose, sodium chloride flush, sodium chloride, potassium chloride **OR** potassium alternative oral replacement **OR** potassium chloride, magnesium sulfate, ondansetron **OR** ondansetron, polyethylene glycol      Vitals:  /67   Pulse 74  hypertension  Renal artery stenosis was suspected in the past but CT angiogram in February 2024 with no evidence of significant renal artery stenosis  Suspect noncompliance  Prior workup in January 2024 with normal TSH, normal PTH, normal ARR, normal plasma metanephrines  Amlodipine 5 mg p.o. daily, chlorthalidone 25 mg p.o. daily, metoprolol XL 50 mg daily and valsartan 320 mg daily listed in home medications  Has been on amlodipine 10 mg p.o. daily and valsartan 320 mg p.o. daily  Metoprolol has been switched to carvedilol  Chlorthalidone resumed on 6/23  As per previous notes, she was supposed to be on nifedipine, unclear why she is on amlodipine at this time  changed to nifedipine XL 30 mg p.o. daily    CKD stage IV  Risk factors include DM II, HTN  Creatinine has been stable since admission     DM II  HBA1Cs have been upto 14s in the 2019, lately better controlled    Discussed with DANNIE Portillo MD  6/25/2024  Office Phone : [phone]    Thank you for allowing us to participate in the care of this pt. I will continue to follow along. Please call with questions or concerns.

## 2024-06-25 NOTE — PROGRESS NOTES
[facility] Daily Progress Note      Admit Date:  6/18/2024      Cardiology consult: Palpitations    Subjective:  Ms. Nick is well known to me. She is chest pain free, no overnight events. Her blood pressure has improved today.       History of present illness:   Galina Nick has a  PMH of severe hypertension, diabetes mellitus, renal artery stenosis, CAD who presented to the hospital with complaints of nearly passing out.  Sunday had an episode of feeling light headed as though she was going to pass out. Had another episode yesterday.  Felt her heart racing with yesterday's episode.  Patient is well-known to me.     Objective:   /67   Pulse 68   Temp 97.6 °F (36.4 °C) (Temporal)   Resp 18   Ht 1.727 m (5' 8\")   Wt 87.5 kg (192 lb 12.8 oz)   LMP 06/01/2011   SpO2 99%   BMI 29.32 kg/m²     Intake/Output Summary (Last 24 hours) at 6/25/2024 1658  Last data filed at 6/25/2024 0631  Gross per 24 hour   Intake 480 ml   Output --   Net 480 ml         Physical Exam:  General:  Awake, alert, oriented x 3, NAD  Skin:  Warm and dry  Neck:  JVD normal  Chest:  normal air entry  Cardiovascular:  RRR S1S2, no S3, 2/6 systolic at 2nd left intercostal space  Abdomen:  Soft, ND, NT, No HSM  Extremities:  No edema    Medications:    insulin lispro  0-8 Units SubCUTAneous TID WC    insulin lispro  0-4 Units SubCUTAneous Nightly    NIFEdipine  60 mg Oral Nightly    insulin lispro  10 Units SubCUTAneous TID WC    carvedilol  25 mg Oral BID WC    chlorthalidone  25 mg Oral Daily    potassium chloride  20 mEq Oral Once    rosuvastatin  10 mg Oral Nightly    valsartan  320 mg Oral Daily    sodium chloride flush  5-40 mL IntraVENous 2 times per day    insulin glargine  35 Units SubCUTAneous Daily    sodium chloride flush  5-40 mL IntraVENous 2 times per day    enoxaparin  40 mg SubCUTAneous Daily    aspirin  81 mg Oral Daily    cholecalciferol  400 Units Oral Nightly    therapeutic multivitamin-minerals  1

## 2024-06-25 NOTE — PROGRESS NOTES
[facility] Daily Progress Note      Admit Date:  6/18/2024      Cardiology consult: Palpitations    Subjective:  Ms. Nick is well known to me. She is chest pain free, no overnight events. Her blood pressure has improved today.       History of present illness:   Galina Nick has a  PMH of severe hypertension, diabetes mellitus, renal artery stenosis, CAD who presented to the hospital with complaints of nearly passing out.  Sunday had an episode of feeling light headed as though she was going to pass out. Had another episode yesterday.  Felt her heart racing with yesterday's episode.  Patient is well-known to me.     Objective:   /67   Pulse 74   Temp 97.6 °F (36.4 °C) (Temporal)   Resp 17   Ht 1.727 m (5' 8\")   Wt 87.5 kg (192 lb 12.8 oz)   LMP 06/01/2011   SpO2 99%   BMI 29.32 kg/m²     Intake/Output Summary (Last 24 hours) at 6/25/2024 0824  Last data filed at 6/25/2024 0631  Gross per 24 hour   Intake 1680 ml   Output 900 ml   Net 780 ml       Physical Exam:  General:  Awake, alert, oriented x 3, NAD  Skin:  Warm and dry  Neck:  JVD normal  Chest:  normal air entry  Cardiovascular:  RRR S1S2, no S3, 2/6 systolic at 2nd left intercostal space  Abdomen:  Soft, ND, NT, No HSM  Extremities:  No edema    Medications:    NIFEdipine  60 mg Oral Nightly    insulin lispro  10 Units SubCUTAneous TID WC    carvedilol  25 mg Oral BID WC    chlorthalidone  25 mg Oral Daily    potassium chloride  20 mEq Oral Once    rosuvastatin  10 mg Oral Nightly    valsartan  320 mg Oral Daily    insulin lispro  0-4 Units SubCUTAneous TID WC    insulin lispro  0-4 Units SubCUTAneous Nightly    sodium chloride flush  5-40 mL IntraVENous 2 times per day    insulin glargine  35 Units SubCUTAneous Daily    sodium chloride flush  5-40 mL IntraVENous 2 times per day    enoxaparin  40 mg SubCUTAneous Daily    aspirin  81 mg Oral Daily    cholecalciferol  400 Units Oral Nightly    therapeutic multivitamin-minerals  1  of mildly reduced perfusion in the basal and mid inferior and inferolateral segment at rest with improvement at stress. There is a second small area of mildly reduced perfusion in the mid anteroseptal segment at rest with improvement at rest. Both defects appear consistent  with artifact.  No evidence of ischemia Overall:  There is no evidence of ischemia on perfusion imaging  Systolic blood pressure dropped form 183 to 148 with submaximal exercise and patient reported significant fatigue and shortness of breath. High risk study due to significant SBP decrease with exercise.    Cardiac cath: 6/18/24 (Jacob)  ANGIOGRAM/CORONARY ARTERIOGRAM:     Dominance nml   Left main artery nml   Left anterior descending artery Prox 60%   Diagonals nml   Left circumflex artery Mid 60%   Obtuse Marginals nml      Right coronary artery Prox 55%, mid 60%   Posterior descending artery Posterior lateral branch nml  nml         IMPRESSION/SUMMARY  ~Coronary Angiography w/ moderate nonobstructive CAD     RECOMMENDATION:  ~Aggressive medical treatment and risk factor modification  ~monitor overnight. IVF. Check bmp in the AM    Assessment/Plan:  Near syncope  Plan: no recurrence      Active Problems:    Hypertensive urgency  Plan: improved. 120/70s       JAYY (acute kidney injury) (HCC)  Plan: Acute on chronic.  Nephrology on board        Coronary artery disease due to lipid rich plaque  Plan: cardiac cath with moderate non-obst disease, aggressive medical therapy.        Renal artery stenosis (HCC)  Plan: Thought to be nonobstructive.  Consider renal artery at the time of cardiac cath if renal function permits       Dyslipidemia  Plan: High intensity statin for goal LDL less than 70.  Was on rosuvastatin; will resume.      Plan[facility] Blood pressure improved today. Discussed findings of cardiac cath. Will follow along until discharge.       Tiffanie Yanez RN 6/25/2024 8:24

## 2024-06-25 NOTE — PLAN OF CARE
Problem: Discharge Planning  Goal: Discharge to home or other facility with appropriate resources  Outcome: Progressing  Flowsheets (Taken 6/25/2024 1346)  Discharge to home or other facility with appropriate resources:   Arrange for needed discharge resources and transportation as appropriate   Identify barriers to discharge with patient and caregiver   Identify discharge learning needs (meds, wound care, etc)     Problem: Pain  Goal: Verbalizes/displays adequate comfort level or baseline comfort level  6/25/2024 1346 by Ricardo Jones RN  Outcome: Progressing  Flowsheets (Taken 6/25/2024 1346)  Verbalizes/displays adequate comfort level or baseline comfort level:   Encourage patient to monitor pain and request assistance   Assess pain using appropriate pain scale   Implement non-pharmacological measures as appropriate and evaluate response  6/25/2024 0456 by Claudia Velásquez RN  Outcome: Progressing     Problem: Safety - Adult  Goal: Free from fall injury  6/25/2024 1346 by Ricardo Jones RN  Outcome: Progressing  Flowsheets (Taken 6/25/2024 1346)  Free From Fall Injury:   Instruct family/caregiver on patient safety   Based on caregiver fall risk screen, instruct family/caregiver to ask for assistance with transferring infant if caregiver noted to have fall risk factors  6/25/2024 0456 by Claudia Velásquez RN  Outcome: Progressing     Problem: ABCDS Injury Assessment  Goal: Absence of physical injury  6/25/2024 1346 by Ricardo Jones RN  Outcome: Progressing  Flowsheets (Taken 6/25/2024 1346)  Absence of Physical Injury: Implement safety measures based on patient assessment  6/25/2024 0456 by Claudia Velásquez RN  Outcome: Progressing     Problem: Chronic Conditions and Co-morbidities  Goal: Patient's chronic conditions and co-morbidity symptoms are monitored and maintained or improved  Outcome: Progressing  Flowsheets (Taken 6/25/2024 1346)  Care Plan - Patient's Chronic Conditions and Co-Morbidity Symptoms

## 2024-06-26 LAB
ANION GAP SERPL CALCULATED.3IONS-SCNC: 9 MMOL/L (ref 3–16)
BUN SERPL-MCNC: 37 MG/DL (ref 7–20)
CALCIUM SERPL-MCNC: 8.2 MG/DL (ref 8.3–10.6)
CHLORIDE SERPL-SCNC: 112 MMOL/L (ref 99–110)
CO2 SERPL-SCNC: 22 MMOL/L (ref 21–32)
CREAT SERPL-MCNC: 2.5 MG/DL (ref 0.6–1.1)
GFR SERPLBLD CREATININE-BSD FMLA CKD-EPI: 22 ML/MIN/{1.73_M2}
GLUCOSE BLD-MCNC: 138 MG/DL (ref 70–99)
GLUCOSE BLD-MCNC: 144 MG/DL (ref 70–99)
GLUCOSE BLD-MCNC: 177 MG/DL (ref 70–99)
GLUCOSE BLD-MCNC: 221 MG/DL (ref 70–99)
GLUCOSE SERPL-MCNC: 181 MG/DL (ref 70–99)
PERFORMED ON: ABNORMAL
POTASSIUM SERPL-SCNC: 4.2 MMOL/L (ref 3.5–5.1)
SODIUM SERPL-SCNC: 143 MMOL/L (ref 136–145)

## 2024-06-26 PROCEDURE — 99232 SBSQ HOSP IP/OBS MODERATE 35: CPT | Performed by: INTERNAL MEDICINE

## 2024-06-26 PROCEDURE — 6370000000 HC RX 637 (ALT 250 FOR IP): Performed by: INTERNAL MEDICINE

## 2024-06-26 PROCEDURE — 6370000000 HC RX 637 (ALT 250 FOR IP): Performed by: NURSE PRACTITIONER

## 2024-06-26 PROCEDURE — 2580000003 HC RX 258: Performed by: PEDIATRICS

## 2024-06-26 PROCEDURE — 1200000000 HC SEMI PRIVATE

## 2024-06-26 PROCEDURE — 36415 COLL VENOUS BLD VENIPUNCTURE: CPT

## 2024-06-26 PROCEDURE — 6370000000 HC RX 637 (ALT 250 FOR IP): Performed by: PEDIATRICS

## 2024-06-26 PROCEDURE — 6370000000 HC RX 637 (ALT 250 FOR IP): Performed by: PHYSICIAN ASSISTANT

## 2024-06-26 PROCEDURE — 2580000003 HC RX 258: Performed by: INTERNAL MEDICINE

## 2024-06-26 PROCEDURE — 80048 BASIC METABOLIC PNL TOTAL CA: CPT

## 2024-06-26 PROCEDURE — 94760 N-INVAS EAR/PLS OXIMETRY 1: CPT

## 2024-06-26 RX ORDER — CHLORTHALIDONE 25 MG/1
25 TABLET ORAL DAILY
Status: DISCONTINUED | OUTPATIENT
Start: 2024-06-26 | End: 2024-06-27 | Stop reason: HOSPADM

## 2024-06-26 RX ORDER — ENOXAPARIN SODIUM 100 MG/ML
30 INJECTION SUBCUTANEOUS DAILY
Status: DISCONTINUED | OUTPATIENT
Start: 2024-06-27 | End: 2024-06-27 | Stop reason: HOSPADM

## 2024-06-26 RX ADMIN — ROSUVASTATIN CALCIUM 10 MG: 10 TABLET, FILM COATED ORAL at 20:46

## 2024-06-26 RX ADMIN — SODIUM CHLORIDE, PRESERVATIVE FREE 10 ML: 5 INJECTION INTRAVENOUS at 08:23

## 2024-06-26 RX ADMIN — INSULIN LISPRO 2 UNITS: 100 INJECTION, SOLUTION INTRAVENOUS; SUBCUTANEOUS at 17:25

## 2024-06-26 RX ADMIN — INSULIN GLARGINE 35 UNITS: 100 INJECTION, SOLUTION SUBCUTANEOUS at 08:22

## 2024-06-26 RX ADMIN — Medication 400 UNITS: at 20:45

## 2024-06-26 RX ADMIN — ASPIRIN 81 MG: 81 TABLET, COATED ORAL at 08:22

## 2024-06-26 RX ADMIN — CARVEDILOL 25 MG: 25 TABLET, FILM COATED ORAL at 17:24

## 2024-06-26 RX ADMIN — SODIUM CHLORIDE, PRESERVATIVE FREE 10 ML: 5 INJECTION INTRAVENOUS at 20:46

## 2024-06-26 RX ADMIN — NIFEDIPINE 60 MG: 30 TABLET, EXTENDED RELEASE ORAL at 20:46

## 2024-06-26 RX ADMIN — INSULIN LISPRO 10 UNITS: 100 INJECTION, SOLUTION INTRAVENOUS; SUBCUTANEOUS at 17:25

## 2024-06-26 RX ADMIN — INSULIN LISPRO 10 UNITS: 100 INJECTION, SOLUTION INTRAVENOUS; SUBCUTANEOUS at 08:22

## 2024-06-26 RX ADMIN — Medication 1 TABLET: at 20:45

## 2024-06-26 RX ADMIN — CARVEDILOL 25 MG: 25 TABLET, FILM COATED ORAL at 08:23

## 2024-06-26 RX ADMIN — CHLORTHALIDONE 25 MG: 25 TABLET ORAL at 12:35

## 2024-06-26 ASSESSMENT — PAIN SCALES - GENERAL
PAINLEVEL_OUTOF10: 0
PAINLEVEL_OUTOF10: 0
PAINLEVEL_OUTOF10: 1
PAINLEVEL_OUTOF10: 0
PAINLEVEL_OUTOF10: 0

## 2024-06-26 NOTE — PROGRESS NOTES
Nephrology progress Note  [phone]  [phone]   Replenish.[facility]       Patient:  Galina Nick   : 1965    Brief HPI    The patient is a 58 y.o.female with significant past medical history of Hypertension, renal artery stenosis,  DM II came in with palpitations  Found to have a blood pressure of 170/90 on presentation    Prior hospitalization for hypertensive crisis, required nicardipine gtt. during that admission  During another prior hospitalization, workup showed bilateral renal artery stenosis, seen by vascular surgery, no intervention recommended    Subjective/Interval history    Pt seen and examined  Creatinine continues to worsen    Meds:  Scheduled Meds:   [START ON 2024] enoxaparin  30 mg SubCUTAneous Daily    insulin lispro  0-8 Units SubCUTAneous TID WC    insulin lispro  0-4 Units SubCUTAneous Nightly    NIFEdipine  60 mg Oral Nightly    insulin lispro  10 Units SubCUTAneous TID WC    carvedilol  25 mg Oral BID WC    [Held by provider] chlorthalidone  25 mg Oral Daily    rosuvastatin  10 mg Oral Nightly    [Held by provider] valsartan  320 mg Oral Daily    sodium chloride flush  5-40 mL IntraVENous 2 times per day    insulin glargine  35 Units SubCUTAneous Daily    sodium chloride flush  5-40 mL IntraVENous 2 times per day    aspirin  81 mg Oral Daily    cholecalciferol  400 Units Oral Nightly    therapeutic multivitamin-minerals  1 tablet Oral Nightly     Continuous Infusions:   sodium chloride      dextrose      sodium chloride       PRN Meds:.sodium chloride flush, sodium chloride, acetaminophen, oxyCODONE-acetaminophen **OR** oxyCODONE-acetaminophen, dextrose bolus **OR** dextrose bolus, glucagon (rDNA), dextrose, sodium chloride flush, sodium chloride, potassium chloride **OR** potassium alternative oral replacement **OR** potassium chloride, magnesium sulfate, ondansetron **OR** ondansetron, polyethylene glycol      Vitals:  /67   Pulse 67   Temp 97.2 °F (36.2 °C)

## 2024-06-26 NOTE — PROGRESS NOTES
[facility]    6/26/2024 10:43 AM        Name: Galina Nick .              Admitted: 6/18/2024  Primary Care Provider: Tristan Lei MD (Tel: [phone])      Subjective:  .  Patient feeling ok. No new complaints. Creat up to 2.5.     Reviewed interval ancillary notes    Current Medications  insulin lispro (HUMALOG,ADMELOG) injection vial 0-8 Units, TID WC  insulin lispro (HUMALOG,ADMELOG) injection vial 0-4 Units, Nightly  NIFEdipine (PROCARDIA XL) extended release tablet 60 mg, Nightly  insulin lispro (HUMALOG,ADMELOG) injection vial 10 Units, TID WC  carvedilol (COREG) tablet 25 mg, BID WC  [Held by provider] chlorthalidone (HYGROTON) tablet 25 mg, Daily  potassium chloride (KLOR-CON M) extended release tablet 20 mEq, Once  rosuvastatin (CRESTOR) tablet 10 mg, Nightly  [Held by provider] valsartan (DIOVAN) tablet 320 mg, Daily  sodium chloride flush 0.9 % injection 5-40 mL, 2 times per day  sodium chloride flush 0.9 % injection 5-40 mL, PRN  0.9 % sodium chloride infusion, PRN  acetaminophen (TYLENOL) tablet 650 mg, Q4H PRN  oxyCODONE-acetaminophen (PERCOCET) 5-325 MG per tablet 1 tablet, Q4H PRN   Or  oxyCODONE-acetaminophen (PERCOCET) 5-325 MG per tablet 2 tablet, Q4H PRN  insulin glargine (LANTUS) injection vial 35 Units, Daily  dextrose bolus 10% 125 mL, PRN   Or  dextrose bolus 10% 250 mL, PRN  glucagon injection 1 mg, PRN  dextrose 10 % infusion, Continuous PRN  sodium chloride flush 0.9 % injection 5-40 mL, 2 times per day  sodium chloride flush 0.9 % injection 5-40 mL, PRN  0.9 % sodium chloride infusion, PRN  potassium chloride (KLOR-CON M) extended release tablet 40 mEq, PRN   Or  potassium bicarb-citric acid (EFFER-K) effervescent tablet 40 mEq, PRN   Or  potassium chloride 10 mEq/100 mL IVPB (Peripheral Line), PRN  magnesium sulfate 2000 mg in 50 mL IVPB premix, PRN  enoxaparin (LOVENOX) injection 40 mg, Daily  ondansetron (ZOFRAN-ODT)

## 2024-06-26 NOTE — PLAN OF CARE
Problem: Discharge Planning  Goal: Discharge to home or other facility with appropriate resources  Outcome: Progressing  Flowsheets (Taken 6/25/2024 1346)  Discharge to home or other facility with appropriate resources:   Arrange for needed discharge resources and transportation as appropriate   Identify barriers to discharge with patient and caregiver   Identify discharge learning needs (meds, wound care, etc)     Problem: Pain  Goal: Verbalizes/displays adequate comfort level or baseline comfort level  6/26/2024 0914 by Ricardo Jones RN  Outcome: Progressing  Flowsheets (Taken 6/25/2024 1346)  Verbalizes/displays adequate comfort level or baseline comfort level:   Encourage patient to monitor pain and request assistance   Assess pain using appropriate pain scale   Implement non-pharmacological measures as appropriate and evaluate response  6/26/2024 0319 by Claudia Velásquez RN  Outcome: Progressing     Problem: Safety - Adult  Goal: Free from fall injury  6/26/2024 0914 by Ricardo Jones RN  Outcome: Progressing  Flowsheets (Taken 6/25/2024 1346)  Free From Fall Injury:   Instruct family/caregiver on patient safety   Based on caregiver fall risk screen, instruct family/caregiver to ask for assistance with transferring infant if caregiver noted to have fall risk factors  6/26/2024 0319 by Claudia Velásquez RN  Outcome: Progressing     Problem: ABCDS Injury Assessment  Goal: Absence of physical injury  6/26/2024 0914 by Ricardo Jones RN  Outcome: Progressing  Flowsheets (Taken 6/25/2024 1346)  Absence of Physical Injury: Implement safety measures based on patient assessment  6/26/2024 0319 by Claudia Velásquez RN  Outcome: Progressing     Problem: Chronic Conditions and Co-morbidities  Goal: Patient's chronic conditions and co-morbidity symptoms are monitored and maintained or improved  Outcome: Progressing  Flowsheets (Taken 6/25/2024 1346)  Care Plan - Patient's Chronic Conditions and Co-Morbidity Symptoms

## 2024-06-26 NOTE — PLAN OF CARE
Problem: Pain  Goal: Verbalizes/displays adequate comfort level or baseline comfort level  6/26/2024 0319 by Claudia Velásquez RN  Outcome: Progressing     Problem: Safety - Adult  Goal: Free from fall injury  6/26/2024 0319 by Claudia Velásquez RN  Outcome: Progressing     Problem: ABCDS Injury Assessment  Goal: Absence of physical injury  6/26/2024 0319 by Claudia Velásquez, RN  Outcome: Progressing

## 2024-06-26 NOTE — PROGRESS NOTES
Nutrition Note    RECOMMENDATIONS  PO Diet: Decreased to 4 carb choices  ONS: Please order Glucerna or Ensure High Protein if po intakes of meals decline to <50% consistently     NUTRITION ASSESSMENT   Pt triggered for LOS assessment. On 5 carb choice diet with documented meal intakes of % throughout admission. Upon visiting, pt reported no issues with appetite or po intake both now or prior to current admission. Wt hx in EMR indicates 193 lb on 4/19 to 179 lb on 6/19 (14 lb or 7.3% wt loss in 2 months), wt today of 195 lb and pt is +6.4L per I/Os. Pt reported wt tends to fluctuate and thus hard to accurately assess if wt loss is r/t fluids vs inadequate po intake. RD will monitor for continued adequate po intake.     Nutrition Related Findings: Labs reviewed. POCG 177. LBM 6/25. No edema noted.  Wounds:  (MIGUEL A wound etiology x1)  Nutrition Education:  Education not indicated   Nutrition Goals: PO intake 75% or greater, by next RD assessment     MALNUTRITION ASSESSMENT   Acute Illness  Malnutrition Status: Insufficient data    NUTRITION DIAGNOSIS   No nutrition diagnosis at this time     CURRENT NUTRITION THERAPIES  ADULT DIET; Regular; 5 carb choices (75 gm/meal)     PO Intake: %   PO Supplement Intake:None Ordered    ANTHROPOMETRICS  Current Height: 172.7 cm (5' 8\")  Current Weight - Scale: 88.8 kg (195 lb 12.8 oz)    Admission weight: 81.6 kg (179 lb 14.3 oz)  Ideal Body Weight (IBW): 140 lbs  (64 kg)        BMI: 29.8    The patient will be monitored per nutrition standards of care. Consult dietitian if additional nutrition interventions are needed prior to RD reassessment.     Maura Thompson MS, RD, LD    Contact: [phone]

## 2024-06-26 NOTE — PROGRESS NOTES
[facility] Daily Progress Note      Admit Date:  6/18/2024      Cardiology consult: Palpitations    Subjective:  Ms. Nick is well known to me. She is chest pain free, no overnight events. Her blood pressure has improved today.       History of present illness:   Galina Nick has a  PMH of severe hypertension, diabetes mellitus, renal artery stenosis, CAD who presented to the hospital with complaints of nearly passing out.  Sunday had an episode of feeling light headed as though she was going to pass out. Had another episode yesterday.  Felt her heart racing with yesterday's episode.  Patient is well-known to me.     Objective:   /64   Pulse 59   Temp 97.5 °F (36.4 °C) (Temporal)   Resp 16   Ht 1.727 m (5' 8\")   Wt 88.8 kg (195 lb 12.8 oz)   LMP 06/01/2011   SpO2 98%   BMI 29.77 kg/m²     Intake/Output Summary (Last 24 hours) at 6/26/2024 1524  Last data filed at 6/26/2024 0407  Gross per 24 hour   Intake 700 ml   Output 700 ml   Net 0 ml       Physical Exam:  General:  Awake, alert, oriented x 3, NAD  Skin:  Warm and dry  Neck:  JVD normal  Chest:  normal air entry  Cardiovascular:  RRR S1S2, no S3, 2/6 systolic at 2nd left intercostal space  Abdomen:  Soft, ND, NT, No HSM  Extremities:  No edema    Medications:    [START ON 6/27/2024] enoxaparin  30 mg SubCUTAneous Daily    chlorthalidone  25 mg Oral Daily    insulin lispro  0-8 Units SubCUTAneous TID WC    insulin lispro  0-4 Units SubCUTAneous Nightly    NIFEdipine  60 mg Oral Nightly    insulin lispro  10 Units SubCUTAneous TID WC    carvedilol  25 mg Oral BID WC    rosuvastatin  10 mg Oral Nightly    [Held by provider] valsartan  320 mg Oral Daily    sodium chloride flush  5-40 mL IntraVENous 2 times per day    insulin glargine  35 Units SubCUTAneous Daily    sodium chloride flush  5-40 mL IntraVENous 2 times per day    aspirin  81 mg Oral Daily    cholecalciferol  400 Units Oral Nightly    therapeutic multivitamin-minerals  1

## 2024-06-27 VITALS
HEIGHT: 68 IN | SYSTOLIC BLOOD PRESSURE: 123 MMHG | TEMPERATURE: 97.6 F | WEIGHT: 194 LBS | OXYGEN SATURATION: 98 % | RESPIRATION RATE: 16 BRPM | HEART RATE: 68 BPM | BODY MASS INDEX: 29.4 KG/M2 | DIASTOLIC BLOOD PRESSURE: 65 MMHG

## 2024-06-27 DIAGNOSIS — I10 ESSENTIAL HYPERTENSION: Primary | ICD-10-CM

## 2024-06-27 LAB
ANION GAP SERPL CALCULATED.3IONS-SCNC: 10 MMOL/L (ref 3–16)
BASOPHILS # BLD: 0.1 K/UL (ref 0–0.2)
BASOPHILS NFR BLD: 1.1 %
BUN SERPL-MCNC: 43 MG/DL (ref 7–20)
CALCIUM SERPL-MCNC: 8.5 MG/DL (ref 8.3–10.6)
CHLORIDE SERPL-SCNC: 109 MMOL/L (ref 99–110)
CO2 SERPL-SCNC: 22 MMOL/L (ref 21–32)
CREAT SERPL-MCNC: 2.4 MG/DL (ref 0.6–1.1)
DEPRECATED RDW RBC AUTO: 15.2 % (ref 12.4–15.4)
EOSINOPHIL # BLD: 0.2 K/UL (ref 0–0.6)
EOSINOPHIL NFR BLD: 3 %
GFR SERPLBLD CREATININE-BSD FMLA CKD-EPI: 23 ML/MIN/{1.73_M2}
GLUCOSE BLD-MCNC: 134 MG/DL (ref 70–99)
GLUCOSE BLD-MCNC: 176 MG/DL (ref 70–99)
GLUCOSE SERPL-MCNC: 163 MG/DL (ref 70–99)
HCT VFR BLD AUTO: 28.2 % (ref 36–48)
HGB BLD-MCNC: 9.3 G/DL (ref 12–16)
LYMPHOCYTES # BLD: 1.8 K/UL (ref 1–5.1)
LYMPHOCYTES NFR BLD: 26.5 %
MCH RBC QN AUTO: 27.4 PG (ref 26–34)
MCHC RBC AUTO-ENTMCNC: 33.1 G/DL (ref 31–36)
MCV RBC AUTO: 82.7 FL (ref 80–100)
MONOCYTES # BLD: 0.6 K/UL (ref 0–1.3)
MONOCYTES NFR BLD: 9.5 %
NEUTROPHILS # BLD: 4.1 K/UL (ref 1.7–7.7)
NEUTROPHILS NFR BLD: 59.9 %
PERFORMED ON: ABNORMAL
PERFORMED ON: ABNORMAL
PLATELET # BLD AUTO: 230 K/UL (ref 135–450)
PMV BLD AUTO: 9.7 FL (ref 5–10.5)
POTASSIUM SERPL-SCNC: 4.4 MMOL/L (ref 3.5–5.1)
RBC # BLD AUTO: 3.4 M/UL (ref 4–5.2)
SODIUM SERPL-SCNC: 141 MMOL/L (ref 136–145)
WBC # BLD AUTO: 6.8 K/UL (ref 4–11)

## 2024-06-27 PROCEDURE — 36415 COLL VENOUS BLD VENIPUNCTURE: CPT

## 2024-06-27 PROCEDURE — 2580000003 HC RX 258: Performed by: INTERNAL MEDICINE

## 2024-06-27 PROCEDURE — 80048 BASIC METABOLIC PNL TOTAL CA: CPT

## 2024-06-27 PROCEDURE — 6370000000 HC RX 637 (ALT 250 FOR IP): Performed by: PHYSICIAN ASSISTANT

## 2024-06-27 PROCEDURE — 85025 COMPLETE CBC W/AUTO DIFF WBC: CPT

## 2024-06-27 PROCEDURE — 6370000000 HC RX 637 (ALT 250 FOR IP): Performed by: PEDIATRICS

## 2024-06-27 PROCEDURE — 6360000002 HC RX W HCPCS: Performed by: INTERNAL MEDICINE

## 2024-06-27 PROCEDURE — 6370000000 HC RX 637 (ALT 250 FOR IP): Performed by: NURSE PRACTITIONER

## 2024-06-27 PROCEDURE — 99232 SBSQ HOSP IP/OBS MODERATE 35: CPT | Performed by: INTERNAL MEDICINE

## 2024-06-27 RX ORDER — NIFEDIPINE 30 MG/1
60 TABLET, EXTENDED RELEASE ORAL NIGHTLY
Qty: 60 TABLET | Refills: 1 | Status: SHIPPED | OUTPATIENT
Start: 2024-06-27

## 2024-06-27 RX ORDER — GLIMEPIRIDE 2 MG/1
2 TABLET ORAL EVERY MORNING
Qty: 30 TABLET | Refills: 1 | Status: SHIPPED | OUTPATIENT
Start: 2024-06-27

## 2024-06-27 RX ORDER — ROSUVASTATIN CALCIUM 20 MG/1
20 TABLET, COATED ORAL DAILY
Qty: 30 TABLET | Refills: 1 | Status: SHIPPED | OUTPATIENT
Start: 2024-06-27

## 2024-06-27 RX ORDER — CARVEDILOL 25 MG/1
25 TABLET ORAL 2 TIMES DAILY WITH MEALS
Qty: 60 TABLET | Refills: 1 | Status: SHIPPED | OUTPATIENT
Start: 2024-06-27 | End: 2024-07-01 | Stop reason: SINTOL

## 2024-06-27 RX ADMIN — INSULIN LISPRO 10 UNITS: 100 INJECTION, SOLUTION INTRAVENOUS; SUBCUTANEOUS at 08:32

## 2024-06-27 RX ADMIN — ENOXAPARIN SODIUM 30 MG: 100 INJECTION SUBCUTANEOUS at 08:28

## 2024-06-27 RX ADMIN — CHLORTHALIDONE 25 MG: 25 TABLET ORAL at 08:27

## 2024-06-27 RX ADMIN — INSULIN GLARGINE 35 UNITS: 100 INJECTION, SOLUTION SUBCUTANEOUS at 08:28

## 2024-06-27 RX ADMIN — SODIUM CHLORIDE, PRESERVATIVE FREE 10 ML: 5 INJECTION INTRAVENOUS at 08:34

## 2024-06-27 RX ADMIN — CARVEDILOL 25 MG: 25 TABLET, FILM COATED ORAL at 08:27

## 2024-06-27 RX ADMIN — ASPIRIN 81 MG: 81 TABLET, COATED ORAL at 08:28

## 2024-06-27 ASSESSMENT — PAIN SCALES - GENERAL
PAINLEVEL_OUTOF10: 0

## 2024-06-27 NOTE — PLAN OF CARE
Problem: Discharge Planning  Goal: Discharge to home or other facility with appropriate resources  6/27/2024 0918 by Yessy Morris RN  Outcome: Progressing     Problem: Pain  Goal: Verbalizes/displays adequate comfort level or baseline comfort level  6/27/2024 0918 by Yessy Morris RN  Outcome: Progressing     Problem: Safety - Adult  Goal: Free from fall injury  6/27/2024 0918 by Yessy Morris RN  Outcome: Progressing     Problem: ABCDS Injury Assessment  Goal: Absence of physical injury  6/27/2024 0918 by Yessy Morris RN  Outcome: Progressing     Problem: Chronic Conditions and Co-morbidities  Goal: Patient's chronic conditions and co-morbidity symptoms are monitored and maintained or improved  6/27/2024 0918 by Yessy Morris RN  Outcome: Progressing     Problem: Anxiety  Goal: Will report anxiety at manageable levels  Description: INTERVENTIONS:  1. Administer medication as ordered  2. Teach and rehearse alternative coping skills  3. Provide emotional support with 1:1 interaction with staff  6/27/2024 0918 by Yessy Morris RN  Outcome: Progressing     Problem: Behavior  Goal: Pt/Family maintain appropriate behavior and adhere to behavioral management agreement, if implemented  Description: INTERVENTIONS:  1. Assess patient/family's coping skills and  non-compliant behavior (including use of illegal substances)  2. Notify security of behavior or suspected illegal substances which indicate the need for search of the family and/or belongings  3. Encourage verbalization of thoughts and concerns in a socially appropriate manner  4. Utilize positive, consistent limit setting strategies supporting safety of patient, staff and others  5. Encourage participation in the decision making process about the behavioral management agreement  6. If a visitor's behavior poses a threat to safety call refer to organization policy.  7. Initiate consult with , Psychosocial CNS, Spiritual Care as

## 2024-06-27 NOTE — PROGRESS NOTES
Data- discharge order received, pt verbalized agreement to discharge, disposition to previous residence, no needs for HHC/DME.     Action- discharge instructions prepared and given to patient, pt verbalized understanding. Medication information packet given r/t NEW and/or CHANGED prescriptions emphasizing name/purpose/side effects, pt verbalized understanding. Discharge instruction summary: Diet- general, Activity- up as tolerated, Primary Care Physician as follows: Tristan Lei -[phone] f/u appointment in a week, immunizations reviewed, prescription medications filled pharmacy of choice. Inpatient surgical procedure precautions reviewed: n/a CHF Education reviewed. Pt/ Family has had a total of 60 minutes CHF education this admission encounter.     1. WEIGHT: Admit Weight - Scale: 77.1 kg (170 lb) (06/18/24 1810)        Today  Weight - Scale: 88 kg (194 lb 0.1 oz) (06/27/24 0400)       2. O2 SAT.: SpO2: 98 % (06/27/24 0825)    Response- Pt belongings gathered, IV removed. Disposition is home (no HHC/DME needs), transported with family, taken to lobby via w/c w/ staff, no complications.

## 2024-06-27 NOTE — PROGRESS NOTES
97.6 °F (36.4 °C) (Temporal)   Resp 16   Ht 1.727 m (5' 8\")   Wt 88 kg (194 lb 0.1 oz)   LMP 06/01/2011   SpO2 98%   BMI 29.50 kg/m²       Physical Exam  General : AAOx3, not in pain or respiratory distress, resting in bed  HEENT : mucosa moist.  CVS: S1 S2 normal, regular rhythm, no murmurs or rubs.  Lungs: Clear, no wheezing or crackles.  Abd: Soft, bowel sounds normal, non-tender.  Ext: No edema    Labs:  CBC with Differential:    Lab Results   Component Value Date/Time    WBC 6.8 06/27/2024 07:11 AM    RBC 3.40 06/27/2024 07:11 AM    HGB 9.3 06/27/2024 07:11 AM    HCT 28.2 06/27/2024 07:11 AM     06/27/2024 07:11 AM    MCV 82.7 06/27/2024 07:11 AM    MCH 27.4 06/27/2024 07:11 AM    MCHC 33.1 06/27/2024 07:11 AM    RDW 15.2 06/27/2024 07:11 AM    LYMPHOPCT 26.5 06/27/2024 07:11 AM    MONOPCT 9.5 06/27/2024 07:11 AM    EOSPCT 3.0 06/27/2024 07:11 AM    BASOPCT 1.1 06/27/2024 07:11 AM    MONOSABS 0.6 06/27/2024 07:11 AM    EOSABS 0.2 06/27/2024 07:11 AM    BASOSABS 0.1 06/27/2024 07:11 AM     BMP:    Lab Results   Component Value Date/Time     06/27/2024 07:11 AM    K 4.4 06/27/2024 07:11 AM     06/27/2024 07:11 AM    CO2 22 06/27/2024 07:11 AM    BUN 43 06/27/2024 07:11 AM    CREATININE 2.4 06/27/2024 07:11 AM    CALCIUM 8.5 06/27/2024 07:11 AM    GFRAA >60 10/13/2022 07:46 AM    LABGLOM 23 06/27/2024 07:11 AM    LABGLOM 48 04/19/2024 03:00 PM    GLUCOSE 163 06/27/2024 07:11 AM     Ionized Calcium:  No components found for: \"IONCA\"  Magnesium:    Lab Results   Component Value Date/Time    MG 2.10 06/23/2024 09:45 AM     Phosphorus:    Lab Results   Component Value Date/Time    PHOS 4.5 01/26/2024 09:00 AM       Assessment/Plan:    JAYY  Suspect ATN in the setting of contrast exposure from cardiac cath, hemodynamic injury from blood pressure fluctuations in the setting of valsartan use  Creatinine stabilized  Continue to hold valsartan on discharge    Hypertensive urgency improved  Blood  pressure is now well-controlled  Does have history of chronically uncontrolled hypertension  Renal artery stenosis was suspected in the past but CT angiogram in February 2024 with no evidence of significant renal artery stenosis  Suspect noncompliance  Prior workup in January 2024 with normal TSH, normal PTH, normal ARR, normal plasma metanephrines  Amlodipine 5 mg p.o. daily, chlorthalidone 25 mg p.o. daily, metoprolol XL 50 mg daily and valsartan 320 mg daily listed in home medications  Has been on amlodipine 10 mg p.o. daily and valsartan 320 mg p.o. daily  Metoprolol has been switched to carvedilol  Chlorthalidone resumed on 6/23  As per previous notes, she was supposed to be on nifedipine, unclear why she is on amlodipine at this time  changed to nifedipine XL 30 mg p.o. daily      CKD stage IV  Risk factors include DM II, HTN     DM II  HBA1Cs have been upto 14s in the 2019, lately better controlled    Blurry vision  MRI of the brain with no acute changes  Discussed to make an urgent appointment with ophthalmology    Near syncope  Had cardiac cath on 6/21 that showed moderate nonobstructive CAD  On medical management per cardiology    Plan  Okay to discharge from my perspective  Continue to hold valsartan on discharge  Follow-up with Dr. Naidu after discharge  Discussed with DANNIE Portillo MD  6/27/2024  Office Phone : [phone]    Thank you for allowing us to participate in the care of this pt. I will continue to follow along. Please call with questions or concerns.

## 2024-06-27 NOTE — CARE COORDINATION
Patient could not be scheduled for a PCP follow-up appointment.  PCP is a part of the [facility].    Electronically signed by GEOFF DORANTES on 6/27/2024 at 11:36 AM

## 2024-06-27 NOTE — PLAN OF CARE
Problem: Discharge Planning  Goal: Discharge to home or other facility with appropriate resources  6/26/2024 2016 by Rosa Damon RN  Outcome: Progressing  Flowsheets (Taken 6/26/2024 2000)  Discharge to home or other facility with appropriate resources:   Identify barriers to discharge with patient and caregiver   Arrange for needed discharge resources and transportation as appropriate  6/26/2024 0914 by Ricardo Jones RN  Outcome: Progressing  Flowsheets (Taken 6/25/2024 1346)  Discharge to home or other facility with appropriate resources:   Arrange for needed discharge resources and transportation as appropriate   Identify barriers to discharge with patient and caregiver   Identify discharge learning needs (meds, wound care, etc)     Problem: Pain  Goal: Verbalizes/displays adequate comfort level or baseline comfort level  6/26/2024 2016 by Rosa Damon RN  Outcome: Progressing  6/26/2024 0914 by Ricardo Jones RN  Outcome: Progressing  Flowsheets (Taken 6/25/2024 1346)  Verbalizes/displays adequate comfort level or baseline comfort level:   Encourage patient to monitor pain and request assistance   Assess pain using appropriate pain scale   Implement non-pharmacological measures as appropriate and evaluate response     Problem: Safety - Adult  Goal: Free from fall injury  6/26/2024 2016 by Rosa Damon RN  Outcome: Progressing  6/26/2024 0914 by Ricardo Jones RN  Outcome: Progressing  Flowsheets (Taken 6/25/2024 1346)  Free From Fall Injury:   Instruct family/caregiver on patient safety   Based on caregiver fall risk screen, instruct family/caregiver to ask for assistance with transferring infant if caregiver noted to have fall risk factors     Problem: ABCDS Injury Assessment  Goal: Absence of physical injury  6/26/2024 2016 by Rosa Damon RN  Outcome: Progressing  6/26/2024 0914 by Ricardo Jones RN  Outcome: Progressing  Flowsheets (Taken 6/25/2024 1346)  Absence of

## 2024-06-27 NOTE — PROGRESS NOTES
[facility] Daily Progress Note      Admit Date:  6/18/2024      Cardiology consult: Palpitations    Subjective:  Ms. Nick reports no overnight events. She has a d/c order and is waiting for her ride. No chest pain or shortness of breath.       History of present illness:   Galina Nick has a  PMH of severe hypertension, diabetes mellitus, renal artery stenosis, CAD who presented to the hospital with complaints of nearly passing out.  Sunday had an episode of feeling light headed as though she was going to pass out. Had another episode yesterday.  Felt her heart racing with yesterday's episode.  Patient is well-known to me.     Objective:   /65   Pulse 68   Temp 97.6 °F (36.4 °C) (Temporal)   Resp 16   Ht 1.727 m (5' 8\")   Wt 88 kg (194 lb 0.1 oz)   LMP 06/01/2011   SpO2 98%   BMI 29.50 kg/m²     Intake/Output Summary (Last 24 hours) at 6/27/2024 1853  Last data filed at 6/27/2024 0409  Gross per 24 hour   Intake 410 ml   Output 600 ml   Net -190 ml       Physical Exam:  General:  Awake, alert, oriented x 3, NAD  Skin:  Warm and dry  Neck:  JVD normal  Chest:  normal air entry  Cardiovascular:  RRR S1S2, no S3, 2/6 systolic at 2nd left intercostal space  Abdomen:  Soft, ND, NT, No HSM  Extremities:  No edema    Medications:               TELEMETRY (Personally reviewed by me): Sinus     Lab Data:  CBC:   Recent Labs     06/25/24  0459 06/27/24  0711   WBC 7.0 6.8   HGB 8.9* 9.3*   HCT 26.6* 28.2*   MCV 81.4 82.7    230     BMP:   Recent Labs     06/25/24  0459 06/26/24  0446 06/27/24  0711    143 141   K 4.2 4.2 4.4    112* 109   CO2 24 22 22   BUN 29* 37* 43*   CREATININE 2.2* 2.5* 2.4*     LIVER PROFILE: No results for input(s): \"AST\", \"ALT\", \"LIPASE\", \"AMYLASE\", \"BILIDIR\", \"BILITOT\", \"ALKPHOS\" in the last 72 hours.    Invalid input(s): \"ALB\"  PT/INR: No results for input(s): \"PROTIME\", \"INR\" in the last 72 hours.  APTT: No results for input(s): \"APTT\" in the last

## 2024-07-01 ENCOUNTER — OFFICE VISIT (OUTPATIENT)
Dept: CARDIOLOGY CLINIC | Age: 59
End: 2024-07-01
Payer: COMMERCIAL

## 2024-07-01 ENCOUNTER — HOSPITAL ENCOUNTER (OUTPATIENT)
Age: 59
Discharge: HOME | End: 2024-07-01
Payer: COMMERCIAL

## 2024-07-01 VITALS
BODY MASS INDEX: 29.34 KG/M2 | DIASTOLIC BLOOD PRESSURE: 80 MMHG | HEART RATE: 62 BPM | OXYGEN SATURATION: 98 % | WEIGHT: 193.6 LBS | HEIGHT: 68 IN | SYSTOLIC BLOOD PRESSURE: 160 MMHG

## 2024-07-01 DIAGNOSIS — I25.83 CORONARY ARTERY DISEASE DUE TO LIPID RICH PLAQUE: ICD-10-CM

## 2024-07-01 DIAGNOSIS — I10 ESSENTIAL HYPERTENSION: ICD-10-CM

## 2024-07-01 DIAGNOSIS — E78.5 DYSLIPIDEMIA: ICD-10-CM

## 2024-07-01 DIAGNOSIS — R06.02 SHORTNESS OF BREATH: Primary | ICD-10-CM

## 2024-07-01 DIAGNOSIS — I70.1 RENAL ARTERY STENOSIS (HCC): ICD-10-CM

## 2024-07-01 DIAGNOSIS — I25.10 CORONARY ARTERY DISEASE DUE TO LIPID RICH PLAQUE: ICD-10-CM

## 2024-07-01 DIAGNOSIS — N17.9 AKI (ACUTE KIDNEY INJURY) (HCC): ICD-10-CM

## 2024-07-01 LAB
ANION GAP SERPL CALCULATED.3IONS-SCNC: 11 MMOL/L (ref 3–16)
BUN SERPL-MCNC: 36 MG/DL (ref 7–20)
CALCIUM SERPL-MCNC: 9.1 MG/DL (ref 8.3–10.6)
CHLORIDE SERPL-SCNC: 108 MMOL/L (ref 99–110)
CO2 SERPL-SCNC: 21 MMOL/L (ref 21–32)
CREAT SERPL-MCNC: 1.9 MG/DL (ref 0.6–1.1)
GFR SERPLBLD CREATININE-BSD FMLA CKD-EPI: 30 ML/MIN/{1.73_M2}
GLUCOSE SERPL-MCNC: 204 MG/DL (ref 70–99)
POTASSIUM SERPL-SCNC: 4.7 MMOL/L (ref 3.5–5.1)
SODIUM SERPL-SCNC: 140 MMOL/L (ref 136–145)

## 2024-07-01 PROCEDURE — 3077F SYST BP >= 140 MM HG: CPT

## 2024-07-01 PROCEDURE — 36415 COLL VENOUS BLD VENIPUNCTURE: CPT

## 2024-07-01 PROCEDURE — 99214 OFFICE O/P EST MOD 30 MIN: CPT

## 2024-07-01 PROCEDURE — 3079F DIAST BP 80-89 MM HG: CPT

## 2024-07-01 PROCEDURE — 80048 BASIC METABOLIC PNL TOTAL CA: CPT

## 2024-07-01 RX ORDER — METOPROLOL SUCCINATE 25 MG/1
50 TABLET, EXTENDED RELEASE ORAL DAILY
Qty: 30 TABLET | Refills: 3 | Status: SHIPPED | OUTPATIENT
Start: 2024-07-01

## 2024-07-01 NOTE — PROGRESS NOTES
today's visit sent to PCP,    NOTE:  This report was transcribed using voice recognition software.  Every effort was made to ensure accuracy; however, inadvertent computerized transcription errors may be present.

## 2024-07-01 NOTE — PATIENT INSTRUCTIONS
Call me with your nephrology name  Blood work today   Stop Carvedilol due to blurred vision   Start Metoprolol 50 mg daily   Sleep study as soon as possible   See me in two weeks

## 2024-07-12 ENCOUNTER — TELEPHONE (OUTPATIENT)
Dept: CARDIOLOGY CLINIC | Age: 59
End: 2024-07-12

## 2024-07-12 NOTE — TELEPHONE ENCOUNTER
Called and spoke with Fouzia. She had called for hospital d/c follow up. Galina states she is SOB and has not been weighing herself. Galina unable to provide vitals to Fouzia. Told Fouzia she cancelled OV d/t being tired. Attempted to call Galina about symptoms, no answer. LMOM with callback.

## 2024-07-12 NOTE — TELEPHONE ENCOUNTER
Fouzia with [facility] care nursing called in letting [facility] know she has been expieriencing SOB and has been feeling very tired, pt had appt today with NP and cancelled due to her symptoms   Please call and advise  Thank you

## 2024-07-22 ENCOUNTER — TELEPHONE (OUTPATIENT)
Dept: ENDOCRINOLOGY | Age: 59
End: 2024-07-22

## 2024-07-23 ENCOUNTER — HOSPITAL ENCOUNTER (OUTPATIENT)
Age: 59
Discharge: HOME | End: 2024-07-23
Payer: COMMERCIAL

## 2024-07-23 DIAGNOSIS — E11.65 POORLY CONTROLLED TYPE 2 DIABETES MELLITUS WITH COMPLICATION (HCC): ICD-10-CM

## 2024-07-23 DIAGNOSIS — E78.2 MIXED HYPERLIPIDEMIA: ICD-10-CM

## 2024-07-23 DIAGNOSIS — E11.8 POORLY CONTROLLED TYPE 2 DIABETES MELLITUS WITH COMPLICATION (HCC): ICD-10-CM

## 2024-07-23 LAB
ALBUMIN SERPL-MCNC: 3.5 G/DL (ref 3.4–5)
ALBUMIN/GLOB SERPL: 1.1 {RATIO} (ref 1.1–2.2)
ALP SERPL-CCNC: 140 U/L (ref 40–129)
ALT SERPL-CCNC: 26 U/L (ref 10–40)
ANION GAP SERPL CALCULATED.3IONS-SCNC: 14 MMOL/L (ref 3–16)
AST SERPL-CCNC: 35 U/L (ref 15–37)
BILIRUB SERPL-MCNC: 0.3 MG/DL (ref 0–1)
BUN SERPL-MCNC: 27 MG/DL (ref 7–20)
CALCIUM SERPL-MCNC: 9.3 MG/DL (ref 8.3–10.6)
CHLORIDE SERPL-SCNC: 105 MMOL/L (ref 99–110)
CHOLEST SERPL-MCNC: 250 MG/DL (ref 0–199)
CO2 SERPL-SCNC: 22 MMOL/L (ref 21–32)
CREAT SERPL-MCNC: 2.1 MG/DL (ref 0.6–1.1)
EST. AVERAGE GLUCOSE BLD GHB EST-MCNC: 205.9 MG/DL
GFR SERPLBLD CREATININE-BSD FMLA CKD-EPI: 27 ML/MIN/{1.73_M2}
GLUCOSE SERPL-MCNC: 110 MG/DL (ref 70–99)
HBA1C MFR BLD: 8.8 %
HDLC SERPL-MCNC: 51 MG/DL (ref 40–60)
LDLC SERPL CALC-MCNC: 176 MG/DL
POTASSIUM SERPL-SCNC: 3.9 MMOL/L (ref 3.5–5.1)
PROT SERPL-MCNC: 6.6 G/DL (ref 6.4–8.2)
SODIUM SERPL-SCNC: 141 MMOL/L (ref 136–145)
TRIGL SERPL-MCNC: 116 MG/DL (ref 0–150)
TSH SERPL DL<=0.005 MIU/L-ACNC: 1.46 UIU/ML (ref 0.27–4.2)
VLDLC SERPL CALC-MCNC: 23 MG/DL

## 2024-07-23 PROCEDURE — 82570 ASSAY OF URINE CREATININE: CPT

## 2024-07-23 PROCEDURE — 84443 ASSAY THYROID STIM HORMONE: CPT

## 2024-07-23 PROCEDURE — 83036 HEMOGLOBIN GLYCOSYLATED A1C: CPT

## 2024-07-23 PROCEDURE — 80061 LIPID PANEL: CPT

## 2024-07-23 PROCEDURE — 82043 UR ALBUMIN QUANTITATIVE: CPT

## 2024-07-23 PROCEDURE — 80053 COMPREHEN METABOLIC PANEL: CPT

## 2024-07-24 ENCOUNTER — TELEPHONE (OUTPATIENT)
Dept: CARDIOLOGY CLINIC | Age: 59
End: 2024-07-24

## 2024-07-24 LAB
CREAT UR-MCNC: 119.3 MG/DL (ref 28–259)
MICROALBUMIN UR DL<=1MG/L-MCNC: 664 MG/DL
MICROALBUMIN/CREAT UR: 5565.8 MG/G (ref 0–30)

## 2024-07-24 NOTE — TELEPHONE ENCOUNTER
Called to answer questions, no answer. LMOM with callback     Pt does not have hx valve repair/replacement

## 2024-07-24 NOTE — TELEPHONE ENCOUNTER
Dr. Ede Bronson called today to speak with Dr. Gamez.  Dr. Vera states that the patient came into his office yesterday 7/23/2024 for a routine cleaning and was complaining of Mitral Valve prolapse issues to which Dr. Vera had her cancel her cleaning until he spoke with Dr. Gamez.  He is needing to know if the patient is going to require any antibiotics prior to her dental cleanings.  Please advise.

## 2024-07-25 ENCOUNTER — OFFICE VISIT (OUTPATIENT)
Dept: ENDOCRINOLOGY | Age: 59
End: 2024-07-25
Payer: COMMERCIAL

## 2024-07-25 VITALS
DIASTOLIC BLOOD PRESSURE: 92 MMHG | SYSTOLIC BLOOD PRESSURE: 161 MMHG | BODY MASS INDEX: 27.13 KG/M2 | HEIGHT: 68 IN | RESPIRATION RATE: 16 BRPM | WEIGHT: 179 LBS | HEART RATE: 74 BPM

## 2024-07-25 DIAGNOSIS — I10 ESSENTIAL HYPERTENSION: ICD-10-CM

## 2024-07-25 DIAGNOSIS — E78.2 MIXED HYPERLIPIDEMIA: ICD-10-CM

## 2024-07-25 DIAGNOSIS — E83.52 HYPERCALCEMIA: ICD-10-CM

## 2024-07-25 DIAGNOSIS — E11.8 POORLY CONTROLLED TYPE 2 DIABETES MELLITUS WITH COMPLICATION (HCC): Primary | ICD-10-CM

## 2024-07-25 DIAGNOSIS — E11.65 POORLY CONTROLLED TYPE 2 DIABETES MELLITUS WITH COMPLICATION (HCC): Primary | ICD-10-CM

## 2024-07-25 PROCEDURE — 3080F DIAST BP >= 90 MM HG: CPT | Performed by: INTERNAL MEDICINE

## 2024-07-25 PROCEDURE — G2211 COMPLEX E/M VISIT ADD ON: HCPCS | Performed by: INTERNAL MEDICINE

## 2024-07-25 PROCEDURE — 99214 OFFICE O/P EST MOD 30 MIN: CPT | Performed by: INTERNAL MEDICINE

## 2024-07-25 PROCEDURE — 3077F SYST BP >= 140 MM HG: CPT | Performed by: INTERNAL MEDICINE

## 2024-07-25 PROCEDURE — 3052F HG A1C>EQUAL 8.0%<EQUAL 9.0%: CPT | Performed by: INTERNAL MEDICINE

## 2024-07-25 RX ORDER — INSULIN GLARGINE 100 [IU]/ML
10 INJECTION, SOLUTION SUBCUTANEOUS NIGHTLY
Qty: 5 ADJUSTABLE DOSE PRE-FILLED PEN SYRINGE | Refills: 0 | Status: SHIPPED | OUTPATIENT
Start: 2024-07-25

## 2024-07-25 NOTE — TELEPHONE ENCOUNTER
Relayed the message per [facility]. Pt verbalized understanding and will relay message to pt's dentist.

## 2024-07-29 ENCOUNTER — OFFICE VISIT (OUTPATIENT)
Dept: CARDIOLOGY CLINIC | Age: 59
End: 2024-07-29
Payer: COMMERCIAL

## 2024-07-29 VITALS
BODY MASS INDEX: 26.87 KG/M2 | WEIGHT: 177.3 LBS | HEART RATE: 68 BPM | HEIGHT: 68 IN | SYSTOLIC BLOOD PRESSURE: 148 MMHG | OXYGEN SATURATION: 98 % | DIASTOLIC BLOOD PRESSURE: 80 MMHG

## 2024-07-29 DIAGNOSIS — N17.9 AKI (ACUTE KIDNEY INJURY) (HCC): ICD-10-CM

## 2024-07-29 DIAGNOSIS — I70.1 RENAL ARTERY STENOSIS (HCC): ICD-10-CM

## 2024-07-29 DIAGNOSIS — I10 ESSENTIAL HYPERTENSION: ICD-10-CM

## 2024-07-29 DIAGNOSIS — E78.5 DYSLIPIDEMIA: ICD-10-CM

## 2024-07-29 DIAGNOSIS — I25.83 CORONARY ARTERY DISEASE DUE TO LIPID RICH PLAQUE: Primary | ICD-10-CM

## 2024-07-29 DIAGNOSIS — I34.0 NONRHEUMATIC MITRAL VALVE REGURGITATION: ICD-10-CM

## 2024-07-29 DIAGNOSIS — I25.10 CORONARY ARTERY DISEASE DUE TO LIPID RICH PLAQUE: Primary | ICD-10-CM

## 2024-07-29 PROCEDURE — 99214 OFFICE O/P EST MOD 30 MIN: CPT

## 2024-07-29 PROCEDURE — 3079F DIAST BP 80-89 MM HG: CPT

## 2024-07-29 PROCEDURE — 3075F SYST BP GE 130 - 139MM HG: CPT

## 2024-07-29 RX ORDER — ROSUVASTATIN CALCIUM 20 MG/1
40 TABLET, COATED ORAL DAILY
Qty: 30 TABLET | Refills: 1 | Status: SHIPPED | OUTPATIENT
Start: 2024-07-29

## 2024-07-29 RX ORDER — VALSARTAN 320 MG/1
320 TABLET ORAL DAILY
Qty: 90 TABLET | Refills: 1 | Status: SHIPPED | OUTPATIENT
Start: 2024-07-29

## 2024-07-29 NOTE — PROGRESS NOTES
[facility]     Outpatient Follow Up Note    CHIEF COMPLAINT / HPI:Follow Up secondary to hypertension and coronary artery disease       Galina Nick is 58 y.o. female who presents today for a routine follow up     Patient being seen today for follow-up for hypertension.  Blood pressure 180/82 for MA and 148/80 currently.  At recent visit with Dr. Villasenor her blood pressure was 154/70.  She did not tolerate carvedilol due to blurred vision and it was stopped.  Valsartan was stopped in the hospital due to JAYY.  Will resume valsartan.  Dr. Villasenor agrees with starting valsartan.  Note.  Upon office visit on 7/16/2024.    She is concerned about having some blockage in her coronary arteries.  At length discussed her concerns.  Agrees to try to control blood pressure in a.m. diabetes.  She is taking wellness classes on Monday nights together and talk to other women.  Nightly will be doing yoga.  She is walking 3-4 times a week at the track.  She is trying to manage her stress level.  She is also working to try to decrease her meat consumption.      gain discussed the importance of seeing a Nephrologist and following through with a sleep study.     Subjective:   Galina Nick has a significant past medical history of HTN, Renal stenosis in addition to what is mentioned above.     The patient admits their symptoms have improved.     She denies significant chest pain. There is intermittent SOB/WONG. The patient is not experiencing palpitations. No further complaints of syncope.   No complaints of dizziness.       These symptoms are improving over the last few days.   With regard to medication therapy the patient has been compliant with prescribed regimen. They have tolerated therapy to date.     Past Medical History:  Past Medical History:   Diagnosis Date    CAD (coronary artery disease)     Diabetes mellitus (HCC)     Dyslipidemia     Hypertension      Social History:    Social History     Tobacco Use   Smoking

## 2024-07-29 NOTE — PATIENT INSTRUCTIONS
Increase Crestor to 40 mg daily-titrate 20 mg and then 40 mg to 40 mg daily if tolerated   Restart Valsartan 320 mg daily   Repeat lab work in 6 weeks   Keep appointment with Dr. Gamez in October   See me 6 months after

## 2024-07-30 ENCOUNTER — HOSPITAL ENCOUNTER (EMERGENCY)
Age: 59
Discharge: HOME | End: 2024-07-30
Attending: EMERGENCY MEDICINE
Payer: COMMERCIAL

## 2024-07-30 VITALS
OXYGEN SATURATION: 99 % | DIASTOLIC BLOOD PRESSURE: 88 MMHG | HEART RATE: 73 BPM | RESPIRATION RATE: 18 BRPM | WEIGHT: 177.6 LBS | TEMPERATURE: 98 F | SYSTOLIC BLOOD PRESSURE: 156 MMHG | HEIGHT: 68 IN | BODY MASS INDEX: 26.92 KG/M2

## 2024-07-30 DIAGNOSIS — I88.9 CERVICAL LYMPHADENITIS: Primary | ICD-10-CM

## 2024-07-30 LAB
ALBUMIN SERPL-MCNC: 3.4 G/DL (ref 3.4–5)
ALBUMIN/GLOB SERPL: 1.1 {RATIO} (ref 1.1–2.2)
ALP SERPL-CCNC: 135 U/L (ref 40–129)
ALT SERPL-CCNC: 19 U/L (ref 10–40)
ANION GAP SERPL CALCULATED.3IONS-SCNC: 11 MMOL/L (ref 3–16)
AST SERPL-CCNC: 20 U/L (ref 15–37)
BASOPHILS # BLD: 0.1 K/UL (ref 0–0.2)
BASOPHILS NFR BLD: 1 %
BILIRUB SERPL-MCNC: <0.2 MG/DL (ref 0–1)
BUN SERPL-MCNC: 28 MG/DL (ref 7–20)
CALCIUM SERPL-MCNC: 9.2 MG/DL (ref 8.3–10.6)
CHLORIDE SERPL-SCNC: 107 MMOL/L (ref 99–110)
CO2 SERPL-SCNC: 22 MMOL/L (ref 21–32)
CREAT SERPL-MCNC: 1.9 MG/DL (ref 0.6–1.1)
DEPRECATED RDW RBC AUTO: 14.1 % (ref 12.4–15.4)
EOSINOPHIL # BLD: 0.2 K/UL (ref 0–0.6)
EOSINOPHIL NFR BLD: 2.9 %
GFR SERPLBLD CREATININE-BSD FMLA CKD-EPI: 30 ML/MIN/{1.73_M2}
GLUCOSE SERPL-MCNC: 232 MG/DL (ref 70–99)
HCT VFR BLD AUTO: 33.7 % (ref 36–48)
HGB BLD-MCNC: 11.1 G/DL (ref 12–16)
LYMPHOCYTES # BLD: 1.6 K/UL (ref 1–5.1)
LYMPHOCYTES NFR BLD: 21.5 %
MCH RBC QN AUTO: 27.1 PG (ref 26–34)
MCHC RBC AUTO-ENTMCNC: 33.1 G/DL (ref 31–36)
MCV RBC AUTO: 82 FL (ref 80–100)
MONOCYTES # BLD: 0.6 K/UL (ref 0–1.3)
MONOCYTES NFR BLD: 7.8 %
NEUTROPHILS # BLD: 4.9 K/UL (ref 1.7–7.7)
NEUTROPHILS NFR BLD: 66.8 %
PLATELET # BLD AUTO: 246 K/UL (ref 135–450)
PMV BLD AUTO: 8.6 FL (ref 5–10.5)
POTASSIUM SERPL-SCNC: 3.7 MMOL/L (ref 3.5–5.1)
PROT SERPL-MCNC: 6.4 G/DL (ref 6.4–8.2)
RBC # BLD AUTO: 4.11 M/UL (ref 4–5.2)
SODIUM SERPL-SCNC: 140 MMOL/L (ref 136–145)
WBC # BLD AUTO: 7.3 K/UL (ref 4–11)

## 2024-07-30 PROCEDURE — 99283 EMERGENCY DEPT VISIT LOW MDM: CPT

## 2024-07-30 PROCEDURE — 85025 COMPLETE CBC W/AUTO DIFF WBC: CPT

## 2024-07-30 PROCEDURE — 80053 COMPREHEN METABOLIC PANEL: CPT

## 2024-07-30 ASSESSMENT — PAIN DESCRIPTION - PAIN TYPE: TYPE: ACUTE PAIN

## 2024-07-30 ASSESSMENT — PAIN DESCRIPTION - ORIENTATION: ORIENTATION: LEFT

## 2024-07-30 ASSESSMENT — LIFESTYLE VARIABLES
HOW MANY STANDARD DRINKS CONTAINING ALCOHOL DO YOU HAVE ON A TYPICAL DAY: PATIENT DOES NOT DRINK
HOW OFTEN DO YOU HAVE A DRINK CONTAINING ALCOHOL: NEVER

## 2024-07-30 ASSESSMENT — PAIN SCALES - GENERAL: PAINLEVEL_OUTOF10: 3

## 2024-07-30 ASSESSMENT — PAIN - FUNCTIONAL ASSESSMENT: PAIN_FUNCTIONAL_ASSESSMENT: 0-10

## 2024-07-30 ASSESSMENT — PAIN DESCRIPTION - LOCATION: LOCATION: NECK

## 2024-07-30 ASSESSMENT — PAIN DESCRIPTION - ONSET: ONSET: GRADUAL

## 2024-07-30 ASSESSMENT — PAIN DESCRIPTION - DESCRIPTORS: DESCRIPTORS: ACHING

## 2024-07-30 NOTE — ED PROVIDER NOTES
Neutrophils % 66.8 %    Lymphocytes % 21.5 %    Monocytes % 7.8 %    Eosinophils % 2.9 %    Basophils % 1.0 %    Neutrophils Absolute 4.9 1.7 - 7.7 K/uL    Lymphocytes Absolute 1.6 1.0 - 5.1 K/uL    Monocytes Absolute 0.6 0.0 - 1.3 K/uL    Eosinophils Absolute 0.2 0.0 - 0.6 K/uL    Basophils Absolute 0.1 0.0 - 0.2 K/uL   CMP w/ Reflex to MG   Result Value Ref Range    Sodium 140 136 - 145 mmol/L    Potassium reflex Magnesium 3.7 3.5 - 5.1 mmol/L    Chloride 107 99 - 110 mmol/L    CO2 22 21 - 32 mmol/L    Anion Gap 11 3 - 16    Glucose 232 (H) 70 - 99 mg/dL    BUN 28 (H) 7 - 20 mg/dL    Creatinine 1.9 (H) 0.6 - 1.1 mg/dL    Est, Glom Filt Rate 30 (A) >60    Calcium 9.2 8.3 - 10.6 mg/dL    Total Protein 6.4 6.4 - 8.2 g/dL    Albumin 3.4 3.4 - 5.0 g/dL    Albumin/Globulin Ratio 1.1 1.1 - 2.2    Total Bilirubin <0.2 0.0 - 1.0 mg/dL    Alkaline Phosphatase 135 (H) 40 - 129 U/L    ALT 19 10 - 40 U/L    AST 20 15 - 37 U/L         RADIOLOGY  I have reviewed all radiographic studies for this visit.   No orders to display        ED COURSE/MDM    58 y.o. female presents with a painful swollen lump on left side of her neck.  On exam, she has cervical lymphadenopathy.  Her presentation is consistent with lymphadenitis.  This is likely reactive in nature given several weeks of a cough.  She does not have any obvious dental infection, sialadenitis, pharyngitis.  Laboratory assessment including CBC and CMP unremarkable.  She has CKD so we will recommend Tylenol as needed for pain control and avoid NSAIDs if possible.    The patient was advised to follow-up with PCP.  I discussed with the patient that most causes of lymphadenopathy are reactive to an infection but if this is not improving on its own in a couple of weeks she may need further workup including biopsy.  Discharge instructions including strict return precautions were given to the patient.  All questions were addressed. The patient verbalizes understanding and is in

## 2024-07-30 NOTE — DISCHARGE INSTRUCTIONS
You have some swollen lymph nodes in your neck.  Most cases swollen lymph nodes are reaction to an infection such as a viral infection and will go away on their own in a few weeks.  Follow-up with your doctor in 1 to 2 weeks for reassessment.  If your symptoms are not improving you may need a biopsy for further evaluation as rarely swollen lymph nodes may indicate a cancer or malignancy.

## 2024-08-01 ENCOUNTER — APPOINTMENT (OUTPATIENT)
Dept: CT IMAGING | Age: 59
End: 2024-08-01
Payer: COMMERCIAL

## 2024-08-01 ENCOUNTER — HOSPITAL ENCOUNTER (EMERGENCY)
Age: 59
Discharge: HOME | End: 2024-08-01
Attending: EMERGENCY MEDICINE
Payer: COMMERCIAL

## 2024-08-01 VITALS
WEIGHT: 178.13 LBS | OXYGEN SATURATION: 97 % | RESPIRATION RATE: 16 BRPM | BODY MASS INDEX: 27 KG/M2 | TEMPERATURE: 98 F | HEIGHT: 68 IN | SYSTOLIC BLOOD PRESSURE: 159 MMHG | HEART RATE: 68 BPM | DIASTOLIC BLOOD PRESSURE: 89 MMHG

## 2024-08-01 DIAGNOSIS — R59.0 REACTIVE CERVICAL NODES: Primary | ICD-10-CM

## 2024-08-01 LAB
ANION GAP SERPL CALCULATED.3IONS-SCNC: 11 MMOL/L (ref 3–16)
BASOPHILS # BLD: 0.1 K/UL (ref 0–0.2)
BASOPHILS NFR BLD: 0.9 %
BUN SERPL-MCNC: 33 MG/DL (ref 7–20)
CALCIUM SERPL-MCNC: 9.6 MG/DL (ref 8.3–10.6)
CHLORIDE SERPL-SCNC: 106 MMOL/L (ref 99–110)
CO2 SERPL-SCNC: 22 MMOL/L (ref 21–32)
CREAT SERPL-MCNC: 2 MG/DL (ref 0.6–1.1)
DEPRECATED RDW RBC AUTO: 14.1 % (ref 12.4–15.4)
EOSINOPHIL # BLD: 0.2 K/UL (ref 0–0.6)
EOSINOPHIL NFR BLD: 2.8 %
GFR SERPLBLD CREATININE-BSD FMLA CKD-EPI: 29 ML/MIN/{1.73_M2}
GLUCOSE SERPL-MCNC: 196 MG/DL (ref 70–99)
HCT VFR BLD AUTO: 34.7 % (ref 36–48)
HGB BLD-MCNC: 11.7 G/DL (ref 12–16)
LYMPHOCYTES # BLD: 1.7 K/UL (ref 1–5.1)
LYMPHOCYTES NFR BLD: 20.7 %
MCH RBC QN AUTO: 27.6 PG (ref 26–34)
MCHC RBC AUTO-ENTMCNC: 33.7 G/DL (ref 31–36)
MCV RBC AUTO: 82 FL (ref 80–100)
MONOCYTES # BLD: 0.7 K/UL (ref 0–1.3)
MONOCYTES NFR BLD: 8.2 %
NEUTROPHILS # BLD: 5.5 K/UL (ref 1.7–7.7)
NEUTROPHILS NFR BLD: 67.4 %
PLATELET # BLD AUTO: 234 K/UL (ref 135–450)
PMV BLD AUTO: 8.9 FL (ref 5–10.5)
POTASSIUM SERPL-SCNC: 4.2 MMOL/L (ref 3.5–5.1)
RBC # BLD AUTO: 4.23 M/UL (ref 4–5.2)
S PYO AG THROAT QL: NEGATIVE
SODIUM SERPL-SCNC: 139 MMOL/L (ref 136–145)
WBC # BLD AUTO: 8.2 K/UL (ref 4–11)

## 2024-08-01 PROCEDURE — 2580000003 HC RX 258: Performed by: EMERGENCY MEDICINE

## 2024-08-01 PROCEDURE — 99284 EMERGENCY DEPT VISIT MOD MDM: CPT

## 2024-08-01 PROCEDURE — 85025 COMPLETE CBC W/AUTO DIFF WBC: CPT

## 2024-08-01 PROCEDURE — 87880 STREP A ASSAY W/OPTIC: CPT

## 2024-08-01 PROCEDURE — 87081 CULTURE SCREEN ONLY: CPT

## 2024-08-01 PROCEDURE — 70490 CT SOFT TISSUE NECK W/O DYE: CPT

## 2024-08-01 PROCEDURE — 80048 BASIC METABOLIC PNL TOTAL CA: CPT

## 2024-08-01 RX ORDER — AMOXICILLIN 500 MG/1
500 CAPSULE ORAL 3 TIMES DAILY
Qty: 30 CAPSULE | Refills: 0 | Status: SHIPPED | OUTPATIENT
Start: 2024-08-01 | End: 2024-08-11

## 2024-08-01 RX ORDER — 0.9 % SODIUM CHLORIDE 0.9 %
500 INTRAVENOUS SOLUTION INTRAVENOUS ONCE
Status: COMPLETED | OUTPATIENT
Start: 2024-08-01 | End: 2024-08-01

## 2024-08-01 RX ADMIN — SODIUM CHLORIDE 500 ML: 9 INJECTION, SOLUTION INTRAVENOUS at 10:23

## 2024-08-01 ASSESSMENT — PAIN - FUNCTIONAL ASSESSMENT
PAIN_FUNCTIONAL_ASSESSMENT: ACTIVITIES ARE NOT PREVENTED
PAIN_FUNCTIONAL_ASSESSMENT: 0-10

## 2024-08-01 ASSESSMENT — PAIN DESCRIPTION - ORIENTATION: ORIENTATION: LEFT

## 2024-08-01 ASSESSMENT — PAIN DESCRIPTION - PAIN TYPE: TYPE: ACUTE PAIN

## 2024-08-01 ASSESSMENT — PAIN SCALES - GENERAL: PAINLEVEL_OUTOF10: 4

## 2024-08-01 ASSESSMENT — PAIN DESCRIPTION - ONSET: ONSET: ON-GOING

## 2024-08-01 ASSESSMENT — PAIN DESCRIPTION - DESCRIPTORS: DESCRIPTORS: DISCOMFORT

## 2024-08-01 ASSESSMENT — PAIN DESCRIPTION - FREQUENCY: FREQUENCY: INTERMITTENT

## 2024-08-01 NOTE — DISCHARGE INSTRUCTIONS
1.  Medications as directed.  2.  Follow with your primary care physician next couple days call for an appointment and follow-up with ENT as planned.  3.  Return emergency department for severe worsening signs infection or inability to tolerate oral fluids

## 2024-08-01 NOTE — ED TRIAGE NOTES
Patient to the ED with complaints of pain to the left side of face, hurts to swallow, ENT appt on 8/15/24, was seen at the dentist yesterday and there were no dental issues.   No

## 2024-08-01 NOTE — ED NOTES
D/C: Order noted for d/c. Pt confirmed d/c paperwork has correct name. Discharge and education instructions reviewed with patient. Teach-back successful.  Pt verbalized understanding and denied questions at this time. No acute distress noted. Patient instructed to follow-up as noted - return to emergency department if symptoms worsen. Patient verbalized understanding. Discharged per EDMD with discharge instructions. Pt discharged to private vehicle. Patient stable upon departure. Thanked patient for [facility] for care. Provider aware of patient pain at time of discharge.

## 2024-08-02 NOTE — ED PROVIDER NOTES
EMERGENCY DEPARTMENT ENCOUNTER     [facility] EMERGENCY DEPARTMENT     Pt Name: Galina Nick   MRN: [id number]   Birthdate 1965   Date of evaluation: 8/1/2024   Provider: Jack Herrera MD   PCP: Tristan Lei MD   Note Started: 3:37 PM EDT 8/2/24     CHIEF COMPLAINT     Chief Complaint   Patient presents with    Cyst     Left side of face, hurts to swallow, ENT appt on 8/15/24, was seen at the dentist yesterday and there were no dental issues        HISTORY OF PRESENT ILLNESS:  History from : Patient   Limitations to history : None     Galina Nick is a 58 y.o. female who presents with right face and jaw pain.  Is a pleasant 58-year-old -American female with migratory past medical history who presents with what she describes as right facial pain with some swelling under the angle of the jaw.  Is been getting progressively worse over the past few weeks and she has been seen by both her primary care physician in this emergency department before.  She is speaking to me in complete sentences denies any severe sore throat respiratory distress hot potato voice fevers chills or systemic signs of infection.  No ear pain and no ear drainage.    Nursing Notes were all reviewed and agreed with or any disagreements were addressed in the HPI.     ROS: Positives and Pertinent negatives as per HPI.    PAST MEDICAL HISTORY     Past medical history:  has a past medical history of CAD (coronary artery disease), Diabetes mellitus (HCC), Dyslipidemia, and Hypertension.    Past surgical history:  has a past surgical history that includes Breast biopsy and Cardiac procedure (N/A, 6/21/2024).      PHYSICAL EXAM:  ED Triage Vitals [08/01/24 0833]   BP Temp Temp Source Pulse Respirations SpO2 Height Weight - Scale   (!) 158/90 98 °F (36.7 °C) Oral 79 14 99 % 1.727 m (5' 8\") 80.8 kg (178 lb 2.1 oz)        Physical Exam  Constitutional:       General: She is not in acute distress.

## 2024-08-03 LAB — S PYO THROAT QL CULT: NORMAL

## 2024-08-21 ENCOUNTER — TELEPHONE (OUTPATIENT)
Dept: CARDIOLOGY CLINIC | Age: 59
End: 2024-08-21

## 2024-08-21 RX ORDER — NIFEDIPINE 60 MG/1
60 TABLET, EXTENDED RELEASE ORAL DAILY
Qty: 90 TABLET | Refills: 1 | Status: SHIPPED | OUTPATIENT
Start: 2024-08-21

## 2024-08-21 NOTE — TELEPHONE ENCOUNTER
Re sent script for 60 mg tablets to [facility]. Please let me know if there are any issues with that. thanks

## 2024-08-21 NOTE — TELEPHONE ENCOUNTER
Pt called in letting [facility] know that her insurance is not covering the NIFEdipine (PROCARDIA XL) 30 MG extended release tablet 2 tablets nightly and is wondering if she can be put back on the 60mg of the medication and sent to [facility]   Please call and advise  Thank you

## 2024-08-21 NOTE — TELEPHONE ENCOUNTER
Spoke to pt, let her know the Nifedipine 60mg was sent to [facility]. Pt verbalized understanding. Thank you.

## 2024-08-30 ENCOUNTER — TELEPHONE (OUTPATIENT)
Dept: CARDIOLOGY CLINIC | Age: 59
End: 2024-08-30

## 2024-08-30 NOTE — TELEPHONE ENCOUNTER
Pt called to inform [facility] that she was in the clinic and she test negative, her symptom were all the of Covid including loss of taste, loss of sense of smell.  Pt wants to know what medication can she take due to her heart condition.  Please advise.  Thank you

## 2024-09-03 ENCOUNTER — TELEPHONE (OUTPATIENT)
Dept: CARDIOLOGY CLINIC | Age: 59
End: 2024-09-03

## 2024-09-06 RX ORDER — METOPROLOL SUCCINATE 25 MG/1
50 TABLET, EXTENDED RELEASE ORAL DAILY
Qty: 30 TABLET | Refills: 3 | Status: SHIPPED | OUTPATIENT
Start: 2024-09-06 | End: 2024-09-06

## 2024-09-06 RX ORDER — METOPROLOL SUCCINATE 25 MG/1
TABLET, EXTENDED RELEASE ORAL
Qty: 30 TABLET | Refills: 3 | Status: SHIPPED | OUTPATIENT
Start: 2024-09-06 | End: 2024-09-06

## 2024-09-06 RX ORDER — METOPROLOL SUCCINATE 25 MG/1
50 TABLET, EXTENDED RELEASE ORAL DAILY
Qty: 30 TABLET | Refills: 3 | Status: SHIPPED | OUTPATIENT
Start: 2024-09-06 | End: 2024-09-06 | Stop reason: SDUPTHER

## 2024-09-06 RX ORDER — METOPROLOL SUCCINATE 50 MG/1
50 TABLET, EXTENDED RELEASE ORAL DAILY
Qty: 30 TABLET | Refills: 2 | Status: SHIPPED | OUTPATIENT
Start: 2024-09-06

## 2024-09-24 ENCOUNTER — HOSPITAL ENCOUNTER (INPATIENT)
Age: 59
LOS: 15 days | Discharge: HOME | DRG: 915 | End: 2024-10-11
Attending: STUDENT IN AN ORGANIZED HEALTH CARE EDUCATION/TRAINING PROGRAM | Admitting: INTERNAL MEDICINE
Payer: COMMERCIAL

## 2024-09-24 DIAGNOSIS — R73.9 HYPERGLYCEMIA: ICD-10-CM

## 2024-09-24 DIAGNOSIS — R06.02 SHORTNESS OF BREATH: ICD-10-CM

## 2024-09-24 DIAGNOSIS — I16.1 HYPERTENSIVE EMERGENCY: ICD-10-CM

## 2024-09-24 DIAGNOSIS — M79.89 LEG SWELLING: ICD-10-CM

## 2024-09-24 DIAGNOSIS — E11.8 POORLY CONTROLLED TYPE 2 DIABETES MELLITUS WITH COMPLICATION (HCC): ICD-10-CM

## 2024-09-24 DIAGNOSIS — E11.65 POORLY CONTROLLED TYPE 2 DIABETES MELLITUS WITH COMPLICATION (HCC): ICD-10-CM

## 2024-09-24 DIAGNOSIS — T78.3XXA ANGIOEDEMA, INITIAL ENCOUNTER: Primary | ICD-10-CM

## 2024-09-24 DIAGNOSIS — R60.0 EDEMA OF LEFT UPPER ARM: ICD-10-CM

## 2024-09-24 DIAGNOSIS — R55 NEAR SYNCOPE: ICD-10-CM

## 2024-09-24 PROBLEM — R22.0 FACIAL SWELLING: Status: ACTIVE | Noted: 2024-09-24

## 2024-09-24 LAB
ALBUMIN SERPL-MCNC: 3.4 G/DL (ref 3.4–5)
ALBUMIN/GLOB SERPL: 0.9 {RATIO} (ref 1.1–2.2)
ALP SERPL-CCNC: 214 U/L (ref 40–129)
ALT SERPL-CCNC: 20 U/L (ref 10–40)
ANION GAP SERPL CALCULATED.3IONS-SCNC: 14 MMOL/L (ref 3–16)
AST SERPL-CCNC: 21 U/L (ref 15–37)
BASOPHILS # BLD: 0 K/UL (ref 0–0.2)
BASOPHILS NFR BLD: 0.5 %
BILIRUB SERPL-MCNC: 0.4 MG/DL (ref 0–1)
BUN SERPL-MCNC: 31 MG/DL (ref 7–20)
CALCIUM SERPL-MCNC: 9.3 MG/DL (ref 8.3–10.6)
CHLORIDE SERPL-SCNC: 102 MMOL/L (ref 99–110)
CO2 SERPL-SCNC: 16 MMOL/L (ref 21–32)
CREAT SERPL-MCNC: 2 MG/DL (ref 0.6–1.1)
DEPRECATED RDW RBC AUTO: 14.3 % (ref 12.4–15.4)
EOSINOPHIL # BLD: 0.1 K/UL (ref 0–0.6)
EOSINOPHIL NFR BLD: 1.3 %
GFR SERPLBLD CREATININE-BSD FMLA CKD-EPI: 29 ML/MIN/{1.73_M2}
GLUCOSE BLD-MCNC: 582 MG/DL (ref 70–99)
GLUCOSE SERPL-MCNC: 356 MG/DL (ref 70–99)
HCT VFR BLD AUTO: 33.9 % (ref 36–48)
HGB BLD-MCNC: 11.2 G/DL (ref 12–16)
LYMPHOCYTES # BLD: 0.6 K/UL (ref 1–5.1)
LYMPHOCYTES NFR BLD: 7.9 %
MCH RBC QN AUTO: 27.4 PG (ref 26–34)
MCHC RBC AUTO-ENTMCNC: 33.1 G/DL (ref 31–36)
MCV RBC AUTO: 83 FL (ref 80–100)
MONOCYTES # BLD: 0.1 K/UL (ref 0–1.3)
MONOCYTES NFR BLD: 1.7 %
NEUTROPHILS # BLD: 7.3 K/UL (ref 1.7–7.7)
NEUTROPHILS NFR BLD: 88.6 %
PERFORMED ON: ABNORMAL
PLATELET # BLD AUTO: 229 K/UL (ref 135–450)
PMV BLD AUTO: 9 FL (ref 5–10.5)
POTASSIUM SERPL-SCNC: 4.8 MMOL/L (ref 3.5–5.1)
PROT SERPL-MCNC: 7 G/DL (ref 6.4–8.2)
RBC # BLD AUTO: 4.08 M/UL (ref 4–5.2)
SODIUM SERPL-SCNC: 132 MMOL/L (ref 136–145)
WBC # BLD AUTO: 8.2 K/UL (ref 4–11)

## 2024-09-24 PROCEDURE — 99285 EMERGENCY DEPT VISIT HI MDM: CPT

## 2024-09-24 PROCEDURE — 96375 TX/PRO/DX INJ NEW DRUG ADDON: CPT

## 2024-09-24 PROCEDURE — 2500000003 HC RX 250 WO HCPCS: Performed by: PHYSICIAN ASSISTANT

## 2024-09-24 PROCEDURE — 87529 HSV DNA AMP PROBE: CPT

## 2024-09-24 PROCEDURE — 6370000000 HC RX 637 (ALT 250 FOR IP): Performed by: STUDENT IN AN ORGANIZED HEALTH CARE EDUCATION/TRAINING PROGRAM

## 2024-09-24 PROCEDURE — 86160 COMPLEMENT ANTIGEN: CPT

## 2024-09-24 PROCEDURE — 6360000002 HC RX W HCPCS: Performed by: STUDENT IN AN ORGANIZED HEALTH CARE EDUCATION/TRAINING PROGRAM

## 2024-09-24 PROCEDURE — 2580000003 HC RX 258: Performed by: PHYSICIAN ASSISTANT

## 2024-09-24 PROCEDURE — G0378 HOSPITAL OBSERVATION PER HR: HCPCS

## 2024-09-24 PROCEDURE — 6360000002 HC RX W HCPCS: Performed by: PHYSICIAN ASSISTANT

## 2024-09-24 PROCEDURE — 6370000000 HC RX 637 (ALT 250 FOR IP): Performed by: NURSE PRACTITIONER

## 2024-09-24 PROCEDURE — 2580000003 HC RX 258: Performed by: STUDENT IN AN ORGANIZED HEALTH CARE EDUCATION/TRAINING PROGRAM

## 2024-09-24 PROCEDURE — 80053 COMPREHEN METABOLIC PANEL: CPT

## 2024-09-24 PROCEDURE — 85025 COMPLETE CBC W/AUTO DIFF WBC: CPT

## 2024-09-24 PROCEDURE — 86161 COMPLEMENT/FUNCTION ACTIVITY: CPT

## 2024-09-24 PROCEDURE — 96374 THER/PROPH/DIAG INJ IV PUSH: CPT

## 2024-09-24 RX ORDER — POTASSIUM CHLORIDE 1500 MG/1
40 TABLET, EXTENDED RELEASE ORAL PRN
Status: DISCONTINUED | OUTPATIENT
Start: 2024-09-24 | End: 2024-10-11 | Stop reason: HOSPADM

## 2024-09-24 RX ORDER — POTASSIUM CHLORIDE 7.45 MG/ML
10 INJECTION INTRAVENOUS PRN
Status: DISCONTINUED | OUTPATIENT
Start: 2024-09-24 | End: 2024-10-11 | Stop reason: HOSPADM

## 2024-09-24 RX ORDER — INSULIN LISPRO 100 [IU]/ML
0-4 INJECTION, SOLUTION INTRAVENOUS; SUBCUTANEOUS NIGHTLY
Status: DISCONTINUED | OUTPATIENT
Start: 2024-09-24 | End: 2024-09-25

## 2024-09-24 RX ORDER — ASPIRIN 81 MG/1
81 TABLET ORAL NIGHTLY
Status: DISCONTINUED | OUTPATIENT
Start: 2024-09-24 | End: 2024-09-30

## 2024-09-24 RX ORDER — ACETAMINOPHEN 650 MG/1
650 SUPPOSITORY RECTAL EVERY 6 HOURS PRN
Status: DISCONTINUED | OUTPATIENT
Start: 2024-09-24 | End: 2024-10-11 | Stop reason: HOSPADM

## 2024-09-24 RX ORDER — POLYETHYLENE GLYCOL 3350 17 G/17G
17 POWDER, FOR SOLUTION ORAL DAILY PRN
Status: DISCONTINUED | OUTPATIENT
Start: 2024-09-24 | End: 2024-10-11 | Stop reason: HOSPADM

## 2024-09-24 RX ORDER — SODIUM CHLORIDE 9 MG/ML
INJECTION, SOLUTION INTRAVENOUS PRN
Status: DISCONTINUED | OUTPATIENT
Start: 2024-09-24 | End: 2024-10-11 | Stop reason: HOSPADM

## 2024-09-24 RX ORDER — SODIUM CHLORIDE 0.9 % (FLUSH) 0.9 %
5-40 SYRINGE (ML) INJECTION PRN
Status: DISCONTINUED | OUTPATIENT
Start: 2024-09-24 | End: 2024-10-11 | Stop reason: HOSPADM

## 2024-09-24 RX ORDER — ONDANSETRON 2 MG/ML
4 INJECTION INTRAMUSCULAR; INTRAVENOUS EVERY 6 HOURS PRN
Status: DISCONTINUED | OUTPATIENT
Start: 2024-09-24 | End: 2024-10-11 | Stop reason: HOSPADM

## 2024-09-24 RX ORDER — GLUCAGON 1 MG/ML
1 KIT INJECTION PRN
Status: DISCONTINUED | OUTPATIENT
Start: 2024-09-24 | End: 2024-10-11 | Stop reason: HOSPADM

## 2024-09-24 RX ORDER — DEXTROSE MONOHYDRATE 100 MG/ML
INJECTION, SOLUTION INTRAVENOUS CONTINUOUS PRN
Status: DISCONTINUED | OUTPATIENT
Start: 2024-09-24 | End: 2024-10-11 | Stop reason: HOSPADM

## 2024-09-24 RX ORDER — METOPROLOL TARTRATE 50 MG
50 TABLET ORAL ONCE
Status: COMPLETED | OUTPATIENT
Start: 2024-09-24 | End: 2024-09-24

## 2024-09-24 RX ORDER — NIFEDIPINE 30 MG/1
60 TABLET, EXTENDED RELEASE ORAL DAILY
Status: DISCONTINUED | OUTPATIENT
Start: 2024-09-25 | End: 2024-09-28

## 2024-09-24 RX ORDER — ENOXAPARIN SODIUM 100 MG/ML
40 INJECTION SUBCUTANEOUS DAILY
Status: DISCONTINUED | OUTPATIENT
Start: 2024-09-25 | End: 2024-09-25

## 2024-09-24 RX ORDER — METOPROLOL SUCCINATE 25 MG/1
25 TABLET, EXTENDED RELEASE ORAL DAILY
Status: DISCONTINUED | OUTPATIENT
Start: 2024-09-25 | End: 2024-09-28

## 2024-09-24 RX ORDER — DIPHENHYDRAMINE HYDROCHLORIDE 50 MG/ML
25 INJECTION INTRAMUSCULAR; INTRAVENOUS
Status: DISCONTINUED | OUTPATIENT
Start: 2024-09-24 | End: 2024-10-11 | Stop reason: HOSPADM

## 2024-09-24 RX ORDER — ROSUVASTATIN CALCIUM 20 MG/1
20 TABLET, COATED ORAL NIGHTLY
Status: DISCONTINUED | OUTPATIENT
Start: 2024-09-24 | End: 2024-10-11 | Stop reason: HOSPADM

## 2024-09-24 RX ORDER — MAGNESIUM SULFATE IN WATER 40 MG/ML
2000 INJECTION, SOLUTION INTRAVENOUS PRN
Status: DISCONTINUED | OUTPATIENT
Start: 2024-09-24 | End: 2024-10-11 | Stop reason: HOSPADM

## 2024-09-24 RX ORDER — DIPHENHYDRAMINE HYDROCHLORIDE 50 MG/ML
25 INJECTION INTRAMUSCULAR; INTRAVENOUS ONCE
Status: COMPLETED | OUTPATIENT
Start: 2024-09-24 | End: 2024-09-24

## 2024-09-24 RX ORDER — ONDANSETRON 4 MG/1
4 TABLET, ORALLY DISINTEGRATING ORAL EVERY 8 HOURS PRN
Status: DISCONTINUED | OUTPATIENT
Start: 2024-09-24 | End: 2024-10-11 | Stop reason: HOSPADM

## 2024-09-24 RX ORDER — EPINEPHRINE 1 MG/ML(1)
0.15 AMPUL (ML) INJECTION
Status: DISPENSED | OUTPATIENT
Start: 2024-09-24 | End: 2024-09-25

## 2024-09-24 RX ORDER — ACETAMINOPHEN 325 MG/1
650 TABLET ORAL EVERY 6 HOURS PRN
Status: DISCONTINUED | OUTPATIENT
Start: 2024-09-24 | End: 2024-10-11 | Stop reason: HOSPADM

## 2024-09-24 RX ORDER — FAMOTIDINE 10 MG/ML
20 INJECTION, SOLUTION INTRAVENOUS ONCE
Status: COMPLETED | OUTPATIENT
Start: 2024-09-24 | End: 2024-09-24

## 2024-09-24 RX ORDER — INSULIN LISPRO 100 [IU]/ML
6 INJECTION, SOLUTION INTRAVENOUS; SUBCUTANEOUS ONCE
Status: COMPLETED | OUTPATIENT
Start: 2024-09-24 | End: 2024-09-24

## 2024-09-24 RX ORDER — SODIUM CHLORIDE 0.9 % (FLUSH) 0.9 %
5-40 SYRINGE (ML) INJECTION EVERY 12 HOURS SCHEDULED
Status: DISCONTINUED | OUTPATIENT
Start: 2024-09-24 | End: 2024-10-11 | Stop reason: HOSPADM

## 2024-09-24 RX ORDER — INSULIN LISPRO 100 [IU]/ML
0-16 INJECTION, SOLUTION INTRAVENOUS; SUBCUTANEOUS
Status: DISCONTINUED | OUTPATIENT
Start: 2024-09-25 | End: 2024-09-25

## 2024-09-24 RX ORDER — CHLORTHALIDONE 25 MG/1
25 TABLET ORAL DAILY
Status: DISCONTINUED | OUTPATIENT
Start: 2024-09-25 | End: 2024-09-26

## 2024-09-24 RX ORDER — METOPROLOL SUCCINATE 25 MG/1
25 TABLET, EXTENDED RELEASE ORAL DAILY
Status: ON HOLD | COMMUNITY
End: 2024-09-30 | Stop reason: HOSPADM

## 2024-09-24 RX ADMIN — WATER 125 MG: 1 INJECTION INTRAMUSCULAR; INTRAVENOUS; SUBCUTANEOUS at 14:24

## 2024-09-24 RX ADMIN — ASPIRIN 81 MG: 81 TABLET, COATED ORAL at 22:50

## 2024-09-24 RX ADMIN — DIPHENHYDRAMINE HYDROCHLORIDE 25 MG: 50 INJECTION INTRAMUSCULAR; INTRAVENOUS at 14:23

## 2024-09-24 RX ADMIN — INSULIN LISPRO 6 UNITS: 100 INJECTION, SOLUTION INTRAVENOUS; SUBCUTANEOUS at 22:50

## 2024-09-24 RX ADMIN — METOPROLOL TARTRATE 50 MG: 50 TABLET, FILM COATED ORAL at 18:14

## 2024-09-24 RX ADMIN — INSULIN LISPRO 4 UNITS: 100 INJECTION, SOLUTION INTRAVENOUS; SUBCUTANEOUS at 22:50

## 2024-09-24 RX ADMIN — SODIUM CHLORIDE, PRESERVATIVE FREE 10 ML: 5 INJECTION INTRAVENOUS at 22:51

## 2024-09-24 RX ADMIN — ROSUVASTATIN 20 MG: 20 TABLET, FILM COATED ORAL at 22:50

## 2024-09-24 RX ADMIN — FAMOTIDINE 20 MG: 10 INJECTION, SOLUTION INTRAVENOUS at 14:24

## 2024-09-24 RX ADMIN — WATER 40 MG: 1 INJECTION INTRAMUSCULAR; INTRAVENOUS; SUBCUTANEOUS at 22:51

## 2024-09-24 ASSESSMENT — PAIN SCALES - GENERAL
PAINLEVEL_OUTOF10: 0
PAINLEVEL_OUTOF10: 0
PAINLEVEL_OUTOF10: 6

## 2024-09-24 ASSESSMENT — PAIN DESCRIPTION - LOCATION: LOCATION: FACE

## 2024-09-24 ASSESSMENT — PAIN - FUNCTIONAL ASSESSMENT: PAIN_FUNCTIONAL_ASSESSMENT: 0-10

## 2024-09-24 NOTE — PROGRESS NOTES
How does patient ambulate?   [x]Low Fall Risk (ambulates by themselves without support)  []Stand by assist   []Contact Guard   []Front wheel walker  []Wheelchair   []Steady  []Bed bound  []History of Lower Extremity Amputation  []Unknown, did not assess in the emergency department   How does patient take pills?  [x]Whole with Water  []Crushed in applesauce  []Crushed in pudding  []Other  []Unknown no oral medications were given in the ED  Is patient alert?   [x]Alert  []Drowsy but responds to voice  []Doesn't respond to voice but responds to painful stimuli  []Unresponsive  Is patient oriented?   [x]To person  [x]To place  [x]To time  [x]To situation  []Confused  []Agitated  [x]Follows commands  If patient is disoriented or from a Skill Nursing Facility has family been notified of admission?   []Yes   []No  X Patient from home.   Patient belongings?   [x]Cell phone  []Wallet   []Dentures  [x]Clothing  Any specific patient or family belongings/needs/dynamics?   N/A  Miscellaneous comments/pending orders?  All ED orders complete.     If there are any additional questions please reach out to the Emergency Department.

## 2024-09-24 NOTE — ED PROVIDER NOTES
In addition to the advanced practice provider, I personally saw Galina Nick and performed a substantive portion of the visit including all aspects of the medical decision making.    Medical Decision Making    Pt with tingling to face 2 days ago, bilateral in nature, then woke up with swelling to bilateral face this am.  She has marked edema to her bilateral upper and lower lip.  Denies any history of similar allergies.  He has a history of dry cough reaction to ACE inhibitor's and has been off lisinopril for several years.  She still takes valsartan for hypertension.  Denies any exposure to any other allergens.  She tried steroid cream on her upper lip without any improvement.  She does have a history of cold sores.  Did get shingles vaccine this year.    On exam patient has marked symmetric edema to bilateral upper and lower lip with redness streaking into the bilateral nasal bridge and lower cheeks.  There are some periorbital edema noted as well.  There is no trismus or stridor.  She has no trouble controlling her secretions.  No posterior oropharyngeal edema is appreciated.  No rash noted elsewhere.    This rash appears consistent with angioedema upon arrival.  On arrival edema appears isolated to the lips without tongue involvement so she was treated with IV steroids and Benadryl and Pepcid.  Did not need epinephrine upon arrival.  She was observed in the emergency room for several hours, edema does not appear to be going down but does not appear to be getting worse.  I would recommend admission for airway monitoring and ENT consult at this point.  This could be angioedema secondary to valsartan use, I have marked this as an allergy in her chart.  Differential also includes cellulitis versus bilateral HSV rash versus bilateral shingles though thought to be much less likely.  I spoke with Dr. Hammer with ENT, agrees with airway monitoring overnight.  If no need for acute intervention she will see in the

## 2024-09-24 NOTE — H&P
V2.0  History and Physical      Name:  Galina Nick /Age/Sex: 1965  (58 y.o. female)   MRN & CSN:  [id number] & [id number] Encounter Date/Time: 2024 7:36 PM EDT   Location:  ED- PCP: Tristan Lei MD       Hospital Day: 1    Assessment and Plan:   Galina Nick is a 58 y.o. female with a pmh of hypertension, CAD, dyslipidemia, type 2 diabetes mellitus who presents with Facial swelling    Hospital Problems             Last Modified POA    * (Principal) Facial swelling 2024 Yes       Plan:  # Angioedema:  -Secondary to most likely out of ARB  -Patient presented with chief complaint of facial swelling.  Mentions that her symptoms started yesterday evening and got worse when she woke up this morning.    -In the ED, patient was hypertensive with blood pressure of 191/82.  -Labs were significant for bicarb 16, creatinine 2, glucose 582.  -Discontinue losartan  -Patient received Benadryl 25 mg, Pepcid 20 mg, Solu-Medrol 125 mg in the ED.  -Start Solu-Medrol 40 mg every 8 hours  -As needed Benadryl  -Sed rate and CRP are pending  -C1 esterase inhibitor and C4 complement are pending  -ENT consult    # Hyperglycemia:  -Start 10 units of Lantus nightly  -Start lispro 5 units 3 times daily  -High-dose sliding scale insulin  -Hypoglycemic protocol    # CKD:  -Creatinine at baseline  -Avoid nephrotoxic meds    # Hypertension:  -Continue home meds  -Discontinue valsartan  -As needed labetalol and hydralazine    Disposition:   Current Living situation: Home  Expected Disposition: Home  Estimated D/C: 2 to 3 days    Diet ADULT DIET; Regular   DVT Prophylaxis [] Lovenox, [x]  Heparin, [] SCDs, [] Ambulation,  [] Eliquis, [] Xarelto, [] Coumadin   Code Status Prior   Surrogate Decision Maker/ POA Sister     Personally reviewed Lab Studies and Imaging     Discussed management of the case with ED attending who recommended to admit patient for angioedema.    EKG interpreted personally and

## 2024-09-24 NOTE — PROGRESS NOTES
Pharmacy Home Medication Reconciliation Note    A medication reconciliation has been completed for Galina Nick 1965    Pharmacy: [facility] [id number], [place]  Information provided by: patient    The patient's home medication list is as follows:  No current facility-administered medications on file prior to encounter.     Current Outpatient Medications on File Prior to Encounter   Medication Sig Dispense Refill    metoprolol succinate (TOPROL XL) 50 MG extended release tablet Take 1 tablet by mouth daily Take 1 tablet daily 30 tablet 2    NIFEdipine (PROCARDIA XL) 60 MG extended release tablet Take 1 tablet by mouth daily 90 tablet 1    valsartan (DIOVAN) 320 MG tablet Take 1 tablet by mouth daily 90 tablet 1    rosuvastatin (CRESTOR) 20 MG tablet Take 2 tablets by mouth daily (Patient taking differently: Take 1 tablet by mouth nightly) 30 tablet 1    glimepiride (AMARYL) 2 MG tablet Take 1 tablet by mouth every morning 30 tablet 1    chlorthalidone (HYGROTON) 25 MG tablet Take 1 tablet by mouth daily 90 tablet 1    aspirin 81 MG EC tablet Take 1 tablet by mouth daily (Patient taking differently: Take 1 tablet by mouth nightly) 90 tablet 3    Dulaglutide (TRULICITY) 0.75 MG/0.5ML SOPN INJECT 0.75 MG UNDER THE SKIN ONCE WEEKLY (Patient taking differently: Inject 0.5 mLs into the skin once a week INJECT 0.75 MG UNDER THE SKIN ONCE WEEKLY (Sundays).) 6 mL 0    Omega-3 Fatty Acids (FISH OIL) 1200 MG CAPS Take 1,200 mg by mouth nightly      Cholecalciferol (VITAMIN D3) 10 MCG (400 UNIT) CAPS Take 1 capsule by mouth nightly      Multiple Vitamins-Minerals (MULTIVITAMIN ADULTS PO) Take 1 tablet by mouth nightly      insulin glargine (LANTUS SOLOSTAR) 100 UNIT/ML injection pen Inject 10 Units into the skin nightly (Patient not taking: Reported on 7/29/2024) 5 Adjustable Dose Pre-filled Pen Syringe 0       Patient is no longer taking Lantus.    Of note, patient only took Valsartan and Toprol

## 2024-09-24 NOTE — ED PROVIDER NOTES
[facility] EMERGENCY DEPARTMENT  EMERGENCY DEPARTMENT ENCOUNTER        Pt Name: Galina Nick  MRN: [id number]  Birthdate 1965  Date of evaluation: 9/24/2024  Provider: Khushi Lobo PA-C  PCP: Tristan Lei MD  Note Started: 1:29 PM EDT 9/24/24       I have seen and evaluated this patient with my supervising physician Gab Payne MD.      CHIEF COMPLAINT       Chief Complaint   Patient presents with    Facial Swelling     Pt noticed some mild facial swelling starting yesterday. She took an otc allergy pill and then went to bed. This morning she woke up to significant facial swelling. Denies any change in beauty products or diet. Pt states she did have a flu shot Friday morning but has never had a reaction to a vaccine before.        HISTORY OF PRESENT ILLNESS: 1 or more Elements     History From: patient   Limitations to history : None    Galina Nick is a 58 y.o. female who presents for evaluation of facial swelling that started yesterday, worse this morning.  Patient states that 2 days ago she had a little bit of facial tingling like she was going to get a cold sore of her nose and upper lip region.  No known sick contacts with similar symptoms.  She is not on ACE inhibitor's.  No new soaps lotions detergents make-up foods, known allergens etc. no chest pain or shortness of breath.  No difficulty swallowing drooling or voice changes.  No other rashes.  Took Zyrtec last night.  Additionally, she did get the flu shot 4 days ago but has never had any similar reaction.  No other complaints or concerns at this time.    Nursing Notes were all reviewed and agreed with or any disagreements were addressed in the HPI.    REVIEW OF SYSTEMS :      Review of Systems   Constitutional:  Negative for appetite change, chills and fever.   HENT:  Positive for facial swelling. Negative for congestion and rhinorrhea.    Respiratory:  Negative for cough, shortness of breath and

## 2024-09-25 ENCOUNTER — APPOINTMENT (OUTPATIENT)
Dept: CT IMAGING | Age: 59
DRG: 915 | End: 2024-09-25
Payer: COMMERCIAL

## 2024-09-25 LAB
ANION GAP SERPL CALCULATED.3IONS-SCNC: 13 MMOL/L (ref 3–16)
ANION GAP SERPL CALCULATED.3IONS-SCNC: 14 MMOL/L (ref 3–16)
BACTERIA URNS QL MICRO: ABNORMAL /HPF
BASOPHILS # BLD: 0.1 K/UL (ref 0–0.2)
BASOPHILS NFR BLD: 0.4 %
BETA-HYDROXYBUTYRATE: 0.3 MMOL/L (ref 0–0.27)
BILIRUB UR QL STRIP.AUTO: NEGATIVE
BUN SERPL-MCNC: 39 MG/DL (ref 7–20)
BUN SERPL-MCNC: 42 MG/DL (ref 7–20)
C4 SERPL-MCNC: 53 MG/DL (ref 10–40)
CALCIUM SERPL-MCNC: 8.6 MG/DL (ref 8.3–10.6)
CALCIUM SERPL-MCNC: 8.9 MG/DL (ref 8.3–10.6)
CHLORIDE SERPL-SCNC: 102 MMOL/L (ref 99–110)
CHLORIDE SERPL-SCNC: 98 MMOL/L (ref 99–110)
CLARITY UR: CLEAR
CO2 SERPL-SCNC: 18 MMOL/L (ref 21–32)
CO2 SERPL-SCNC: 18 MMOL/L (ref 21–32)
COLOR UR: YELLOW
CREAT SERPL-MCNC: 2.4 MG/DL (ref 0.6–1.1)
CREAT SERPL-MCNC: 2.5 MG/DL (ref 0.6–1.1)
CRP SERPL-MCNC: 4.3 MG/L (ref 0–5.1)
DEPRECATED RDW RBC AUTO: 13.9 % (ref 12.4–15.4)
EOSINOPHIL # BLD: 0 K/UL (ref 0–0.6)
EOSINOPHIL NFR BLD: 0 %
EPI CELLS #/AREA URNS AUTO: 3 /HPF (ref 0–5)
ERYTHROCYTE [SEDIMENTATION RATE] IN BLOOD BY WESTERGREN METHOD: 52 MM/HR (ref 0–30)
EST. AVERAGE GLUCOSE BLD GHB EST-MCNC: 246 MG/DL
FERRITIN SERPL IA-MCNC: 315 NG/ML (ref 15–150)
FOLATE SERPL-MCNC: 8.29 NG/ML (ref 4.78–24.2)
GFR SERPLBLD CREATININE-BSD FMLA CKD-EPI: 22 ML/MIN/{1.73_M2}
GFR SERPLBLD CREATININE-BSD FMLA CKD-EPI: 23 ML/MIN/{1.73_M2}
GLUCOSE BLD-MCNC: 305 MG/DL (ref 70–99)
GLUCOSE BLD-MCNC: 316 MG/DL (ref 70–99)
GLUCOSE BLD-MCNC: 325 MG/DL (ref 70–99)
GLUCOSE BLD-MCNC: 326 MG/DL (ref 70–99)
GLUCOSE BLD-MCNC: 423 MG/DL (ref 70–99)
GLUCOSE BLD-MCNC: 504 MG/DL (ref 70–99)
GLUCOSE BLD-MCNC: 526 MG/DL (ref 70–99)
GLUCOSE BLD-MCNC: 596 MG/DL (ref 70–99)
GLUCOSE BLD-MCNC: >600 MG/DL (ref 70–99)
GLUCOSE BLD-MCNC: >600 MG/DL (ref 70–99)
GLUCOSE SERPL-MCNC: 532 MG/DL (ref 70–99)
GLUCOSE SERPL-MCNC: 587 MG/DL (ref 70–99)
GLUCOSE SERPL-MCNC: 595 MG/DL (ref 70–99)
GLUCOSE UR STRIP.AUTO-MCNC: >=1000 MG/DL
HBA1C MFR BLD: 10.2 %
HCT VFR BLD AUTO: 29.7 % (ref 36–48)
HGB BLD-MCNC: 9.9 G/DL (ref 12–16)
HGB UR QL STRIP.AUTO: ABNORMAL
HYALINE CASTS #/AREA URNS AUTO: 3 /LPF (ref 0–8)
IRON SATN MFR SERPL: 13 % (ref 15–50)
IRON SERPL-MCNC: 31 UG/DL (ref 37–145)
KETONES UR STRIP.AUTO-MCNC: ABNORMAL MG/DL
LEUKOCYTE ESTERASE UR QL STRIP.AUTO: NEGATIVE
LYMPHOCYTES # BLD: 0.7 K/UL (ref 1–5.1)
LYMPHOCYTES NFR BLD: 5.9 %
MCH RBC QN AUTO: 27.2 PG (ref 26–34)
MCHC RBC AUTO-ENTMCNC: 33.5 G/DL (ref 31–36)
MCV RBC AUTO: 81.4 FL (ref 80–100)
MONOCYTES # BLD: 0.1 K/UL (ref 0–1.3)
MONOCYTES NFR BLD: 1 %
NEUTROPHILS # BLD: 11.5 K/UL (ref 1.7–7.7)
NEUTROPHILS NFR BLD: 92.7 %
NITRITE UR QL STRIP.AUTO: NEGATIVE
PERFORMED ON: ABNORMAL
PH UR STRIP.AUTO: 6 [PH] (ref 5–8)
PLATELET # BLD AUTO: 230 K/UL (ref 135–450)
PMV BLD AUTO: 10.2 FL (ref 5–10.5)
POTASSIUM SERPL-SCNC: 4.9 MMOL/L (ref 3.5–5.1)
POTASSIUM SERPL-SCNC: 5.2 MMOL/L (ref 3.5–5.1)
PROCALCITONIN SERPL IA-MCNC: 0.33 NG/ML (ref 0–0.15)
PROT UR STRIP.AUTO-MCNC: >=1000 MG/DL
RBC # BLD AUTO: 3.65 M/UL (ref 4–5.2)
RBC CLUMPS #/AREA URNS AUTO: 5 /HPF (ref 0–4)
REPORT: NORMAL
RESP PATH DNA+RNA PNL NPH NAA+NON-PROBE: NORMAL
SODIUM SERPL-SCNC: 130 MMOL/L (ref 136–145)
SODIUM SERPL-SCNC: 133 MMOL/L (ref 136–145)
SP GR UR STRIP.AUTO: 1.02 (ref 1–1.03)
TIBC SERPL-MCNC: 238 UG/DL (ref 260–445)
UA COMPLETE W REFLEX CULTURE PNL UR: ABNORMAL
UA DIPSTICK W REFLEX MICRO PNL UR: YES
URN SPEC COLLECT METH UR: ABNORMAL
UROBILINOGEN UR STRIP-ACNC: 0.2 E.U./DL
VIT B12 SERPL-MCNC: 619 PG/ML (ref 211–911)
WBC # BLD AUTO: 12.5 K/UL (ref 4–11)
WBC #/AREA URNS AUTO: 5 /HPF (ref 0–5)

## 2024-09-25 PROCEDURE — 84145 PROCALCITONIN (PCT): CPT

## 2024-09-25 PROCEDURE — 6370000000 HC RX 637 (ALT 250 FOR IP): Performed by: INTERNAL MEDICINE

## 2024-09-25 PROCEDURE — 2580000003 HC RX 258: Performed by: INTERNAL MEDICINE

## 2024-09-25 PROCEDURE — 6370000000 HC RX 637 (ALT 250 FOR IP): Performed by: STUDENT IN AN ORGANIZED HEALTH CARE EDUCATION/TRAINING PROGRAM

## 2024-09-25 PROCEDURE — 70486 CT MAXILLOFACIAL W/O DYE: CPT

## 2024-09-25 PROCEDURE — 96366 THER/PROPH/DIAG IV INF ADDON: CPT

## 2024-09-25 PROCEDURE — 82010 KETONE BODYS QUAN: CPT

## 2024-09-25 PROCEDURE — 36415 COLL VENOUS BLD VENIPUNCTURE: CPT

## 2024-09-25 PROCEDURE — 80048 BASIC METABOLIC PNL TOTAL CA: CPT

## 2024-09-25 PROCEDURE — 86140 C-REACTIVE PROTEIN: CPT

## 2024-09-25 PROCEDURE — 82728 ASSAY OF FERRITIN: CPT

## 2024-09-25 PROCEDURE — 0202U NFCT DS 22 TRGT SARS-COV-2: CPT

## 2024-09-25 PROCEDURE — 84300 ASSAY OF URINE SODIUM: CPT

## 2024-09-25 PROCEDURE — 2500000003 HC RX 250 WO HCPCS: Performed by: INTERNAL MEDICINE

## 2024-09-25 PROCEDURE — 82746 ASSAY OF FOLIC ACID SERUM: CPT

## 2024-09-25 PROCEDURE — 96368 THER/DIAG CONCURRENT INF: CPT

## 2024-09-25 PROCEDURE — 85652 RBC SED RATE AUTOMATED: CPT

## 2024-09-25 PROCEDURE — 96372 THER/PROPH/DIAG INJ SC/IM: CPT

## 2024-09-25 PROCEDURE — 99223 1ST HOSP IP/OBS HIGH 75: CPT | Performed by: OTOLARYNGOLOGY

## 2024-09-25 PROCEDURE — 82607 VITAMIN B-12: CPT

## 2024-09-25 PROCEDURE — 96365 THER/PROPH/DIAG IV INF INIT: CPT

## 2024-09-25 PROCEDURE — 81001 URINALYSIS AUTO W/SCOPE: CPT

## 2024-09-25 PROCEDURE — 83036 HEMOGLOBIN GLYCOSYLATED A1C: CPT

## 2024-09-25 PROCEDURE — 6360000002 HC RX W HCPCS: Performed by: STUDENT IN AN ORGANIZED HEALTH CARE EDUCATION/TRAINING PROGRAM

## 2024-09-25 PROCEDURE — G0378 HOSPITAL OBSERVATION PER HR: HCPCS

## 2024-09-25 PROCEDURE — 82570 ASSAY OF URINE CREATININE: CPT

## 2024-09-25 PROCEDURE — 84156 ASSAY OF PROTEIN URINE: CPT

## 2024-09-25 PROCEDURE — 87040 BLOOD CULTURE FOR BACTERIA: CPT

## 2024-09-25 PROCEDURE — 83550 IRON BINDING TEST: CPT

## 2024-09-25 PROCEDURE — 85025 COMPLETE CBC W/AUTO DIFF WBC: CPT

## 2024-09-25 PROCEDURE — 96375 TX/PRO/DX INJ NEW DRUG ADDON: CPT

## 2024-09-25 PROCEDURE — 82947 ASSAY GLUCOSE BLOOD QUANT: CPT

## 2024-09-25 PROCEDURE — 83540 ASSAY OF IRON: CPT

## 2024-09-25 PROCEDURE — 2580000003 HC RX 258: Performed by: STUDENT IN AN ORGANIZED HEALTH CARE EDUCATION/TRAINING PROGRAM

## 2024-09-25 RX ORDER — SODIUM CHLORIDE 9 MG/ML
INJECTION, SOLUTION INTRAVENOUS CONTINUOUS
Status: DISCONTINUED | OUTPATIENT
Start: 2024-09-25 | End: 2024-09-25

## 2024-09-25 RX ORDER — INSULIN LISPRO 100 [IU]/ML
5 INJECTION, SOLUTION INTRAVENOUS; SUBCUTANEOUS
Status: DISCONTINUED | OUTPATIENT
Start: 2024-09-25 | End: 2024-09-25

## 2024-09-25 RX ORDER — INSULIN GLARGINE 100 [IU]/ML
10 INJECTION, SOLUTION SUBCUTANEOUS NIGHTLY
Status: DISCONTINUED | OUTPATIENT
Start: 2024-09-25 | End: 2024-09-25

## 2024-09-25 RX ORDER — LABETALOL HYDROCHLORIDE 5 MG/ML
10 INJECTION, SOLUTION INTRAVENOUS EVERY 4 HOURS PRN
Status: DISCONTINUED | OUTPATIENT
Start: 2024-09-25 | End: 2024-09-28

## 2024-09-25 RX ORDER — LABETALOL 200 MG/1
100 TABLET, FILM COATED ORAL EVERY 8 HOURS SCHEDULED
Status: DISCONTINUED | OUTPATIENT
Start: 2024-09-25 | End: 2024-09-25

## 2024-09-25 RX ORDER — HEPARIN SODIUM 5000 [USP'U]/ML
5000 INJECTION, SOLUTION INTRAVENOUS; SUBCUTANEOUS EVERY 8 HOURS SCHEDULED
Status: DISCONTINUED | OUTPATIENT
Start: 2024-09-25 | End: 2024-10-05

## 2024-09-25 RX ORDER — HYDRALAZINE HYDROCHLORIDE 20 MG/ML
10 INJECTION INTRAMUSCULAR; INTRAVENOUS EVERY 6 HOURS PRN
Status: DISCONTINUED | OUTPATIENT
Start: 2024-09-25 | End: 2024-09-25

## 2024-09-25 RX ORDER — LABETALOL 200 MG/1
200 TABLET, FILM COATED ORAL EVERY 8 HOURS SCHEDULED
Status: DISCONTINUED | OUTPATIENT
Start: 2024-09-25 | End: 2024-09-28

## 2024-09-25 RX ADMIN — NIFEDIPINE 60 MG: 30 TABLET, FILM COATED, EXTENDED RELEASE ORAL at 10:02

## 2024-09-25 RX ADMIN — SODIUM CHLORIDE: 9 INJECTION, SOLUTION INTRAVENOUS at 10:02

## 2024-09-25 RX ADMIN — HEPARIN SODIUM 5000 UNITS: 5000 INJECTION INTRAVENOUS; SUBCUTANEOUS at 07:01

## 2024-09-25 RX ADMIN — SODIUM BICARBONATE: 84 INJECTION INTRAVENOUS at 16:33

## 2024-09-25 RX ADMIN — WATER 40 MG: 1 INJECTION INTRAMUSCULAR; INTRAVENOUS; SUBCUTANEOUS at 07:01

## 2024-09-25 RX ADMIN — ASPIRIN 81 MG: 81 TABLET, COATED ORAL at 20:56

## 2024-09-25 RX ADMIN — INSULIN HUMAN 8 UNITS: 100 INJECTION, SOLUTION PARENTERAL at 10:15

## 2024-09-25 RX ADMIN — LABETALOL HYDROCHLORIDE 100 MG: 200 TABLET, FILM COATED ORAL at 10:02

## 2024-09-25 RX ADMIN — ROSUVASTATIN 20 MG: 20 TABLET, FILM COATED ORAL at 20:55

## 2024-09-25 RX ADMIN — SODIUM CHLORIDE, PRESERVATIVE FREE 10 ML: 5 INJECTION INTRAVENOUS at 10:03

## 2024-09-25 RX ADMIN — LABETALOL HYDROCHLORIDE 100 MG: 200 TABLET, FILM COATED ORAL at 13:05

## 2024-09-25 RX ADMIN — INSULIN HUMAN 8 UNITS: 100 INJECTION, SOLUTION PARENTERAL at 15:34

## 2024-09-25 RX ADMIN — SODIUM CHLORIDE 10.5 UNITS/HR: 9 INJECTION, SOLUTION INTRAVENOUS at 15:20

## 2024-09-25 RX ADMIN — HEPARIN SODIUM 5000 UNITS: 5000 INJECTION INTRAVENOUS; SUBCUTANEOUS at 15:43

## 2024-09-25 RX ADMIN — INSULIN GLARGINE 10 UNITS: 100 INJECTION, SOLUTION SUBCUTANEOUS at 07:07

## 2024-09-25 RX ADMIN — SODIUM CHLORIDE, PRESERVATIVE FREE 10 ML: 5 INJECTION INTRAVENOUS at 04:53

## 2024-09-25 RX ADMIN — HYDRALAZINE HYDROCHLORIDE 10 MG: 20 INJECTION, SOLUTION INTRAMUSCULAR; INTRAVENOUS at 04:53

## 2024-09-25 RX ADMIN — HEPARIN SODIUM 5000 UNITS: 5000 INJECTION INTRAVENOUS; SUBCUTANEOUS at 20:55

## 2024-09-25 ASSESSMENT — PAIN SCALES - GENERAL
PAINLEVEL_OUTOF10: 0

## 2024-09-25 NOTE — CONSULTS
[facility]       Otolaryngology Consultation      Requesting Physician / Provider:  Gab Payne MD     Date of Consultation:  9/25/2024        Date of Admission:   9/24/2024      Admission diagnosis:  Hyperglycemia [R73.9]  Facial swelling [R22.0]  Angioedema, initial encounter [T78.3XXA]      History Obtained From:  patient, electronic medical record        IMPRESSION:   Angioedema involving lips/face, probably secondary to Diovan, no evidence of airway obstruction/compromise, no indication for Otolaryngologic intervention      RECOMMENDATIONS:   ENT follow up as needed.           CHIEF COMPLAINT / Reason for Consultation:  \"angioedema\"      HISTORY OF PRESENT ILLNESS:    Galina Nick is a 58 y.o. female is a new patient to me.  An otolaryngologic consultation was requested for evaluation of facial swelling particularly of the upper lip, felt to be angioedema.  Consulted for risk of airway compromise.        REVIEW OF SYSTEMS:    I reviewed the ROS in the admission history and physical.      Past Medical History:       Diagnosis Date    CAD (coronary artery disease)     Diabetes mellitus (HCC)     Dyslipidemia     Hypertension        Past Surgical History:        Procedure Laterality Date    BREAST BIOPSY      1980's benign     CARDIAC PROCEDURE N/A 6/21/2024    Left heart cath / coronary angiography performed by Rafael James MD at [facility] CARDIAC CATH LAB       Current Medications:   Current Facility-Administered Medications: labetalol (NORMODYNE;TRANDATE) injection 10 mg, 10 mg, IntraVENous, Q4H PRN  heparin (porcine) injection 5,000 Units, 5,000 Units, SubCUTAneous, 3 times per day  [COMPLETED] insulin regular (HumuLIN R;NovoLIN R) injection 8 Units, 0.1 Units/kg, IntraVENous, Once **AND** insulin regular (HUMULIN R;NOVOLIN R) 100 Units in sodium chloride 0.9 % 100 mL infusion, 0.1-50 Units/hr, IntraVENous, Continuous  labetalol (NORMODYNE) tablet 200 mg, 200 mg, Oral, 3  She has never used smokeless tobacco.  ETOH:   reports no history of alcohol use.    Family History:       Problem Relation Age of Onset    Diabetes Father     Diabetes Maternal Grandmother     Diabetes Maternal Grandfather     Diabetes Paternal Grandmother     Diabetes Paternal Grandfather          PHYSICAL EXAM:      Constitutional:    Vitals:    Vitals:    09/25/24 1340   BP: (!) 140/83   Pulse: 100   Resp:    Temp: 98.4 °F (36.9 °C)   SpO2: 98%      General appearance:  WDWN, NAD, awake and alert.    Voice:  normal with no hot potato quality or hoarseness.  Ears:  The TMs and EACs, and external ears appeared to be normal.   Nose:  The external nose, nasal mucosa and secretions, inferior nasal turbinates, nasal septum, and nasal vestibules appeared to be normal.  Mouth and Throat:  moderate swelling of upper lip.  The oral cavity and oropharynx appeared to be normal.    Neck:  No masses or tenderness   Lymphatic:  Facial, neck and supraclavicular lymph nodes were normal to palpation.    I performed this head and neck physical exam personally.          LABORATORY:  9/25/24 0524  WBC = 12.5  DIFF = 92.7 / 5.9 / 1.0 / 0.0 / 0.4    9/24/24 1747  WBC = 8.2  DIFF = 88.6 / 7.9 / 1.7 / 1.3 / 0.5      REVIEW OF PRIOR EXTERNAL NOTES / RECORDS:    I reviewed the ED provider notes and the hospitalist notes including the admission H&P.      REVIEW / INDEPENDENT INTERPRETATION OF IMAGING / IMAGES:         I independently interpreted the images of the CT scan of the face, showing soft tissue swelling with no abscess.      CT OF THE FACE WITHOUT CONTRAST  9/25/2024    FINDINGS:  FACIAL BONES: The frontal sinuses, orbital walls, maxilla, pterygoid plates,  zygomatic arches, hard palate, nasal bones and mandible are intact.  The  temporomandibular joints are aligned.     ORBITAL CONTENTS: The globes appear intact.  The extraocular muscles, optic  nerve sheath complexes and lacrimal glands appear unremarkable.  No  retrobulbar  hematoma or mass is seen.     SINUSES: There is no evidence of acute sinusitis, such as air fluid level.  The mastoid air cells are clear.     SOFT TISSUES: Small subcutaneous calcification in the right side of the face.  No fluid collection.  No abscess.  No subcutaneous emphysema.     IMPRESSION:  No acute facial bone trauma.       Jett Ch MD    [facility]  Department of Otolaryngology/Head and Neck Surgery  [facility]  [address], [address]  [place] [address]  [phone]         MEDICAL DECISION MAKING    # and complexity of problems addressed:  26766 - High  1 acute or chronic illness or injury that poses a threat to life or bodily function.  (Angioedema with risk of deep neck infection, airway compromise, Prescott' palsy)      Amount and/or Complexity of Data to be Reviewed and Analyzed    98070 - high - 2 of 3 categories    Cat 1 - any 3 of following  Reviewed 2 prior external notes from unique sources  Reviewed results of 1 unique test     Cat 2  Independent interpretation of the test performed by another physician/other qualified healthcare professional (not separately reported)

## 2024-09-25 NOTE — PROGRESS NOTES
Patient seen.  Full consult report to follow.        IMPRESSION:  Angioedema involving lips/face, probably secondary to Diovan, no evidence of airway obstruction/compromise, no indication for Otolaryngologic intervention        RECOMMENDATIONS/PLAN:  ENT follow up as needed..

## 2024-09-25 NOTE — PLAN OF CARE
Problem: Discharge Planning  Goal: Discharge to home or other facility with appropriate resources  9/25/2024 1647 by Terri Quezada, RN  Outcome: Progressing  Flowsheets (Taken 9/25/2024 0745 by Coral Villasenor, RN)  Discharge to home or other facility with appropriate resources: Identify barriers to discharge with patient and caregiver  9/25/2024 0351 by Deepti Peck, RN  Outcome: Progressing     Problem: Safety - Adult  Goal: Free from fall injury  9/25/2024 1647 by eTrri Quezada, RN  Outcome: Progressing  9/25/2024 0351 by Deepti Peck, RN  Outcome: Progressing  Note: Pt is independent at baseline and remains free from falls this shift     Problem: Chronic Conditions and Co-morbidities  Goal: Patient's chronic conditions and co-morbidity symptoms are monitored and maintained or improved  Outcome: Progressing  Flowsheets (Taken 9/25/2024 0745 by Coral Villasenor, RN)  Care Plan - Patient's Chronic Conditions and Co-Morbidity Symptoms are Monitored and Maintained or Improved: Monitor and assess patient's chronic conditions and comorbid symptoms for stability, deterioration, or improvement

## 2024-09-25 NOTE — PLAN OF CARE
Problem: Discharge Planning  Goal: Discharge to home or other facility with appropriate resources  Outcome: Progressing     Problem: Safety - Adult  Goal: Free from fall injury  Outcome: Progressing  Note: Pt is independent at baseline and remains free from falls this shift

## 2024-09-25 NOTE — PROGRESS NOTES
Patient alert and oriented X 4, transferred to CVU in room # 2907 as per ordered with her belongings, handoff report provided to receiving RN, answered all questions.

## 2024-09-25 NOTE — CARE COORDINATION
Case Management Note:    Patient admitted as Observation with an anticipated short hospitalization length of stay. Chart reviewed and it appears that patient has minimal needs for discharge at this time. Medical staff to inform case management team if discharge needs are identified.      Case management will continue to follow progress and update discharge plan as needed.        Electronically signed by GEOFF Bowers on 9/25/2024 at 9:09 AM

## 2024-09-25 NOTE — PROGRESS NOTES
[place]2.[place]    [facility] [place]      Name:  Galina Nick /Age/Sex: 1965  (58 y.o. female)   MRN & CSN:  [id number] & [id number] Encounter Date/Time: 2024 2:31 PM EDT   Location:  [facility] PCP: Tristan Lei MD     Attending:Jennifer Schwartz MD       Hospital Day: 2    Assessment and Recommendations   Galina Nick is a 58 y.o. female with pmh of  hypertension, CAD, dyslipidemia, type 2 diabetes mellitus who presented with painless facial swelling.      Plan:     Angioedema involving the face: Improved  Likely secondary to Diovan use; discontinued.    Received Benadryl 25 mg, Pepcid 20 mg, Solu-Medrol 125 mg in the ED.  Discontinued steroid due to worsening hyperglycemia.  C4 complement elevated.    Continue monitoring for resolution.      Uncontrolled hypertension:   In the ED, patient was hypertensive with blood pressure of 191/82.  Hold metoprolol, diuretics and ARB.  Started on nifedipine and labetalol.      T2DM with hyperglycemia:   No evidence of DKA on BMP.    Continue insulin drip.  Transition to SC insulin once stable.    JAYY on CKD 4 with metabolic acidosis:  Nephrology consulted.  Continue bicarb drip.  Avoid nephrotoxins.  Monitor BMP.     Anemia: Follow-up workup.  Monitor CBC.    Diet ADULT DIET; Regular; 3 carb choices (45 gm/meal)   DVT Prophylaxis [] Lovenox, [x]  Heparin, [] SCDs, [] Ambulation,  [] Eliquis, [] Xarelto  [] Coumadin   Code Status Full Code   Disposition From: Home  Expected Disposition: Home  Estimated Date of Discharge:   Patient requires continued admission due to angioedema, JAYY, hyperglycemia   Surrogate Decision Maker/ SOLANGE Terri Nick (Brother/Sister)  [phone]      Personally reviewed Lab Studies and Imaging     Drugs that require monitoring for toxicity include insulin drip and the method of monitoring his blood glucose      Subjective:     Chief Complaint: Facial swelling    Patient seen and examined.   Hypertensive +  Blood  glucose remains uncontrolled requiring insulin drip and transferred to the ICU.  No facial pain, fever or chills.    Serum creatinine mildly elevated above baseline.    Denies any stridor, shortness of breath, wheezing, dysphagia or drooling.    Review of Systems:      A 10-12 system review obtained and updated today and is unremarkable except as mentioned  in my interval history.     Objective:   No intake or output data in the 24 hours ending 09/25/24 1442   Vitals:   Vitals:    09/25/24 1119 09/25/24 1305 09/25/24 1339 09/25/24 1340   BP: (!) 172/82 (!) 168/79 (!) 140/83 (!) 140/83   Pulse: 87 98 97 100   Resp:       Temp: 98.3 °F (36.8 °C)   98.4 °F (36.9 °C)   TempSrc: Oral   Temporal   SpO2: 97%   98%   Weight:       Height:             Physical Exam:    General appearance: No apparent distress, appears stated age and cooperative.  HEENT: Pupils equal, round, and reactive to light. Conjunctivae clear.  Neck: Supple, with full range of motion. Trachea midline.  Respiratory:  Normal respiratory effort. Clear to auscultation, bilaterally without Rales/Wheezes/Rhonchi.  Cardiovascular: Regular rate and rhythm with normal S1/S2 without murmurs, rubs or gallops.  Abdomen: Soft, non-tender, non-distended with normal bowel sounds.  Musculoskeletal: No clubbing, cyanosis or edema bilaterally.  Full range of motion without deformity.  Skin: Skin color, texture, turgor normal.  No rashes or lesions.  Neurologic:  Neurovascularly intact without any focal sensory/motor deficits. Cranial nerves: II-XII intact, grossly non-focal.  Psychiatric: Alert and oriented, thought content appropriate, normal insight  Capillary Refill: Brisk, 3 seconds, normal   Peripheral Pulses: +2 palpable, equal bilaterally   Periorbital edema resolved.  Still with facial swelling especially involving the lips with mild facial erythema but no tenderness or differential warmth.  Medications:   Medications:    heparin (porcine)  5,000 Units

## 2024-09-25 NOTE — CONSULTS
[facility]   Nephrology progress Note  [phone]  [phone]   Cole Martin           Reason for Consult:  HTN     HISTORY OF PRESENT ILLNESS:      The patient is a 58 y.o. female with significant past medical history of hypertension, T2DM, CKD, CAD, hyperlipidemia, mitral regurgitation, renal artery stenosis, presented to the ER with facial swelling.  This started as tingling sensation around the nose and the mouth and then subsequently she developed a rash in the center of her face.  The midface became swollen and red and hurt lips became large especially the upper lip.  She had no swelling of the tongue, there was no problems with breathing, and there was no generalized rash over the body.  She had a flu vaccine a week ago and previously had COVID in the last 4 weeks.    Of note she was on ARB Diovan for blood pressure control as well as antiproteinuric effect.  She had had used ACE inhibitor's maybe 20 years ago and they had caused a dry cough necessitating stoppage.  She never had angioedema with them      Past Medical History:        Diagnosis Date    CAD (coronary artery disease)     Diabetes mellitus (HCC)     Dyslipidemia     Hypertension        Past Surgical History:        Procedure Laterality Date    BREAST BIOPSY      1980's benign     CARDIAC PROCEDURE N/A 6/21/2024    Left heart cath / coronary angiography performed by Rafael James MD at [facility] CARDIAC CATH LAB       Current Medications:    No current facility-administered medications on file prior to encounter.     Current Outpatient Medications on File Prior to Encounter   Medication Sig Dispense Refill    metoprolol succinate (TOPROL XL) 25 MG extended release tablet Take 1 tablet by mouth daily      NIFEdipine (PROCARDIA XL) 60 MG extended release tablet Take 1 tablet by mouth daily 90 tablet 1    valsartan (DIOVAN) 320 MG tablet Take 1 tablet by mouth daily 90 tablet 1    rosuvastatin (CRESTOR) 20 MG tablet Take 2  tablets by mouth daily (Patient taking differently: Take 1 tablet by mouth nightly) 30 tablet 1    glimepiride (AMARYL) 2 MG tablet Take 1 tablet by mouth every morning 30 tablet 1    chlorthalidone (HYGROTON) 25 MG tablet Take 1 tablet by mouth daily 90 tablet 1    aspirin 81 MG EC tablet Take 1 tablet by mouth daily (Patient taking differently: Take 1 tablet by mouth nightly) 90 tablet 3    Dulaglutide (TRULICITY) 0.75 MG/0.5ML SOPN INJECT 0.75 MG UNDER THE SKIN ONCE WEEKLY (Patient taking differently: Inject 0.5 mLs into the skin once a week INJECT 0.75 MG UNDER THE SKIN ONCE WEEKLY (Sundays).) 6 mL 0    Omega-3 Fatty Acids (FISH OIL) 1200 MG CAPS Take 1,200 mg by mouth nightly      Cholecalciferol (VITAMIN D3) 10 MCG (400 UNIT) CAPS Take 1 capsule by mouth nightly      Multiple Vitamins-Minerals (MULTIVITAMIN ADULTS PO) Take 1 tablet by mouth nightly      metoprolol succinate (TOPROL XL) 50 MG extended release tablet Take 1 tablet by mouth daily Take 1 tablet daily (Patient not taking: Reported on 9/24/2024) 30 tablet 2    insulin glargine (LANTUS SOLOSTAR) 100 UNIT/ML injection pen Inject 10 Units into the skin nightly (Patient not taking: Reported on 7/29/2024) 5 Adjustable Dose Pre-filled Pen Syringe 0       Allergies:  Ace inhibitors and Angiotensin receptor blockers    Social History:    Social History     Socioeconomic History    Marital status: Single     Spouse name: Not on file    Number of children: Not on file    Years of education: Not on file    Highest education level: Not on file   Occupational History    Not on file   Tobacco Use    Smoking status: Never    Smokeless tobacco: Never   Vaping Use    Vaping status: Never Used   Substance and Sexual Activity    Alcohol use: No    Drug use: No    Sexual activity: Not on file   Other Topics Concern    Not on file   Social History Narrative    Not on file     Social Determinants of Health     Financial Resource Strain: Not on file   Food Insecurity: No  07/14/2011 07:38 PM    AMORPHOUS Present 06/19/2024 12:00 AM           IMPRESSION/RECOMMENDATIONS:      CKD stage IIIb  Sees Dr. MAVIS rodriguez  Creatinine baseline was 1.9  Likely diabetic nephropathy  Previous investigations ABI was negative, C3 normal at 145, C4 normal at 35, immunofixation: No paraprotein  Acute kidney injury  Creatinine 2.5  Check fractional excretion of sodium  Agree with IV fluids  T2DM  Hypertension  Facial swelling , ? Angioedema   Likely due to use of ARB(being used for hypertension and proteinuria)  Renal osteodystrophy  Monitor calcium phosphorus      Thank you for allowing me to participate in the care of this patient.  I will continue to follow along.  Please call with questions.    Romero Rodriguez MD, MD  9/25/2024  [facility]

## 2024-09-26 LAB
ALBUMIN SERPL-MCNC: 2.8 G/DL (ref 3.4–5)
ALBUMIN/GLOB SERPL: 1.3 {RATIO} (ref 1.1–2.2)
ALP SERPL-CCNC: 135 U/L (ref 40–129)
ALT SERPL-CCNC: 18 U/L (ref 10–40)
ANION GAP SERPL CALCULATED.3IONS-SCNC: 11 MMOL/L (ref 3–16)
AST SERPL-CCNC: 21 U/L (ref 15–37)
BASOPHILS # BLD: 0 K/UL (ref 0–0.2)
BASOPHILS NFR BLD: 0.3 %
BILIRUB SERPL-MCNC: <0.2 MG/DL (ref 0–1)
BUN SERPL-MCNC: 52 MG/DL (ref 7–20)
CALCIUM SERPL-MCNC: 8.4 MG/DL (ref 8.3–10.6)
CHLORIDE SERPL-SCNC: 106 MMOL/L (ref 99–110)
CO2 SERPL-SCNC: 23 MMOL/L (ref 21–32)
CREAT SERPL-MCNC: 3.1 MG/DL (ref 0.6–1.1)
CREAT UR-MCNC: 45.3 MG/DL (ref 28–259)
DEPRECATED RDW RBC AUTO: 14.2 % (ref 12.4–15.4)
EOSINOPHIL # BLD: 0 K/UL (ref 0–0.6)
EOSINOPHIL NFR BLD: 0.1 %
GFR SERPLBLD CREATININE-BSD FMLA CKD-EPI: 17 ML/MIN/{1.73_M2}
GLUCOSE BLD-MCNC: 104 MG/DL (ref 70–99)
GLUCOSE BLD-MCNC: 104 MG/DL (ref 70–99)
GLUCOSE BLD-MCNC: 105 MG/DL (ref 70–99)
GLUCOSE BLD-MCNC: 183 MG/DL (ref 70–99)
GLUCOSE BLD-MCNC: 190 MG/DL (ref 70–99)
GLUCOSE BLD-MCNC: 263 MG/DL (ref 70–99)
GLUCOSE BLD-MCNC: 265 MG/DL (ref 70–99)
GLUCOSE BLD-MCNC: 317 MG/DL (ref 70–99)
GLUCOSE BLD-MCNC: 324 MG/DL (ref 70–99)
GLUCOSE BLD-MCNC: 332 MG/DL (ref 70–99)
GLUCOSE BLD-MCNC: 337 MG/DL (ref 70–99)
GLUCOSE BLD-MCNC: 90 MG/DL (ref 70–99)
GLUCOSE SERPL-MCNC: 177 MG/DL (ref 70–99)
HCT VFR BLD AUTO: 22.6 % (ref 36–48)
HCT VFR BLD AUTO: 23.5 % (ref 36–48)
HGB BLD-MCNC: 7.5 G/DL (ref 12–16)
IMMATURE RETIC FRACT: 0.38 (ref 0.21–0.37)
LYMPHOCYTES # BLD: 1.5 K/UL (ref 1–5.1)
LYMPHOCYTES NFR BLD: 10.3 %
MCH RBC QN AUTO: 26.6 PG (ref 26–34)
MCHC RBC AUTO-ENTMCNC: 33.1 G/DL (ref 31–36)
MCV RBC AUTO: 80.4 FL (ref 80–100)
MONOCYTES # BLD: 0.9 K/UL (ref 0–1.3)
MONOCYTES NFR BLD: 6.2 %
NEUTROPHILS # BLD: 12 K/UL (ref 1.7–7.7)
NEUTROPHILS NFR BLD: 83.1 %
PERFORMED ON: ABNORMAL
PERFORMED ON: NORMAL
PLATELET # BLD AUTO: 209 K/UL (ref 135–450)
PMV BLD AUTO: 9.3 FL (ref 5–10.5)
POTASSIUM SERPL-SCNC: 3.7 MMOL/L (ref 3.5–5.1)
PROT SERPL-MCNC: 4.9 G/DL (ref 6.4–8.2)
PROT UR-MCNC: 569 MG/DL
RBC # BLD AUTO: 2.81 M/UL (ref 4–5.2)
RETICS # AUTO: 0.04 M/UL (ref 0.02–0.1)
RETICS/RBC NFR AUTO: 1.33 % (ref 0.5–2.18)
SODIUM SERPL-SCNC: 140 MMOL/L (ref 136–145)
SODIUM UR-SCNC: 26 MMOL/L
WBC # BLD AUTO: 14.5 K/UL (ref 4–11)

## 2024-09-26 PROCEDURE — 96376 TX/PRO/DX INJ SAME DRUG ADON: CPT

## 2024-09-26 PROCEDURE — 2580000003 HC RX 258: Performed by: STUDENT IN AN ORGANIZED HEALTH CARE EDUCATION/TRAINING PROGRAM

## 2024-09-26 PROCEDURE — 96372 THER/PROPH/DIAG INJ SC/IM: CPT

## 2024-09-26 PROCEDURE — 2500000003 HC RX 250 WO HCPCS: Performed by: INTERNAL MEDICINE

## 2024-09-26 PROCEDURE — 6360000002 HC RX W HCPCS: Performed by: INTERNAL MEDICINE

## 2024-09-26 PROCEDURE — 6360000002 HC RX W HCPCS: Performed by: STUDENT IN AN ORGANIZED HEALTH CARE EDUCATION/TRAINING PROGRAM

## 2024-09-26 PROCEDURE — 51798 US URINE CAPACITY MEASURE: CPT

## 2024-09-26 PROCEDURE — 85025 COMPLETE CBC W/AUTO DIFF WBC: CPT

## 2024-09-26 PROCEDURE — 2580000003 HC RX 258: Performed by: INTERNAL MEDICINE

## 2024-09-26 PROCEDURE — 85045 AUTOMATED RETICULOCYTE COUNT: CPT

## 2024-09-26 PROCEDURE — 6370000000 HC RX 637 (ALT 250 FOR IP): Performed by: INTERNAL MEDICINE

## 2024-09-26 PROCEDURE — 80053 COMPREHEN METABOLIC PANEL: CPT

## 2024-09-26 PROCEDURE — 1200000000 HC SEMI PRIVATE

## 2024-09-26 PROCEDURE — 6370000000 HC RX 637 (ALT 250 FOR IP): Performed by: STUDENT IN AN ORGANIZED HEALTH CARE EDUCATION/TRAINING PROGRAM

## 2024-09-26 RX ORDER — LANCETS 30 GAUGE
1 EACH MISCELLANEOUS
Qty: 100 EACH | Refills: 3 | Status: SHIPPED | OUTPATIENT
Start: 2024-09-26

## 2024-09-26 RX ORDER — SODIUM CHLORIDE, SODIUM LACTATE, POTASSIUM CHLORIDE, CALCIUM CHLORIDE 600; 310; 30; 20 MG/100ML; MG/100ML; MG/100ML; MG/100ML
INJECTION, SOLUTION INTRAVENOUS CONTINUOUS
Status: DISCONTINUED | OUTPATIENT
Start: 2024-09-26 | End: 2024-09-27

## 2024-09-26 RX ORDER — GLUCOSAMINE HCL/CHONDROITIN SU 500-400 MG
1 CAPSULE ORAL
Qty: 100 STRIP | Refills: 3 | Status: SHIPPED | OUTPATIENT
Start: 2024-09-26

## 2024-09-26 RX ORDER — INSULIN LISPRO 100 [IU]/ML
0-16 INJECTION, SOLUTION INTRAVENOUS; SUBCUTANEOUS
Status: DISCONTINUED | OUTPATIENT
Start: 2024-09-26 | End: 2024-09-27

## 2024-09-26 RX ORDER — INSULIN LISPRO 100 [IU]/ML
10 INJECTION, SOLUTION INTRAVENOUS; SUBCUTANEOUS
Status: DISCONTINUED | OUTPATIENT
Start: 2024-09-26 | End: 2024-09-27

## 2024-09-26 RX ORDER — INSULIN LISPRO 100 [IU]/ML
0-4 INJECTION, SOLUTION INTRAVENOUS; SUBCUTANEOUS NIGHTLY
Status: DISCONTINUED | OUTPATIENT
Start: 2024-09-26 | End: 2024-09-26

## 2024-09-26 RX ORDER — DIPHENHYDRAMINE HYDROCHLORIDE 25 MG/1
1 CAPSULE, LIQUID FILLED ORAL ONCE
Qty: 1 KIT | Refills: 0 | Status: SHIPPED | OUTPATIENT
Start: 2024-09-26 | End: 2024-09-26

## 2024-09-26 RX ORDER — BLOOD-GLUCOSE SENSOR
1 EACH MISCELLANEOUS
Qty: 2 EACH | Refills: 3 | Status: SHIPPED | OUTPATIENT
Start: 2024-09-26 | End: 2024-09-27 | Stop reason: HOSPADM

## 2024-09-26 RX ORDER — INSULIN LISPRO 100 [IU]/ML
0-4 INJECTION, SOLUTION INTRAVENOUS; SUBCUTANEOUS NIGHTLY
Status: DISCONTINUED | OUTPATIENT
Start: 2024-09-26 | End: 2024-09-27

## 2024-09-26 RX ORDER — INSULIN LISPRO 100 [IU]/ML
0-8 INJECTION, SOLUTION INTRAVENOUS; SUBCUTANEOUS
Status: DISCONTINUED | OUTPATIENT
Start: 2024-09-26 | End: 2024-09-26

## 2024-09-26 RX ORDER — INSULIN GLARGINE 100 [IU]/ML
10 INJECTION, SOLUTION SUBCUTANEOUS DAILY
Status: DISCONTINUED | OUTPATIENT
Start: 2024-09-26 | End: 2024-09-26

## 2024-09-26 RX ORDER — KETOROLAC TROMETHAMINE 30 MG/ML
1 INJECTION, SOLUTION INTRAMUSCULAR; INTRAVENOUS ONCE
Qty: 1 EACH | Refills: 0 | Status: SHIPPED | OUTPATIENT
Start: 2024-09-26 | End: 2024-09-27 | Stop reason: HOSPADM

## 2024-09-26 RX ORDER — INSULIN GLARGINE 100 [IU]/ML
15 INJECTION, SOLUTION SUBCUTANEOUS 2 TIMES DAILY
Status: DISCONTINUED | OUTPATIENT
Start: 2024-09-26 | End: 2024-09-27

## 2024-09-26 RX ADMIN — SODIUM CHLORIDE, PRESERVATIVE FREE 10 ML: 5 INJECTION INTRAVENOUS at 23:17

## 2024-09-26 RX ADMIN — SODIUM CHLORIDE, POTASSIUM CHLORIDE, SODIUM LACTATE AND CALCIUM CHLORIDE: 600; 310; 30; 20 INJECTION, SOLUTION INTRAVENOUS at 09:14

## 2024-09-26 RX ADMIN — HEPARIN SODIUM 5000 UNITS: 5000 INJECTION INTRAVENOUS; SUBCUTANEOUS at 05:28

## 2024-09-26 RX ADMIN — EPOETIN ALFA-EPBX 10000 UNITS: 10000 INJECTION, SOLUTION INTRAVENOUS; SUBCUTANEOUS at 16:58

## 2024-09-26 RX ADMIN — NIFEDIPINE 60 MG: 30 TABLET, FILM COATED, EXTENDED RELEASE ORAL at 08:42

## 2024-09-26 RX ADMIN — SODIUM CHLORIDE, POTASSIUM CHLORIDE, SODIUM LACTATE AND CALCIUM CHLORIDE: 600; 310; 30; 20 INJECTION, SOLUTION INTRAVENOUS at 22:04

## 2024-09-26 RX ADMIN — ASPIRIN 81 MG: 81 TABLET, COATED ORAL at 21:16

## 2024-09-26 RX ADMIN — INSULIN LISPRO 4 UNITS: 100 INJECTION, SOLUTION INTRAVENOUS; SUBCUTANEOUS at 12:14

## 2024-09-26 RX ADMIN — INSULIN LISPRO 10 UNITS: 100 INJECTION, SOLUTION INTRAVENOUS; SUBCUTANEOUS at 16:58

## 2024-09-26 RX ADMIN — INSULIN GLARGINE 15 UNITS: 100 INJECTION, SOLUTION SUBCUTANEOUS at 21:16

## 2024-09-26 RX ADMIN — ROSUVASTATIN 20 MG: 20 TABLET, FILM COATED ORAL at 21:16

## 2024-09-26 RX ADMIN — HEPARIN SODIUM 5000 UNITS: 5000 INJECTION INTRAVENOUS; SUBCUTANEOUS at 14:04

## 2024-09-26 RX ADMIN — INSULIN GLARGINE 10 UNITS: 100 INJECTION, SOLUTION SUBCUTANEOUS at 09:15

## 2024-09-26 RX ADMIN — SODIUM CHLORIDE 16.6 UNITS/HR: 9 INJECTION, SOLUTION INTRAVENOUS at 01:12

## 2024-09-26 RX ADMIN — INSULIN LISPRO 4 UNITS: 100 INJECTION, SOLUTION INTRAVENOUS; SUBCUTANEOUS at 16:57

## 2024-09-26 RX ADMIN — HEPARIN SODIUM 5000 UNITS: 5000 INJECTION INTRAVENOUS; SUBCUTANEOUS at 21:16

## 2024-09-26 RX ADMIN — SODIUM BICARBONATE: 84 INJECTION INTRAVENOUS at 05:29

## 2024-09-26 ASSESSMENT — PAIN SCALES - GENERAL
PAINLEVEL_OUTOF10: 0
PAINLEVEL_OUTOF10: 0

## 2024-09-26 NOTE — PROGRESS NOTES
[facility]   Nephrology progress Note  [phone]  [phone]   [email or web address]           Reason for Consult:  HTN     The patient is a 58 y.o. female with significant past medical history of hypertension, T2DM, CKD, CAD, hyperlipidemia, mitral regurgitation, renal artery stenosis, presented to the ER with facial swelling.  This started as tingling sensation around the nose and the mouth and then subsequently she developed a rash in the center of her face.  The midface became swollen and red and hurt lips became large especially the upper lip.  She had no swelling of the tongue, there was no problems with breathing, and there was no generalized rash over the body.  She had a flu vaccine a week ago and previously had COVID in the last 4 weeks.    Of note she was on ARB Diovan for blood pressure control as well as antiproteinuric effect.  She had had used ACE inhibitor's maybe 20 years ago and they had caused a dry cough necessitating stoppage.  She never had angioedema with them    Interval History:    9/26/24: facial swelling is down     PHYSICAL EXAM:    Vitals:    BP (!) 150/73   Pulse 87   Temp 98.6 °F (37 °C) (Temporal)   Resp 16   Ht 1.727 m (5' 8\")   Wt 97.8 kg (215 lb 9.8 oz)   LMP 06/01/2011   SpO2 98%   BMI 32.78 kg/m²   I/O last 3 completed shifts:  In: 1592.1 [I.V.:1592.1]  Out: -   No intake/output data recorded.    Physical Exam:  Gen:  alert, oriented x 3  PERRL , EOM +  Midface swelling,   Pallor +, o icterus  JVP not raised   CV: RRR no murmur or rub .  Lungs:B/ L air entry, Normal breath sounds   Abd: soft, bowel sounds + , No organomegaly   Ext: No edema, no cyanosis  Skin: Warm.  No rash  Neuro: nonfocal.      DATA:    CBC with Differential:    Lab Results   Component Value Date/Time    WBC 14.5 09/26/2024 04:14 AM    RBC 2.81 09/26/2024 04:14 AM    HGB 7.5 09/26/2024 04:14 AM    HCT 22.6 09/26/2024 04:14 AM     09/26/2024 04:14 AM    MCV 80.4 09/26/2024

## 2024-09-26 NOTE — PROGRESS NOTES
[facility]  Diabetes Education   Progress Note       NAME:  Galina Nick  MEDICAL RECORD NUMBER:  [id number]  AGE: 58 y.o.   GENDER: female  : 1965  TODAY'S DATE:  2024    Subjective   Reason for Diabetes Education Evaluation and Assessment: DM    Visit Type: evaluation      Galina Nick is a 58 y.o. female referred by:  [x] Physician  [] Nursing  [] Chart Review   [] Other:     Pt seen for DM education. Pt states she was taken off of metformin during a previous hospitalization d/t kidney problems. This was replaced with glimepiride. Now she takes Trulicity and glimepiride. Pt states she's always wanted to avoid insulin d/t dislike of needles. Doesn't mind Trulicity because it has an auto-injector. Lives alone.    PAST MEDICAL HISTORY        Diagnosis Date    CAD (coronary artery disease)     Diabetes mellitus (HCC)     Dyslipidemia     Hypertension        PAST SURGICAL HISTORY    Past Surgical History:   Procedure Laterality Date    BREAST BIOPSY       benign     CARDIAC PROCEDURE N/A 2024    Left heart cath / coronary angiography performed by Rafael James MD at [facility] CARDIAC CATH LAB       FAMILY HISTORY    Family History   Problem Relation Age of Onset    Diabetes Father     Diabetes Maternal Grandmother     Diabetes Maternal Grandfather     Diabetes Paternal Grandmother     Diabetes Paternal Grandfather        SOCIAL HISTORY    Social History     Tobacco Use    Smoking status: Never    Smokeless tobacco: Never   Vaping Use    Vaping status: Never Used   Substance Use Topics    Alcohol use: No    Drug use: No       ALLERGIES    Allergies   Allergen Reactions    Ace Inhibitors      Dry cough per pt    Angiotensin Receptor Blockers Angioedema       MEDICATIONS     epoetin raisa-epbx  10,000 Units SubCUTAneous Weekly    insulin glargine  15 Units SubCUTAneous BID    insulin lispro  10 Units SubCUTAneous TID WC    insulin lispro  0-16 Units SubCUTAneous TID WC     insulin lispro  0-4 Units SubCUTAneous Nightly    heparin (porcine)  5,000 Units SubCUTAneous 3 times per day    labetalol  200 mg Oral 3 times per day    aspirin  81 mg Oral Nightly    [Held by provider] metoprolol succinate  25 mg Oral Daily    NIFEdipine  60 mg Oral Daily    rosuvastatin  20 mg Oral Nightly    sodium chloride flush  5-40 mL IntraVENous 2 times per day       Objective        Patient Active Problem List   Diagnosis Code    Uncontrolled type 2 diabetes mellitus with complication, with long-term current use of insulin [id number]    Hypercalcemia E83.52    Essential hypertension I10    Dyslipidemia E78.5    Nephrolithiasis N20.0    Hypertensive urgency I16.0    Renal artery stenosis (HCC) I70.1    Malignant hypertension I10    Hypertensive emergency I16.1    Shortness of breath R06.02    Nonrheumatic mitral valve regurgitation I34.0    JAYY (acute kidney injury) ([facility]) N17.9    Coronary artery disease due to lipid rich plaque I25.10, I25.83    Near syncope R55    NSTEMI (non-ST elevated myocardial infarction) ([facility]) I21.4    Facial swelling R22.0        /73   Pulse 88   Temp 98.6 °F (37 °C) (Temporal)   Resp 16   Ht 1.727 m (5' 8\")   Wt 97.8 kg (215 lb 9.8 oz)   LMP 06/01/2011   SpO2 97%   BMI 32.78 kg/m²     HgBA1c:    Lab Results   Component Value Date/Time    LABA1C 10.2 09/25/2024 05:24 AM       Recent Labs     09/26/24  0658 09/26/24  0753 09/26/24  1206 09/26/24  1642   POCGLU 104* 105* 263* 265*       BUN/Creatinine:    Lab Results   Component Value Date/Time    BUN 52 09/26/2024 04:14 AM    CREATININE 3.1 09/26/2024 04:14 AM       Assessment        Diabetes Management and Education    Does the patient have a Primary Care Physician? Sees Dr. Morgan for endocrinology    Does the patient require new medication instruction? Yes, instructed pt on use and storage of insulin pens.    Person responsible for administration of Insulin/Medication:   [x] Self     [] Caregiver       [] Spouse

## 2024-09-26 NOTE — PROGRESS NOTES
Pt alert and oriented. Resting in bed. VS stable. Assessment complete. Patient denies any pain or further needs at this time. Will continue to monitor patient.

## 2024-09-26 NOTE — PLAN OF CARE
Problem: Discharge Planning  Goal: Discharge to home or other facility with appropriate resources  9/26/2024 0055 by Lauren Malone RN  Outcome: Progressing  9/25/2024 1647 by Terri Quezada RN  Outcome: Progressing  Flowsheets (Taken 9/25/2024 0745 by Coral Villasenor, RN)  Discharge to home or other facility with appropriate resources: Identify barriers to discharge with patient and caregiver     Problem: Safety - Adult  Goal: Free from fall injury  9/26/2024 0055 by Lauren Malone RN  Outcome: Progressing  9/25/2024 1647 by Terri Quezada RN  Outcome: Progressing     Problem: Chronic Conditions and Co-morbidities  Goal: Patient's chronic conditions and co-morbidity symptoms are monitored and maintained or improved  9/26/2024 0055 by Lauren Malone RN  Outcome: Progressing  9/25/2024 1647 by Terri Quezada RN  Outcome: Progressing  Flowsheets (Taken 9/25/2024 0745 by Coral Villasenor, RN)  Care Plan - Patient's Chronic Conditions and Co-Morbidity Symptoms are Monitored and Maintained or Improved: Monitor and assess patient's chronic conditions and comorbid symptoms for stability, deterioration, or improvement

## 2024-09-26 NOTE — PROGRESS NOTES
[place]2.[place]    [facility] [place]      Name:  Galina Nick /Age/Sex: 1965  (58 y.o. female)   MRN & CSN:  [id number] & [id number] Encounter Date/Time: 2024 2:31 PM EDT   Location:  [facility] PCP: Tristan Lei MD     Attending:Jennifer Schwartz MD       Hospital Day: 3    Assessment and Recommendations   Galina Nick is a 58 y.o. female with pmh of  hypertension, CAD, dyslipidemia, type 2 diabetes mellitus who presented with painless facial swelling.      Plan:     Angioedema involving the face: Improved  Likely secondary to Diovan use; discontinued.    Received Benadryl 25 mg, Pepcid 20 mg, Solu-Medrol 125 mg in the ED.  Discontinued steroid due to worsening hyperglycemia.  C4 complement elevated.    Continue monitoring for resolution.      Uncontrolled hypertension:   In the ED, patient was hypertensive with blood pressure of 191/82.  Hold metoprolol, diuretics and ARB.  Monitor on nifedipine and labetalol.    T2DM with hyperglycemia:   No evidence of DKA on BMP.    Discontinued insulin drip.  Transitioned to SC insulin.    JAYY on CKD 4 with metabolic acidosis:  Nephrology consulted; continue IVF.  Avoid nephrotoxins.  Monitor BMP.     Anemia: Follow-up workup.  Monitor CBC.    Diet ADULT DIET; Regular; 3 carb choices (45 gm/meal)   DVT Prophylaxis [] Lovenox, [x]  Heparin, [] SCDs, [] Ambulation,  [] Eliquis, [] Xarelto  [] Coumadin   Code Status Full Code   Disposition From: Home  Expected Disposition: Home  Estimated Date of Discharge:   Patient requires continued admission due to JAYY   Surrogate Decision Maker/ SOLANGE Terri Nick (Brother/Sister)  [phone]      Personally reviewed Lab Studies and Imaging     Drugs that require monitoring for toxicity include insulin drip and the method of monitoring his blood glucose      Subjective:     Chief Complaint: Facial swelling    Patient seen and examined.   Hypertension improved.  Blood glucose controlled.  No facial pain,  lispro  0-16 Units SubCUTAneous TID     insulin lispro  0-4 Units SubCUTAneous Nightly    heparin (porcine)  5,000 Units SubCUTAneous 3 times per day    labetalol  200 mg Oral 3 times per day    aspirin  81 mg Oral Nightly    [Held by provider] metoprolol succinate  25 mg Oral Daily    NIFEdipine  60 mg Oral Daily    rosuvastatin  20 mg Oral Nightly    sodium chloride flush  5-40 mL IntraVENous 2 times per day      Infusions:    lactated ringers IV soln 75 mL/hr at 09/26/24 1215    sodium chloride      dextrose       PRN Meds: labetalol, 10 mg, Q4H PRN  sodium chloride flush, 5-40 mL, PRN  sodium chloride, , PRN  potassium chloride, 40 mEq, PRN   Or  potassium alternative oral replacement, 40 mEq, PRN   Or  potassium chloride, 10 mEq, PRN  magnesium sulfate, 2,000 mg, PRN  ondansetron, 4 mg, Q8H PRN   Or  ondansetron, 4 mg, Q6H PRN  polyethylene glycol, 17 g, Daily PRN  acetaminophen, 650 mg, Q6H PRN   Or  acetaminophen, 650 mg, Q6H PRN  dextrose bolus, 125 mL, PRN   Or  dextrose bolus, 250 mL, PRN  glucagon (rDNA), 1 mg, PRN  dextrose, , Continuous PRN  diphenhydrAMINE, 25 mg, Q2H PRN        Labs and Imaging   No results found.    CBC:   Recent Labs     09/24/24 1747 09/25/24 0524 09/26/24  0414   WBC 8.2 12.5* 14.5*   HGB 11.2* 9.9* 7.5*    230 209     BMP:    Recent Labs     09/25/24 0524 09/25/24  1259 09/25/24  1640 09/26/24  0414   * 130*  --  140   K 5.2* 4.9  --  3.7    98*  --  106   CO2 18* 18*  --  23   BUN 39* 42*  --  52*   CREATININE 2.4* 2.5*  --  3.1*   GLUCOSE 532* 587* 595* 177*     Hepatic:   Recent Labs     09/24/24 1747 09/26/24  0414   AST 21 21   ALT 20 18   BILITOT 0.4 <0.2   ALKPHOS 214* 135*     Lipids:   Lab Results   Component Value Date/Time    CHOL 250 07/23/2024 12:41 PM    HDL 51 07/23/2024 12:41 PM    TRIG 116 07/23/2024 12:41 PM     Hemoglobin A1C:   Lab Results   Component Value Date/Time    LABA1C 10.2 09/25/2024 05:24 AM     TSH:   Lab Results   Component  Value Date/Time    TSH 1.46 07/23/2024 12:41 PM     Troponin: No results found for: \"TROPONINT\"  Lactic Acid: No results for input(s): \"LACTA\" in the last 72 hours.  BNP: No results for input(s): \"PROBNP\" in the last 72 hours.  UA:  Lab Results   Component Value Date/Time    NITRU Negative 09/25/2024 10:00 AM    COLORU Yellow 09/25/2024 10:00 AM    PHUR 6.0 09/25/2024 10:00 AM    PHUR 6.0 01/23/2024 05:24 PM    WBCUA 5 09/25/2024 10:00 AM    RBCUA 5 09/25/2024 10:00 AM    MUCUS Present 06/19/2024 12:00 AM    BACTERIA None Seen 09/25/2024 10:00 AM    CLARITYU Clear 09/25/2024 10:00 AM    LEUKOCYTESUR Negative 09/25/2024 10:00 AM    UROBILINOGEN 0.2 09/25/2024 10:00 AM    BILIRUBINUR Negative 09/25/2024 10:00 AM    BLOODU SMALL 09/25/2024 10:00 AM    GLUCOSEU >=1000 09/25/2024 10:00 AM    GLUCOSEU >=1000 07/14/2011 07:38 PM    KETUA TRACE 09/25/2024 10:00 AM    AMORPHOUS Present 06/19/2024 12:00 AM     Urine Cultures:   Lab Results   Component Value Date/Time    LABURIN  06/21/2024 03:00 PM     <50,000 CFU/ml mixed skin/urogenital bushra. No further workup     Blood Cultures:   Lab Results   Component Value Date/Time    BC  09/25/2024 09:31 AM     No Growth to date.  Any change in status will be called.     Lab Results   Component Value Date/Time    BLOODCULT2  09/25/2024 09:31 AM     No Growth to date.  Any change in status will be called.     Organism:   Lab Results   Component Value Date/Time    ORG Escherichia coli 01/08/2022 12:49 PM         Electronically signed by Jennifer Schwartz MD on 9/26/2024 at 2:45 PM

## 2024-09-27 ENCOUNTER — TELEPHONE (OUTPATIENT)
Dept: ENDOCRINOLOGY | Age: 59
End: 2024-09-27

## 2024-09-27 LAB
ALBUMIN SERPL-MCNC: 2.7 G/DL (ref 3.4–5)
ANION GAP SERPL CALCULATED.3IONS-SCNC: 7 MMOL/L (ref 3–16)
BASOPHILS # BLD: 0.1 K/UL (ref 0–0.2)
BASOPHILS NFR BLD: 0.6 %
BUN SERPL-MCNC: 52 MG/DL (ref 7–20)
CALCIUM SERPL-MCNC: 8 MG/DL (ref 8.3–10.6)
CHLORIDE SERPL-SCNC: 106 MMOL/L (ref 99–110)
CO2 SERPL-SCNC: 23 MMOL/L (ref 21–32)
CREAT SERPL-MCNC: 2.9 MG/DL (ref 0.6–1.1)
DEPRECATED RDW RBC AUTO: 14.3 % (ref 12.4–15.4)
EOSINOPHIL # BLD: 0.4 K/UL (ref 0–0.6)
EOSINOPHIL NFR BLD: 3.3 %
GFR SERPLBLD CREATININE-BSD FMLA CKD-EPI: 18 ML/MIN/{1.73_M2}
GLUCOSE BLD-MCNC: 101 MG/DL (ref 70–99)
GLUCOSE BLD-MCNC: 128 MG/DL (ref 70–99)
GLUCOSE BLD-MCNC: 263 MG/DL (ref 70–99)
GLUCOSE BLD-MCNC: 297 MG/DL (ref 70–99)
GLUCOSE BLD-MCNC: 50 MG/DL (ref 70–99)
GLUCOSE BLD-MCNC: 68 MG/DL (ref 70–99)
GLUCOSE BLD-MCNC: 85 MG/DL (ref 70–99)
GLUCOSE BLD-MCNC: 85 MG/DL (ref 70–99)
GLUCOSE SERPL-MCNC: 202 MG/DL (ref 70–99)
HCT VFR BLD AUTO: 23.1 % (ref 36–48)
HGB BLD-MCNC: 7.7 G/DL (ref 12–16)
LYMPHOCYTES # BLD: 3.3 K/UL (ref 1–5.1)
LYMPHOCYTES NFR BLD: 30.9 %
MCH RBC QN AUTO: 27 PG (ref 26–34)
MCHC RBC AUTO-ENTMCNC: 33.3 G/DL (ref 31–36)
MCV RBC AUTO: 81.2 FL (ref 80–100)
MONOCYTES # BLD: 0.6 K/UL (ref 0–1.3)
MONOCYTES NFR BLD: 5.9 %
NEUTROPHILS # BLD: 6.3 K/UL (ref 1.7–7.7)
NEUTROPHILS NFR BLD: 59.3 %
PERFORMED ON: ABNORMAL
PERFORMED ON: NORMAL
PERFORMED ON: NORMAL
PHOSPHATE SERPL-MCNC: 3.3 MG/DL (ref 2.5–4.9)
PLATELET # BLD AUTO: 189 K/UL (ref 135–450)
PMV BLD AUTO: 9.3 FL (ref 5–10.5)
POTASSIUM SERPL-SCNC: 4.4 MMOL/L (ref 3.5–5.1)
RBC # BLD AUTO: 2.84 M/UL (ref 4–5.2)
SODIUM SERPL-SCNC: 136 MMOL/L (ref 136–145)
WBC # BLD AUTO: 10.6 K/UL (ref 4–11)

## 2024-09-27 PROCEDURE — 6370000000 HC RX 637 (ALT 250 FOR IP): Performed by: STUDENT IN AN ORGANIZED HEALTH CARE EDUCATION/TRAINING PROGRAM

## 2024-09-27 PROCEDURE — 6370000000 HC RX 637 (ALT 250 FOR IP): Performed by: INTERNAL MEDICINE

## 2024-09-27 PROCEDURE — 6360000002 HC RX W HCPCS: Performed by: STUDENT IN AN ORGANIZED HEALTH CARE EDUCATION/TRAINING PROGRAM

## 2024-09-27 PROCEDURE — 2500000003 HC RX 250 WO HCPCS

## 2024-09-27 PROCEDURE — 80069 RENAL FUNCTION PANEL: CPT

## 2024-09-27 PROCEDURE — 2580000003 HC RX 258: Performed by: INTERNAL MEDICINE

## 2024-09-27 PROCEDURE — 85025 COMPLETE CBC W/AUTO DIFF WBC: CPT

## 2024-09-27 PROCEDURE — 6360000002 HC RX W HCPCS: Performed by: INTERNAL MEDICINE

## 2024-09-27 PROCEDURE — 2580000003 HC RX 258: Performed by: STUDENT IN AN ORGANIZED HEALTH CARE EDUCATION/TRAINING PROGRAM

## 2024-09-27 PROCEDURE — 1200000000 HC SEMI PRIVATE

## 2024-09-27 RX ORDER — ACYCLOVIR 400 MG/1
1 TABLET ORAL
Qty: 3 EACH | Refills: 3 | Status: SHIPPED | OUTPATIENT
Start: 2024-09-27

## 2024-09-27 RX ORDER — ACYCLOVIR 400 MG/1
1 TABLET ORAL ONCE
Qty: 1 EACH | Refills: 0 | Status: SHIPPED | OUTPATIENT
Start: 2024-09-27 | End: 2024-09-27

## 2024-09-27 RX ORDER — INSULIN LISPRO 100 [IU]/ML
15 INJECTION, SOLUTION INTRAVENOUS; SUBCUTANEOUS
Status: DISCONTINUED | OUTPATIENT
Start: 2024-09-27 | End: 2024-09-28

## 2024-09-27 RX ORDER — INSULIN GLARGINE 100 [IU]/ML
30 INJECTION, SOLUTION SUBCUTANEOUS DAILY
Status: DISCONTINUED | OUTPATIENT
Start: 2024-09-27 | End: 2024-09-28

## 2024-09-27 RX ORDER — INSULIN LISPRO 100 [IU]/ML
0-8 INJECTION, SOLUTION INTRAVENOUS; SUBCUTANEOUS
Status: DISCONTINUED | OUTPATIENT
Start: 2024-09-27 | End: 2024-09-29

## 2024-09-27 RX ORDER — INSULIN LISPRO 100 [IU]/ML
0-4 INJECTION, SOLUTION INTRAVENOUS; SUBCUTANEOUS NIGHTLY
Status: DISCONTINUED | OUTPATIENT
Start: 2024-09-27 | End: 2024-09-29

## 2024-09-27 RX ORDER — DEXTROSE MONOHYDRATE 25 G/50ML
INJECTION, SOLUTION INTRAVENOUS
Status: COMPLETED
Start: 2024-09-27 | End: 2024-09-27

## 2024-09-27 RX ADMIN — INSULIN LISPRO 10 UNITS: 100 INJECTION, SOLUTION INTRAVENOUS; SUBCUTANEOUS at 13:01

## 2024-09-27 RX ADMIN — LABETALOL HYDROCHLORIDE 200 MG: 200 TABLET, FILM COATED ORAL at 06:30

## 2024-09-27 RX ADMIN — INSULIN LISPRO 8 UNITS: 100 INJECTION, SOLUTION INTRAVENOUS; SUBCUTANEOUS at 13:01

## 2024-09-27 RX ADMIN — HEPARIN SODIUM 5000 UNITS: 5000 INJECTION INTRAVENOUS; SUBCUTANEOUS at 22:06

## 2024-09-27 RX ADMIN — ROSUVASTATIN 20 MG: 20 TABLET, FILM COATED ORAL at 20:33

## 2024-09-27 RX ADMIN — HEPARIN SODIUM 5000 UNITS: 5000 INJECTION INTRAVENOUS; SUBCUTANEOUS at 06:22

## 2024-09-27 RX ADMIN — ASPIRIN 81 MG: 81 TABLET, COATED ORAL at 20:33

## 2024-09-27 RX ADMIN — HEPARIN SODIUM 5000 UNITS: 5000 INJECTION INTRAVENOUS; SUBCUTANEOUS at 14:48

## 2024-09-27 RX ADMIN — SODIUM CHLORIDE, PRESERVATIVE FREE 10 ML: 5 INJECTION INTRAVENOUS at 20:33

## 2024-09-27 RX ADMIN — NIFEDIPINE 60 MG: 30 TABLET, FILM COATED, EXTENDED RELEASE ORAL at 08:34

## 2024-09-27 RX ADMIN — INSULIN GLARGINE 30 UNITS: 100 INJECTION, SOLUTION SUBCUTANEOUS at 09:12

## 2024-09-27 RX ADMIN — INSULIN LISPRO 10 UNITS: 100 INJECTION, SOLUTION INTRAVENOUS; SUBCUTANEOUS at 08:32

## 2024-09-27 RX ADMIN — SODIUM CHLORIDE, PRESERVATIVE FREE 10 ML: 5 INJECTION INTRAVENOUS at 08:35

## 2024-09-27 RX ADMIN — SODIUM CHLORIDE: 9 INJECTION, SOLUTION INTRAVENOUS at 14:45

## 2024-09-27 RX ADMIN — INSULIN LISPRO 8 UNITS: 100 INJECTION, SOLUTION INTRAVENOUS; SUBCUTANEOUS at 08:33

## 2024-09-27 RX ADMIN — LABETALOL HYDROCHLORIDE 200 MG: 200 TABLET, FILM COATED ORAL at 14:48

## 2024-09-27 RX ADMIN — DEXTROSE MONOHYDRATE: 25 INJECTION, SOLUTION INTRAVENOUS at 16:30

## 2024-09-27 RX ADMIN — SODIUM CHLORIDE 125 MG: 9 INJECTION, SOLUTION INTRAVENOUS at 14:47

## 2024-09-27 NOTE — PROGRESS NOTES
[facility]   Nephrology progress Note  [phone]  [phone]   [employer]           Reason for Consult:  HTN     The patient is a 58 y.o. female with significant past medical history of hypertension, T2DM, CKD, CAD, hyperlipidemia, mitral regurgitation, renal artery stenosis, presented to the ER with facial swelling.  This started as tingling sensation around the nose and the mouth and then subsequently she developed a rash in the center of her face.  The midface became swollen and red and hurt lips became large especially the upper lip.  She had no swelling of the tongue, there was no problems with breathing, and there was no generalized rash over the body.  She had a flu vaccine a week ago and previously had COVID in the last 4 weeks.    Of note she was on ARB Diovan for blood pressure control as well as antiproteinuric effect.  She had had used ACE inhibitor's maybe 20 years ago and they had caused a dry cough necessitating stoppage.  She never had angioedema with them    Interval History:    9/26/24: facial swelling is down   9/27/24: no swelling mid face , lips feel better     PHYSICAL EXAM:    Vitals:    BP (!) 156/76   Pulse 80   Temp 97.5 °F (36.4 °C) (Temporal)   Resp 16   Ht 1.727 m (5' 8\")   Wt 97.9 kg (215 lb 13.3 oz)   LMP 06/01/2011   SpO2 95%   BMI 32.82 kg/m²   I/O last 3 completed shifts:  In: 3825.3 [P.O.:820; I.V.:3005.3]  Out: 1000 [Urine:1000]  I/O this shift:  In: 718.7 [P.O.:240; I.V.:478.7]  Out: 500 [Urine:500]    Physical Exam:  Gen:  alert, oriented x 3  PERRL , EOM +  Midface swelling,   Pallor +, o icterus  JVP not raised   CV: RRR no murmur or rub .  Lungs:B/ L air entry, Normal breath sounds   Abd: soft, bowel sounds + , No organomegaly   Ext: No edema, no cyanosis  Skin: Warm.  No rash  Neuro: nonfocal.      DATA:    CBC with Differential:    Lab Results   Component Value Date/Time    WBC 10.6 09/27/2024 03:05 AM    RBC 2.84 09/27/2024 03:05 AM

## 2024-09-27 NOTE — PROGRESS NOTES
CLINICAL PHARMACY NOTE: MEDS TO BEDS    Total # of Prescriptions Filled: 3   The following medications were delivered to the patient:  Onetouch test strips  Onetouch meter  Onetouch lancets    Additional Documentation:     STANFORD Jenkins approved medication delivery    Medications were delivered to patient's room and patient signed for    Fatimah Tobin CPhT

## 2024-09-27 NOTE — PROGRESS NOTES
Pt to 3320, placed on telemetry on 3A. No needs at this time, call light in reach, oriented to room.

## 2024-09-27 NOTE — PLAN OF CARE
Problem: Discharge Planning  Goal: Discharge to home or other facility with appropriate resources  9/27/2024 0858 by Elsie Wagner RN  Outcome: Progressing  9/27/2024 0351 by Lauren Malone RN  Outcome: Progressing     Problem: Safety - Adult  Goal: Free from fall injury  9/27/2024 0858 by Elsie Wagner, RN  Outcome: Progressing  9/27/2024 0351 by Lauren Malone RN  Outcome: Progressing     Problem: Chronic Conditions and Co-morbidities  Goal: Patient's chronic conditions and co-morbidity symptoms are monitored and maintained or improved  9/27/2024 0858 by Elsie Wagner RN  Outcome: Progressing  9/27/2024 0351 by Lauren Malone RN  Outcome: Progressing

## 2024-09-27 NOTE — PROGRESS NOTES
[facility]  Diabetes Education   Progress Note       NAME:  Galina Nick  MEDICAL RECORD NUMBER:  [id number]  AGE: 58 y.o.   GENDER: female  : 1965  TODAY'S DATE:  2024    DM education follow up. Dr. Schwartz at bedside, requests that I reach out to Dr. Morgan's office regarding medication management and follow up appointment.    Informed pt of $50 copay for Dexcom G7 sensors and , and $40/month for receivers after that. Pt declined d/t cost. Pt states she will also decline fingerstick testing supplies if copay is requested but she might shop around with other pharmacies and see who has the best prices. Financial counselor consult placed to screen pt for assistance; pt agreeable.    Spoke with Suzy at Dr. Morgan's office who states appointments are booked out until . Suzy states she'll send a message to Dr. Morgan requesting call to Dr. Schwartz to discuss medication management.    Pt's next appointment with Dr. Morgan is currently scheduled for 24.    Electronically signed by Samantha White RN [facility] on 2024 at 11:25 AM

## 2024-09-27 NOTE — TELEPHONE ENCOUNTER
Samantha from [facility] called stating Dr. Schwartz asking for Dr to call pt A1C is above 10 and pt is refusing insulin        # [phone]

## 2024-09-27 NOTE — PROGRESS NOTES
Patient transferred to [facility] via wheelchair on tele monitor with all personal belongings.  Patient A&O.  VSS.  TERESSA BAZAN RN

## 2024-09-27 NOTE — PROGRESS NOTES
Hernesto: A&O x4    Cardiac: NSR 80    Resp: Easy on room air    GI: active BM today    : Voiding clear yellow    Skin: Intact    Lines: PIV     Gtts: LR @ 75mL/hr    Plan: Awaiting MD rounding    See flow sheets for details, VS, MAR for all medication and titration of gtts.

## 2024-09-27 NOTE — PROGRESS NOTES
FSBS recheck 68.  Patient currently eating dinner.  Will recheck.  No s/s of hypoglycemia.  Patient A&O.  VSS

## 2024-09-27 NOTE — PROGRESS NOTES
V2.0    [facility] [place] Progress Note      Name:  Galina Nick /Age/Sex: 1965  (58 y.o. female)   MRN & CSN:  [id number] & [id number] Encounter Date/Time: 2024 2:31 PM EDT   Location:  [facility]2907/2907-01 PCP: Tristan Lei MD     Attending:Jennifer Schwartz MD       Hospital Day: 4    Assessment and Recommendations   Galina Nick is a 58 y.o. female with pmh of  hypertension, CAD, dyslipidemia, type 2 diabetes mellitus who presented with painless facial swelling.      Plan:     Angioedema involving the face: Improved  Likely secondary to Diovan use; discontinued.    Received Benadryl 25 mg, Pepcid 20 mg, Solu-Medrol 125 mg in the ED.  Discontinued steroid due to worsening hyperglycemia.  C4 complement elevated.    Continue monitoring for resolution.      JAYY on CKD 4 with metabolic acidosis:  Nephrology consulted; continue IVF.  Avoid nephrotoxins.  Monitor BMP.    Uncontrolled hypertension:   In the ED, patient was hypertensive with blood pressure of 191/82.  Hold metoprolol, diuretics and ARB.  Monitor on nifedipine and labetalol.    T2DM not on long-term insulin with hyperglycemia:   No evidence of DKA on BMP.    Discontinued insulin drip.  Transitioned to SC insulin.    Anemia: Low iron saturation.    Started on Venofer and JUN. Monitor CBC.    Diet ADULT DIET; Regular; 3 carb choices (45 gm/meal)   DVT Prophylaxis [] Lovenox, [x]  Heparin, [] SCDs, [] Ambulation,  [] Eliquis, [] Xarelto  [] Coumadin   Code Status Full Code   Disposition From: Home  Expected Disposition: Home  Estimated Date of Discharge:   Patient requires continued admission due to JAYY   Surrogate Decision Maker/ SOLANGE NickTerri (Brother/Sister)  [phone]      Personally reviewed Lab Studies and Imaging     Drugs that require monitoring for toxicity include insulin drip and the method of monitoring his blood glucose      Subjective:     Chief Complaint: Facial swelling    Patient seen and examined.     epoetin raisa-epbx  10,000 Units SubCUTAneous Weekly    insulin lispro  0-16 Units SubCUTAneous TID WC    insulin lispro  0-4 Units SubCUTAneous Nightly    heparin (porcine)  5,000 Units SubCUTAneous 3 times per day    labetalol  200 mg Oral 3 times per day    aspirin  81 mg Oral Nightly    [Held by provider] metoprolol succinate  25 mg Oral Daily    NIFEdipine  60 mg Oral Daily    rosuvastatin  20 mg Oral Nightly    sodium chloride flush  5-40 mL IntraVENous 2 times per day      Infusions:    lactated ringers IV soln 75 mL/hr at 09/27/24 0800    sodium chloride      dextrose       PRN Meds: labetalol, 10 mg, Q4H PRN  sodium chloride flush, 5-40 mL, PRN  sodium chloride, , PRN  potassium chloride, 40 mEq, PRN   Or  potassium alternative oral replacement, 40 mEq, PRN   Or  potassium chloride, 10 mEq, PRN  magnesium sulfate, 2,000 mg, PRN  ondansetron, 4 mg, Q8H PRN   Or  ondansetron, 4 mg, Q6H PRN  polyethylene glycol, 17 g, Daily PRN  acetaminophen, 650 mg, Q6H PRN   Or  acetaminophen, 650 mg, Q6H PRN  dextrose bolus, 125 mL, PRN   Or  dextrose bolus, 250 mL, PRN  glucagon (rDNA), 1 mg, PRN  dextrose, , Continuous PRN  diphenhydrAMINE, 25 mg, Q2H PRN        Labs and Imaging   No results found.    CBC:   Recent Labs     09/25/24  0524 09/26/24  0414 09/27/24  0305   WBC 12.5* 14.5* 10.6   HGB 9.9* 7.5* 7.7*    209 189     BMP:    Recent Labs     09/25/24  1259 09/25/24  1640 09/26/24  0414 09/27/24  0305   *  --  140 136   K 4.9  --  3.7 4.4   CL 98*  --  106 106   CO2 18*  --  23 23   BUN 42*  --  52* 52*   CREATININE 2.5*  --  3.1* 2.9*   GLUCOSE 587* 595* 177* 202*     Hepatic:   Recent Labs     09/24/24  1747 09/26/24  0414   AST 21 21   ALT 20 18   BILITOT 0.4 <0.2   ALKPHOS 214* 135*     Lipids:   Lab Results   Component Value Date/Time    CHOL 250 07/23/2024 12:41 PM    HDL 51 07/23/2024 12:41 PM    TRIG 116 07/23/2024 12:41 PM     Hemoglobin A1C:   Lab Results   Component Value Date/Time

## 2024-09-28 LAB
ALBUMIN SERPL-MCNC: 2.8 G/DL (ref 3.4–5)
ANION GAP SERPL CALCULATED.3IONS-SCNC: 14 MMOL/L (ref 3–16)
BUN SERPL-MCNC: 40 MG/DL (ref 7–20)
C1INH FUNCTIONAL/C1INH TOTAL MFR SERPL: 112 %
CALCIUM SERPL-MCNC: 8.6 MG/DL (ref 8.3–10.6)
CHLORIDE SERPL-SCNC: 108 MMOL/L (ref 99–110)
CO2 SERPL-SCNC: 15 MMOL/L (ref 21–32)
CREAT SERPL-MCNC: 2.3 MG/DL (ref 0.6–1.1)
DEPRECATED RDW RBC AUTO: 14.5 % (ref 12.4–15.4)
GFR SERPLBLD CREATININE-BSD FMLA CKD-EPI: 24 ML/MIN/{1.73_M2}
GLUCOSE BLD-MCNC: 151 MG/DL (ref 70–99)
GLUCOSE BLD-MCNC: 166 MG/DL (ref 70–99)
GLUCOSE BLD-MCNC: 181 MG/DL (ref 70–99)
GLUCOSE BLD-MCNC: 210 MG/DL (ref 70–99)
GLUCOSE BLD-MCNC: 226 MG/DL (ref 70–99)
GLUCOSE BLD-MCNC: 282 MG/DL (ref 70–99)
GLUCOSE SERPL-MCNC: 206 MG/DL (ref 70–99)
HCT VFR BLD AUTO: 27.7 % (ref 36–48)
HGB BLD-MCNC: 9.1 G/DL (ref 12–16)
HSV1 DNA CSF QL NAA+PROBE: NOT DETECTED
HSV2 DNA CSF QL NAA+PROBE: NOT DETECTED
MCH RBC QN AUTO: 27 PG (ref 26–34)
MCHC RBC AUTO-ENTMCNC: 32.9 G/DL (ref 31–36)
MCV RBC AUTO: 82.3 FL (ref 80–100)
PERFORMED ON: ABNORMAL
PHOSPHATE SERPL-MCNC: 3.8 MG/DL (ref 2.5–4.9)
PLATELET # BLD AUTO: 206 K/UL (ref 135–450)
PMV BLD AUTO: 9.3 FL (ref 5–10.5)
POTASSIUM SERPL-SCNC: 4.5 MMOL/L (ref 3.5–5.1)
RBC # BLD AUTO: 3.36 M/UL (ref 4–5.2)
REASON FOR REJECTION: NORMAL
REJECTED TEST: NORMAL
SODIUM SERPL-SCNC: 137 MMOL/L (ref 136–145)
SPECIMEN SOURCE: NORMAL
WBC # BLD AUTO: 7.5 K/UL (ref 4–11)

## 2024-09-28 PROCEDURE — 6370000000 HC RX 637 (ALT 250 FOR IP): Performed by: STUDENT IN AN ORGANIZED HEALTH CARE EDUCATION/TRAINING PROGRAM

## 2024-09-28 PROCEDURE — 6360000002 HC RX W HCPCS: Performed by: INTERNAL MEDICINE

## 2024-09-28 PROCEDURE — 2580000003 HC RX 258: Performed by: INTERNAL MEDICINE

## 2024-09-28 PROCEDURE — 6370000000 HC RX 637 (ALT 250 FOR IP): Performed by: INTERNAL MEDICINE

## 2024-09-28 PROCEDURE — 36415 COLL VENOUS BLD VENIPUNCTURE: CPT

## 2024-09-28 PROCEDURE — 85027 COMPLETE CBC AUTOMATED: CPT

## 2024-09-28 PROCEDURE — 2580000003 HC RX 258: Performed by: STUDENT IN AN ORGANIZED HEALTH CARE EDUCATION/TRAINING PROGRAM

## 2024-09-28 PROCEDURE — 1200000000 HC SEMI PRIVATE

## 2024-09-28 PROCEDURE — 6360000002 HC RX W HCPCS: Performed by: STUDENT IN AN ORGANIZED HEALTH CARE EDUCATION/TRAINING PROGRAM

## 2024-09-28 PROCEDURE — 80069 RENAL FUNCTION PANEL: CPT

## 2024-09-28 RX ORDER — NIFEDIPINE 30 MG/1
60 TABLET, EXTENDED RELEASE ORAL 2 TIMES DAILY
Status: DISCONTINUED | OUTPATIENT
Start: 2024-09-28 | End: 2024-10-02

## 2024-09-28 RX ORDER — LABETALOL 200 MG/1
300 TABLET, FILM COATED ORAL EVERY 8 HOURS SCHEDULED
Status: DISCONTINUED | OUTPATIENT
Start: 2024-09-28 | End: 2024-09-28

## 2024-09-28 RX ORDER — GLIPIZIDE 5 MG/1
5 TABLET ORAL
Status: DISCONTINUED | OUTPATIENT
Start: 2024-09-29 | End: 2024-09-29

## 2024-09-28 RX ORDER — INSULIN GLARGINE 100 [IU]/ML
30 INJECTION, SOLUTION SUBCUTANEOUS DAILY
Status: DISCONTINUED | OUTPATIENT
Start: 2024-09-28 | End: 2024-09-29

## 2024-09-28 RX ORDER — SODIUM BICARBONATE 650 MG/1
650 TABLET ORAL 3 TIMES DAILY
Status: DISCONTINUED | OUTPATIENT
Start: 2024-09-28 | End: 2024-10-01

## 2024-09-28 RX ORDER — LABETALOL 200 MG/1
200 TABLET, FILM COATED ORAL EVERY 8 HOURS SCHEDULED
Status: DISCONTINUED | OUTPATIENT
Start: 2024-09-28 | End: 2024-09-30

## 2024-09-28 RX ORDER — INSULIN LISPRO 100 [IU]/ML
8 INJECTION, SOLUTION INTRAVENOUS; SUBCUTANEOUS
Status: DISCONTINUED | OUTPATIENT
Start: 2024-09-28 | End: 2024-09-28

## 2024-09-28 RX ADMIN — HEPARIN SODIUM 5000 UNITS: 5000 INJECTION INTRAVENOUS; SUBCUTANEOUS at 22:35

## 2024-09-28 RX ADMIN — SODIUM BICARBONATE 650 MG: 650 TABLET ORAL at 22:34

## 2024-09-28 RX ADMIN — HEPARIN SODIUM 5000 UNITS: 5000 INJECTION INTRAVENOUS; SUBCUTANEOUS at 06:11

## 2024-09-28 RX ADMIN — SODIUM CHLORIDE, PRESERVATIVE FREE 10 ML: 5 INJECTION INTRAVENOUS at 09:36

## 2024-09-28 RX ADMIN — SODIUM CHLORIDE, PRESERVATIVE FREE 10 ML: 5 INJECTION INTRAVENOUS at 22:58

## 2024-09-28 RX ADMIN — NIFEDIPINE 60 MG: 30 TABLET, FILM COATED, EXTENDED RELEASE ORAL at 22:21

## 2024-09-28 RX ADMIN — ROSUVASTATIN 20 MG: 20 TABLET, FILM COATED ORAL at 22:58

## 2024-09-28 RX ADMIN — SODIUM BICARBONATE 650 MG: 650 TABLET ORAL at 14:58

## 2024-09-28 RX ADMIN — HEPARIN SODIUM 5000 UNITS: 5000 INJECTION INTRAVENOUS; SUBCUTANEOUS at 14:58

## 2024-09-28 RX ADMIN — INSULIN GLARGINE 30 UNITS: 100 INJECTION, SOLUTION SUBCUTANEOUS at 09:49

## 2024-09-28 RX ADMIN — ASPIRIN 81 MG: 81 TABLET, COATED ORAL at 22:34

## 2024-09-28 RX ADMIN — INSULIN LISPRO 2 UNITS: 100 INJECTION, SOLUTION INTRAVENOUS; SUBCUTANEOUS at 17:02

## 2024-09-28 RX ADMIN — INSULIN LISPRO 2 UNITS: 100 INJECTION, SOLUTION INTRAVENOUS; SUBCUTANEOUS at 09:34

## 2024-09-28 RX ADMIN — SODIUM CHLORIDE 25 ML: 9 INJECTION, SOLUTION INTRAVENOUS at 15:56

## 2024-09-28 RX ADMIN — SODIUM CHLORIDE 125 MG: 9 INJECTION, SOLUTION INTRAVENOUS at 15:58

## 2024-09-28 RX ADMIN — LABETALOL HYDROCHLORIDE 100 MG: 200 TABLET, FILM COATED ORAL at 14:58

## 2024-09-28 RX ADMIN — LABETALOL HYDROCHLORIDE 10 MG: 5 INJECTION, SOLUTION INTRAVENOUS at 12:54

## 2024-09-28 RX ADMIN — NIFEDIPINE 60 MG: 30 TABLET, FILM COATED, EXTENDED RELEASE ORAL at 09:34

## 2024-09-28 NOTE — PROGRESS NOTES
PRN labetalol given for hypertension, see flowsheets. Placed on telemetry. BP remains elevated, MD notified.

## 2024-09-28 NOTE — PROGRESS NOTES
V2.0    [facility] [place] Progress Note      Name:  Galina Nick /Age/Sex: 1965  (58 y.o. female)   MRN & CSN:  [id number] & [id number] Encounter Date/Time: 2024 2:31 PM EDT   Location:  [address] [place], [place] [id number]- PCP: Tristan Lei MD     Attending:Jennifer Schwartz MD       Hospital Day: 5    Assessment and Recommendations   Galina Nick is a 58 y.o. female with pmh of  hypertension, CAD, dyslipidemia, type 2 diabetes mellitus who presented with painless facial swelling.      Plan:     Angioedema involving the face: Improved  Likely secondary to Diovan use; discontinued.    Received Benadryl 25 mg, Pepcid 20 mg, Solu-Medrol 125 mg in the ED.  Discontinued steroid due to worsening hyperglycemia.  C4 complement elevated.    Continue monitoring for resolution.      JAYY on CKD 3b with metabolic acidosis: Resolving.  Baseline SCr 1.9.  Peak SCr 3.1.    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    Discontinued IVF.  Continue sodium bicarb.  Avoid nephrotoxins.  Monitor BMP.    Uncontrolled hypertension:   In the ED, patient was hypertensive with blood pressure of 191/82.  Hold metoprolol, diuretics and ARB.  Monitor on nifedipine and labetalol.    T2DM not on long-term insulin with hyperglycemia:   Episodes of hypotension noted with increased dose of insulin.  Patient willing to be discharged on daily Lantus on discharge without prandial insulin.  Outpatient follow-up with endocrinologist.    Anemia: Low iron saturation.    Started on Venofer and JUN. Monitor CBC.    Diet ADULT DIET; Regular; 3 carb choices (45 gm/meal)   DVT Prophylaxis [] Lovenox, [x]  Heparin, [] SCDs, [] Ambulation,  [] Eliquis, [] Xarelto  [] Coumadin   Code Status Full Code   Disposition From: Home  Expected Disposition: Home  Estimated Date of Discharge:   Patient requires continued admission due to JAYY   Surrogate Decision Maker/ SOLANGE NickTerri (Brother/Sister)  [phone]      Personally reviewed Lab Studies and Imaging

## 2024-09-28 NOTE — PROGRESS NOTES
[facility]   Nephrology progress Note  [phone]  [phone]   [email or web address]           Reason for Consult:  HTN     The patient is a 58 y.o. female with significant past medical history of hypertension, T2DM, CKD, CAD, hyperlipidemia, mitral regurgitation, renal artery stenosis, presented to the ER with facial swelling.  This started as tingling sensation around the nose and the mouth and then subsequently she developed a rash in the center of her face.  The midface became swollen and red and hurt lips became large especially the upper lip.  She had no swelling of the tongue, there was no problems with breathing, and there was no generalized rash over the body.  She had a flu vaccine a week ago and previously had COVID in the last 4 weeks.    Of note she was on ARB Diovan for blood pressure control as well as antiproteinuric effect.  She had had used ACE inhibitor's maybe 20 years ago and they had caused a dry cough necessitating stoppage.  She never had angioedema with them    Interval History:    9/26/24: facial swelling is down   9/27/24: no swelling mid face , lips feel better   9/28/24: feeling overall better    PHYSICAL EXAM:    Vitals:    BP (!) 177/91   Pulse 86   Temp 97.5 °F (36.4 °C)   Resp 16   Ht 1.727 m (5' 8\")   Wt 97.9 kg (215 lb 13.3 oz)   LMP 06/01/2011   SpO2 98%   BMI 32.82 kg/m²       Intake/Output Summary (Last 24 hours) at 9/28/2024 1157  Last data filed at 9/27/2024 1719  Gross per 24 hour   Intake 852.68 ml   Output 700 ml   Net 152.68 ml         Physical Exam:  Gen:  alert, oriented x 3  CV: RRR no murmur or rub .  Lungs: CTA  Abd: soft, bowel sounds + , No organomegaly   Ext: No edema, no cyanosis  Skin: Warm.  No rash  Neuro: nonfocal.    Medications   [START ON 9/29/2024] glipiZIDE  5 mg Oral QAM AC    insulin glargine  30 Units SubCUTAneous Daily    sodium bicarbonate  650 mg Oral TID    ferric gluconate (FERRLECIT) 125 mg in sodium chloride 0.9 %  09:34 AM     Uric Acid:  No results found for: \"LABURIC\", \"URICACID\"  Last 3 Troponin:  No results found for: \"TROPONINI\"  U/A:    Lab Results   Component Value Date/Time    COLORU Yellow 09/25/2024 10:00 AM    PROTEINU >=1000 09/25/2024 10:00 AM    PHUR 6.0 09/25/2024 10:00 AM    PHUR 6.0 01/23/2024 05:24 PM    WBCUA 5 09/25/2024 10:00 AM    RBCUA 5 09/25/2024 10:00 AM    MUCUS Present 06/19/2024 12:00 AM    BACTERIA None Seen 09/25/2024 10:00 AM    CLARITYU Clear 09/25/2024 10:00 AM    LEUKOCYTESUR Negative 09/25/2024 10:00 AM    UROBILINOGEN 0.2 09/25/2024 10:00 AM    BILIRUBINUR Negative 09/25/2024 10:00 AM    BLOODU SMALL 09/25/2024 10:00 AM    GLUCOSEU >=1000 09/25/2024 10:00 AM    GLUCOSEU >=1000 07/14/2011 07:38 PM    AMORPHOUS Present 06/19/2024 12:00 AM           IMPRESSION/RECOMMENDATIONS:      CKD stage IIIb  Sees Dr. MAVIS rodriguez  Creatinine baseline was 1.9  Likely diabetic nephropathy, P/Cr 12.  Previous investigations ABI was negative, C3 normal at 145, C4 normal at 35, immunofixation: No paraprotein    Acute kidney injury  Creatinine starting to trend down  Diovan held for angioedema     T2DM    Hypertension - continue Labetalol    Facial swelling - ? Angioedema   Likely due to use of ARB (being used for hypertension and proteinuria)    CKD-MBD         Phosphorus at target    Anemia  Receiving iron & JUN    Acidosis - started oral sodium bicarbonate    Thank you for allowing me to participate in the care of this patient.  I will continue to follow along.  Please call with questions.    Tiffany Workman MD  9/28/2024  [facility]

## 2024-09-28 NOTE — PROGRESS NOTES
Pt A&O x4. Pt denies any pain. Pt does report blurry vision. Vitals obtained. BP was high- 170/90. Morning blood glucose 226. Morning meds administered. All needs addressed. Bed locked and in lowest position. Bedside table and call light within reach.

## 2024-09-28 NOTE — PROGRESS NOTES
Patient A&O x4. No c/o pain or discomfort. Evening Labetalol held per MD  d/t BP of 123/74. VSSA. Assessment complete. All needs addressed. Patient aware of Q4 blood sugar checks. Bed locked, in lowest position. Bedside table and call light within reach. Plan of care ongoing.

## 2024-09-29 LAB
ALBUMIN SERPL-MCNC: 2.9 G/DL (ref 3.4–5)
ALBUMIN/GLOB SERPL: 1.1 {RATIO} (ref 1.1–2.2)
ALP SERPL-CCNC: 134 U/L (ref 40–129)
ALT SERPL-CCNC: 42 U/L (ref 10–40)
ANION GAP SERPL CALCULATED.3IONS-SCNC: 9 MMOL/L (ref 3–16)
AST SERPL-CCNC: 44 U/L (ref 15–37)
BACTERIA BLD CULT ORG #2: NORMAL
BACTERIA BLD CULT: NORMAL
BILIRUB SERPL-MCNC: <0.2 MG/DL (ref 0–1)
BUN SERPL-MCNC: 37 MG/DL (ref 7–20)
CALCIUM SERPL-MCNC: 8.5 MG/DL (ref 8.3–10.6)
CHLORIDE SERPL-SCNC: 114 MMOL/L (ref 99–110)
CO2 SERPL-SCNC: 21 MMOL/L (ref 21–32)
CREAT SERPL-MCNC: 2.5 MG/DL (ref 0.6–1.1)
DEPRECATED RDW RBC AUTO: 14.4 % (ref 12.4–15.4)
GFR SERPLBLD CREATININE-BSD FMLA CKD-EPI: 22 ML/MIN/{1.73_M2}
GLUCOSE BLD-MCNC: 118 MG/DL (ref 70–99)
GLUCOSE BLD-MCNC: 187 MG/DL (ref 70–99)
GLUCOSE BLD-MCNC: 66 MG/DL (ref 70–99)
GLUCOSE BLD-MCNC: 72 MG/DL (ref 70–99)
GLUCOSE BLD-MCNC: 90 MG/DL (ref 70–99)
GLUCOSE SERPL-MCNC: 107 MG/DL (ref 70–99)
HCT VFR BLD AUTO: 26.7 % (ref 36–48)
HGB BLD-MCNC: 8.8 G/DL (ref 12–16)
MAGNESIUM SERPL-MCNC: 1.9 MG/DL (ref 1.8–2.4)
MCH RBC QN AUTO: 27 PG (ref 26–34)
MCHC RBC AUTO-ENTMCNC: 33 G/DL (ref 31–36)
MCV RBC AUTO: 81.8 FL (ref 80–100)
PERFORMED ON: ABNORMAL
PERFORMED ON: NORMAL
PERFORMED ON: NORMAL
PLATELET # BLD AUTO: 217 K/UL (ref 135–450)
PMV BLD AUTO: 9.1 FL (ref 5–10.5)
POTASSIUM SERPL-SCNC: 4.3 MMOL/L (ref 3.5–5.1)
PROT SERPL-MCNC: 5.5 G/DL (ref 6.4–8.2)
RBC # BLD AUTO: 3.27 M/UL (ref 4–5.2)
SODIUM SERPL-SCNC: 144 MMOL/L (ref 136–145)
WBC # BLD AUTO: 7.9 K/UL (ref 4–11)

## 2024-09-29 PROCEDURE — 80053 COMPREHEN METABOLIC PANEL: CPT

## 2024-09-29 PROCEDURE — 6360000002 HC RX W HCPCS: Performed by: INTERNAL MEDICINE

## 2024-09-29 PROCEDURE — 6360000002 HC RX W HCPCS: Performed by: STUDENT IN AN ORGANIZED HEALTH CARE EDUCATION/TRAINING PROGRAM

## 2024-09-29 PROCEDURE — 6370000000 HC RX 637 (ALT 250 FOR IP): Performed by: INTERNAL MEDICINE

## 2024-09-29 PROCEDURE — 85027 COMPLETE CBC AUTOMATED: CPT

## 2024-09-29 PROCEDURE — 83735 ASSAY OF MAGNESIUM: CPT

## 2024-09-29 PROCEDURE — 1200000000 HC SEMI PRIVATE

## 2024-09-29 PROCEDURE — 6370000000 HC RX 637 (ALT 250 FOR IP): Performed by: STUDENT IN AN ORGANIZED HEALTH CARE EDUCATION/TRAINING PROGRAM

## 2024-09-29 PROCEDURE — 36415 COLL VENOUS BLD VENIPUNCTURE: CPT

## 2024-09-29 PROCEDURE — 2580000003 HC RX 258: Performed by: STUDENT IN AN ORGANIZED HEALTH CARE EDUCATION/TRAINING PROGRAM

## 2024-09-29 PROCEDURE — 2580000003 HC RX 258: Performed by: INTERNAL MEDICINE

## 2024-09-29 RX ORDER — INSULIN LISPRO 100 [IU]/ML
0-4 INJECTION, SOLUTION INTRAVENOUS; SUBCUTANEOUS NIGHTLY
Status: DISCONTINUED | OUTPATIENT
Start: 2024-09-29 | End: 2024-10-11 | Stop reason: HOSPADM

## 2024-09-29 RX ORDER — INSULIN GLARGINE 100 [IU]/ML
25 INJECTION, SOLUTION SUBCUTANEOUS DAILY
Status: DISCONTINUED | OUTPATIENT
Start: 2024-09-30 | End: 2024-09-30

## 2024-09-29 RX ORDER — INSULIN LISPRO 100 [IU]/ML
4 INJECTION, SOLUTION INTRAVENOUS; SUBCUTANEOUS
Status: DISCONTINUED | OUTPATIENT
Start: 2024-09-30 | End: 2024-09-30

## 2024-09-29 RX ORDER — INSULIN LISPRO 100 [IU]/ML
0-4 INJECTION, SOLUTION INTRAVENOUS; SUBCUTANEOUS
Status: DISCONTINUED | OUTPATIENT
Start: 2024-09-29 | End: 2024-10-11 | Stop reason: HOSPADM

## 2024-09-29 RX ORDER — OMEGA-3-ACID ETHYL ESTERS 1 G/1
2 CAPSULE, LIQUID FILLED ORAL 2 TIMES DAILY
Status: DISCONTINUED | OUTPATIENT
Start: 2024-09-30 | End: 2024-09-30 | Stop reason: RX

## 2024-09-29 RX ADMIN — LABETALOL HYDROCHLORIDE 200 MG: 200 TABLET, FILM COATED ORAL at 21:17

## 2024-09-29 RX ADMIN — SODIUM BICARBONATE 650 MG: 650 TABLET ORAL at 14:04

## 2024-09-29 RX ADMIN — NIFEDIPINE 60 MG: 30 TABLET, FILM COATED, EXTENDED RELEASE ORAL at 08:42

## 2024-09-29 RX ADMIN — LABETALOL HYDROCHLORIDE 200 MG: 200 TABLET, FILM COATED ORAL at 14:04

## 2024-09-29 RX ADMIN — INSULIN GLARGINE 30 UNITS: 100 INJECTION, SOLUTION SUBCUTANEOUS at 08:40

## 2024-09-29 RX ADMIN — SODIUM BICARBONATE 650 MG: 650 TABLET ORAL at 08:42

## 2024-09-29 RX ADMIN — HEPARIN SODIUM 5000 UNITS: 5000 INJECTION INTRAVENOUS; SUBCUTANEOUS at 14:04

## 2024-09-29 RX ADMIN — ASPIRIN 81 MG: 81 TABLET, COATED ORAL at 21:17

## 2024-09-29 RX ADMIN — HEPARIN SODIUM 5000 UNITS: 5000 INJECTION INTRAVENOUS; SUBCUTANEOUS at 21:17

## 2024-09-29 RX ADMIN — SODIUM CHLORIDE 125 MG: 9 INJECTION, SOLUTION INTRAVENOUS at 17:00

## 2024-09-29 RX ADMIN — GLIPIZIDE 5 MG: 5 TABLET ORAL at 05:50

## 2024-09-29 RX ADMIN — SODIUM BICARBONATE 650 MG: 650 TABLET ORAL at 21:17

## 2024-09-29 RX ADMIN — HEPARIN SODIUM 5000 UNITS: 5000 INJECTION INTRAVENOUS; SUBCUTANEOUS at 05:50

## 2024-09-29 RX ADMIN — SODIUM CHLORIDE, PRESERVATIVE FREE 10 ML: 5 INJECTION INTRAVENOUS at 21:17

## 2024-09-29 RX ADMIN — NIFEDIPINE 60 MG: 30 TABLET, FILM COATED, EXTENDED RELEASE ORAL at 21:17

## 2024-09-29 RX ADMIN — SODIUM CHLORIDE, PRESERVATIVE FREE 10 ML: 5 INJECTION INTRAVENOUS at 08:42

## 2024-09-29 NOTE — PROGRESS NOTES
Evening blood glucose obtained, result 66. Apple juice provided. Recheck glucose 72. Dinner tray at bedside, pt currently eating. No needs expressed.

## 2024-09-29 NOTE — PROGRESS NOTES
Shift assessment completed, vital signs obtained and ordered medications given. Patient very apprehensive to take ordered BP medication, specifically labetalol. Patient states she takes labetalol 100mg at home and is willing to take that particular strength multiples times per day. However she is unwilling to take more than 100mg at a time. Currently labetalol 200mg is ordered q8h, however patient declined on both ordered doses during my shift. This RN discussed with patient that I am unable to give half the ordered dose, as she must take as directed or order will need to change. Patient voiced understanding and states, \" I wasn't told my BP medication dosage would change and I would like my current cardiologist involved in my BP management\". This RN explain to patient I would pass this information off to dayshift nurse and would monitor her BP more frequently throughout the shift. Patient's BP elevated in the 150's systolically with a decrease into 130's systolically at the end of my shift.     Patient assessed for comfort and appears to rest comfortably without signs of respiratory distress and discomfort. Patient Aox 4 and denies additional nursing needs at this time. Call light and bedside table within reach, bed's wheels locked and in the lowest position.

## 2024-09-29 NOTE — PROGRESS NOTES
[facility]   Nephrology progress Note  [phone]  [phone]   [email or web address]           Reason for Consult:  HTN     The patient is a 58 y.o. female with significant past medical history of hypertension, T2DM, CKD, CAD, hyperlipidemia, mitral regurgitation, renal artery stenosis, presented to the ER with facial swelling.  This started as tingling sensation around the nose and the mouth and then subsequently she developed a rash in the center of her face.  The midface became swollen and red and hurt lips became large especially the upper lip.  She had no swelling of the tongue, there was no problems with breathing, and there was no generalized rash over the body.  She had a flu vaccine a week ago and previously had COVID in the last 4 weeks.    Of note she was on ARB Diovan for blood pressure control as well as antiproteinuric effect.  She had had used ACE inhibitor's maybe 20 years ago and they had caused a dry cough necessitating stoppage.  She never had angioedema with them    Interval History:    9/26/24: facial swelling is down   9/27/24: no swelling mid face , lips feel better   9/28/24: feeling overall better  9/29/24: Refused to take Labetalol yesterday 9/28/24    PHYSICAL EXAM:    Vitals:    BP (!) 181/90   Pulse 79   Temp 97.6 °F (36.4 °C) (Oral)   Resp 17   Ht 1.727 m (5' 8\")   Wt 97.9 kg (215 lb 13.3 oz)   LMP 06/01/2011   SpO2 96%   BMI 32.82 kg/m²       Intake/Output Summary (Last 24 hours) at 9/29/2024 1148  Last data filed at 9/29/2024 1013  Gross per 24 hour   Intake 240 ml   Output --   Net 240 ml         Physical Exam:  Gen:  alert, oriented x 3  CV: RRR no murmur or rub .  Lungs: CTA  Abd: soft, bowel sounds + , No organomegaly   Ext: No edema, no cyanosis  Skin: Warm.  No rash  Neuro: nonfocal.    Medications   [START ON 9/30/2024] insulin glargine  25 Units SubCUTAneous Daily    insulin lispro  0-4 Units SubCUTAneous TID WC    insulin lispro  0-4 Units

## 2024-09-29 NOTE — PROGRESS NOTES
Shift assessment completed. Routine vitals obtained. Scheduled medications given. Patient is awake, alert and oriented. Respirations are easy and unlabored. Patient does not appear to be in distress, resting comfortably at this time. Call light within reach. Breakfast tray at bedside. No needs expressed.

## 2024-09-29 NOTE — PROGRESS NOTES
V2.0    [facility] [place] Progress Note      Name:  Galina Nick /Age/Sex: 1965  (58 y.o. female)   MRN & CSN:  [id number] & [id number] Encounter Date/Time: 2024 2:31 PM EDT   Location:  [address] [place], [place] [id number]- PCP: Tristan Lei MD     Attending:Jennifer Schwartz MD       Hospital Day: 6    Assessment and Recommendations   Galina Nick is a 58 y.o. female with pmh of  hypertension, CAD, dyslipidemia, type 2 diabetes mellitus who presented with painless facial swelling.      Plan:     JAYY on CKD 3b with metabolic acidosis: Resolving.  Baseline SCr 1.9.  Peak SCr 3.1.    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    Discontinued IVF.  Continue sodium bicarb.  Avoid nephrotoxins.  Monitor BMP.    Uncontrolled hypertension:   In the ED, patient was hypertensive with blood pressure of 191/82.  Discontinued metoprolol, diuretics and ARB.  Monitor on nifedipine and labetalol (dose increased).    Angioedema involving the face: Improved  Likely secondary to Diovan use; discontinued.    Received Benadryl 25 mg, Pepcid 20 mg, Solu-Medrol 125 mg in the ED.  Discontinued steroid due to worsening hyperglycemia.  C4 complement elevated.    Continue monitoring for resolution.    T2DM not on long-term insulin with hyperglycemia:   Episodes of hypotension noted with increased dose of insulin.  Patient willing to be discharged on daily Lantus without prandial insulin.  Outpatient follow-up with endocrinologist.    Anemia: Low iron saturation.    Started on Venofer and JUN. Monitor CBC.    Diet ADULT DIET; Regular; 3 carb choices (45 gm/meal)   DVT Prophylaxis [] Lovenox, [x]  Heparin, [] SCDs, [] Ambulation,  [] Eliquis, [] Xarelto  [] Coumadin   Code Status Full Code   Disposition From: Home  Expected Disposition: Home  Estimated Date of Discharge:   Patient requires continued admission due to JAYY   Surrogate Decision Maker/ Terri Navarrete (Brother/Sister)  [phone]      Personally reviewed Lab Studies    Medications:    [START ON 9/30/2024] insulin glargine  25 Units SubCUTAneous Daily    insulin lispro  0-4 Units SubCUTAneous TID WC    insulin lispro  0-4 Units SubCUTAneous Nightly    glipiZIDE  5 mg Oral QAM AC    sodium bicarbonate  650 mg Oral TID    NIFEdipine  60 mg Oral BID    labetalol  200 mg Oral 3 times per day    ferric gluconate (FERRLECIT) 125 mg in sodium chloride 0.9 % 100 mL IVPB  125 mg IntraVENous Q24H    epoetin raisa-epbx  10,000 Units SubCUTAneous Weekly    heparin (porcine)  5,000 Units SubCUTAneous 3 times per day    aspirin  81 mg Oral Nightly    rosuvastatin  20 mg Oral Nightly    sodium chloride flush  5-40 mL IntraVENous 2 times per day      Infusions:    sodium chloride 25 mL (09/28/24 1556)    dextrose       PRN Meds: sodium chloride flush, 5-40 mL, PRN  sodium chloride, , PRN  potassium chloride, 40 mEq, PRN   Or  potassium alternative oral replacement, 40 mEq, PRN   Or  potassium chloride, 10 mEq, PRN  magnesium sulfate, 2,000 mg, PRN  ondansetron, 4 mg, Q8H PRN   Or  ondansetron, 4 mg, Q6H PRN  polyethylene glycol, 17 g, Daily PRN  acetaminophen, 650 mg, Q6H PRN   Or  acetaminophen, 650 mg, Q6H PRN  dextrose bolus, 125 mL, PRN   Or  dextrose bolus, 250 mL, PRN  glucagon (rDNA), 1 mg, PRN  dextrose, , Continuous PRN  diphenhydrAMINE, 25 mg, Q2H PRN        Labs and Imaging   No results found.    CBC:   Recent Labs     09/27/24  0305 09/28/24  1037 09/29/24  0521   WBC 10.6 7.5 7.9   HGB 7.7* 9.1* 8.8*    206 217     BMP:    Recent Labs     09/27/24  0305 09/28/24  0934 09/29/24  0521    137 144   K 4.4 4.5 4.3    108 114*   CO2 23 15* 21   BUN 52* 40* 37*   CREATININE 2.9* 2.3* 2.5*   GLUCOSE 202* 206* 107*     Hepatic:   Recent Labs     09/29/24  0521   AST 44*   ALT 42*   BILITOT <0.2   ALKPHOS 134*     Lipids:   Lab Results   Component Value Date/Time    CHOL 250 07/23/2024 12:41 PM    HDL 51 07/23/2024 12:41 PM    TRIG 116 07/23/2024 12:41 PM     Hemoglobin A1C:    Lab Results   Component Value Date/Time    LABA1C 10.2 09/25/2024 05:24 AM     TSH:   Lab Results   Component Value Date/Time    TSH 1.46 07/23/2024 12:41 PM     Troponin: No results found for: \"TROPONINT\"  Lactic Acid: No results for input(s): \"LACTA\" in the last 72 hours.  BNP: No results for input(s): \"PROBNP\" in the last 72 hours.  UA:  Lab Results   Component Value Date/Time    NITRU Negative 09/25/2024 10:00 AM    COLORU Yellow 09/25/2024 10:00 AM    PHUR 6.0 09/25/2024 10:00 AM    PHUR 6.0 01/23/2024 05:24 PM    WBCUA 5 09/25/2024 10:00 AM    RBCUA 5 09/25/2024 10:00 AM    MUCUS Present 06/19/2024 12:00 AM    BACTERIA None Seen 09/25/2024 10:00 AM    CLARITYU Clear 09/25/2024 10:00 AM    LEUKOCYTESUR Negative 09/25/2024 10:00 AM    UROBILINOGEN 0.2 09/25/2024 10:00 AM    BILIRUBINUR Negative 09/25/2024 10:00 AM    BLOODU SMALL 09/25/2024 10:00 AM    GLUCOSEU >=1000 09/25/2024 10:00 AM    GLUCOSEU >=1000 07/14/2011 07:38 PM    KETUA TRACE 09/25/2024 10:00 AM    AMORPHOUS Present 06/19/2024 12:00 AM     Urine Cultures:   Lab Results   Component Value Date/Time    LABURIN  06/21/2024 03:00 PM     <50,000 CFU/ml mixed skin/urogenital bushra. No further workup     Blood Cultures:   Lab Results   Component Value Date/Time    BC No Growth after 4 days of incubation. 09/25/2024 09:31 AM     Lab Results   Component Value Date/Time    BLOODCULT2 No Growth after 4 days of incubation. 09/25/2024 09:31 AM     Organism:   Lab Results   Component Value Date/Time    ORG Escherichia coli 01/08/2022 12:49 PM         Electronically signed by Jennifer Schwartz MD on 9/29/2024 at 12:25 PM

## 2024-09-30 PROBLEM — D64.9 ANEMIA: Status: ACTIVE | Noted: 2024-09-30

## 2024-09-30 LAB
ALBUMIN SERPL-MCNC: 2.8 G/DL (ref 3.4–5)
ALP SERPL-CCNC: 127 U/L (ref 40–129)
ALT SERPL-CCNC: 40 U/L (ref 10–40)
ANION GAP SERPL CALCULATED.3IONS-SCNC: 10 MMOL/L (ref 3–16)
AST SERPL-CCNC: 36 U/L (ref 15–37)
BACTERIA URNS QL MICRO: ABNORMAL /HPF
BILIRUB DIRECT SERPL-MCNC: 0.2 MG/DL (ref 0–0.3)
BILIRUB INDIRECT SERPL-MCNC: ABNORMAL MG/DL (ref 0–1)
BILIRUB SERPL-MCNC: <0.2 MG/DL (ref 0–1)
BILIRUB UR QL STRIP.AUTO: NEGATIVE
BUN SERPL-MCNC: 35 MG/DL (ref 7–20)
CALCIUM SERPL-MCNC: 8.4 MG/DL (ref 8.3–10.6)
CHLORIDE SERPL-SCNC: 110 MMOL/L (ref 99–110)
CLARITY UR: ABNORMAL
CO2 SERPL-SCNC: 22 MMOL/L (ref 21–32)
COLOR UR: YELLOW
CORTIS AM PEAK SERPL-MCNC: 13.7 UG/DL (ref 4.3–22.4)
CREAT SERPL-MCNC: 2.8 MG/DL (ref 0.6–1.1)
DEPRECATED RDW RBC AUTO: 14.4 % (ref 12.4–15.4)
EPI CELLS #/AREA URNS AUTO: 4 /HPF (ref 0–5)
GFR SERPLBLD CREATININE-BSD FMLA CKD-EPI: 19 ML/MIN/{1.73_M2}
GLUCOSE BLD-MCNC: 148 MG/DL (ref 70–99)
GLUCOSE BLD-MCNC: 161 MG/DL (ref 70–99)
GLUCOSE BLD-MCNC: 201 MG/DL (ref 70–99)
GLUCOSE BLD-MCNC: 246 MG/DL (ref 70–99)
GLUCOSE SERPL-MCNC: 146 MG/DL (ref 70–99)
GLUCOSE UR STRIP.AUTO-MCNC: >=1000 MG/DL
HCT VFR BLD AUTO: 26 % (ref 36–48)
HGB BLD-MCNC: 8.6 G/DL (ref 12–16)
HGB UR QL STRIP.AUTO: ABNORMAL
HYALINE CASTS #/AREA URNS AUTO: 2 /LPF (ref 0–8)
KETONES UR STRIP.AUTO-MCNC: NEGATIVE MG/DL
LEUKOCYTE ESTERASE UR QL STRIP.AUTO: NEGATIVE
MAGNESIUM SERPL-MCNC: 1.8 MG/DL (ref 1.8–2.4)
MCH RBC QN AUTO: 27.5 PG (ref 26–34)
MCHC RBC AUTO-ENTMCNC: 33.3 G/DL (ref 31–36)
MCV RBC AUTO: 82.8 FL (ref 80–100)
NITRITE UR QL STRIP.AUTO: NEGATIVE
PERFORMED ON: ABNORMAL
PH UR STRIP.AUTO: 6.5 [PH] (ref 5–8)
PHOSPHATE SERPL-MCNC: 4.5 MG/DL (ref 2.5–4.9)
PLATELET # BLD AUTO: 227 K/UL (ref 135–450)
PMV BLD AUTO: 9 FL (ref 5–10.5)
POTASSIUM SERPL-SCNC: 4.4 MMOL/L (ref 3.5–5.1)
PROT SERPL-MCNC: 5.3 G/DL (ref 6.4–8.2)
PROT UR STRIP.AUTO-MCNC: >=1000 MG/DL
RBC # BLD AUTO: 3.14 M/UL (ref 4–5.2)
RBC CLUMPS #/AREA URNS AUTO: 7 /HPF (ref 0–4)
SODIUM SERPL-SCNC: 142 MMOL/L (ref 136–145)
SP GR UR STRIP.AUTO: 1.02 (ref 1–1.03)
T4 FREE SERPL-MCNC: 1.1 NG/DL (ref 0.9–1.8)
TSH SERPL DL<=0.005 MIU/L-ACNC: 3.47 UIU/ML (ref 0.27–4.2)
UA COMPLETE W REFLEX CULTURE PNL UR: ABNORMAL
UA DIPSTICK W REFLEX MICRO PNL UR: YES
URN SPEC COLLECT METH UR: ABNORMAL
UROBILINOGEN UR STRIP-ACNC: 0.2 E.U./DL
WBC # BLD AUTO: 7.6 K/UL (ref 4–11)
WBC #/AREA URNS AUTO: 5 /HPF (ref 0–5)

## 2024-09-30 PROCEDURE — 84305 ASSAY OF SOMATOMEDIN: CPT

## 2024-09-30 PROCEDURE — 6370000000 HC RX 637 (ALT 250 FOR IP): Performed by: INTERNAL MEDICINE

## 2024-09-30 PROCEDURE — 82570 ASSAY OF URINE CREATININE: CPT

## 2024-09-30 PROCEDURE — 84156 ASSAY OF PROTEIN URINE: CPT

## 2024-09-30 PROCEDURE — 82043 UR ALBUMIN QUANTITATIVE: CPT

## 2024-09-30 PROCEDURE — 83529 ASAY OF INTERLEUKIN-6 (IL-6): CPT

## 2024-09-30 PROCEDURE — 80069 RENAL FUNCTION PANEL: CPT

## 2024-09-30 PROCEDURE — 84439 ASSAY OF FREE THYROXINE: CPT

## 2024-09-30 PROCEDURE — 36415 COLL VENOUS BLD VENIPUNCTURE: CPT

## 2024-09-30 PROCEDURE — 2580000003 HC RX 258: Performed by: INTERNAL MEDICINE

## 2024-09-30 PROCEDURE — 82533 TOTAL CORTISOL: CPT

## 2024-09-30 PROCEDURE — 6360000002 HC RX W HCPCS: Performed by: STUDENT IN AN ORGANIZED HEALTH CARE EDUCATION/TRAINING PROGRAM

## 2024-09-30 PROCEDURE — 80076 HEPATIC FUNCTION PANEL: CPT

## 2024-09-30 PROCEDURE — 1200000000 HC SEMI PRIVATE

## 2024-09-30 PROCEDURE — 85027 COMPLETE CBC AUTOMATED: CPT

## 2024-09-30 PROCEDURE — 81001 URINALYSIS AUTO W/SCOPE: CPT

## 2024-09-30 PROCEDURE — 84443 ASSAY THYROID STIM HORMONE: CPT

## 2024-09-30 PROCEDURE — 83735 ASSAY OF MAGNESIUM: CPT

## 2024-09-30 PROCEDURE — 84300 ASSAY OF URINE SODIUM: CPT

## 2024-09-30 RX ORDER — FERROUS SULFATE 325(65) MG
325 TABLET ORAL
Qty: 30 TABLET | Refills: 5 | Status: SHIPPED | OUTPATIENT
Start: 2024-09-30 | End: 2024-10-04

## 2024-09-30 RX ORDER — VITAMIN B COMPLEX
2000 TABLET ORAL DAILY
Status: DISCONTINUED | OUTPATIENT
Start: 2024-09-30 | End: 2024-10-11 | Stop reason: HOSPADM

## 2024-09-30 RX ORDER — CHLORTHALIDONE 25 MG/1
12.5 TABLET ORAL DAILY
Status: DISCONTINUED | OUTPATIENT
Start: 2024-09-30 | End: 2024-10-02

## 2024-09-30 RX ORDER — SODIUM CHLORIDE 9 MG/ML
INJECTION, SOLUTION INTRAVENOUS CONTINUOUS
Status: ACTIVE | OUTPATIENT
Start: 2024-09-30 | End: 2024-10-01

## 2024-09-30 RX ORDER — INSULIN GLARGINE 100 [IU]/ML
20 INJECTION, SOLUTION SUBCUTANEOUS DAILY
Status: DISCONTINUED | OUTPATIENT
Start: 2024-09-30 | End: 2024-10-05

## 2024-09-30 RX ORDER — CARVEDILOL 25 MG/1
25 TABLET ORAL 2 TIMES DAILY WITH MEALS
Status: DISCONTINUED | OUTPATIENT
Start: 2024-09-30 | End: 2024-10-11 | Stop reason: HOSPADM

## 2024-09-30 RX ADMIN — Medication 2000 UNITS: at 08:39

## 2024-09-30 RX ADMIN — SODIUM BICARBONATE 650 MG: 650 TABLET ORAL at 14:03

## 2024-09-30 RX ADMIN — INSULIN GLARGINE 20 UNITS: 100 INJECTION, SOLUTION SUBCUTANEOUS at 08:39

## 2024-09-30 RX ADMIN — HEPARIN SODIUM 5000 UNITS: 5000 INJECTION INTRAVENOUS; SUBCUTANEOUS at 22:34

## 2024-09-30 RX ADMIN — LABETALOL HYDROCHLORIDE 200 MG: 200 TABLET, FILM COATED ORAL at 06:20

## 2024-09-30 RX ADMIN — NIFEDIPINE 60 MG: 30 TABLET, FILM COATED, EXTENDED RELEASE ORAL at 22:33

## 2024-09-30 RX ADMIN — SODIUM BICARBONATE 650 MG: 650 TABLET ORAL at 22:33

## 2024-09-30 RX ADMIN — INSULIN LISPRO 1 UNITS: 100 INJECTION, SOLUTION INTRAVENOUS; SUBCUTANEOUS at 12:44

## 2024-09-30 RX ADMIN — EMPAGLIFLOZIN 10 MG: 10 TABLET, FILM COATED ORAL at 11:36

## 2024-09-30 RX ADMIN — SODIUM CHLORIDE: 9 INJECTION, SOLUTION INTRAVENOUS at 12:18

## 2024-09-30 RX ADMIN — INSULIN LISPRO 1 UNITS: 100 INJECTION, SOLUTION INTRAVENOUS; SUBCUTANEOUS at 16:50

## 2024-09-30 RX ADMIN — NIFEDIPINE 60 MG: 30 TABLET, FILM COATED, EXTENDED RELEASE ORAL at 08:39

## 2024-09-30 RX ADMIN — SODIUM BICARBONATE 650 MG: 650 TABLET ORAL at 08:39

## 2024-09-30 RX ADMIN — ROSUVASTATIN 20 MG: 20 TABLET, FILM COATED ORAL at 22:33

## 2024-09-30 RX ADMIN — CARVEDILOL 25 MG: 25 TABLET, FILM COATED ORAL at 16:50

## 2024-09-30 RX ADMIN — HEPARIN SODIUM 5000 UNITS: 5000 INJECTION INTRAVENOUS; SUBCUTANEOUS at 14:03

## 2024-09-30 RX ADMIN — CARVEDILOL 25 MG: 25 TABLET, FILM COATED ORAL at 11:42

## 2024-09-30 RX ADMIN — HEPARIN SODIUM 5000 UNITS: 5000 INJECTION INTRAVENOUS; SUBCUTANEOUS at 06:20

## 2024-09-30 ASSESSMENT — PAIN SCALES - GENERAL
PAINLEVEL_OUTOF10: 3
PAINLEVEL_OUTOF10: 1

## 2024-09-30 ASSESSMENT — PAIN DESCRIPTION - FREQUENCY: FREQUENCY: INTERMITTENT

## 2024-09-30 ASSESSMENT — PAIN DESCRIPTION - ORIENTATION
ORIENTATION: LEFT
ORIENTATION: LEFT

## 2024-09-30 ASSESSMENT — PAIN DESCRIPTION - PAIN TYPE: TYPE: ACUTE PAIN

## 2024-09-30 ASSESSMENT — PAIN DESCRIPTION - ONSET: ONSET: OTHER (COMMENT)

## 2024-09-30 ASSESSMENT — PAIN DESCRIPTION - LOCATION
LOCATION: ARM
LOCATION: ARM

## 2024-09-30 ASSESSMENT — PAIN DESCRIPTION - DESCRIPTORS: DESCRIPTORS: DISCOMFORT;ACHING

## 2024-09-30 NOTE — PROGRESS NOTES
Endocrinologist at bedside. Patient assessment as documented, vitals obtained elevated BP routine BP meds given as ordered, pm meds given as ordered. Patient denies pain/discomfort at this time. No concerns expressed. Call light within reach. No blood return on PIV in LFA, flushed well.     KALLI PINZON RN

## 2024-09-30 NOTE — CONSULTS
Endocrinology    Thanks for calling.    A 58 year-old type 2 usually noninsulin dependent diabetic hypertensive, hyperlipidemic woman is admitted for angioedema.    Pres Illness:  The patient has had type 2 NIDDM for about 20 years.  She recently developed angioedema believed due to valsartan which she has taken for about 8 months.  She also has renal insufficiency with baseline serum creatinine neat 1.9 mg/dl with current hospital serum creatinine up to 3.1 gm/dl on 9- and today down to 2.5 mg/dl and BUN 37 (today) to 52 mg/dl. Her urine protein in June 2024 was 523 mg/24 hrs and no monoclonal spike was found on urine immunoelectrophoresis in June 2024; her nephrologist, Dr Rodriguez feels that diabetic nephropathy is likely which was part of rationale for valsartan, now stopped owing to angioedema and suspicion that valsartan might have caused it.   She has been injecting Trulicity 1.5 mg weekly and taking glimepiride 2 to 4 mg daily, which replaced metformin she had taken previously for many years but stopped owing to increasing serum creatinine.  Since January of this year she has had several spells of either syncope (in Jan, 2024) or presyncope in February and in June of 2024 all 3 of which led to brief hospitalizations.  Brain MRI 1- and again on 6- showed no intracranial abnormality.  She had cardiac catheterization as part of syncope workup and she says her cardiologist, Dr. Timo Gamez, told her that she had only mild to moderate coronary stenoses that did not require angioplasty. Her calcium score on the CT was 19.  Since taking steroids for angioedema on the current admission, she has required basal Lantus insulin 25 units daily and bolus Humalog on a sliding scale; blood sugars are down to 66 mg/dl to 187 mg/dl today.  She has iron deficiency with iron 31 mcg/dL (nml > 37 mcg/dl and < 145 mcg/dl and TIBC low at 13%.  She feels somewhat better after Venofer IV.   The angioedema has  do better with added SGLT 2 injibitor, InPefa is most recent and may be the best as it inhibits SGLT1 as well..  3.  Hypertension, not adequately controlled, could consider nebivolol which usually works well, now generic.  4. Mitral insufficiency, mild on cor angio, not likely cause of syncope.  5. Syncope and presyncope, possibly due to iron deficiency mainly; might also have a contribution of long COVID-19 as she has had COVID-19 twice.  Higher vitamin D replacement than the about 400 units she has been taking may help to reduce viral effects, chronic or otherwise.  6. Uncontrolled type 2 DM.  Needs insulin with HbA1c 10.2%.  Has neuropathy symptoms, possibly diabetic neuropathy exacerbated by recet COVID-19 which typically exacerbates DM.  7. Anemia, iron deficient. Seems more severe than expected for minor renal insufficiency.  Suspect occult GI blood loss, responding to venofer.    8. 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  Also help BP and tend to reduce insulin requirements; need to drink 8 glasses of water daily while taking.  Only Jardiance on hospital formulary but she does not have CHF so doesn't really qualify for hospital formulary Jardiance.  Continue basal and bolus insulin, a small fixed dose of Humalog near 5 units per meal should help to prevent some glycemic lability seen on usual sliding scale boluses.  Basic pituitary evaluation to include Am serum cortisol and thyroid profile, IGF-1.  Defer to nephrology re antihypertensives (she needs better control) but agree with avoiding ACE or ARB with angioedema history.    Stool hemeoccult.  Increase to at least 2000 units vitamin D3 daily with food.  7.  She is welcome to

## 2024-09-30 NOTE — PROGRESS NOTES
Report given informed on PIV in LFA feeling tender/pain, flushed but no blood return pt. States she is a hard stick and may need ultrasound guided PIV.      KALLI PINZON RN

## 2024-09-30 NOTE — PROGRESS NOTES
V2.0    [facility] Progress Note      Name:  Galina Nick /Age/Sex: 1965  (58 y.o. female)   MRN & CSN:  [id number] & [id number] Encounter Date/Time: 2024 2:31 PM EDT   Location:  [address] [place] [address]- PCP: Tristan Lei MD     Attending:Jennifer Schwartz MD       Hospital Day: 7    Assessment and Recommendations   Galina Nick is a 58 y.o. female with pmh of  hypertension, CAD, dyslipidemia, type 2 diabetes mellitus who presented with painless facial swelling.      Plan:     JAYY on CKD 3b with metabolic acidosis:  Baseline SCr 1.9.  Peak SCr 3.1.    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    SCr trended down to 2.3 but trending up again.    Restarted IVF. Continue sodium bicarb.  Avoid nephrotoxins.  Monitor BMP.  Follow-up workup ordered per nephrology.      Uncontrolled hypertension:   In the ED, patient was hypertensive with blood pressure of 191/82.  Discontinued metoprolol, diuretics and ARB.  Monitor on nifedipine and Coreg.    Angioedema involving the face: Improved  Likely secondary to Diovan use; discontinued.    Received Benadryl 25 mg, Pepcid 20 mg, Solu-Medrol 125 mg in the ED.  No further doses of steroids received due to worsening hyperglycemia.  C4 complement elevated.        T2DM not on long-term insulin with hyperglycemia:   Episodes of hypoglycemia noted with increased dose of insulin.  Patient willing to be discharged on daily Lantus without prandial insulin.  Glipizide not to be resumed on discharge with insulin.    Started on Jardiance.    Outpatient follow-up with Dr. Hartman.    Anemia:  Low iron saturation on workup.    Completed course of Venofer.  JUN per nephrology.     Diet ADULT DIET; Regular; 3 carb choices (45 gm/meal)  ADULT ORAL NUTRITION SUPPLEMENT; Breakfast, Lunch, Dinner; Renal Oral Supplement   DVT Prophylaxis [] Lovenox, [x]  Heparin, [] SCDs, [] Ambulation,  [] Eliquis, [] Xarelto  [] Coumadin   Code Status Full Code   Disposition From:  content appropriate, normal insight  Capillary Refill: Brisk, 3 seconds, normal   Peripheral Pulses: +2 palpable, equal bilaterally   Periorbital edema resolved.  Facial swelling with mild erythema resolving.  Medications:   Medications:    insulin glargine  20 Units SubCUTAneous Daily    Vitamin D  2,000 Units Oral Daily    empagliflozin  10 mg Oral Daily    carvedilol  25 mg Oral BID WC    [Held by provider] chlorthalidone  12.5 mg Oral Daily    insulin lispro  0-4 Units SubCUTAneous TID WC    insulin lispro  0-4 Units SubCUTAneous Nightly    sodium bicarbonate  650 mg Oral TID    NIFEdipine  60 mg Oral BID    epoetin raisa-epbx  10,000 Units SubCUTAneous Weekly    heparin (porcine)  5,000 Units SubCUTAneous 3 times per day    rosuvastatin  20 mg Oral Nightly    sodium chloride flush  5-40 mL IntraVENous 2 times per day      Infusions:    sodium chloride 75 mL/hr at 09/30/24 1218    sodium chloride Stopped (09/28/24 1727)    dextrose       PRN Meds: sodium chloride flush, 5-40 mL, PRN  sodium chloride, , PRN  potassium chloride, 40 mEq, PRN   Or  potassium alternative oral replacement, 40 mEq, PRN   Or  potassium chloride, 10 mEq, PRN  magnesium sulfate, 2,000 mg, PRN  ondansetron, 4 mg, Q8H PRN   Or  ondansetron, 4 mg, Q6H PRN  polyethylene glycol, 17 g, Daily PRN  acetaminophen, 650 mg, Q6H PRN   Or  acetaminophen, 650 mg, Q6H PRN  dextrose bolus, 125 mL, PRN   Or  dextrose bolus, 250 mL, PRN  glucagon (rDNA), 1 mg, PRN  dextrose, , Continuous PRN  diphenhydrAMINE, 25 mg, Q2H PRN        Labs and Imaging   No results found.    CBC:   Recent Labs     09/28/24  1037 09/29/24  0521 09/30/24  0707   WBC 7.5 7.9 7.6   HGB 9.1* 8.8* 8.6*    217 227     BMP:    Recent Labs     09/28/24  0934 09/29/24  0521 09/30/24  0707    144 142   K 4.5 4.3 4.4    114* 110   CO2 15* 21 22   BUN 40* 37* 35*   CREATININE 2.3* 2.5* 2.8*   GLUCOSE 206* 107* 146*     Hepatic:   Recent Labs     09/29/24  0521

## 2024-09-30 NOTE — PROGRESS NOTES
Shift assessment completed. Routine vitals stable. Scheduled medications given. Patient is awake, alert and oriented x4. Respirations are easy and unlabored. Blood glucose is stable this morning, Patient does not appear to be in distress. Call light and bedside table within reach. Patient ind in the room, no further needs at this time.

## 2024-09-30 NOTE — PROGRESS NOTES
[facility]   Nephrology progress Note  [phone]  [phone]   [email or web address]           Reason for Consult:  HTN     The patient is a 58 y.o. female with significant past medical history of hypertension, T2DM, CKD, CAD, hyperlipidemia, mitral regurgitation, renal artery stenosis, presented to the ER with facial swelling.  This started as tingling sensation around the nose and the mouth and then subsequently she developed a rash in the center of her face.  The midface became swollen and red and hurt lips became large especially the upper lip.  She had no swelling of the tongue, there was no problems with breathing, and there was no generalized rash over the body.  She had a flu vaccine a week ago and previously had COVID in the last 4 weeks.    Of note she was on ARB Diovan for blood pressure control as well as antiproteinuric effect.  She had had used ACE inhibitor's maybe 20 years ago and they had caused a dry cough necessitating stoppage.  She never had angioedema with them    Interval History:    9/26/24: facial swelling is down   9/27/24: no swelling mid face , lips feel better   9/28/24: feeling overall better  9/29/24: Refused to take Labetalol yesterday 9/28/24 9/30/24 BP is stable in 140-150s. D/w her changes in BP medicine planned today. Drink more fluids.     PHYSICAL EXAM:    Vitals:    BP (!) 148/77   Pulse 61   Temp 97.6 °F (36.4 °C) (Oral)   Resp 18   Ht 1.727 m (5' 8\")   Wt 97.5 kg (214 lb 15.2 oz)   LMP 06/01/2011   SpO2 98%   BMI 32.68 kg/m²       Intake/Output Summary (Last 24 hours) at 9/30/2024 1059  Last data filed at 9/29/2024 2050  Gross per 24 hour   Intake 870.7 ml   Output --   Net 870.7 ml         Physical Exam:  Gen:  alert, oriented x 3  CV: RRR no murmur or rub .  Lungs: CTA  Abd: soft, bowel sounds + , No organomegaly   Ext: No edema, no cyanosis  Skin: Warm.  No rash  Neuro: nonfocal.    Medications   insulin glargine  20 Units SubCUTAneous Daily  hold     CKD-MBD         Phosphorus at target    Anemia  Receiving iron & JUN    Acidosis    continue oral sodium bicarbonate    Thank you for allowing me to participate in the care of this patient.  I will continue to follow along.  Please call with questions.    Brianna Bueno MD  9/30/2024  [facility]

## 2024-09-30 NOTE — PLAN OF CARE
Problem: Discharge Planning  Goal: Discharge to home or other facility with appropriate resources  Outcome: Progressing     Problem: Safety - Adult  Goal: Free from fall injury  9/29/2024 2025 by Malgorzata Hernandez RN  Outcome: Progressing    Problem: Chronic Conditions and Co-morbidities  Goal: Patient's chronic conditions and co-morbidity symptoms are monitored and maintained or improved  Outcome: Progressing      no

## 2024-10-01 LAB
ALBUMIN SERPL-MCNC: 2.7 G/DL (ref 3.4–5)
ALP SERPL-CCNC: 115 U/L (ref 40–129)
ALT SERPL-CCNC: 37 U/L (ref 10–40)
ANION GAP SERPL CALCULATED.3IONS-SCNC: 12 MMOL/L (ref 3–16)
AST SERPL-CCNC: 31 U/L (ref 15–37)
BILIRUB DIRECT SERPL-MCNC: 0.2 MG/DL (ref 0–0.3)
BILIRUB INDIRECT SERPL-MCNC: ABNORMAL MG/DL (ref 0–1)
BILIRUB SERPL-MCNC: <0.2 MG/DL (ref 0–1)
BUN SERPL-MCNC: 31 MG/DL (ref 7–20)
CALCIUM SERPL-MCNC: 8.2 MG/DL (ref 8.3–10.6)
CHLORIDE SERPL-SCNC: 109 MMOL/L (ref 99–110)
CO2 SERPL-SCNC: 18 MMOL/L (ref 21–32)
CREAT SERPL-MCNC: 2.5 MG/DL (ref 0.6–1.1)
CREAT UR-MCNC: 83.2 MG/DL (ref 28–259)
CREAT UR-MCNC: 83.6 MG/DL (ref 28–259)
CREAT UR-MCNC: 83.8 MG/DL (ref 28–259)
DEPRECATED RDW RBC AUTO: 14.5 % (ref 12.4–15.4)
GFR SERPLBLD CREATININE-BSD FMLA CKD-EPI: 22 ML/MIN/{1.73_M2}
GLUCOSE BLD-MCNC: 155 MG/DL (ref 70–99)
GLUCOSE BLD-MCNC: 173 MG/DL (ref 70–99)
GLUCOSE BLD-MCNC: 180 MG/DL (ref 70–99)
GLUCOSE BLD-MCNC: 232 MG/DL (ref 70–99)
GLUCOSE SERPL-MCNC: 149 MG/DL (ref 70–99)
HCT VFR BLD AUTO: 23.7 % (ref 36–48)
HGB BLD-MCNC: 7.9 G/DL (ref 12–16)
MAGNESIUM SERPL-MCNC: 1.7 MG/DL (ref 1.8–2.4)
MCH RBC QN AUTO: 27.6 PG (ref 26–34)
MCHC RBC AUTO-ENTMCNC: 33.3 G/DL (ref 31–36)
MCV RBC AUTO: 82.8 FL (ref 80–100)
MICROALBUMIN UR DL<=1MG/L-MCNC: 348 MG/DL
MICROALBUMIN/CREAT UR: 4182.7 MG/G (ref 0–30)
PERFORMED ON: ABNORMAL
PHOSPHATE SERPL-MCNC: 4.4 MG/DL (ref 2.5–4.9)
PLATELET # BLD AUTO: 212 K/UL (ref 135–450)
PMV BLD AUTO: 9.3 FL (ref 5–10.5)
POTASSIUM SERPL-SCNC: 4.5 MMOL/L (ref 3.5–5.1)
PROT SERPL-MCNC: 5.2 G/DL (ref 6.4–8.2)
PROT UR-MCNC: 547 MG/DL
PROT/CREAT UR-RTO: 6.5 MG/DL
RBC # BLD AUTO: 2.86 M/UL (ref 4–5.2)
SODIUM SERPL-SCNC: 139 MMOL/L (ref 136–145)
SODIUM UR-SCNC: 48 MMOL/L
WBC # BLD AUTO: 7.6 K/UL (ref 4–11)

## 2024-10-01 PROCEDURE — 83735 ASSAY OF MAGNESIUM: CPT

## 2024-10-01 PROCEDURE — 6370000000 HC RX 637 (ALT 250 FOR IP): Performed by: INTERNAL MEDICINE

## 2024-10-01 PROCEDURE — 2580000003 HC RX 258: Performed by: INTERNAL MEDICINE

## 2024-10-01 PROCEDURE — 84100 ASSAY OF PHOSPHORUS: CPT

## 2024-10-01 PROCEDURE — 6360000002 HC RX W HCPCS: Performed by: STUDENT IN AN ORGANIZED HEALTH CARE EDUCATION/TRAINING PROGRAM

## 2024-10-01 PROCEDURE — 2500000003 HC RX 250 WO HCPCS: Performed by: INTERNAL MEDICINE

## 2024-10-01 PROCEDURE — 80048 BASIC METABOLIC PNL TOTAL CA: CPT

## 2024-10-01 PROCEDURE — 2580000003 HC RX 258: Performed by: STUDENT IN AN ORGANIZED HEALTH CARE EDUCATION/TRAINING PROGRAM

## 2024-10-01 PROCEDURE — 1200000000 HC SEMI PRIVATE

## 2024-10-01 PROCEDURE — 6370000000 HC RX 637 (ALT 250 FOR IP): Performed by: STUDENT IN AN ORGANIZED HEALTH CARE EDUCATION/TRAINING PROGRAM

## 2024-10-01 PROCEDURE — 85027 COMPLETE CBC AUTOMATED: CPT

## 2024-10-01 PROCEDURE — 80076 HEPATIC FUNCTION PANEL: CPT

## 2024-10-01 PROCEDURE — 36415 COLL VENOUS BLD VENIPUNCTURE: CPT

## 2024-10-01 RX ORDER — SODIUM BICARBONATE 650 MG/1
1300 TABLET ORAL 3 TIMES DAILY
Status: DISCONTINUED | OUTPATIENT
Start: 2024-10-01 | End: 2024-10-07

## 2024-10-01 RX ORDER — SPIRONOLACTONE 25 MG/1
25 TABLET ORAL DAILY
Status: DISCONTINUED | OUTPATIENT
Start: 2024-10-01 | End: 2024-10-02

## 2024-10-01 RX ORDER — MAGNESIUM SULFATE IN WATER 40 MG/ML
2000 INJECTION, SOLUTION INTRAVENOUS ONCE
Status: COMPLETED | OUTPATIENT
Start: 2024-10-01 | End: 2024-10-01

## 2024-10-01 RX ADMIN — NIFEDIPINE 60 MG: 30 TABLET, FILM COATED, EXTENDED RELEASE ORAL at 20:22

## 2024-10-01 RX ADMIN — HEPARIN SODIUM 5000 UNITS: 5000 INJECTION INTRAVENOUS; SUBCUTANEOUS at 06:53

## 2024-10-01 RX ADMIN — SODIUM BICARBONATE 650 MG: 650 TABLET ORAL at 09:51

## 2024-10-01 RX ADMIN — INSULIN GLARGINE 20 UNITS: 100 INJECTION, SOLUTION SUBCUTANEOUS at 09:50

## 2024-10-01 RX ADMIN — SODIUM BICARBONATE: 84 INJECTION, SOLUTION INTRAVENOUS at 12:02

## 2024-10-01 RX ADMIN — ROSUVASTATIN 20 MG: 20 TABLET, FILM COATED ORAL at 20:22

## 2024-10-01 RX ADMIN — NIFEDIPINE 60 MG: 30 TABLET, FILM COATED, EXTENDED RELEASE ORAL at 09:50

## 2024-10-01 RX ADMIN — HEPARIN SODIUM 5000 UNITS: 5000 INJECTION INTRAVENOUS; SUBCUTANEOUS at 20:25

## 2024-10-01 RX ADMIN — INSULIN LISPRO 1 UNITS: 100 INJECTION, SOLUTION INTRAVENOUS; SUBCUTANEOUS at 17:23

## 2024-10-01 RX ADMIN — HEPARIN SODIUM 5000 UNITS: 5000 INJECTION INTRAVENOUS; SUBCUTANEOUS at 12:03

## 2024-10-01 RX ADMIN — CARVEDILOL 25 MG: 25 TABLET, FILM COATED ORAL at 17:23

## 2024-10-01 RX ADMIN — MAGNESIUM SULFATE HEPTAHYDRATE 2000 MG: 40 INJECTION, SOLUTION INTRAVENOUS at 10:02

## 2024-10-01 RX ADMIN — CARVEDILOL 25 MG: 25 TABLET, FILM COATED ORAL at 09:50

## 2024-10-01 RX ADMIN — SODIUM BICARBONATE 1300 MG: 650 TABLET ORAL at 12:05

## 2024-10-01 RX ADMIN — Medication 2000 UNITS: at 09:51

## 2024-10-01 RX ADMIN — EMPAGLIFLOZIN 10 MG: 10 TABLET, FILM COATED ORAL at 09:51

## 2024-10-01 RX ADMIN — SODIUM BICARBONATE 1300 MG: 650 TABLET ORAL at 20:22

## 2024-10-01 RX ADMIN — ACETAMINOPHEN 650 MG: 325 TABLET ORAL at 01:17

## 2024-10-01 RX ADMIN — SPIRONOLACTONE 25 MG: 25 TABLET ORAL at 12:03

## 2024-10-01 RX ADMIN — SODIUM CHLORIDE, PRESERVATIVE FREE 10 ML: 5 INJECTION INTRAVENOUS at 09:51

## 2024-10-01 ASSESSMENT — PAIN SCALES - GENERAL
PAINLEVEL_OUTOF10: 2
PAINLEVEL_OUTOF10: 2
PAINLEVEL_OUTOF10: 5

## 2024-10-01 ASSESSMENT — PAIN DESCRIPTION - ORIENTATION: ORIENTATION: LEFT

## 2024-10-01 ASSESSMENT — PAIN DESCRIPTION - LOCATION: LOCATION: ARM

## 2024-10-01 ASSESSMENT — PAIN SCALES - WONG BAKER: WONGBAKER_NUMERICALRESPONSE: NO HURT

## 2024-10-01 NOTE — ACP (ADVANCE CARE PLANNING)
Advanced Care Planning Note    Purpose of Encounter: Advanced care planning in light of GENET  Parties In Attendance: Patient, Jorge Odom MD  Decisional Capacity: Yes  Subjective: Patient has Genet  Objective: Cr 2.5  Goals of Care Determination: Patient wants full support  Plan:  Bicarb gtt, Aldactone, Nephro recs  Code Status: Full   Time spent on Advanced care Plannin minutes  Advanced Care Planning Documents: Completed advanced directives on chart, Terri, is the Healthcare POA.    Jorge Odom MD  10/1/2024 12:07 PM

## 2024-10-01 NOTE — PROGRESS NOTES
Dr. Driver at bedside to assess - OK to place ostomy bag on VALORIE site. Will continue to monitor.    Shift assessment completed. Routine vitals stable. Scheduled medications given. Patient is awake, alert and oriented x4. Respirations are easy and unlabored. Patient does not appear to be in distress, resting comfortably at this time. Call light within reach. Denies needs at this time. Pain 0/10 per patient.    Dax Salgado RN, BSN

## 2024-10-01 NOTE — PROGRESS NOTES
CLINICAL PHARMACY NOTE: MEDS TO BEDS    Total # of Prescriptions Filled: 2   The following medications were delivered to the patient:  DEXCOM G7 SENSORS  DEXCOM RECIEVER    Additional Documentation:  Delivered to patients room = signed  Ok to be delivered per Dax Still CPhT

## 2024-10-01 NOTE — PROGRESS NOTES
[facility]   Nephrology progress Note  [phone]  [phone]   [facility]           Reason for Consult:  HTN     The patient is a 58 y.o. female with significant past medical history of hypertension, T2DM, CKD, CAD, hyperlipidemia, mitral regurgitation, renal artery stenosis, presented to the ER with facial swelling.  This started as tingling sensation around the nose and the mouth and then subsequently she developed a rash in the center of her face.  The midface became swollen and red and hurt lips became large especially the upper lip.  She had no swelling of the tongue, there was no problems with breathing, and there was no generalized rash over the body.  She had a flu vaccine a week ago and previously had COVID in the last 4 weeks.    Of note she was on ARB Diovan for blood pressure control as well as antiproteinuric effect.  She had had used ACE inhibitor's maybe 20 years ago and they had caused a dry cough necessitating stoppage.  She never had angioedema with them    Interval History:    9/26/24: facial swelling is down   9/27/24: no swelling mid face , lips feel better   9/28/24: feeling overall better  9/29/24: Refused to take Labetalol yesterday 9/28/24 9/30/24 BP is stable in 140-150s. D/w her changes in BP medicine planned today. Drink more fluids.   10/1: -150s . Scr 2.5     PHYSICAL EXAM:    Vitals:    BP (!) 147/69   Pulse 80   Temp 97.9 °F (36.6 °C) (Oral)   Resp 18   Ht 1.727 m (5' 8\")   Wt 97.5 kg (214 lb 15.2 oz)   LMP 06/01/2011   SpO2 96%   BMI 32.68 kg/m²       Intake/Output Summary (Last 24 hours) at 10/1/2024 1030  Last data filed at 10/1/2024 0109  Gross per 24 hour   Intake --   Output 700 ml   Net -700 ml         Physical Exam:  Gen:  alert, oriented x 3  CV: RRR no murmur or rub .  Lungs: CTA  Abd: soft, bowel sounds + , No organomegaly   Ext: No edema, no cyanosis  Skin: Warm.  No rash  Neuro: nonfocal.    Medications   magnesium sulfate   1.70 10/01/2024 06:48 AM     Phosphorus:    Lab Results   Component Value Date/Time    PHOS 4.4 10/01/2024 06:48 AM     Uric Acid:  No results found for: \"LABURIC\", \"URICACID\"  Last 3 Troponin:  No results found for: \"TROPONINI\"  U/A:    Lab Results   Component Value Date/Time    COLORU Yellow 09/30/2024 01:58 PM    PROTEINU >=1000 09/30/2024 01:58 PM    PHUR 6.5 09/30/2024 01:58 PM    PHUR 6.0 01/23/2024 05:24 PM    WBCUA 5 09/30/2024 01:58 PM    RBCUA 7 09/30/2024 01:58 PM    MUCUS Present 06/19/2024 12:00 AM    BACTERIA 3+ 09/30/2024 01:58 PM    CLARITYU CLOUDY 09/30/2024 01:58 PM    LEUKOCYTESUR Negative 09/30/2024 01:58 PM    UROBILINOGEN 0.2 09/30/2024 01:58 PM    BILIRUBINUR Negative 09/30/2024 01:58 PM    BLOODU SMALL 09/30/2024 01:58 PM    GLUCOSEU >=1000 09/30/2024 01:58 PM    GLUCOSEU >=1000 07/14/2011 07:38 PM    AMORPHOUS Present 06/19/2024 12:00 AM           IMPRESSION/RECOMMENDATIONS:      CKD stage IIIb with nephrotic range proteinuria  Sees Dr. 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   -IVF with bicarb today      Acute kidney injury  Scr on admission 2 on 9/24, Peak scr 3.1 on 9/26 , dipped to 2.3 and now rising to 2.8 > 2.5   UA 7 rbc , 3 + bacteria   UPCR 6.5 , UACR 4.1 gm/gm  FENA prerenal   Plan:  -c/w miVF today with bicarb  -encourage oral hydration   - Diovan held for angioedema     IDDM  UPCR 12   ARB on hold   A1c 10.2   plan  Now on jardiance 10 per endocrine    Add aldactone 25        Hypertension -

## 2024-10-01 NOTE — PLAN OF CARE
Problem: Discharge Planning  Goal: Discharge to home or other facility with appropriate resources  10/1/2024 1128 by Dax Salgado RN  Outcome: Progressing  10/1/2024 0735 by Corazon Dodge RN  Outcome: Progressing     Problem: Safety - Adult  Goal: Free from fall injury  10/1/2024 1128 by Dax Salgado RN  Outcome: Progressing  10/1/2024 0735 by Corazon Dodge RN  Outcome: Progressing     Problem: Chronic Conditions and Co-morbidities  Goal: Patient's chronic conditions and co-morbidity symptoms are monitored and maintained or improved  10/1/2024 1128 by Dax Salgado RN  Outcome: Progressing  10/1/2024 0735 by Corazon Dodge RN  Outcome: Progressing     Problem: Pain  Goal: Verbalizes/displays adequate comfort level or baseline comfort level  10/1/2024 1128 by Dax Salgado RN  Outcome: Progressing  10/1/2024 0735 by Corazon Dodge RN  Outcome: Progressing

## 2024-10-01 NOTE — PROGRESS NOTES
Admit Date: 9/24/2024  Diet: ADULT ORAL NUTRITION SUPPLEMENT; Breakfast, Lunch, Dinner; Renal Oral Supplement  ADULT DIET; Regular; 4 carb choices (60 gm/meal)    CC: Facial swelling    Interval history:   Overnight, there were no acute events. Patient's vitals remained stable    Patient was seen this morning. I discussed the plan of the day with her in detail. All questions were addressed and answered to patient's satisfaction.  Patient endorses that her facial swelling is much improved.  Patient denies fevers, chills, nausea, vomiting, chest pain, shortness of breath, diarrhea, constipation, dysuria, urinary frequency or urgency.     Plan:     -Per nephrology, start bicarb drip at 50 mL/h and Aldactone  -Continue to monitor creatinine, still off baseline of 1.9    Assessment:   Galina Nick is a 58 y.o. female with PMH of hypertension, CAD, dyslipidemia, type 2 diabetes mellitus who was admitted with JAYY    JAYY on CKD 3b with metabolic acidosis:  Baseline SCr 1.9.  Peak SCr 3.1.    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    SCr trended down to 2.3 but trending up again.    Restarted IVF. Continue sodium bicarb.  Avoid nephrotoxins.  Monitor BMP.  Follow-up workup ordered per nephrology.      Uncontrolled hypertension:   In the ED, patient was hypertensive with blood pressure of 191/82.  Discontinued metoprolol, diuretics and ARB.  Monitor on nifedipine and Coreg.     Angioedema involving the face: Improved  Likely secondary to Diovan use; discontinued.    Received Benadryl 25 mg, Pepcid 20 mg, Solu-Medrol 125 mg in the ED.  No further doses of steroids received due to worsening hyperglycemia.  C4 complement elevated.        T2DM not on long-term insulin with hyperglycemia:   Episodes of hypoglycemia noted with increased dose of insulin.  Patient willing to be discharged on daily Lantus without prandial insulin.  Glipizide not to be resumed on discharge with insulin.    Started on Jardiance.

## 2024-10-02 ENCOUNTER — APPOINTMENT (OUTPATIENT)
Dept: GENERAL RADIOLOGY | Age: 59
DRG: 915 | End: 2024-10-02
Payer: COMMERCIAL

## 2024-10-02 LAB
ALBUMIN SERPL-MCNC: 2.9 G/DL (ref 3.4–5)
ALP SERPL-CCNC: 115 U/L (ref 40–129)
ALT SERPL-CCNC: 41 U/L (ref 10–40)
ANION GAP SERPL CALCULATED.3IONS-SCNC: 12 MMOL/L (ref 3–16)
AST SERPL-CCNC: 36 U/L (ref 15–37)
BASOPHILS # BLD: 0.1 K/UL (ref 0–0.2)
BASOPHILS NFR BLD: 0.8 %
BILIRUB DIRECT SERPL-MCNC: 0.2 MG/DL (ref 0–0.3)
BILIRUB INDIRECT SERPL-MCNC: 0.2 MG/DL (ref 0–1)
BILIRUB SERPL-MCNC: 0.4 MG/DL (ref 0–1)
BUN SERPL-MCNC: 30 MG/DL (ref 7–20)
C3 SERPL-MCNC: 174 MG/DL (ref 90–180)
C4 SERPL-MCNC: 42.3 MG/DL (ref 10–40)
CALCIUM SERPL-MCNC: 8.1 MG/DL (ref 8.3–10.6)
CHLORIDE SERPL-SCNC: 107 MMOL/L (ref 99–110)
CO2 SERPL-SCNC: 21 MMOL/L (ref 21–32)
CREAT SERPL-MCNC: 2.7 MG/DL (ref 0.6–1.1)
DEPRECATED RDW RBC AUTO: 14.7 % (ref 12.4–15.4)
EOSINOPHIL # BLD: 0.2 K/UL (ref 0–0.6)
EOSINOPHIL NFR BLD: 3.1 %
GFR SERPLBLD CREATININE-BSD FMLA CKD-EPI: 20 ML/MIN/{1.73_M2}
GLUCOSE BLD-MCNC: 147 MG/DL (ref 70–99)
GLUCOSE BLD-MCNC: 156 MG/DL (ref 70–99)
GLUCOSE BLD-MCNC: 172 MG/DL (ref 70–99)
GLUCOSE BLD-MCNC: 173 MG/DL (ref 70–99)
GLUCOSE SERPL-MCNC: 143 MG/DL (ref 70–99)
HCT VFR BLD AUTO: 22.8 % (ref 36–48)
HGB BLD-MCNC: 7.5 G/DL (ref 12–16)
IL6 SERPL-MCNC: 3.5 PG/ML
LYMPHOCYTES # BLD: 1.3 K/UL (ref 1–5.1)
LYMPHOCYTES NFR BLD: 17.1 %
MAGNESIUM SERPL-MCNC: 2.2 MG/DL (ref 1.8–2.4)
MCH RBC QN AUTO: 27 PG (ref 26–34)
MCHC RBC AUTO-ENTMCNC: 32.7 G/DL (ref 31–36)
MCV RBC AUTO: 82.4 FL (ref 80–100)
MONOCYTES # BLD: 1 K/UL (ref 0–1.3)
MONOCYTES NFR BLD: 12.4 %
NEUTROPHILS # BLD: 5.2 K/UL (ref 1.7–7.7)
NEUTROPHILS NFR BLD: 66.6 %
PERFORMED ON: ABNORMAL
PHOSPHATE SERPL-MCNC: 4.8 MG/DL (ref 2.5–4.9)
PLATELET # BLD AUTO: 222 K/UL (ref 135–450)
PMV BLD AUTO: 9.5 FL (ref 5–10.5)
POTASSIUM SERPL-SCNC: 4.7 MMOL/L (ref 3.5–5.1)
PROT SERPL-MCNC: 5.4 G/DL (ref 6.4–8.2)
RBC # BLD AUTO: 2.76 M/UL (ref 4–5.2)
SODIUM SERPL-SCNC: 140 MMOL/L (ref 136–145)
WBC # BLD AUTO: 7.9 K/UL (ref 4–11)

## 2024-10-02 PROCEDURE — 83735 ASSAY OF MAGNESIUM: CPT

## 2024-10-02 PROCEDURE — 36415 COLL VENOUS BLD VENIPUNCTURE: CPT

## 2024-10-02 PROCEDURE — 6360000002 HC RX W HCPCS: Performed by: STUDENT IN AN ORGANIZED HEALTH CARE EDUCATION/TRAINING PROGRAM

## 2024-10-02 PROCEDURE — 2500000003 HC RX 250 WO HCPCS: Performed by: INTERNAL MEDICINE

## 2024-10-02 PROCEDURE — 6360000002 HC RX W HCPCS: Performed by: PHYSICIAN ASSISTANT

## 2024-10-02 PROCEDURE — 84165 PROTEIN E-PHORESIS SERUM: CPT

## 2024-10-02 PROCEDURE — 80076 HEPATIC FUNCTION PANEL: CPT

## 2024-10-02 PROCEDURE — 71045 X-RAY EXAM CHEST 1 VIEW: CPT

## 2024-10-02 PROCEDURE — 86701 HIV-1ANTIBODY: CPT

## 2024-10-02 PROCEDURE — 1200000000 HC SEMI PRIVATE

## 2024-10-02 PROCEDURE — 87390 HIV-1 AG IA: CPT

## 2024-10-02 PROCEDURE — 84166 PROTEIN E-PHORESIS/URINE/CSF: CPT

## 2024-10-02 PROCEDURE — 84155 ASSAY OF PROTEIN SERUM: CPT

## 2024-10-02 PROCEDURE — 86335 IMMUNFIX E-PHORSIS/URINE/CSF: CPT

## 2024-10-02 PROCEDURE — 86160 COMPLEMENT ANTIGEN: CPT

## 2024-10-02 PROCEDURE — 85025 COMPLETE CBC W/AUTO DIFF WBC: CPT

## 2024-10-02 PROCEDURE — 80069 RENAL FUNCTION PANEL: CPT

## 2024-10-02 PROCEDURE — 6370000000 HC RX 637 (ALT 250 FOR IP): Performed by: INTERNAL MEDICINE

## 2024-10-02 PROCEDURE — 2580000003 HC RX 258: Performed by: STUDENT IN AN ORGANIZED HEALTH CARE EDUCATION/TRAINING PROGRAM

## 2024-10-02 PROCEDURE — 84156 ASSAY OF PROTEIN URINE: CPT

## 2024-10-02 PROCEDURE — 6370000000 HC RX 637 (ALT 250 FOR IP): Performed by: STUDENT IN AN ORGANIZED HEALTH CARE EDUCATION/TRAINING PROGRAM

## 2024-10-02 PROCEDURE — 2580000003 HC RX 258: Performed by: INTERNAL MEDICINE

## 2024-10-02 PROCEDURE — 86702 HIV-2 ANTIBODY: CPT

## 2024-10-02 PROCEDURE — 80074 ACUTE HEPATITIS PANEL: CPT

## 2024-10-02 RX ORDER — NIFEDIPINE 30 MG/1
30 TABLET, EXTENDED RELEASE ORAL 2 TIMES DAILY
Status: DISCONTINUED | OUTPATIENT
Start: 2024-10-02 | End: 2024-10-03

## 2024-10-02 RX ORDER — PANTOPRAZOLE SODIUM 40 MG/10ML
40 INJECTION, POWDER, LYOPHILIZED, FOR SOLUTION INTRAVENOUS DAILY
Status: DISCONTINUED | OUTPATIENT
Start: 2024-10-02 | End: 2024-10-02

## 2024-10-02 RX ORDER — SPIRONOLACTONE 25 MG/1
100 TABLET ORAL DAILY
Status: DISCONTINUED | OUTPATIENT
Start: 2024-10-03 | End: 2024-10-03

## 2024-10-02 RX ORDER — SPIRONOLACTONE 25 MG/1
75 TABLET ORAL ONCE
Status: COMPLETED | OUTPATIENT
Start: 2024-10-02 | End: 2024-10-02

## 2024-10-02 RX ORDER — PANTOPRAZOLE SODIUM 40 MG/10ML
40 INJECTION, POWDER, LYOPHILIZED, FOR SOLUTION INTRAVENOUS 2 TIMES DAILY
Status: DISCONTINUED | OUTPATIENT
Start: 2024-10-02 | End: 2024-10-03

## 2024-10-02 RX ADMIN — PANTOPRAZOLE SODIUM 40 MG: 40 INJECTION, POWDER, FOR SOLUTION INTRAVENOUS at 13:27

## 2024-10-02 RX ADMIN — PANTOPRAZOLE SODIUM 40 MG: 40 INJECTION, POWDER, FOR SOLUTION INTRAVENOUS at 21:53

## 2024-10-02 RX ADMIN — SPIRONOLACTONE 25 MG: 25 TABLET ORAL at 09:35

## 2024-10-02 RX ADMIN — SODIUM BICARBONATE: 84 INJECTION, SOLUTION INTRAVENOUS at 11:15

## 2024-10-02 RX ADMIN — NIFEDIPINE 60 MG: 30 TABLET, FILM COATED, EXTENDED RELEASE ORAL at 09:35

## 2024-10-02 RX ADMIN — CARVEDILOL 25 MG: 25 TABLET, FILM COATED ORAL at 18:35

## 2024-10-02 RX ADMIN — SODIUM BICARBONATE 1300 MG: 650 TABLET ORAL at 21:53

## 2024-10-02 RX ADMIN — SODIUM BICARBONATE 1300 MG: 650 TABLET ORAL at 13:27

## 2024-10-02 RX ADMIN — CARVEDILOL 25 MG: 25 TABLET, FILM COATED ORAL at 09:35

## 2024-10-02 RX ADMIN — ACETAMINOPHEN 650 MG: 325 TABLET ORAL at 22:08

## 2024-10-02 RX ADMIN — EMPAGLIFLOZIN 10 MG: 10 TABLET, FILM COATED ORAL at 09:35

## 2024-10-02 RX ADMIN — INSULIN GLARGINE 20 UNITS: 100 INJECTION, SOLUTION SUBCUTANEOUS at 09:35

## 2024-10-02 RX ADMIN — HEPARIN SODIUM 5000 UNITS: 5000 INJECTION INTRAVENOUS; SUBCUTANEOUS at 05:46

## 2024-10-02 RX ADMIN — SPIRONOLACTONE 75 MG: 25 TABLET ORAL at 13:27

## 2024-10-02 RX ADMIN — SODIUM CHLORIDE, PRESERVATIVE FREE 10 ML: 5 INJECTION INTRAVENOUS at 21:53

## 2024-10-02 RX ADMIN — ROSUVASTATIN 20 MG: 20 TABLET, FILM COATED ORAL at 21:53

## 2024-10-02 RX ADMIN — SODIUM BICARBONATE 1300 MG: 650 TABLET ORAL at 09:35

## 2024-10-02 RX ADMIN — Medication 2000 UNITS: at 09:35

## 2024-10-02 NOTE — CARE COORDINATION
Discharge Planning:     (CM) reviewed the patient's chart to assess needs.     Patient's Readmission Risk Score is 21%.   Patient's medical insurance is [employer].   Patient's PCP is Tristan Lei MD.     Barrier(s) to discharge-patient-             1. Oxygen             2. Other: Nephrology     Tentative discharge plan-  Home      Tentative discharge date- TBD     Case management will continue to follow progress and update discharge plan as needed.      Electronically signed by GEOFF Matta on 10/2/24 at 3:49 PM EDT

## 2024-10-02 NOTE — PROGRESS NOTES
Patient A&OX4, pt denies pain at the moment, pt O2 sat readiig 88%, this RN placed pt on 02, 2L  supplement, pt O2 sat reading 93%.

## 2024-10-02 NOTE — PLAN OF CARE
Problem: Discharge Planning  Goal: Discharge to home or other facility with appropriate resources  10/2/2024 1506 by Maeve Alejandra RN  Outcome: Progressing  10/2/2024 0406 by Corazon Dodge RN  Outcome: Progressing     Problem: Safety - Adult  Goal: Free from fall injury  10/2/2024 1506 by Maeve Alejandra RN  Outcome: Progressing  10/2/2024 0406 by Corazon Dodge RN  Outcome: Progressing     Problem: Chronic Conditions and Co-morbidities  Goal: Patient's chronic conditions and co-morbidity symptoms are monitored and maintained or improved  10/2/2024 1506 by Maeve Alejandra RN  Outcome: Progressing  10/2/2024 0406 by Corazon Dodge RN  Outcome: Progressing     Problem: Pain  Goal: Verbalizes/displays adequate comfort level or baseline comfort level  10/2/2024 1506 by Maeve Alejandra RN  Outcome: Progressing  10/2/2024 0406 by Corazon Dodge RN  Outcome: Progressing     Problem: Respiratory - Adult  Goal: Achieves optimal ventilation and oxygenation  10/2/2024 1506 by Maeve Alejandra RN  Outcome: Progressing  10/2/2024 0406 by Corazon Dodge RN  Outcome: Progressing     Problem: Gastrointestinal - Adult  Goal: Minimal or absence of nausea and vomiting  10/2/2024 1506 by Maeve Alejandra RN  Outcome: Progressing  10/2/2024 0406 by Corazno Dodge RN  Outcome: Progressing  Goal: Maintains or returns to baseline bowel function  10/2/2024 1506 by Maeve Alejandra RN  Outcome: Progressing  10/2/2024 0406 by Corazon Dodge RN  Outcome: Progressing  Goal: Maintains adequate nutritional intake  10/2/2024 1506 by Maeve Alejandra RN  Outcome: Progressing  10/2/2024 0406 by Corazon Dodge RN  Outcome: Progressing  Goal: Establish and maintain optimal ostomy function  10/2/2024 1506 by Maeve Alejandra RN  Outcome: Progressing  10/2/2024 0406 by Mambulu, Nanou N, RN  Outcome: Progressing     Problem: Metabolic/Fluid and Electrolytes -  Adult  Goal: Electrolytes maintained within normal limits  10/2/2024 1506 by Maeve Alejandra RN  Outcome: Progressing  10/2/2024 0406 by Corazon Dodge RN  Outcome: Progressing  Goal: Hemodynamic stability and optimal renal function maintained  10/2/2024 1506 by Maeve Alejandra RN  Outcome: Progressing  10/2/2024 0406 by Corazon Dodge RN  Outcome: Progressing  Goal: Glucose maintained within prescribed range  10/2/2024 1506 by Maeve Alejandra RN  Outcome: Progressing  10/2/2024 0406 by Corazon Dodge RN  Outcome: Progressing     Problem: Hematologic - Adult  Goal: Maintains hematologic stability  10/2/2024 1506 by Maeve Alejandra RN  Outcome: Progressing  10/2/2024 0406 by Corazon Dodge RN  Outcome: Progressing

## 2024-10-02 NOTE — CONSULTS
Gastroenterology Consult Note        Patient: Galina Nick  : 1965  Acct#:      Date:  10/2/2024      1. Angioedema, initial encounter    2. Hyperglycemia        Subjective:       History of Present Illness  Patient is a 58 y.o.  female admitted with Hyperglycemia [R73.9]  Facial swelling [R22.0]  Angioedema, initial encounter [T78.3XXA]  JAYY (acute kidney injury) (HCC) [N17.9] who is seen in consult for anemia.  History of hypertension, hyperlipidemia, CAD on baby aspirin daily she reports, diabetes, CKD.  She presented to the ER 8 days ago with facial swelling.  This was felt to be from an ARB.  Had JAYY on CKD and was evaluated by nephrology.  Black/green bowel movements which started here.  No p.o. iron use.  No bloody stools.  No abdominal pain, nausea, vomiting.  Occasionally has some acid reflux.  Denies NSAIDs other than the baby aspirin.  Reports fatigue.     Past Medical History:   Diagnosis Date    CAD (coronary artery disease)     Diabetes mellitus (HCC)     Dyslipidemia     Hypertension       Past Surgical History:   Procedure Laterality Date    BREAST BIOPSY       benign     CARDIAC PROCEDURE N/A 2024    Left heart cath / coronary angiography performed by Rafael James MD at [facility] CARDIAC C[facility]      Past Endoscopic History  Screening colonoscopy with Dr. Valadez 2022 with normal TI, moderate sigmoid diverticulosis, small internal hemorrhoids.  Follow-up in 10 years.    Admission Meds  No current facility-administered medications on file prior to encounter.     Current Outpatient Medications on File Prior to Encounter   Medication Sig Dispense Refill    NIFEdipine (PROCARDIA XL) 60 MG extended release tablet Take 1 tablet by mouth daily 90 tablet 1    rosuvastatin (CRESTOR) 20 MG tablet Take 2 tablets by mouth daily (Patient taking differently: Take 1 tablet by mouth nightly) 30 tablet 1    chlorthalidone (HYGROTON) 25 MG tablet Take 1 tablet by mouth daily 90  and dry, no jaundice  Neuro: Grossly intact, A&OX3  Musculoskeletal: 5/5  strength BUE      Data Review:    Recent Labs     09/30/24  0707 10/01/24  0648 10/02/24  0610   WBC 7.6 7.6 7.9   HGB 8.6* 7.9* 7.5*   HCT 26.0* 23.7* 22.8*   MCV 82.8 82.8 82.4    212 222     Recent Labs     09/30/24  0707 10/01/24  0648 10/02/24  0610    139 140   K 4.4 4.5 4.7    109 107   CO2 22 18* 21   PHOS 4.5 4.4 4.8   BUN 35* 31* 30*   CREATININE 2.8* 2.5* 2.7*     Recent Labs     09/30/24  0707 10/01/24  0648 10/02/24  0610   AST 36 31 36   ALT 40 37 41*   BILIDIR 0.2 0.2 0.2   BILITOT <0.2 <0.2 0.4   ALKPHOS 127 115 115     No results for input(s): \"LIPASE\", \"AMYLASE\" in the last 72 hours.  No results for input(s): \"PROTIME\", \"INR\" in the last 72 hours.  Invalid input(s): \"PTT\"  No results for input(s): \"OCCULTBLD\" in the last 72 hours.         Assessment/Plan:     Anemia -hemoglobin has ranged anywhere from 8-11 this year.  Hemoglobin was 11.2 at admission and is now 7.5.  Reports dark black/green stools here.  Did not receive any p.o. iron.  Last colonoscopy 12/2022 without any polyps.  Labs consistent with anemia of chronic disease.  B12 and folate were okay.  -N.p.o. midnight  - PPI  -EGD tomorrow    Discussed with Dr. Tyrone Montana, PA-C  [facility]    I have personally performed a face to face diagnostic evaluation on this patient.  I have interviewed and examined the patient and I agree with the findings and recommended plan of care.  In summary, my findings and plan are the following: pt with anemia and has had dark stools.  Hgb 7.5 down from 11.  Colon 12/2022 was ok.  Labs c/w anemia of chronic disease but there is acute drop and dark stool so will plan EGD.  Give PPI    Ab nt.          Yared Hansen MD  [facility]

## 2024-10-02 NOTE — PROGRESS NOTES
Started on Jardiance.    Outpatient follow-up with Dr. Hartman.     Anemia:  Patient endorses some dark stools.  Her last colonoscopy was about 2 years ago which was within normal limits.  Her creatinine has continued to downtrend since admission.  Low iron saturation on workup.    Completed course of Venofer.  JUN per nephrology.  -GI consulted    Code Status: Full Code   FEN: ADULT ORAL NUTRITION SUPPLEMENT; Breakfast, Lunch, Dinner; Renal Oral Supplement  ADULT DIET; Regular; 4 carb choices (60 gm/meal)   DVT PPX: []Lovenox, [x]Heparin, []Coumadin, []Eliquis, []Xarelto, []SCD  DISPO: Continue monitoring creatinine still above baseline    Jorge Odom MD  10/2/2024,  11:17 AM    This note was likely completed using voice recognition technology and may contain unintended errors.     Objective:   Vitals:   T-max:  Patient Vitals for the past 8 hrs:   BP Temp Temp src Pulse Resp SpO2 Weight   10/02/24 0929 (!) 150/71 98.6 °F (37 °C) Oral 79 16 94 % --   10/02/24 0545 -- -- -- -- -- -- 97.9 kg (215 lb 13.3 oz)     No intake or output data in the 24 hours ending 10/02/24 1117      Physical Exam  General appearance: No apparent distress, appears stated age and cooperative.  HEENT: Pupils equal, round, and reactive to light. Conjunctivae clear.  Neck: Supple, with full range of motion. Trachea midline.  Respiratory:  Normal respiratory effort. Clear to auscultation, bilaterally without Rales/Wheezes/Rhonchi.  Cardiovascular: Regular rate and rhythm with normal S1/S2 without murmurs, rubs or gallops.  Abdomen: Soft, non-tender, non-distended with normal bowel sounds.  Musculoskeletal: No clubbing, cyanosis or edema bilaterally.  Full range of motion without deformity.  Skin: Skin color, texture, turgor normal.  No rashes or lesions.  Neurologic:  Neurovascularly intact without any focal sensory/motor deficits. Cranial nerves: II-XII intact, grossly non-focal.  Psychiatric: Alert and oriented, thought content  appropriate, normal insight  Capillary Refill: Brisk, 3 seconds, normal   Peripheral Pulses: +2 palpable, equal bilaterally   Periorbital edema resolved.  Facial swelling with mild erythema resolving.    Review of Systems  - Negative except Interval History    LABS:    CBC:   Recent Labs     09/30/24  0707 10/01/24  0648 10/02/24  0610   WBC 7.6 7.6 7.9   HGB 8.6* 7.9* 7.5*   HCT 26.0* 23.7* 22.8*    212 222   MCV 82.8 82.8 82.4     Renal:    Recent Labs     09/30/24  0707 10/01/24  0648 10/02/24  0610    139 140   K 4.4 4.5 4.7    109 107   CO2 22 18* 21   BUN 35* 31* 30*   CREATININE 2.8* 2.5* 2.7*   GLUCOSE 146* 149* 143*   CALCIUM 8.4 8.2* 8.1*   MG 1.80 1.70* 2.20   PHOS 4.5 4.4 4.8   ANIONGAP 10 12 12     Hepatic:   Recent Labs     09/30/24  0707 10/01/24  0648 10/02/24  0610   AST 36 31 36   ALT 40 37 41*   BILITOT <0.2 <0.2 0.4   BILIDIR 0.2 0.2 0.2   ALKPHOS 127 115 115     Troponin: No results for input(s): \"TROPONINI\" in the last 72 hours.  BNP: No results for input(s): \"BNP\" in the last 72 hours.  Lipids: No results for input(s): \"CHOL\", \"HDL\" in the last 72 hours.    Invalid input(s): \"LDLCALCU\", \"TRIGLYCERIDE\"  ABGs:  No results for input(s): \"PHART\", \"HAC7KLD\", \"PO2ART\", \"DFD7IXT\", \"BEART\", \"THGBART\", \"A5NWAKIS\", \"LRI6FGL\" in the last 72 hours.    INR: No results for input(s): \"INR\" in the last 72 hours.  Lactate: No results for input(s): \"LACTATE\" in the last 72 hours.  Cultures:  -----------------------------------------------------------------  RAD:   XR CHEST PORTABLE   Final Result   Mild bibasilar airspace disease which could reflect atelectasis, less likely   pneumonia         CT FACIAL BONES WO CONTRAST   Final Result   No acute facial bone trauma.             Medications:     Scheduled Meds:   pantoprazole  40 mg IntraVENous Daily    sodium bicarbonate  1,300 mg Oral TID    spironolactone  25 mg Oral Daily    insulin glargine  20 Units SubCUTAneous Daily    Vitamin D  2,000  Units Oral Daily    empagliflozin  10 mg Oral Daily    carvedilol  25 mg Oral BID WC    [Held by provider] chlorthalidone  12.5 mg Oral Daily    insulin lispro  0-4 Units SubCUTAneous TID WC    insulin lispro  0-4 Units SubCUTAneous Nightly    NIFEdipine  60 mg Oral BID    epoetin raisa-epbx  10,000 Units SubCUTAneous Weekly    [Held by provider] heparin (porcine)  5,000 Units SubCUTAneous 3 times per day    rosuvastatin  20 mg Oral Nightly    sodium chloride flush  5-40 mL IntraVENous 2 times per day     Continuous Infusions:   sodium bicarbonate 150 mEq in sterile water 1,000 mL infusion 50 mL/hr at 10/02/24 1115    sodium chloride Stopped (09/28/24 1727)    dextrose       PRN Meds:sodium chloride flush, sodium chloride, potassium chloride **OR** potassium alternative oral replacement **OR** potassium chloride, magnesium sulfate, ondansetron **OR** ondansetron, polyethylene glycol, acetaminophen **OR** acetaminophen, dextrose bolus **OR** dextrose bolus, glucagon (rDNA), dextrose, diphenhydrAMINE

## 2024-10-02 NOTE — PLAN OF CARE
Problem: Discharge Planning  Goal: Discharge to home or other facility with appropriate resources  Outcome: Progressing     Problem: Safety - Adult  Goal: Free from fall injury  Outcome: Progressing     Problem: Chronic Conditions and Co-morbidities  Goal: Patient's chronic conditions and co-morbidity symptoms are monitored and maintained or improved  Outcome: Progressing     Problem: Pain  Goal: Verbalizes/displays adequate comfort level or baseline comfort level  Outcome: Progressing     Problem: Respiratory - Adult  Goal: Achieves optimal ventilation and oxygenation  Outcome: Progressing     Problem: Gastrointestinal - Adult  Goal: Minimal or absence of nausea and vomiting  Outcome: Progressing  Goal: Maintains or returns to baseline bowel function  Outcome: Progressing  Goal: Maintains adequate nutritional intake  Outcome: Progressing  Goal: Establish and maintain optimal ostomy function  Outcome: Progressing     Problem: Metabolic/Fluid and Electrolytes - Adult  Goal: Electrolytes maintained within normal limits  Outcome: Progressing  Goal: Hemodynamic stability and optimal renal function maintained  Outcome: Progressing  Goal: Glucose maintained within prescribed range  Outcome: Progressing     Problem: Hematologic - Adult  Goal: Maintains hematologic stability  Outcome: Progressing

## 2024-10-02 NOTE — PROGRESS NOTES
Results   Component Value Date/Time    MG 2.20 10/02/2024 06:10 AM     Phosphorus:    Lab Results   Component Value Date/Time    PHOS 4.8 10/02/2024 06:10 AM     Uric Acid:  No results found for: \"LABURIC\", \"URICACID\"  Last 3 Troponin:  No results found for: \"TROPONINI\"  U/A:    Lab Results   Component Value Date/Time    COLORU Yellow 09/30/2024 01:58 PM    PROTEINU >=1000 09/30/2024 01:58 PM    PHUR 6.5 09/30/2024 01:58 PM    PHUR 6.0 01/23/2024 05:24 PM    WBCUA 5 09/30/2024 01:58 PM    RBCUA 7 09/30/2024 01:58 PM    MUCUS Present 06/19/2024 12:00 AM    BACTERIA 3+ 09/30/2024 01:58 PM    CLARITYU CLOUDY 09/30/2024 01:58 PM    LEUKOCYTESUR Negative 09/30/2024 01:58 PM    UROBILINOGEN 0.2 09/30/2024 01:58 PM    BILIRUBINUR Negative 09/30/2024 01:58 PM    BLOODU SMALL 09/30/2024 01:58 PM    GLUCOSEU >=1000 09/30/2024 01:58 PM    GLUCOSEU >=1000 07/14/2011 07:38 PM    AMORPHOUS Present 06/19/2024 12:00 AM           IMPRESSION/RECOMMENDATIONS:    Acute kidney injury  Scr on admission 2 on 9/24, Peak scr 3.1 on 9/26 , dipped to 2.3 and now rising to 2.8 > 2.5 > 2.7  Seems to be new baseline   S/p mIVF   UA 7 rbc , 3 + bacteria   UPCR 6.5 , UACR 4.1 gm/gm  FENA prerenal   Plan:  -dispo : okay for d/c from renal stand pt as scr in mid 2 range seems to be new baseline. Start aldactone 100 , jardiance 25 , sod bicarb 1300 Tid, coreg 25 bid and nifedipine 30 bid at d/c . Stop Chlorthalidone at d.c. Stop ARB at d/c. Will give close f/u with Dr MAVIS Rodriguez.SPEP, Hep panel, HIV , Repeat c3 c4 are pending.   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 MAVIS rodriguez  Creatinine baseline was 1.9-2.2   Scr this admission has been in mid 2 range , seems to be new baseline   Renal usg LK 10.8, RK 11 cm , many cysts b/lat  on 1/24   Likely diabetic and HTN nephropathy  UPCR 6.2 in 1/24 > 6.5 in 9/24   UACR 5.5 GM IN 7/24 > 4.1 gm/gm 9/24  In past ABI was negative, C3 normal at 145, C4 normal at  35  UPEP unremarkable in past   S/p IVF   plan  -ARB on hold given allergic rx     -add aldactone 25 for HTN/proteinuria with plans to gradually increase to 100  Dispo- should be okay for d/c from renal stand pt , hopefully tomorrow id scr remains stable or any better and can tolerate aldactone.   -IVF with bicarb today    Nephrotic range proteinuria   UPCR 6.2 in 1/24 > 6.5 in 9/24   UACR 5.5 GM IN 7/24 > 4.1 gm/gm 9/24  In past ABI was negative, C3 normal at 145, C4 normal at 35  UPEP unremarkable in past   on jardiance 10 started this admissio for  proteinuria   Albumin 2.7  Plan:   Inc aldactone to 100 mg   Inc jardiance to 25 mg   ARB dced as angioedema   Recheck c3.c4, spep, upep, hep , hiv     IDDM  ARB on hold   A1c 10.2   on jardiance 10 started this admission per endocrine    Insulin     Hypertension -   now BP in 140-150 systolic range   was on chlorthalidone 25, valsartan 320, , nifedipine 60, toprol 50 PTA   Now on nifedipine 60 BID ,  coreg 25 bid, aldactone 25   Renin 0.4, erika 3.4 in 1/24   Hx of ANN b/lat on USG  but CTA AP showed no stenosis   plan  -c/w coreg 25 bid   -inc aldactone to 100   -lower nifedipine to 30 bid.     Facial swelling - ? Angioedema   Likely due to use of ARB (being used for hypertension and proteinuria)  -ARB on hold     CKD-MBD         Phosphorus at target    Anemia  Receiving iron & JUN  Hgb 11 range > 7 range now   ? Dilutional   Reports last c scope 2 years ago wnl.   Noted GI consult     Acidosis    continue oral sodium bicarbonate      Thank you for allowing me to participate in the care of this patient.  I will continue to follow along.  Please call with questions.    Brianna Bueno MD  10/2/2024  [facility]

## 2024-10-03 ENCOUNTER — ANESTHESIA (OUTPATIENT)
Dept: ENDOSCOPY | Age: 59
End: 2024-10-03
Payer: COMMERCIAL

## 2024-10-03 ENCOUNTER — ANESTHESIA EVENT (OUTPATIENT)
Dept: ENDOSCOPY | Age: 59
End: 2024-10-03
Payer: COMMERCIAL

## 2024-10-03 LAB
ALBUMIN SERPL-MCNC: 2.9 G/DL (ref 3.4–5)
ANION GAP SERPL CALCULATED.3IONS-SCNC: 9 MMOL/L (ref 3–16)
BASOPHILS # BLD: 0.1 K/UL (ref 0–0.2)
BASOPHILS NFR BLD: 0.8 %
BUN SERPL-MCNC: 28 MG/DL (ref 7–20)
CALCIUM SERPL-MCNC: 8.2 MG/DL (ref 8.3–10.6)
CHLORIDE SERPL-SCNC: 103 MMOL/L (ref 99–110)
CO2 SERPL-SCNC: 25 MMOL/L (ref 21–32)
CREAT SERPL-MCNC: 2.9 MG/DL (ref 0.6–1.1)
DEPRECATED RDW RBC AUTO: 14.6 % (ref 12.4–15.4)
EOSINOPHIL # BLD: 0.2 K/UL (ref 0–0.6)
EOSINOPHIL NFR BLD: 3 %
GFR SERPLBLD CREATININE-BSD FMLA CKD-EPI: 18 ML/MIN/{1.73_M2}
GLUCOSE BLD-MCNC: 131 MG/DL (ref 70–99)
GLUCOSE BLD-MCNC: 168 MG/DL (ref 70–99)
GLUCOSE BLD-MCNC: 175 MG/DL (ref 70–99)
GLUCOSE BLD-MCNC: 240 MG/DL (ref 70–99)
GLUCOSE SERPL-MCNC: 181 MG/DL (ref 70–99)
HAV IGM SERPL QL IA: NORMAL
HBV CORE IGM SERPL QL IA: NORMAL
HBV SURFACE AG SERPL QL IA: NORMAL
HCT VFR BLD AUTO: 21.9 % (ref 36–48)
HCV AB SERPL QL IA: NORMAL
HGB BLD-MCNC: 7.4 G/DL (ref 12–16)
IGF-I SERPL-MCNC: 300 NG/ML (ref 48–235)
IGF-I Z-SCORE SERPL: 2.4
LYMPHOCYTES # BLD: 1.7 K/UL (ref 1–5.1)
LYMPHOCYTES NFR BLD: 23.5 %
MAGNESIUM SERPL-MCNC: 2.2 MG/DL (ref 1.8–2.4)
MCH RBC QN AUTO: 27.6 PG (ref 26–34)
MCHC RBC AUTO-ENTMCNC: 33.6 G/DL (ref 31–36)
MCV RBC AUTO: 82 FL (ref 80–100)
MONOCYTES # BLD: 1.1 K/UL (ref 0–1.3)
MONOCYTES NFR BLD: 14.6 %
NEUTROPHILS # BLD: 4.2 K/UL (ref 1.7–7.7)
NEUTROPHILS NFR BLD: 58.1 %
PERFORMED ON: ABNORMAL
PHOSPHATE SERPL-MCNC: 5.1 MG/DL (ref 2.5–4.9)
PLATELET # BLD AUTO: 212 K/UL (ref 135–450)
PMV BLD AUTO: 9.2 FL (ref 5–10.5)
POTASSIUM SERPL-SCNC: 4.6 MMOL/L (ref 3.5–5.1)
PROT UR-MCNC: 0.42 G/DL
PROT UR-MCNC: 420 MG/DL
RBC # BLD AUTO: 2.67 M/UL (ref 4–5.2)
SODIUM SERPL-SCNC: 137 MMOL/L (ref 136–145)
WBC # BLD AUTO: 7.2 K/UL (ref 4–11)

## 2024-10-03 PROCEDURE — 2500000003 HC RX 250 WO HCPCS: Performed by: NURSE ANESTHETIST, CERTIFIED REGISTERED

## 2024-10-03 PROCEDURE — 1200000000 HC SEMI PRIVATE

## 2024-10-03 PROCEDURE — 2580000003 HC RX 258: Performed by: ANESTHESIOLOGY

## 2024-10-03 PROCEDURE — 2580000003 HC RX 258: Performed by: INTERNAL MEDICINE

## 2024-10-03 PROCEDURE — 80069 RENAL FUNCTION PANEL: CPT

## 2024-10-03 PROCEDURE — 3700000000 HC ANESTHESIA ATTENDED CARE: Performed by: INTERNAL MEDICINE

## 2024-10-03 PROCEDURE — 7100000000 HC PACU RECOVERY - FIRST 15 MIN: Performed by: INTERNAL MEDICINE

## 2024-10-03 PROCEDURE — 6370000000 HC RX 637 (ALT 250 FOR IP): Performed by: INTERNAL MEDICINE

## 2024-10-03 PROCEDURE — 3609017100 HC EGD: Performed by: INTERNAL MEDICINE

## 2024-10-03 PROCEDURE — 36415 COLL VENOUS BLD VENIPUNCTURE: CPT

## 2024-10-03 PROCEDURE — 7100000001 HC PACU RECOVERY - ADDTL 15 MIN: Performed by: INTERNAL MEDICINE

## 2024-10-03 PROCEDURE — 86255 FLUORESCENT ANTIBODY SCREEN: CPT

## 2024-10-03 PROCEDURE — 83735 ASSAY OF MAGNESIUM: CPT

## 2024-10-03 PROCEDURE — 2709999900 HC NON-CHARGEABLE SUPPLY: Performed by: INTERNAL MEDICINE

## 2024-10-03 PROCEDURE — 0DJ08ZZ INSPECTION OF UPPER INTESTINAL TRACT, VIA NATURAL OR ARTIFICIAL OPENING ENDOSCOPIC: ICD-10-PCS | Performed by: INTERNAL MEDICINE

## 2024-10-03 PROCEDURE — 85025 COMPLETE CBC W/AUTO DIFF WBC: CPT

## 2024-10-03 PROCEDURE — 6360000002 HC RX W HCPCS: Performed by: NURSE ANESTHETIST, CERTIFIED REGISTERED

## 2024-10-03 PROCEDURE — 6360000002 HC RX W HCPCS: Performed by: INTERNAL MEDICINE

## 2024-10-03 RX ORDER — NIFEDIPINE 30 MG/1
60 TABLET, EXTENDED RELEASE ORAL 2 TIMES DAILY
Status: DISCONTINUED | OUTPATIENT
Start: 2024-10-03 | End: 2024-10-11 | Stop reason: HOSPADM

## 2024-10-03 RX ORDER — LIDOCAINE HYDROCHLORIDE 20 MG/ML
INJECTION, SOLUTION INFILTRATION; PERINEURAL
Status: DISCONTINUED | OUTPATIENT
Start: 2024-10-03 | End: 2024-10-03 | Stop reason: SDUPTHER

## 2024-10-03 RX ORDER — SPIRONOLACTONE 25 MG/1
50 TABLET ORAL DAILY
Status: DISCONTINUED | OUTPATIENT
Start: 2024-10-03 | End: 2024-10-08

## 2024-10-03 RX ORDER — PEG-3350, SODIUM SULFATE, SODIUM CHLORIDE, POTASSIUM CHLORIDE, SODIUM ASCORBATE AND ASCORBIC ACID 7.5-2.691G
100 KIT ORAL ONCE
Status: COMPLETED | OUTPATIENT
Start: 2024-10-03 | End: 2024-10-03

## 2024-10-03 RX ORDER — SODIUM CHLORIDE 9 MG/ML
INJECTION, SOLUTION INTRAVENOUS CONTINUOUS
Status: DISCONTINUED | OUTPATIENT
Start: 2024-10-03 | End: 2024-10-04

## 2024-10-03 RX ORDER — PEG-3350, SODIUM SULFATE, SODIUM CHLORIDE, POTASSIUM CHLORIDE, SODIUM ASCORBATE AND ASCORBIC ACID 7.5-2.691G
100 KIT ORAL ONCE
Status: COMPLETED | OUTPATIENT
Start: 2024-10-04 | End: 2024-10-04

## 2024-10-03 RX ORDER — PROPOFOL 10 MG/ML
INJECTION, EMULSION INTRAVENOUS
Status: DISCONTINUED | OUTPATIENT
Start: 2024-10-03 | End: 2024-10-03 | Stop reason: SDUPTHER

## 2024-10-03 RX ADMIN — NIFEDIPINE 60 MG: 30 TABLET, FILM COATED, EXTENDED RELEASE ORAL at 11:34

## 2024-10-03 RX ADMIN — NIFEDIPINE 60 MG: 30 TABLET, FILM COATED, EXTENDED RELEASE ORAL at 22:07

## 2024-10-03 RX ADMIN — LIDOCAINE HYDROCHLORIDE 100 MG: 20 INJECTION, SOLUTION INFILTRATION; PERINEURAL at 09:10

## 2024-10-03 RX ADMIN — SODIUM CHLORIDE, PRESERVATIVE FREE 10 ML: 5 INJECTION INTRAVENOUS at 11:35

## 2024-10-03 RX ADMIN — SODIUM BICARBONATE 1300 MG: 650 TABLET ORAL at 11:35

## 2024-10-03 RX ADMIN — PROPOFOL 100 MG: 10 INJECTION, EMULSION INTRAVENOUS at 09:10

## 2024-10-03 RX ADMIN — CARVEDILOL 25 MG: 25 TABLET, FILM COATED ORAL at 11:34

## 2024-10-03 RX ADMIN — EPOETIN ALFA-EPBX 10000 UNITS: 10000 INJECTION, SOLUTION INTRAVENOUS; SUBCUTANEOUS at 18:08

## 2024-10-03 RX ADMIN — SPIRONOLACTONE 50 MG: 25 TABLET ORAL at 11:34

## 2024-10-03 RX ADMIN — SODIUM CHLORIDE: 9 INJECTION, SOLUTION INTRAVENOUS at 08:52

## 2024-10-03 RX ADMIN — ROSUVASTATIN 20 MG: 20 TABLET, FILM COATED ORAL at 22:07

## 2024-10-03 RX ADMIN — CARVEDILOL 25 MG: 25 TABLET, FILM COATED ORAL at 17:13

## 2024-10-03 RX ADMIN — PEG-3350, SODIUM SULFATE, SODIUM CHLORIDE, POTASSIUM CHLORIDE, SODIUM ASCORBATE AND ASCORBIC ACID 100 G: KIT at 17:14

## 2024-10-03 RX ADMIN — INSULIN LISPRO 1 UNITS: 100 INJECTION, SOLUTION INTRAVENOUS; SUBCUTANEOUS at 17:13

## 2024-10-03 RX ADMIN — SODIUM BICARBONATE 1300 MG: 650 TABLET ORAL at 17:13

## 2024-10-03 RX ADMIN — Medication 2000 UNITS: at 11:34

## 2024-10-03 RX ADMIN — SODIUM CHLORIDE: 9 INJECTION, SOLUTION INTRAVENOUS at 22:09

## 2024-10-03 RX ADMIN — PROPOFOL 30 MG: 10 INJECTION, EMULSION INTRAVENOUS at 09:12

## 2024-10-03 RX ADMIN — PROPOFOL 20 MG: 10 INJECTION, EMULSION INTRAVENOUS at 09:14

## 2024-10-03 RX ADMIN — EMPAGLIFLOZIN 10 MG: 10 TABLET, FILM COATED ORAL at 11:34

## 2024-10-03 RX ADMIN — SODIUM BICARBONATE 1300 MG: 650 TABLET ORAL at 22:07

## 2024-10-03 RX ADMIN — INSULIN GLARGINE 20 UNITS: 100 INJECTION, SOLUTION SUBCUTANEOUS at 11:35

## 2024-10-03 ASSESSMENT — PAIN SCALES - WONG BAKER
WONGBAKER_NUMERICALRESPONSE: NO HURT
WONGBAKER_NUMERICALRESPONSE: NO HURT

## 2024-10-03 ASSESSMENT — ENCOUNTER SYMPTOMS: SHORTNESS OF BREATH: 1

## 2024-10-03 ASSESSMENT — PAIN - FUNCTIONAL ASSESSMENT: PAIN_FUNCTIONAL_ASSESSMENT: NONE - DENIES PAIN

## 2024-10-03 NOTE — PROGRESS NOTES
[facility]   Nephrology progress Note  [phone]  [phone]   [facility]           Reason for Consult:  HTN     The patient is a 58 y.o. female with significant past medical history of hypertension, T2DM, CKD, CAD, hyperlipidemia, mitral regurgitation, renal artery stenosis, presented to the ER with facial swelling.  This started as tingling sensation around the nose and the mouth and then subsequently she developed a rash in the center of her face.  The midface became swollen and red and hurt lips became large especially the upper lip.  She had no swelling of the tongue, there was no problems with breathing, and there was no generalized rash over the body.  She had a flu vaccine a week ago and previously had COVID in the last 4 weeks.    Of note she was on ARB Diovan for blood pressure control as well as antiproteinuric effect.  She had had used ACE inhibitor's maybe 20 years ago and they had caused a dry cough necessitating stoppage.  She never had angioedema with them    Interval History:    9/26/24: facial swelling is down   9/27/24: no swelling mid face , lips feel better   9/28/24: feeling overall better  9/29/24: Refused to take Labetalol yesterday 9/28/24 9/30/24 BP is stable in 140-150s. D/w her changes in BP medicine planned today. Drink more fluids.   10/1: -150s . Scr 2.5  aldactone 25 started   10/2: on miVF , scr 2.7 . -150 range   10/3 - off mivf . Scr 2.9 , inc aldactone to 100 y day     PHYSICAL EXAM:    Vitals:    BP (!) 161/73   Pulse 82   Temp 98.7 °F (37.1 °C) (Oral)   Resp 15   Ht 1.727 m (5' 8\")   Wt 98.5 kg (217 lb 2.5 oz)   LMP 06/01/2011   SpO2 92%   BMI 33.02 kg/m²       Intake/Output Summary (Last 24 hours) at 10/3/2024 1226  Last data filed at 10/2/2024 2153  Gross per 24 hour   Intake 10 ml   Output 400 ml   Net -390 ml           Physical Exam:  Gen:  alert, oriented x 3  CV: RRR no murmur or rub .  Lungs: CTA  Abd: soft, bowel  10/02/2024 06:10 AM    ALKPHOS 115 10/02/2024 06:10 AM    AST 36 10/02/2024 06:10 AM    ALT 41 10/02/2024 06:10 AM     Magnesium:    Lab Results   Component Value Date/Time    MG 2.20 10/03/2024 04:49 AM     Phosphorus:    Lab Results   Component Value Date/Time    PHOS 5.1 10/03/2024 04:49 AM     Uric Acid:  No results found for: \"LABURIC\", \"URICACID\"  Last 3 Troponin:  No results found for: \"TROPONINI\"  U/A:    Lab Results   Component Value Date/Time    COLORU Yellow 09/30/2024 01:58 PM    PROTEINU >=1000 09/30/2024 01:58 PM    PHUR 6.5 09/30/2024 01:58 PM    PHUR 6.0 01/23/2024 05:24 PM    WBCUA 5 09/30/2024 01:58 PM    RBCUA 7 09/30/2024 01:58 PM    MUCUS Present 06/19/2024 12:00 AM    BACTERIA 3+ 09/30/2024 01:58 PM    CLARITYU CLOUDY 09/30/2024 01:58 PM    LEUKOCYTESUR Negative 09/30/2024 01:58 PM    UROBILINOGEN 0.2 09/30/2024 01:58 PM    BILIRUBINUR Negative 09/30/2024 01:58 PM    BLOODU SMALL 09/30/2024 01:58 PM    GLUCOSEU >=1000 09/30/2024 01:58 PM    GLUCOSEU >=1000 07/14/2011 07:38 PM    AMORPHOUS Present 06/19/2024 12:00 AM           IMPRESSION/RECOMMENDATIONS:    Acute kidney injury  Scr on admission 2 on 9/24, Peak scr 3.1 on 9/26 , dipped to 2.3 and now rising to 2.8 > 2.5 > 2.7> 2.9 , Seems to be new baseline   S/p mIVF   UA 7 rbc , 3 + bacteria   UPCR 6.5 , UACR 4.1 gm/gm  FENA prerenal   C3 c4 wnl , hep non reactive   Plan:  -dispo :lower aldactone to 50 mg    okay for d/c from renal stand pt as scr in mid 2 range seems to be new baseline. Start aldactone 50 , jardiance 10 , sod bicarb 1300 Tid, coreg 25 bid and nifedipine 30 bid at d/c . Stop Chlorthalidone at d.c. Stop ARB at d/c. Will give close f/u with Dr MAVIS Rodriguez.SPEP,  HIV , UPEP pending . Check PLA2R   -encourage oral hydration       CKD stage IIIb with nephrotic range proteinuria  Sees Dr. MAVIS rodriguez  Creatinine baseline was 1.9-2.2   Scr this admission has been in mid 2 range , seems to be new baseline   Renal usg LK 10.8, RK 11 cm , many

## 2024-10-03 NOTE — ANESTHESIA POSTPROCEDURE EVALUATION
Department of Anesthesiology  Postprocedure Note    Patient: Galina Nick  MRN: [id number]  YOB: 1965  Date of evaluation: 10/3/2024    Procedure Summary       Date: 10/03/24 Room / Location: [place] [address] / [facility]    Anesthesia Start: 0906 Anesthesia Stop: 0919    Procedure: ESOPHAGOGASTRODUODENOSCOPY DIAGNOSTIC ONLY (Abdomen) Diagnosis:       Anemia, unspecified type      (Anemia, unspecified type [D64.9])    Surgeons: Yared Hansen MD Responsible Provider: Teto Rasmussen MD    Anesthesia Type: MAC ASA Status: 4            Anesthesia Type: No value filed.    Tyron Phase I: Tyron Score: 5    Tyron Phase II:      Anesthesia Post Evaluation    Patient location during evaluation: PACU  Patient participation: complete - patient participated  Level of consciousness: awake and alert  Airway patency: patent  Nausea & Vomiting: no nausea and no vomiting  Cardiovascular status: hemodynamically stable  Respiratory status: acceptable  Hydration status: euvolemic  Multimodal analgesia pain management approach  Pain management: satisfactory to patient    No notable events documented.

## 2024-10-03 NOTE — PROGRESS NOTES
Pt stable and able to be transferred from PACU to room 3320. A&O , VSS, with no complaints at this time. 3A called and notified that pt is being transferred out of PACU and to room.

## 2024-10-03 NOTE — PLAN OF CARE
Problem: Pain  Goal: Verbalizes/displays adequate comfort level or baseline comfort level  10/3/2024 0239 by Marine Kraus, RN  Outcome: Progressing

## 2024-10-03 NOTE — PROGRESS NOTES
Admit Date: 9/24/2024  Diet: ADULT DIET; Clear Liquid  Diet NPO    CC: Facial swelling    Interval history:   Overnight, there were no acute events. Patient's vitals remained stable    Patient was seen this morning. I discussed the plan of the day with her in detail. All questions were addressed and answered to patient's satisfaction.    Patient denies fevers, chills, nausea, vomiting, chest pain, shortness of breath, diarrhea, constipation, dysuria, urinary frequency or urgency.     Plan:     -Continue to monitor creatinine, still uptrending, per nephrology, this may be a new baseline for her  -EGD today    Assessment:   Galina Nick is a 58 y.o. female with PMH of hypertension, CAD, dyslipidemia, type 2 diabetes mellitus who was admitted with JAYY    AJYY on CKD 3b with metabolic acidosis:  Baseline SCr 1.9.  Peak SCr 3.1.    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    SCr trended down to 2.3 but trending up again.    Restarted IVF. Continue sodium bicarb.  Avoid nephrotoxins.  Monitor BMP.  Follow-up workup ordered per nephrology.      Uncontrolled hypertension:   In the ED, patient was hypertensive with blood pressure of 191/82.  Discontinued metoprolol, diuretics and ARB.  Monitor on nifedipine and Coreg.     Angioedema involving the face: Improved  Likely secondary to Diovan use; discontinued.    Received Benadryl 25 mg, Pepcid 20 mg, Solu-Medrol 125 mg in the ED.  No further doses of steroids received due to worsening hyperglycemia.  C4 complement elevated.        T2DM not on long-term insulin with hyperglycemia:   Episodes of hypoglycemia noted with increased dose of insulin.  Patient willing to be discharged on daily Lantus without prandial insulin.  Glipizide not to be resumed on discharge with insulin.    Started on Jardiance.    Outpatient follow-up with Dr. Hartman.     Anemia:  Patient endorses some dark stools.  Her last colonoscopy was about 2 years ago which was within normal limits.   Her creatinine has continued to downtrend since admission.  Low iron saturation on workup.    Completed course of Venofer.  JUN per nephrology.  -GI consulted    Code Status: Full Code   FEN: ADULT DIET; Clear Liquid  Diet NPO   DVT PPX: []Lovenox, [x]Heparin, []Coumadin, []Eliquis, []Xarelto, []SCD  DISPO: Continue monitoring creatinine still above baseline    Jorge Odom MD  10/3/2024,  10:29 AM    This note was likely completed using voice recognition technology and may contain unintended errors.     Objective:   Vitals:   T-max:  Patient Vitals for the past 8 hrs:   BP Temp Temp src Pulse Resp SpO2 Height Weight   10/03/24 0935 138/72 97.7 °F (36.5 °C) Temporal 78 11 92 % -- --   10/03/24 0932 -- -- -- -- -- -- 1.727 m (5' 8\") --   10/03/24 0930 (!) 143/65 -- -- 78 14 92 % -- --   10/03/24 0925 (!) 141/66 -- -- 76 13 93 % -- --   10/03/24 0920 139/65 -- -- 74 14 92 % -- --   10/03/24 0919 139/65 97.6 °F (36.4 °C) Temporal 75 14 91 % -- --   10/03/24 0846 129/66 98.1 °F (36.7 °C) Temporal 76 16 96 % -- --   10/03/24 0559 -- -- -- -- -- -- -- 98.5 kg (217 lb 2.5 oz)       Intake/Output Summary (Last 24 hours) at 10/3/2024 1029  Last data filed at 10/2/2024 2153  Gross per 24 hour   Intake 10 ml   Output 400 ml   Net -390 ml         Physical Exam  General appearance: No apparent distress, appears stated age and cooperative.  HEENT: Pupils equal, round, and reactive to light. Conjunctivae clear.  Neck: Supple, with full range of motion. Trachea midline.  Respiratory:  Normal respiratory effort. Clear to auscultation, bilaterally without Rales/Wheezes/Rhonchi.  Cardiovascular: Regular rate and rhythm with normal S1/S2 without murmurs, rubs or gallops.  Abdomen: Soft, non-tender, non-distended with normal bowel sounds.  Musculoskeletal: No clubbing, cyanosis or edema bilaterally.  Full range of motion without deformity.  Skin: Skin color, texture, turgor normal.  No rashes or lesions.  Neurologic:  Neurovascularly  intact without any focal sensory/motor deficits. Cranial nerves: II-XII intact, grossly non-focal.  Psychiatric: Alert and oriented, thought content appropriate, normal insight  Capillary Refill: Brisk, 3 seconds, normal   Peripheral Pulses: +2 palpable, equal bilaterally   Periorbital edema resolved.  Facial swelling with mild erythema resolving.    Review of Systems  - Negative except Interval History    LABS:    CBC:   Recent Labs     10/01/24  0648 10/02/24  0610 10/03/24  0449   WBC 7.6 7.9 7.2   HGB 7.9* 7.5* 7.4*   HCT 23.7* 22.8* 21.9*    222 212   MCV 82.8 82.4 82.0     Renal:    Recent Labs     10/01/24  0648 10/02/24  0610 10/03/24  0449    140 137   K 4.5 4.7 4.6    107 103   CO2 18* 21 25   BUN 31* 30* 28*   CREATININE 2.5* 2.7* 2.9*   GLUCOSE 149* 143* 181*   CALCIUM 8.2* 8.1* 8.2*   MG 1.70* 2.20 2.20   PHOS 4.4 4.8 5.1*   ANIONGAP 12 12 9     Hepatic:   Recent Labs     10/01/24  0648 10/02/24  0610   AST 31 36   ALT 37 41*   BILITOT <0.2 0.4   BILIDIR 0.2 0.2   ALKPHOS 115 115     Troponin: No results for input(s): \"TROPONINI\" in the last 72 hours.  BNP: No results for input(s): \"BNP\" in the last 72 hours.  Lipids: No results for input(s): \"CHOL\", \"HDL\" in the last 72 hours.    Invalid input(s): \"LDLCALCU\", \"TRIGLYCERIDE\"  ABGs:  No results for input(s): \"PHART\", \"MUT0ITK\", \"PO2ART\", \"JHB0XZL\", \"BEART\", \"THGBART\", \"S5IEZUSN\", \"YPI9END\" in the last 72 hours.    INR: No results for input(s): \"INR\" in the last 72 hours.  Lactate: No results for input(s): \"LACTATE\" in the last 72 hours.  Cultures:  -----------------------------------------------------------------  RAD:   XR CHEST PORTABLE   Final Result   Mild bibasilar airspace disease which could reflect atelectasis, less likely   pneumonia         CT FACIAL BONES WO CONTRAST   Final Result   No acute facial bone trauma.             Medications:     Scheduled Meds:   spironolactone  50 mg Oral Daily    NIFEdipine  60 mg Oral BID

## 2024-10-03 NOTE — PROGRESS NOTES
Nutrition Note    RECOMMENDATIONS  PO Diet: Resume diet as appropriate   ONS: Order Glucerna BID if po intakes of meals decline to consistently <50%    ASSESSMENT   Pt triggered for LOS assessment. NPO and off unit for EGD upon visiting. Was on CCC diet prior with documented meal intakes averaging >50%. Reported no issues with appetite/po intake or unintentional wt loss prior to current admission per nursing nutrition screen. Hard to accurately assess for wt change given wt fluctuations documented in EMR. RD will monitor for adequate po intake vs need for ONS.     Malnutrition Status: Insufficient data  Acute Illness    NUTRITION DIAGNOSIS   No nutrition diagnosis at this time related to   as evidenced by      Goals: PO intake 50% or greater, prior to discharge     NUTRITION RELATED FINDINGS  Objective: Wt up 28 lb from admission, +3.3L per I/Os. Phos 5.1. POCG 175. LBM 10/1. +2 non-pitting LUE edema.  Wounds: None    CURRENT NUTRITION THERAPIES  Diet NPO Exceptions are: Sips of Water with Meds  Diet NPO     PO Intake: NPO   PO Supplement Intake:NPO    ANTHROPOMETRICS  Current Height: 172.7 cm (5' 8\")  Current Weight - Scale: 98.5 kg (217 lb 2.5 oz)    Admission weight: 86.1 kg (189 lb 13.1 oz)  Ideal Body Weight (IBW): 140 lbs  (64 kg)        BMI: 33    COMPARATIVE STANDARDS  Total Energy Requirements (kcals/day): 4297-7612     Protein (g):         Fluid (mL/day):  5077-2700    EDUCATION  Education not indicated     The patient will be monitored per nutrition standards of care. Consult dietitian if additional nutrition interventions are needed prior to RD reassessment.     Maura Thompson MS, RD, LD    Contact: [phone]

## 2024-10-03 NOTE — PROGRESS NOTES
Shift assessment completed, vital signs obtained and ordered medication given. Patient with low-grade fever at beginning of the shift, prn tylenol given. Patient resting comfortably on 2LNC and does not appear in respiratory distress/discomfort. Patient aware of NPO status at midnight in preparation for EGD tomorrow. Patient AO x4 and denies additional nursing needs at this time. Call light and bedside table within reach; bed locked and in lowest position.

## 2024-10-03 NOTE — ANESTHESIA PRE PROCEDURE
Department of Anesthesiology  Preprocedure Note       Name:  Galina Nick   Age:  58 y.o.  :  1965                                          MRN:  [id number]         Date:  10/3/2024      Surgeon: Surgeon(s):  Yared Hansen MD    Procedure: Procedure(s):  ESOPHAGOGASTRODUODENOSCOPY DIAGNOSTIC ONLY    Medications prior to admission:   Prior to Admission medications    Medication Sig Start Date End Date Taking? Authorizing Provider   ferrous sulfate (IRON 325) 325 (65 Fe) MG tablet Take 1 tablet by mouth daily (with breakfast) 24  Yes Jennifer Schwartz MD   empagliflozin (JARDIANCE) 10 MG tablet Take 1 tablet by mouth daily 10/1/24  Yes Jennifer Schwartz MD   Continuous Glucose Sensor (DEXCOM G7 SENSOR) MISC 1 each by Does not apply route every 10 days 24  Yes Jennifer Schwartz MD   Blood Glucose Monitoring Suppl (BLOOD GLUCOSE MONITOR SYSTEM) w/Device KIT 1 each by Does not apply route once for 1 dose 24 Yes Jennifer Schwartz MD   blood glucose monitor strips 1 strip by Other route 4 times daily (before meals and nightly) ICD code- E11.65 24  Yes Jennifer Schwartz MD   Lancets MISC 1 each by Does not apply route 4 times daily (before meals and nightly) 24  Yes Jennifer Schwartz MD   NIFEdipine (PROCARDIA XL) 60 MG extended release tablet Take 1 tablet by mouth daily 24  Yes Timo Gamez MD   rosuvastatin (CRESTOR) 20 MG tablet Take 2 tablets by mouth daily  Patient taking differently: Take 1 tablet by mouth nightly 24  Yes Juani Sweet APRN - CNP   chlorthalidone (HYGROTON) 25 MG tablet Take 1 tablet by mouth daily 5/10/24  Yes Juani Sweet APRN - CNP   aspirin 81 MG EC tablet Take 1 tablet by mouth daily  Patient taking differently: Take 1 tablet by mouth nightly 24  Yes Timo Gamez MD   Dulaglutide (TRULICITY) 0.75 MG/0.5ML SOPN INJECT 0.75 MG UNDER THE SKIN ONCE WEEKLY  Patient taking differently: Inject 0.5 mLs into the skin once a week

## 2024-10-04 ENCOUNTER — ANESTHESIA EVENT (OUTPATIENT)
Dept: ENDOSCOPY | Age: 59
End: 2024-10-04
Payer: COMMERCIAL

## 2024-10-04 ENCOUNTER — APPOINTMENT (OUTPATIENT)
Dept: GENERAL RADIOLOGY | Age: 59
DRG: 915 | End: 2024-10-04
Payer: COMMERCIAL

## 2024-10-04 ENCOUNTER — ANESTHESIA (OUTPATIENT)
Dept: ENDOSCOPY | Age: 59
End: 2024-10-04
Payer: COMMERCIAL

## 2024-10-04 LAB
ALBUMIN SERPL ELPH-MCNC: 2.2 G/DL (ref 3.1–4.9)
ALBUMIN SERPL-MCNC: 3 G/DL (ref 3.4–5)
ALPHA1 GLOB SERPL ELPH-MCNC: 0.3 G/DL (ref 0.2–0.4)
ALPHA2 GLOB SERPL ELPH-MCNC: 0.7 G/DL (ref 0.4–1.1)
ANION GAP SERPL CALCULATED.3IONS-SCNC: 9 MMOL/L (ref 3–16)
B-GLOBULIN SERPL ELPH-MCNC: 0.8 G/DL (ref 0.9–1.6)
BASOPHILS # BLD: 0.1 K/UL (ref 0–0.2)
BASOPHILS NFR BLD: 0.8 %
BUN SERPL-MCNC: 24 MG/DL (ref 7–20)
CALCIUM SERPL-MCNC: 8.1 MG/DL (ref 8.3–10.6)
CHLORIDE SERPL-SCNC: 103 MMOL/L (ref 99–110)
CO2 SERPL-SCNC: 25 MMOL/L (ref 21–32)
CREAT SERPL-MCNC: 2.8 MG/DL (ref 0.6–1.1)
DEPRECATED RDW RBC AUTO: 13.8 % (ref 12.4–15.4)
EOSINOPHIL # BLD: 0.2 K/UL (ref 0–0.6)
EOSINOPHIL NFR BLD: 3.1 %
GAMMA GLOB SERPL ELPH-MCNC: 0.5 G/DL (ref 0.6–1.8)
GFR SERPLBLD CREATININE-BSD FMLA CKD-EPI: 19 ML/MIN/{1.73_M2}
GLUCOSE BLD-MCNC: 125 MG/DL (ref 70–99)
GLUCOSE BLD-MCNC: 167 MG/DL (ref 70–99)
GLUCOSE BLD-MCNC: 201 MG/DL (ref 70–99)
GLUCOSE BLD-MCNC: 220 MG/DL (ref 70–99)
GLUCOSE SERPL-MCNC: 117 MG/DL (ref 70–99)
HCT VFR BLD AUTO: 22.1 % (ref 36–48)
HGB BLD-MCNC: 7.3 G/DL (ref 12–16)
HIV 1+2 AB+HIV1 P24 AG SERPL QL IA: NORMAL
HIV 2 AB SERPL QL IA: NORMAL
HIV1 AB SERPL QL IA: NORMAL
HIV1 P24 AG SERPL QL IA: NORMAL
LYMPHOCYTES # BLD: 1.7 K/UL (ref 1–5.1)
LYMPHOCYTES NFR BLD: 21.1 %
MAGNESIUM SERPL-MCNC: 2.1 MG/DL (ref 1.8–2.4)
MCH RBC QN AUTO: 27.1 PG (ref 26–34)
MCHC RBC AUTO-ENTMCNC: 32.9 G/DL (ref 31–36)
MCV RBC AUTO: 82.3 FL (ref 80–100)
MONOCYTES # BLD: 1.2 K/UL (ref 0–1.3)
MONOCYTES NFR BLD: 15.1 %
NEUTROPHILS # BLD: 4.8 K/UL (ref 1.7–7.7)
NEUTROPHILS NFR BLD: 59.9 %
PERFORMED ON: ABNORMAL
PHOSPHATE SERPL-MCNC: 4.5 MG/DL (ref 2.5–4.9)
PLATELET # BLD AUTO: 205 K/UL (ref 135–450)
PMV BLD AUTO: 9.2 FL (ref 5–10.5)
POTASSIUM SERPL-SCNC: 4.2 MMOL/L (ref 3.5–5.1)
PROT SERPL-MCNC: 4.5 G/DL (ref 6.4–8.2)
RBC # BLD AUTO: 2.68 M/UL (ref 4–5.2)
SODIUM SERPL-SCNC: 137 MMOL/L (ref 136–145)
SPE/IFE INTERPRETATION: NORMAL
WBC # BLD AUTO: 8 K/UL (ref 4–11)

## 2024-10-04 PROCEDURE — 6370000000 HC RX 637 (ALT 250 FOR IP): Performed by: INTERNAL MEDICINE

## 2024-10-04 PROCEDURE — 2700000000 HC OXYGEN THERAPY PER DAY

## 2024-10-04 PROCEDURE — 3700000001 HC ADD 15 MINUTES (ANESTHESIA): Performed by: INTERNAL MEDICINE

## 2024-10-04 PROCEDURE — 3609027000 HC COLONOSCOPY: Performed by: INTERNAL MEDICINE

## 2024-10-04 PROCEDURE — 6370000000 HC RX 637 (ALT 250 FOR IP): Performed by: NURSE PRACTITIONER

## 2024-10-04 PROCEDURE — 36415 COLL VENOUS BLD VENIPUNCTURE: CPT

## 2024-10-04 PROCEDURE — 94761 N-INVAS EAR/PLS OXIMETRY MLT: CPT

## 2024-10-04 PROCEDURE — 2500000003 HC RX 250 WO HCPCS: Performed by: NURSE ANESTHETIST, CERTIFIED REGISTERED

## 2024-10-04 PROCEDURE — 1200000000 HC SEMI PRIVATE

## 2024-10-04 PROCEDURE — 2709999900 HC NON-CHARGEABLE SUPPLY: Performed by: INTERNAL MEDICINE

## 2024-10-04 PROCEDURE — 7100000001 HC PACU RECOVERY - ADDTL 15 MIN: Performed by: INTERNAL MEDICINE

## 2024-10-04 PROCEDURE — 71045 X-RAY EXAM CHEST 1 VIEW: CPT

## 2024-10-04 PROCEDURE — 2580000003 HC RX 258: Performed by: INTERNAL MEDICINE

## 2024-10-04 PROCEDURE — 6360000002 HC RX W HCPCS: Performed by: NURSE ANESTHETIST, CERTIFIED REGISTERED

## 2024-10-04 PROCEDURE — 85025 COMPLETE CBC W/AUTO DIFF WBC: CPT

## 2024-10-04 PROCEDURE — 0DJD8ZZ INSPECTION OF LOWER INTESTINAL TRACT, VIA NATURAL OR ARTIFICIAL OPENING ENDOSCOPIC: ICD-10-PCS | Performed by: INTERNAL MEDICINE

## 2024-10-04 PROCEDURE — 94640 AIRWAY INHALATION TREATMENT: CPT

## 2024-10-04 PROCEDURE — 6360000002 HC RX W HCPCS: Performed by: STUDENT IN AN ORGANIZED HEALTH CARE EDUCATION/TRAINING PROGRAM

## 2024-10-04 PROCEDURE — 83735 ASSAY OF MAGNESIUM: CPT

## 2024-10-04 PROCEDURE — 80069 RENAL FUNCTION PANEL: CPT

## 2024-10-04 PROCEDURE — 3700000000 HC ANESTHESIA ATTENDED CARE: Performed by: INTERNAL MEDICINE

## 2024-10-04 PROCEDURE — 7100000000 HC PACU RECOVERY - FIRST 15 MIN: Performed by: INTERNAL MEDICINE

## 2024-10-04 RX ORDER — ROSUVASTATIN CALCIUM 20 MG/1
20 TABLET, COATED ORAL NIGHTLY
Qty: 30 TABLET | Refills: 3 | Status: SHIPPED | OUTPATIENT
Start: 2024-10-04

## 2024-10-04 RX ORDER — SODIUM BICARBONATE 650 MG/1
1300 TABLET ORAL 3 TIMES DAILY
Qty: 180 TABLET | Refills: 0 | Status: SHIPPED | OUTPATIENT
Start: 2024-10-04 | End: 2024-10-11 | Stop reason: HOSPADM

## 2024-10-04 RX ORDER — FUROSEMIDE 10 MG/ML
40 INJECTION INTRAMUSCULAR; INTRAVENOUS DAILY
Status: DISCONTINUED | OUTPATIENT
Start: 2024-10-04 | End: 2024-10-07

## 2024-10-04 RX ORDER — INSULIN GLARGINE 100 [IU]/ML
10 INJECTION, SOLUTION SUBCUTANEOUS NIGHTLY
Qty: 5 ADJUSTABLE DOSE PRE-FILLED PEN SYRINGE | Refills: 0 | Status: SHIPPED | OUTPATIENT
Start: 2024-10-04 | End: 2024-10-11

## 2024-10-04 RX ORDER — IPRATROPIUM BROMIDE AND ALBUTEROL SULFATE 2.5; .5 MG/3ML; MG/3ML
1 SOLUTION RESPIRATORY (INHALATION)
Status: DISCONTINUED | OUTPATIENT
Start: 2024-10-04 | End: 2024-10-05

## 2024-10-04 RX ORDER — FERROUS SULFATE 325(65) MG
325 TABLET ORAL
Qty: 30 TABLET | Refills: 5 | Status: SHIPPED | OUTPATIENT
Start: 2024-10-04 | End: 2024-10-11

## 2024-10-04 RX ORDER — NIFEDIPINE 60 MG/1
60 TABLET, EXTENDED RELEASE ORAL DAILY
Qty: 90 TABLET | Refills: 1 | Status: SHIPPED | OUTPATIENT
Start: 2024-10-04 | End: 2024-10-11

## 2024-10-04 RX ORDER — ONDANSETRON 2 MG/ML
INJECTION INTRAMUSCULAR; INTRAVENOUS
Status: DISCONTINUED | OUTPATIENT
Start: 2024-10-04 | End: 2024-10-04 | Stop reason: SDUPTHER

## 2024-10-04 RX ORDER — SPIRONOLACTONE 50 MG/1
50 TABLET, FILM COATED ORAL DAILY
Qty: 30 TABLET | Refills: 3 | Status: SHIPPED | OUTPATIENT
Start: 2024-10-04 | End: 2024-10-11 | Stop reason: HOSPADM

## 2024-10-04 RX ORDER — ASPIRIN 81 MG/1
81 TABLET ORAL DAILY
Qty: 90 TABLET | Refills: 3 | Status: SHIPPED | OUTPATIENT
Start: 2024-10-04

## 2024-10-04 RX ORDER — LIDOCAINE HYDROCHLORIDE 20 MG/ML
INJECTION, SOLUTION EPIDURAL; INFILTRATION; INTRACAUDAL; PERINEURAL
Status: DISCONTINUED | OUTPATIENT
Start: 2024-10-04 | End: 2024-10-04 | Stop reason: SDUPTHER

## 2024-10-04 RX ORDER — PROPOFOL 10 MG/ML
INJECTION, EMULSION INTRAVENOUS
Status: DISCONTINUED | OUTPATIENT
Start: 2024-10-04 | End: 2024-10-04 | Stop reason: SDUPTHER

## 2024-10-04 RX ORDER — CARVEDILOL 25 MG/1
25 TABLET ORAL 2 TIMES DAILY WITH MEALS
Qty: 60 TABLET | Refills: 3 | Status: SHIPPED | OUTPATIENT
Start: 2024-10-04

## 2024-10-04 RX ORDER — FUROSEMIDE 10 MG/ML
40 INJECTION INTRAMUSCULAR; INTRAVENOUS 2 TIMES DAILY
Status: DISCONTINUED | OUTPATIENT
Start: 2024-10-04 | End: 2024-10-04

## 2024-10-04 RX ADMIN — NIFEDIPINE 60 MG: 30 TABLET, FILM COATED, EXTENDED RELEASE ORAL at 20:28

## 2024-10-04 RX ADMIN — IPRATROPIUM BROMIDE AND ALBUTEROL SULFATE 1 DOSE: .5; 3 SOLUTION RESPIRATORY (INHALATION) at 23:15

## 2024-10-04 RX ADMIN — LIDOCAINE HYDROCHLORIDE 80 MG: 20 INJECTION, SOLUTION EPIDURAL; INFILTRATION; INTRACAUDAL; PERINEURAL at 08:59

## 2024-10-04 RX ADMIN — EMPAGLIFLOZIN 10 MG: 10 TABLET, FILM COATED ORAL at 11:01

## 2024-10-04 RX ADMIN — NIFEDIPINE 60 MG: 30 TABLET, FILM COATED, EXTENDED RELEASE ORAL at 11:01

## 2024-10-04 RX ADMIN — SPIRONOLACTONE 50 MG: 25 TABLET ORAL at 11:01

## 2024-10-04 RX ADMIN — FUROSEMIDE 40 MG: 10 INJECTION, SOLUTION INTRAMUSCULAR; INTRAVENOUS at 14:16

## 2024-10-04 RX ADMIN — SODIUM CHLORIDE, PRESERVATIVE FREE 10 ML: 5 INJECTION INTRAVENOUS at 20:27

## 2024-10-04 RX ADMIN — PEG-3350, SODIUM SULFATE, SODIUM CHLORIDE, POTASSIUM CHLORIDE, SODIUM ASCORBATE AND ASCORBIC ACID 100 G: KIT at 02:40

## 2024-10-04 RX ADMIN — ROSUVASTATIN 20 MG: 20 TABLET, FILM COATED ORAL at 20:28

## 2024-10-04 RX ADMIN — ONDANSETRON 4 MG: 2 INJECTION, SOLUTION INTRAMUSCULAR; INTRAVENOUS at 09:09

## 2024-10-04 RX ADMIN — PROPOFOL 130 MCG/KG/MIN: 10 INJECTION, EMULSION INTRAVENOUS at 09:00

## 2024-10-04 RX ADMIN — INSULIN GLARGINE 20 UNITS: 100 INJECTION, SOLUTION SUBCUTANEOUS at 11:01

## 2024-10-04 RX ADMIN — CARVEDILOL 25 MG: 25 TABLET, FILM COATED ORAL at 17:08

## 2024-10-04 RX ADMIN — PROPOFOL 50 MG: 10 INJECTION, EMULSION INTRAVENOUS at 08:59

## 2024-10-04 RX ADMIN — SODIUM CHLORIDE, PRESERVATIVE FREE 10 ML: 5 INJECTION INTRAVENOUS at 11:02

## 2024-10-04 RX ADMIN — INSULIN LISPRO 1 UNITS: 100 INJECTION, SOLUTION INTRAVENOUS; SUBCUTANEOUS at 17:08

## 2024-10-04 RX ADMIN — Medication 2000 UNITS: at 11:01

## 2024-10-04 RX ADMIN — SODIUM BICARBONATE 1300 MG: 650 TABLET ORAL at 14:10

## 2024-10-04 RX ADMIN — SODIUM BICARBONATE 1300 MG: 650 TABLET ORAL at 20:27

## 2024-10-04 ASSESSMENT — PAIN - FUNCTIONAL ASSESSMENT: PAIN_FUNCTIONAL_ASSESSMENT: 0-10

## 2024-10-04 ASSESSMENT — ENCOUNTER SYMPTOMS: SHORTNESS OF BREATH: 1

## 2024-10-04 NOTE — BRIEF OP NOTE
Colon:     Sigmoid diverticulosis.   Otherwise normal colon to the TI.   Normal colored bowel effluent  Fair prep  Anemia likely anemia of chronic disease and kidney disease.       Ok for dc from gi perspective.       Yared Hansen MD  [facility] [place]

## 2024-10-04 NOTE — DISCHARGE INSTRUCTIONS
Follow up with Dr. Hartman at [address] [place] [address] at 2 pm if possible.      Please call and make an appointment with your PCP within 1 week; If you do not have a PCP please make an appointment with the [facility] outpatient resident clinic by calling [phone]  Please call and make an appointment with Nephrology  Please take all your medications as prescribed including any new ones on discharge

## 2024-10-04 NOTE — PROGRESS NOTES
[facility]  Diabetes Education   Progress Note       NAME:  Galina Nick  MEDICAL RECORD NUMBER:  [id number]  AGE: 58 y.o.   GENDER: female  : 1965  TODAY'S DATE:  10/4/2024    Followed up with pt for DM education.    Provided insulin pen demonstration. Pt performed practice injection into practice pad with minimal prompting.  Offered assistance with Dexcom G7 CGM system in case pt is discharged over the weekend. Pt has used one in the past and declines assistance at this time.    Pt states she met with dietitian today for carb control diet education.    Plan:  [facility] outpatient pharmacy meds-to-beds since pt qualifies for [facility] med program  Will follow up with pt next week if still hospitalized     Teaching Time Diabetes Education:  20 minutes     Electronically signed by STANFORD Morrell on 10/4/2024 at 2:12 PM

## 2024-10-04 NOTE — PROGRESS NOTES
[facility]   Nephrology progress Note  [phone]  [phone]   [email or web address]           Reason for Consult:  HTN     The patient is a 58 y.o. female with significant past medical history of hypertension, T2DM, CKD, CAD, hyperlipidemia, mitral regurgitation, renal artery stenosis, presented to the ER with facial swelling.  This started as tingling sensation around the nose and the mouth and then subsequently she developed a rash in the center of her face.  The midface became swollen and red and hurt lips became large especially the upper lip.  She had no swelling of the tongue, there was no problems with breathing, and there was no generalized rash over the body.  She had a flu vaccine a week ago and previously had COVID in the last 4 weeks. Of note she was on ARB Diovan for blood pressure control as well as antiproteinuric effect. She had had used ACE inhibitor's maybe 20 years ago and they had caused a dry cough necessitating stoppage.  She never had angioedema with them.     Interval History:    9/26/24: facial swelling is down   9/27/24: no swelling mid face , lips feel better   9/28/24: feeling overall better  9/29/24: Refused to take Labetalol yesterday 9/28/24 9/30/24 BP is stable in 140-150s. D/w her changes in BP medicine planned today. Drink more fluids.   10/1: -150s . Scr 2.5  aldactone 25 started   10/2: on miVF , scr 2.7 . -150 range   10/3 - off mivf . Scr 2.9 , inc aldactone to 100 y day   10/4/2024-more short of breath this afternoon, agree with Lasix IV.  On Aldactone 50.  SCR 2.8 now.  Blood pressure variable.  On nasal cannula this afternoon.  Chest x-ray pulmonary edema diminished breath  PHYSICAL EXAM:    Vitals:    /71   Pulse 74   Temp 96.9 °F (36.1 °C) (Infrared)   Resp 15   Ht 1.727 m (5' 7.99\")   Wt 102 kg (224 lb 13.9 oz)   LMP 06/01/2011   SpO2 94%   BMI 34.20 kg/m²       Intake/Output Summary (Last 24 hours) at 10/4/2024 1455  Last  stage IIIb with nephrotic range proteinuria  Sees Dr. MAVIS rodriguez  Creatinine baseline was 1.9-2.2   Scr this admission has been in mid to high 2 range , which seems to be new baseline.  Renal usg LK 10.8, RK 11 cm , many cysts b/lat  on 1/24   Likely diabetic and HTN nephropathy  UPCR 6.2 in 1/24 > 6.5 in 9/24   UACR 5.5 GM IN 7/24 > 4.1 gm/gm 9/24  In past ABI was negative, C3 normal at 145, C4 normal at 35  UPEP unremarkable in past   S/p IVF   plan  -ARB on hold given allergic rx   -Once stable from respiratory standpoint, okay for discharge from renal standpoint with Aldactone 50, Jardiance 10.      Acute on chronic ? Diastolic CHF   CXR pulm edema  -agree with lasix 40 iv bid       Nephrotic range proteinuria   UPCR 6.2 in 1/24 > 6.5 in 9/24   UACR 5.5 GM IN 7/24 > 4.1 gm/gm 9/24  In past ABI was negative, C3 normal at 145, C4 normal at 35  UPEP unremarkable in past   on jardiance 10 started this admission for  proteinuria   On Aldactone 50 started this admission  Off ARB due to angioedema  Albumin 2.7> 2.2  Plan:  Continue aldactone 50 mg   C/w  jardiance 10 mg   ARB dced as angioedema   Labs as above pending     IDDM  ARB on hold   A1c 10.2, poorly controlled  on jardiance 10 started this admission per endocrine    Insulin     Hypertension -   now BP in 140-150 systolic range   was on chlorthalidone 25, valsartan 320, , nifedipine 60, toprol 50 PTA   Now on nifedipine 60 BID ,  coreg 25 bid, aldactone 50  Renin 0.4, erika 3.4 in 1/24   Hx of ANN b/lat on USG  but CTA AP showed no stenosis   plan  -c/w coreg 25 bid   - aldactone 50   -nifedipine to 30 bid.     Facial swelling - ? Angioedema   Likely due to use of ARB (being used for hypertension and proteinuria)  -ARB on hold     CKD-MBD         Phosphorus at target    Anemia  S/P IV iron & on weekly JUN  Hgb 11 range > 7-8 range now  ? Dilutional   EGD, colonoscopy this admission unremarkable.    Acidosis    continue oral sodium bicarbonate      Thank you for

## 2024-10-04 NOTE — PROGRESS NOTES
Kim RN at bedside, transporting pt to endoscopy.     1000: Pt back to unit. Vitals obtained by student nurse. Pt remains resting in bed, no needs expressed. Call light in reach, fall precautions in place.

## 2024-10-04 NOTE — ANESTHESIA PRE PROCEDURE
Department of Anesthesiology  Preprocedure Note       Name:  Galina Nick   Age:  58 y.o.  :  1965                                          MRN:  [id number]         Date:  10/4/2024      Surgeon: Surgeon(s):  Yared Hansen MD    Procedure: Procedure(s):  COLONOSCOPY DIAGNOSTIC    Medications prior to admission:   Prior to Admission medications    Medication Sig Start Date End Date Taking? Authorizing Provider   ferrous sulfate (IRON 325) 325 (65 Fe) MG tablet Take 1 tablet by mouth daily (with breakfast) 24  Yes Jennifer Schwartz MD   empagliflozin (JARDIANCE) 10 MG tablet Take 1 tablet by mouth daily 10/1/24  Yes Jennifer Schwartz MD   Continuous Glucose Sensor (DEXCOM G7 SENSOR) MISC 1 each by Does not apply route every 10 days 24  Yes Jennifer Schwartz MD   Blood Glucose Monitoring Suppl (BLOOD GLUCOSE MONITOR SYSTEM) w/Device KIT 1 each by Does not apply route once for 1 dose 24 Yes Jennifer Schwartz MD   blood glucose monitor strips 1 strip by Other route 4 times daily (before meals and nightly) ICD code- E11.65 24  Yes Jennifer Schwartz MD   Lancets MISC 1 each by Does not apply route 4 times daily (before meals and nightly) 24  Yes Jennifer Schwartz MD   NIFEdipine (PROCARDIA XL) 60 MG extended release tablet Take 1 tablet by mouth daily 24  Yes Timo Gamez MD   rosuvastatin (CRESTOR) 20 MG tablet Take 2 tablets by mouth daily  Patient taking differently: Take 1 tablet by mouth nightly 24  Yes Juani Sweet APRN - CNP   chlorthalidone (HYGROTON) 25 MG tablet Take 1 tablet by mouth daily 5/10/24  Yes Juani Sweet APRN - CNP   aspirin 81 MG EC tablet Take 1 tablet by mouth daily  Patient taking differently: Take 1 tablet by mouth nightly 24  Yes Timo Gamez MD   Dulaglutide (TRULICITY) 0.75 MG/0.5ML SOPN INJECT 0.75 MG UNDER THE SKIN ONCE WEEKLY  Patient taking differently: Inject 0.5 mLs into the skin once a week INJECT 0.75 MG UNDER

## 2024-10-04 NOTE — DISCHARGE INSTR - COC
Continuity of Care Form    Patient Name: Galina Nick   :  1965  MRN:  [id number]    Admit date:  2024  Discharge date:  ***    Code Status Order: Full Code   Advance Directives:   Advance Care Flowsheet Documentation        Date/Time Healthcare Directive Type of Healthcare Directive Copy in Chart Healthcare Agent Appointed Healthcare Agent's Name Healthcare Agent's Phone Number    10/04/24 0827 No, patient does not have an advance directive for healthcare treatment  --  --  --  --  --     10/03/24 0849 No, patient does not have an advance directive for healthcare treatment  --  --  --  --  --                     Admitting Physician:  Jennifer Schwartz MD  PCP: Tristan Lei MD    Discharging Nurse: ***  Discharging Hospital Unit/Room#: 3AN-3320/3320-01  Discharging Unit Phone Number: ***    Emergency Contact:   Extended Emergency Contact Information  Primary Emergency Contact: Terri Nick           [place], [place] [address] [place] [address]  Home Phone: [phone]  Relation: Brother/Sister  Secondary Emergency Contact: Ebony Pitt  Home Phone: [phone]  Work Phone: [phone]  Relation: Brother/Sister    Past Surgical History:  Past Surgical History:   Procedure Laterality Date    BREAST BIOPSY       benign     CARDIAC PROCEDURE N/A 2024    Left heart cath / coronary angiography performed by Rafael James MD at [facility] CARDIAC CATH LAB    COLONOSCOPY N/A 10/4/2024    COLONOSCOPY DIAGNOSTIC performed by Yared Hansen MD at [facility] ASC ENDOSCOPY    UPPER GASTROINTESTINAL ENDOSCOPY N/A 10/3/2024    ESOPHAGOGASTRODUODENOSCOPY DIAGNOSTIC ONLY performed by Yared Hansen MD at [facility] ASC ENDOSCOPY       Immunization History:   Immunization History   Administered Date(s) Administered    COVID-19, J&J, (age 18y+), IM, 0.5 mL 2021       Active Problems:  Patient Active Problem List   Diagnosis Code    Uncontrolled type 2 diabetes mellitus with complication, with  ml   Output 1200 ml   Net -800 ml     I/O last 3 completed shifts:  In: 10 [I.V.:10]  Out: 1200 [Urine:1200]    Safety Concerns:     { THA Safety Concerns:923017012}    Impairments/Disabilities:      { THA Impairments/Disabilities:184815308}    Nutrition Therapy:  Current Nutrition Therapy:   { THA Diet List:798363174}    Routes of Feeding: {[facility] DME Other Feedings:186614870}  Liquids: {Slp liquid thickness:11718}  Daily Fluid Restriction: {[facility] DME Yes amt example:188231552}  Last Modified Barium Swallow with Video (Video Swallowing Test): {Done Not Done Date:}    Treatments at the Time of Hospital Discharge:   Respiratory Treatments: ***  Oxygen Therapy:  {Therapy; copd oxygen:03696}  Ventilator:    { CC Vent List:217365819}    Rehab Therapies: {THERAPEUTIC INTERVENTION:8610686164}  Weight Bearing Status/Restrictions: {[facility] Weight Bearin}  Other Medical Equipment (for information only, NOT a DME order):  {EQUIPMENT:837897734}  Other Treatments: ***    Patient's personal belongings (please select all that are sent with patient):  {[facility] DME Belongings:526564396}    RN SIGNATURE:  {Esignature:668824030}    CASE MANAGEMENT/SOCIAL WORK SECTION    Inpatient Status Date: ***    Readmission Risk Assessment Score:  Readmission Risk              Risk of Unplanned Readmission:  34           Discharging to Facility/ Agency   Name:   Address:  Phone:  Fax:    Dialysis Facility (if applicable)   Name:  Address:  Dialysis Schedule:  Phone:  Fax:    / signature: {Esignature:403320989}    PHYSICIAN SECTION    Prognosis: {Prognosis:0019961310}    Condition at Discharge: { Patient Condition:777553119}    Rehab Potential (if transferring to Rehab): {Prognosis:6973026460}    Recommended Labs or Other Treatments After Discharge: ***    Physician Certification: I certify the above information and transfer of Galina Nick  is necessary for the continuing treatment of the diagnosis  listed and that she requires {Admit to Appropriate Level of Care:87242} for {GREATER/LESS:551215508} 30 days.     Update Admission H&P: {[facility]P DME Changes in HandP:992864795}    PHYSICIAN SIGNATURE:  {Esignature:120623777}

## 2024-10-04 NOTE — ANESTHESIA POSTPROCEDURE EVALUATION
Department of Anesthesiology  Postprocedure Note    Patient: Galina Nick  MRN: [id number]  YOB: 1965  Date of evaluation: 10/4/2024    Procedure Summary       Date: 10/04/24 Room / Location: [place] [address] / [facility]    Anesthesia Start: 0855 Anesthesia Stop: 0923    Procedure: COLONOSCOPY DIAGNOSTIC Diagnosis:       Anemia, unspecified type      (Anemia, unspecified type [D64.9])    Surgeons: Yared Hansen MD Responsible Provider: Teto Rasmussen MD    Anesthesia Type: MAC ASA Status: 4            Anesthesia Type: No value filed.    Tyron Phase I: Tyron Score: 9    Tyron Phase II:      Anesthesia Post Evaluation    Patient location during evaluation: PACU  Patient participation: complete - patient participated  Level of consciousness: awake and alert  Airway patency: patent  Nausea & Vomiting: no nausea and no vomiting  Cardiovascular status: hemodynamically stable  Respiratory status: acceptable  Hydration status: euvolemic  Multimodal analgesia pain management approach  Pain management: satisfactory to patient    No notable events documented.

## 2024-10-04 NOTE — PROGRESS NOTES
Endocrinology    Patient doing well with her blood sugars on the whole, only one > 200 mg/dl today.      Pulse 69  /67; Resp 16 Temp 99.1 deg F    Assessment:  Anemia, normochromic: fairly severe hemoglobin down to 7.4 and baseline was 13.4 on 1-8-2022 but down to 10.2 on 2-9-2024, 11.6 on 6-.  EGD was negative and colonoscopy pending.  Part of issue may be progressive renal insufficiency with decrease in erythropoeitin levels; serum creat is up to 2.9  Type 2 DM fair control on basal and bolus insulin  Recent angioedema, resolved    Plan:  Colonoscopy per GI  Continue effective basal and bolus insulin, may need less if renal function declines furhter.  Consider Epogen if not already using and if nephrology agrees.    LESLEY Hartman M.D.

## 2024-10-04 NOTE — PROGRESS NOTES
Shift assessment completed, vital signs obtained and ordered medications given. Patient given prep overnight for scheduled colonoscopy today. Patient tolerated prep and had several bowel movements. Patient assessed for comfort, linen change and supplies for miriam care given. Patient appears to rest comfortably in bed and does not appear in respiratory discomfort or distress. Patient AO x4 and denies additional nursing needs at this time. Call light and bedside commode within reach, wheels locked and in lowest position

## 2024-10-04 NOTE — CONSULTS
Nutrition Education    Pt triggered for consult on diabetes nutrition education. Provided carbohydrate counting diet education. Education included amount of carbohydrates allowed at meals and snacks, and label reading to identify carbohydrate content of food items. Pt reported familiar with what foods contain carbohydrates, tries to limit carbohydrate containing foods at home. Pt states understanding of education. This RD answered any questions and left name/number on handout should pt have any questions regarding materials provided.     Educated on 10/4  Learners: Patient  Readiness: Acceptance  Method: Explanation and Handout  Response: Verbalizes Understanding  Contact name and number provided.    Maura Thompson, MS, RD, LD  Contact Number: [phone]

## 2024-10-04 NOTE — PROGRESS NOTES
CLINICAL PHARMACY NOTE: MEDS TO BEDS    Total # of Prescriptions Filled: 10   The following medications were delivered to the patient:  SODIUM BICARBONATE 650MG TABS  B - DUF PEN NEED 3/16'' 31G (5MM)  LANTUS SOLOSTAR 100UNIT/ML  ROSUVASTATIN CALCIUM 20MG TABS  NIFEDIPINE ER OSMOTIC RELEA 60 TB24  SPIRONOLACTONE 50MG TABS  ASPIRIN LOW DOSE 81MG TBEC  JARDIANCE 10MG TABS  CARVEDILOL 25MG TABS  FEROSUL 325 (65 FE)MG TABS    Additional Documentation: Tamia COYNE approved to deliver medications to patient room=signed  Started a PA for [employer], key code [id number]  [facility] Tech

## 2024-10-05 LAB
ALBUMIN SERPL-MCNC: 2.6 G/DL (ref 3.4–5)
ANION GAP SERPL CALCULATED.3IONS-SCNC: 12 MMOL/L (ref 3–16)
ANTI-XA UNFRAC HEPARIN: <0.1 IU/ML (ref 0.3–0.7)
APTT BLD: 33 SEC (ref 22.1–36.4)
BASOPHILS # BLD: 0.1 K/UL (ref 0–0.2)
BASOPHILS NFR BLD: 0.6 %
BUN SERPL-MCNC: 23 MG/DL (ref 7–20)
CALCIUM SERPL-MCNC: 8 MG/DL (ref 8.3–10.6)
CHLORIDE SERPL-SCNC: 103 MMOL/L (ref 99–110)
CO2 SERPL-SCNC: 22 MMOL/L (ref 21–32)
CREAT SERPL-MCNC: 2.9 MG/DL (ref 0.6–1.1)
D-DIMER QUANTITATIVE: 2.04 UG/ML FEU (ref 0–0.6)
DEPRECATED RDW RBC AUTO: 13.9 % (ref 12.4–15.4)
EOSINOPHIL # BLD: 0.2 K/UL (ref 0–0.6)
EOSINOPHIL NFR BLD: 1.5 %
GFR SERPLBLD CREATININE-BSD FMLA CKD-EPI: 18 ML/MIN/{1.73_M2}
GLUCOSE BLD-MCNC: 129 MG/DL (ref 70–99)
GLUCOSE BLD-MCNC: 131 MG/DL (ref 70–99)
GLUCOSE BLD-MCNC: 169 MG/DL (ref 70–99)
GLUCOSE BLD-MCNC: 240 MG/DL (ref 70–99)
GLUCOSE SERPL-MCNC: 161 MG/DL (ref 70–99)
HCT VFR BLD AUTO: 21.1 % (ref 36–48)
HCT VFR BLD AUTO: 23.1 % (ref 36–48)
HGB BLD-MCNC: 7.1 G/DL (ref 12–16)
HGB BLD-MCNC: 7.8 G/DL (ref 12–16)
LYMPHOCYTES # BLD: 1.2 K/UL (ref 1–5.1)
LYMPHOCYTES NFR BLD: 12 %
MAGNESIUM SERPL-MCNC: 1.8 MG/DL (ref 1.8–2.4)
MCH RBC QN AUTO: 27.4 PG (ref 26–34)
MCHC RBC AUTO-ENTMCNC: 33.5 G/DL (ref 31–36)
MCV RBC AUTO: 81.7 FL (ref 80–100)
MONOCYTES # BLD: 1.4 K/UL (ref 0–1.3)
MONOCYTES NFR BLD: 13.8 %
NEUTROPHILS # BLD: 7.3 K/UL (ref 1.7–7.7)
NEUTROPHILS NFR BLD: 72.1 %
NT-PROBNP SERPL-MCNC: 7264 PG/ML (ref 0–124)
PERFORMED ON: ABNORMAL
PHOSPHATE SERPL-MCNC: 3.9 MG/DL (ref 2.5–4.9)
PLATELET # BLD AUTO: 201 K/UL (ref 135–450)
PMV BLD AUTO: 9.2 FL (ref 5–10.5)
POTASSIUM SERPL-SCNC: 4 MMOL/L (ref 3.5–5.1)
RBC # BLD AUTO: 2.58 M/UL (ref 4–5.2)
SODIUM SERPL-SCNC: 137 MMOL/L (ref 136–145)
WBC # BLD AUTO: 10.2 K/UL (ref 4–11)

## 2024-10-05 PROCEDURE — 2580000003 HC RX 258: Performed by: INTERNAL MEDICINE

## 2024-10-05 PROCEDURE — P9047 ALBUMIN (HUMAN), 25%, 50ML: HCPCS | Performed by: STUDENT IN AN ORGANIZED HEALTH CARE EDUCATION/TRAINING PROGRAM

## 2024-10-05 PROCEDURE — 6370000000 HC RX 637 (ALT 250 FOR IP): Performed by: INTERNAL MEDICINE

## 2024-10-05 PROCEDURE — 83735 ASSAY OF MAGNESIUM: CPT

## 2024-10-05 PROCEDURE — 6370000000 HC RX 637 (ALT 250 FOR IP)

## 2024-10-05 PROCEDURE — 94761 N-INVAS EAR/PLS OXIMETRY MLT: CPT

## 2024-10-05 PROCEDURE — 6360000002 HC RX W HCPCS: Performed by: NURSE PRACTITIONER

## 2024-10-05 PROCEDURE — 85520 HEPARIN ASSAY: CPT

## 2024-10-05 PROCEDURE — 85379 FIBRIN DEGRADATION QUANT: CPT

## 2024-10-05 PROCEDURE — 94640 AIRWAY INHALATION TREATMENT: CPT

## 2024-10-05 PROCEDURE — 83880 ASSAY OF NATRIURETIC PEPTIDE: CPT

## 2024-10-05 PROCEDURE — 1200000000 HC SEMI PRIVATE

## 2024-10-05 PROCEDURE — 6370000000 HC RX 637 (ALT 250 FOR IP): Performed by: STUDENT IN AN ORGANIZED HEALTH CARE EDUCATION/TRAINING PROGRAM

## 2024-10-05 PROCEDURE — 85014 HEMATOCRIT: CPT

## 2024-10-05 PROCEDURE — 85730 THROMBOPLASTIN TIME PARTIAL: CPT

## 2024-10-05 PROCEDURE — 2700000000 HC OXYGEN THERAPY PER DAY

## 2024-10-05 PROCEDURE — 85025 COMPLETE CBC W/AUTO DIFF WBC: CPT

## 2024-10-05 PROCEDURE — 36415 COLL VENOUS BLD VENIPUNCTURE: CPT

## 2024-10-05 PROCEDURE — 6360000002 HC RX W HCPCS: Performed by: STUDENT IN AN ORGANIZED HEALTH CARE EDUCATION/TRAINING PROGRAM

## 2024-10-05 PROCEDURE — 80069 RENAL FUNCTION PANEL: CPT

## 2024-10-05 PROCEDURE — 85018 HEMOGLOBIN: CPT

## 2024-10-05 PROCEDURE — 5A0935A ASSISTANCE WITH RESPIRATORY VENTILATION, LESS THAN 24 CONSECUTIVE HOURS, HIGH NASAL FLOW/VELOCITY: ICD-10-PCS | Performed by: STUDENT IN AN ORGANIZED HEALTH CARE EDUCATION/TRAINING PROGRAM

## 2024-10-05 RX ORDER — HEPARIN SODIUM 1000 [USP'U]/ML
80 INJECTION, SOLUTION INTRAVENOUS; SUBCUTANEOUS ONCE
Status: DISCONTINUED | OUTPATIENT
Start: 2024-10-05 | End: 2024-10-05

## 2024-10-05 RX ORDER — IPRATROPIUM BROMIDE AND ALBUTEROL SULFATE 2.5; .5 MG/3ML; MG/3ML
1 SOLUTION RESPIRATORY (INHALATION) EVERY 4 HOURS PRN
Status: DISCONTINUED | OUTPATIENT
Start: 2024-10-05 | End: 2024-10-11 | Stop reason: HOSPADM

## 2024-10-05 RX ORDER — IPRATROPIUM BROMIDE AND ALBUTEROL SULFATE 2.5; .5 MG/3ML; MG/3ML
SOLUTION RESPIRATORY (INHALATION)
Status: COMPLETED
Start: 2024-10-05 | End: 2024-10-05

## 2024-10-05 RX ORDER — FUROSEMIDE 10 MG/ML
20 INJECTION INTRAMUSCULAR; INTRAVENOUS ONCE
Status: COMPLETED | OUTPATIENT
Start: 2024-10-05 | End: 2024-10-05

## 2024-10-05 RX ORDER — HEPARIN SODIUM 10000 [USP'U]/100ML
5-30 INJECTION, SOLUTION INTRAVENOUS CONTINUOUS
Status: DISCONTINUED | OUTPATIENT
Start: 2024-10-05 | End: 2024-10-07

## 2024-10-05 RX ORDER — ALBUMIN (HUMAN) 12.5 G/50ML
25 SOLUTION INTRAVENOUS ONCE
Status: DISCONTINUED | OUTPATIENT
Start: 2024-10-05 | End: 2024-10-05

## 2024-10-05 RX ORDER — HEPARIN SODIUM 1000 [USP'U]/ML
80 INJECTION, SOLUTION INTRAVENOUS; SUBCUTANEOUS PRN
Status: DISCONTINUED | OUTPATIENT
Start: 2024-10-05 | End: 2024-10-07

## 2024-10-05 RX ORDER — INSULIN LISPRO 100 [IU]/ML
4 INJECTION, SOLUTION INTRAVENOUS; SUBCUTANEOUS
Status: DISCONTINUED | OUTPATIENT
Start: 2024-10-06 | End: 2024-10-06

## 2024-10-05 RX ORDER — HEPARIN SODIUM 10000 [USP'U]/100ML
5-30 INJECTION, SOLUTION INTRAVENOUS CONTINUOUS
Status: DISCONTINUED | OUTPATIENT
Start: 2024-10-05 | End: 2024-10-05

## 2024-10-05 RX ORDER — HEPARIN SODIUM 1000 [USP'U]/ML
40 INJECTION, SOLUTION INTRAVENOUS; SUBCUTANEOUS PRN
Status: DISCONTINUED | OUTPATIENT
Start: 2024-10-05 | End: 2024-10-07

## 2024-10-05 RX ORDER — ALBUMIN (HUMAN) 12.5 G/50ML
25 SOLUTION INTRAVENOUS ONCE
Status: COMPLETED | OUTPATIENT
Start: 2024-10-05 | End: 2024-10-05

## 2024-10-05 RX ORDER — HEPARIN SODIUM 1000 [USP'U]/ML
80 INJECTION, SOLUTION INTRAVENOUS; SUBCUTANEOUS ONCE
Status: COMPLETED | OUTPATIENT
Start: 2024-10-05 | End: 2024-10-05

## 2024-10-05 RX ORDER — INSULIN GLARGINE 100 [IU]/ML
24 INJECTION, SOLUTION SUBCUTANEOUS DAILY
Status: DISCONTINUED | OUTPATIENT
Start: 2024-10-06 | End: 2024-10-06

## 2024-10-05 RX ORDER — IPRATROPIUM BROMIDE AND ALBUTEROL SULFATE 2.5; .5 MG/3ML; MG/3ML
1 SOLUTION RESPIRATORY (INHALATION)
Status: DISCONTINUED | OUTPATIENT
Start: 2024-10-05 | End: 2024-10-09

## 2024-10-05 RX ORDER — ALBUMIN (HUMAN) 12.5 G/50ML
25 SOLUTION INTRAVENOUS ONCE
Status: DISCONTINUED | OUTPATIENT
Start: 2024-10-05 | End: 2024-10-05 | Stop reason: SDUPTHER

## 2024-10-05 RX ADMIN — FUROSEMIDE 40 MG: 10 INJECTION, SOLUTION INTRAMUSCULAR; INTRAVENOUS at 07:33

## 2024-10-05 RX ADMIN — INSULIN GLARGINE 20 UNITS: 100 INJECTION, SOLUTION SUBCUTANEOUS at 07:33

## 2024-10-05 RX ADMIN — SODIUM BICARBONATE 1300 MG: 650 TABLET ORAL at 13:59

## 2024-10-05 RX ADMIN — IPRATROPIUM BROMIDE AND ALBUTEROL SULFATE 1 DOSE: .5; 3 SOLUTION RESPIRATORY (INHALATION) at 08:35

## 2024-10-05 RX ADMIN — NIFEDIPINE 60 MG: 30 TABLET, FILM COATED, EXTENDED RELEASE ORAL at 22:07

## 2024-10-05 RX ADMIN — ALBUMIN (HUMAN) 25 G: 0.25 INJECTION, SOLUTION INTRAVENOUS at 19:57

## 2024-10-05 RX ADMIN — IPRATROPIUM BROMIDE AND ALBUTEROL SULFATE 1 DOSE: .5; 3 SOLUTION RESPIRATORY (INHALATION) at 22:02

## 2024-10-05 RX ADMIN — Medication 2000 UNITS: at 07:33

## 2024-10-05 RX ADMIN — SODIUM CHLORIDE, PRESERVATIVE FREE 10 ML: 5 INJECTION INTRAVENOUS at 07:32

## 2024-10-05 RX ADMIN — EMPAGLIFLOZIN 10 MG: 10 TABLET, FILM COATED ORAL at 07:33

## 2024-10-05 RX ADMIN — HEPARIN SODIUM 18 UNITS/KG/HR: 10000 INJECTION, SOLUTION INTRAVENOUS at 23:14

## 2024-10-05 RX ADMIN — IPRATROPIUM BROMIDE AND ALBUTEROL SULFATE 1 DOSE: .5; 3 SOLUTION RESPIRATORY (INHALATION) at 13:45

## 2024-10-05 RX ADMIN — SODIUM BICARBONATE 1300 MG: 650 TABLET ORAL at 22:07

## 2024-10-05 RX ADMIN — SODIUM CHLORIDE, PRESERVATIVE FREE 10 ML: 5 INJECTION INTRAVENOUS at 01:15

## 2024-10-05 RX ADMIN — NIFEDIPINE 60 MG: 30 TABLET, FILM COATED, EXTENDED RELEASE ORAL at 07:33

## 2024-10-05 RX ADMIN — ROSUVASTATIN 20 MG: 20 TABLET, FILM COATED ORAL at 22:07

## 2024-10-05 RX ADMIN — FUROSEMIDE 20 MG: 10 INJECTION, SOLUTION INTRAMUSCULAR; INTRAVENOUS at 01:16

## 2024-10-05 RX ADMIN — CARVEDILOL 25 MG: 25 TABLET, FILM COATED ORAL at 17:26

## 2024-10-05 RX ADMIN — ALBUMIN (HUMAN) 12.5 G: 0.25 INJECTION, SOLUTION INTRAVENOUS at 22:07

## 2024-10-05 RX ADMIN — INSULIN LISPRO 1 UNITS: 100 INJECTION, SOLUTION INTRAVENOUS; SUBCUTANEOUS at 11:39

## 2024-10-05 RX ADMIN — IPRATROPIUM BROMIDE AND ALBUTEROL SULFATE 1 DOSE: .5; 3 SOLUTION RESPIRATORY (INHALATION) at 05:27

## 2024-10-05 RX ADMIN — CARVEDILOL 25 MG: 25 TABLET, FILM COATED ORAL at 07:33

## 2024-10-05 RX ADMIN — HEPARIN SODIUM 7800 UNITS: 1000 INJECTION INTRAVENOUS; SUBCUTANEOUS at 23:10

## 2024-10-05 RX ADMIN — SODIUM BICARBONATE 1300 MG: 650 TABLET ORAL at 07:33

## 2024-10-05 RX ADMIN — SPIRONOLACTONE 50 MG: 25 TABLET ORAL at 07:33

## 2024-10-05 RX ADMIN — SODIUM CHLORIDE, PRESERVATIVE FREE 10 ML: 5 INJECTION INTRAVENOUS at 21:00

## 2024-10-05 ASSESSMENT — PAIN DESCRIPTION - ORIENTATION: ORIENTATION: LEFT;UPPER

## 2024-10-05 ASSESSMENT — PAIN SCALES - GENERAL: PAINLEVEL_OUTOF10: 2

## 2024-10-05 ASSESSMENT — PAIN DESCRIPTION - PAIN TYPE: TYPE: ACUTE PAIN

## 2024-10-05 ASSESSMENT — PAIN DESCRIPTION - DESCRIPTORS: DESCRIPTORS: SORE

## 2024-10-05 ASSESSMENT — PAIN DESCRIPTION - FREQUENCY: FREQUENCY: INTERMITTENT

## 2024-10-05 ASSESSMENT — PAIN DESCRIPTION - LOCATION: LOCATION: ABDOMEN

## 2024-10-05 NOTE — RT PROTOCOL NOTE
RT Inhaler-Nebulizer Bronchodilator Protocol Note    There is a bronchodilator order in the chart from a provider indicating to follow the RT Bronchodilator Protocol and there is an “Initiate RT Inhaler-Nebulizer Bronchodilator Protocol” order as well (see protocol at bottom of note).    CXR Findings:  XR CHEST PORTABLE    Result Date: 10/5/2024  Interval worsening of bilateral airspace opacities with trace effusions. Findings may represent edema and/or infection.     XR CHEST PORTABLE    Result Date: 10/4/2024  Slightly increasing vascular prominence may reflect developing/worsening failure and basilar opacity suggesting atelectasis and potentially early effusions.       The findings from the last RT Protocol Assessment were as follows:   History Pulmonary Disease: None or smoker <15 pack years  Respiratory Pattern: Dyspnea on exertion or RR 21-25 bpm  Breath Sounds: Slightly diminished and/or crackles  Cough: Weak, productive  Indication for Bronchodilator Therapy:    Bronchodilator Assessment Score: 6    Aerosolized bronchodilator medication orders have been revised according to the RT Inhaler-Nebulizer Bronchodilator Protocol below.    Respiratory Therapist to perform RT Therapy Protocol Assessment initially then follow the protocol.  Repeat RT Therapy Protocol Assessment PRN for score 0-3 or on second treatment, BID, and PRN for scores above 3.    No Indications - adjust the frequency to every 6 hours PRN wheezing or bronchospasm, if no treatments needed after 48 hours then discontinue using Per Protocol order mode.     If indication present, adjust the RT bronchodilator orders based on the Bronchodilator Assessment Score as indicated below.  Use Inhaler orders unless patient has one or more of the following: on home nebulizer, not able to hold breath for 10 seconds, is not alert and oriented, cannot activate and use MDI correctly, or respiratory rate 25 breaths per minute or more, then use the equivalent

## 2024-10-05 NOTE — PROGRESS NOTES
Endocrinology Progress    Patient had colonoscopy and no bleeding site found, has tolerated well.  Still oxygen dependent.  Blood sugar over 200 mg/dl twice today    Pulse 103 Resp 18 Temp 99.3 deg F /78 oximetry 93% on 3.5 liters nasal canula    Assessment:  Type 2 DM with complication, probably nephropathy,   Anemia down to hgb 7.3 today, crit 22.1% MCV 82.3 is normal.  Started on erythropoeitin  Renal insufficiency Serum creat up to 2.8 mg/dl eGFR 19 or stage 4 CKD  Angioedema on admission due to ARB probably.  5.  Hypoxia likely secondary to anemia (exacerbated by phlebotomy) and decreased oxygen carrying capacity; she would prefer no transfusion if possible.  6. Diverticular disease on colonoscopy.    Plan:  May need home oxygen, especially if she fails a 6 min walk test prior to discharge.  Agree with Erythropoeitin  Needs slightly more insulin, needs further instruction in its use; we can provide in office and we are in tomorrow on Saturday with DM experienced Nursse Practitioner as well if she is discharged; can point in the direction of an insulin pump; she also seems interested in inhaled insulin which we can also show her how to use with samples and request for her.  May need further anemia work up as serum creatinine of 2.8 seems insufficient to fully exxplain the anemia.    LESLEY Hartman M.D.

## 2024-10-05 NOTE — PROGRESS NOTES
Admit Date: 9/24/2024  Diet: ADULT DIET; Regular; 4 carb choices (60 gm/meal); Low Sodium (2 gm); Low Potassium (Less than 3000 mg/day)    CC: Facial swelling    Interval history:   Overnight, there were no acute events. Patient's vitals remained stable    Patient was seen this morning. I discussed the plan of the day with her in detail. All questions were addressed and answered to patient's satisfaction.  Patient is still requiring a lot of oxygen.  She is on 7 L NC.  Patient denies fevers, chills, nausea, vomiting, chest pain, diarrhea, constipation, dysuria, urinary frequency or urgency.     Plan:     -Continue Lasix 40 mg IV daily as patient is very congested and has significant crackles on exam  -Strict I's and O's  -Continue to monitor creatinine while diuresing  -Wean off O2 as tolerated  -Continue to monitor hemoglobin trend, patient is okay with PRBC transfusion if needed    Assessment:   Galina Nick is a 58 y.o. female with PMH of hypertension, CAD, dyslipidemia, type 2 diabetes mellitus who was admitted with JAYY    JAYY on CKD 3b with metabolic acidosis:  Baseline SCr 1.9.  Peak SCr 3.1.    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    SCr trended down to 2.3 but trending up again.    Restarted IVF. Continue sodium bicarb.  Avoid nephrotoxins.  Monitor BMP.  Follow-up workup ordered per nephrology.    Fluid overload  Patient started to require a lot of oxygen.  CXR done showed bilateral pleural effusions that has been getting worse.      Uncontrolled hypertension:   In the ED, patient was hypertensive with blood pressure of 191/82.  Discontinued metoprolol, diuretics and ARB.  Monitor on nifedipine and Coreg.     Angioedema involving the face: Improved  Likely secondary to Diovan use; discontinued.    Received Benadryl 25 mg, Pepcid 20 mg, Solu-Medrol 125 mg in the ED.  No further doses of steroids received due to worsening hyperglycemia.  C4 complement elevated.        T2DM not on long-term

## 2024-10-05 NOTE — PROGRESS NOTES
Pt transferred to [facility] with pt belongings. Outpatient pharmacy scripts placed in [employer].

## 2024-10-05 NOTE — PROGRESS NOTES
Pt is transferred from 3A..   AXO X4. Vitals monitored and recorded. Call light within the reach. Bed alarm on.   6:25 PM Waiting for PICC nurse for Peripheral IV.

## 2024-10-05 NOTE — PROGRESS NOTES
STAT CXR  and breathing treatment ordered. Patient sat 93% on 6L high flow after breathing treatment.

## 2024-10-05 NOTE — RT PROTOCOL NOTE
RT Inhaler-Nebulizer Bronchodilator Protocol Note    There is a bronchodilator order in the chart from a provider indicating to follow the RT Bronchodilator Protocol and there is an “Initiate RT Inhaler-Nebulizer Bronchodilator Protocol” order as well (see protocol at bottom of note).    CXR Findings:  XR CHEST PORTABLE    Result Date: 10/5/2024  Interval worsening of bilateral airspace opacities with trace effusions. Findings may represent edema and/or infection.     XR CHEST PORTABLE    Result Date: 10/4/2024  Slightly increasing vascular prominence may reflect developing/worsening failure and basilar opacity suggesting atelectasis and potentially early effusions.       The findings from the last RT Protocol Assessment were as follows:   History Pulmonary Disease: None or smoker <15 pack years  Respiratory Pattern: Dyspnea on exertion or RR 21-25 bpm  Breath Sounds: Slightly diminished and/or crackles  Cough: Weak, productive  Indication for Bronchodilator Therapy:    Bronchodilator Assessment Score: 6    Aerosolized bronchodilator medication orders have been revised according to the RT Inhaler-Nebulizer Bronchodilator Protocol below.    Respiratory Therapist to perform RT Therapy Protocol Assessment initially then follow the protocol.  Repeat RT Therapy Protocol Assessment PRN for score 0-3 or on second treatment, BID, and PRN for scores above 3.    No Indications - adjust the frequency to every 6 hours PRN wheezing or bronchospasm, if no treatments needed after 48 hours then discontinue using Per Protocol order mode.     If indication present, adjust the RT bronchodilator orders based on the Bronchodilator Assessment Score as indicated below.  Use Inhaler orders unless patient has one or more of the following: on home nebulizer, not able to hold breath for 10 seconds, is not alert and oriented, cannot activate and use MDI correctly, or respiratory rate 25 breaths per minute or more, then use the equivalent  nebulizer order(s) with same Frequency and PRN reasons based on the score.  If a patient is on this medication at home then do not decrease Frequency below that used at home.    0-3 - enter or revise RT bronchodilator order(s) to equivalent RT Bronchodilator order with Frequency of every 4 hours PRN for wheezing or increased work of breathing using Per Protocol order mode.        4-6 - enter or revise RT Bronchodilator order(s) to two equivalent RT bronchodilator orders with one order with BID Frequency and one order with Frequency of every 4 hours PRN wheezing or increased work of breathing using Per Protocol order mode.        7-10 - enter or revise RT Bronchodilator order(s) to two equivalent RT bronchodilator orders with one order with TID Frequency and one order with Frequency of every 4 hours PRN wheezing or increased work of breathing using Per Protocol order mode.       11-13 - enter or revise RT Bronchodilator order(s) to one equivalent RT bronchodilator order with QID Frequency and an Albuterol order with Frequency of every 4 hours PRN wheezing or increased work of breathing using Per Protocol order mode.      Greater than 13 - enter or revise RT Bronchodilator order(s) to one equivalent RT bronchodilator order with every 4 hours Frequency and an Albuterol order with Frequency of every 2 hours PRN wheezing or increased work of breathing using Per Protocol order mode.     RT to enter RT Home Evaluation for COPD & MDI Assessment order using Per Protocol order mode.    Electronically signed by Ansuhka Degroot RCP on 10/5/2024 at 3:00 AM

## 2024-10-05 NOTE — PROGRESS NOTES
Pt reporting edema in L breast, MD notified and came to bedside to assess. LUE also remains slightly swollen from PIV infilitration from 9/30. Per pt, swelling has much improved, denies tenderness, no redness observed but limb feels warm. Order for albumin acknowledged, attempted to administer, PIV leaking, removed without complication.  Pt states she is a hard stick, previous PIVs placed by ultrasound. DIT called for assistance. Pt weaned to 4L, SpO2 remains >90%. No needs expressed by pt at this time.

## 2024-10-05 NOTE — PROGRESS NOTES
Visit us at [employer] or call us at [phone] [place] [address]    NEPHROLOGY PROGRESS NOTE    Patient: Galina Nick MRN: [id number]     YOB: 1965  Age: 58 y.o.  Sex: female    Unit: [facility] 3A  NURSING Room/Bed: [id number]/3320-01 Location: [facility]     Admitting Physician: DONY THORPE    Primary Care Physician: Tristan Lei MD          LOS: 9 days       Reason for evaluation:   CKD4      SUBJECTIVE:      The patient was seen and examined. Notes and labs reviewed.  There were no complications over night.    Patient's review of systems: comfortable, SOB improved.       OBJECTIVE:     Vitals:    10/05/24 0723 10/05/24 0727 10/05/24 0737 10/05/24 0835   BP: 127/70      Pulse: 73   73   Resp: 15   16   Temp: 99.2 °F (37.3 °C)      TempSrc: Oral      SpO2: 93% 95% 92% 96%   Weight:       Height:           Intake and Output:      Intake/Output Summary (Last 24 hours) at 10/5/2024 1020  Last data filed at 10/5/2024 0932  Gross per 24 hour   Intake 600 ml   Output --   Net 600 ml       Continuous Infusions:   sodium chloride Stopped (09/28/24 1727)    dextrose         Current meds:   ipratropium 0.5 mg-albuterol 2.5 mg  1 Dose Inhalation TID RT    furosemide  40 mg IntraVENous Daily    spironolactone  50 mg Oral Daily    NIFEdipine  60 mg Oral BID    empagliflozin  10 mg Oral Daily    sodium bicarbonate  1,300 mg Oral TID    insulin glargine  20 Units SubCUTAneous Daily    Vitamin D  2,000 Units Oral Daily    carvedilol  25 mg Oral BID WC    insulin lispro  0-4 Units SubCUTAneous TID WC    insulin lispro  0-4 Units SubCUTAneous Nightly    epoetin raisa-epbx  10,000 Units SubCUTAneous Weekly    [Held by provider] heparin (porcine)  5,000 Units SubCUTAneous 3 times per day    rosuvastatin  20 mg Oral Nightly    sodium chloride flush  5-40 mL IntraVENous 2 times per day        Exam:   CONSTITUTIONAL/PSYCHIATRY: awake, alert and oriented x3. Not in acute distress   EYES: Conjunctivae:

## 2024-10-05 NOTE — PROGRESS NOTES
Shift assessment completed. Routine vitals obtained, weaned to 7L HFNC, spo2 >90%. Scheduled medications given. Patient is awake, alert and oriented. Respirations are easy and unlabored, does report WONG. Patient does not appear to be in distress, resting comfortably at this time. No needs expressed. Assisted to bathroom and returned to bed safely, steady gait observed, oxygen remained >90% on ambulation. POC discussed, pt agreeable. IS encouraged. Call light within reach. Fall precautions in place.

## 2024-10-05 NOTE — PROGRESS NOTES
Patients head to toe assessment completed. Vital signs WNL.  call light within reach. Scheduled medications given per MAR. Patient denies any pain at the moment. Patient resting in bed.

## 2024-10-05 NOTE — PROGRESS NOTES
Pt reporting rib pain when coughing. Declines intervention. MD notified. D-dimer ordered. Pt weaned to 3L NC, SpO2 89-93%. IS encouraged. No needs expressed.

## 2024-10-06 LAB
ABO + RH BLD: NORMAL
ALBUMIN SERPL-MCNC: 3 G/DL (ref 3.4–5)
ANION GAP SERPL CALCULATED.3IONS-SCNC: 16 MMOL/L (ref 3–16)
ANTI-XA UNFRAC HEPARIN: 0.29 IU/ML (ref 0.3–0.7)
ANTI-XA UNFRAC HEPARIN: 0.41 IU/ML (ref 0.3–0.7)
ANTI-XA UNFRAC HEPARIN: 0.51 IU/ML (ref 0.3–0.7)
ANTI-XA UNFRAC HEPARIN: 0.51 IU/ML (ref 0.3–0.7)
ANTIBODY SCREEN: NORMAL
BASOPHILS # BLD: 0 K/UL (ref 0–0.2)
BASOPHILS NFR BLD: 0.4 %
BLOOD BANK DISPENSE STATUS: NORMAL
BLOOD BANK PRODUCT CODE: NORMAL
BPU ID: NORMAL
BUN SERPL-MCNC: 23 MG/DL (ref 7–20)
CALCIUM SERPL-MCNC: 8.1 MG/DL (ref 8.3–10.6)
CHLORIDE SERPL-SCNC: 102 MMOL/L (ref 99–110)
CO2 SERPL-SCNC: 22 MMOL/L (ref 21–32)
CREAT SERPL-MCNC: 3 MG/DL (ref 0.6–1.1)
DEPRECATED RDW RBC AUTO: 13.7 % (ref 12.4–15.4)
DESCRIPTION BLOOD BANK: NORMAL
EOSINOPHIL # BLD: 0.3 K/UL (ref 0–0.6)
EOSINOPHIL NFR BLD: 2.6 %
GFR SERPLBLD CREATININE-BSD FMLA CKD-EPI: 18 ML/MIN/{1.73_M2}
GLUCOSE BLD-MCNC: 149 MG/DL (ref 70–99)
GLUCOSE BLD-MCNC: 171 MG/DL (ref 70–99)
GLUCOSE BLD-MCNC: 199 MG/DL (ref 70–99)
GLUCOSE BLD-MCNC: 297 MG/DL (ref 70–99)
GLUCOSE SERPL-MCNC: 189 MG/DL (ref 70–99)
HCT VFR BLD AUTO: 19.6 % (ref 36–48)
HCT VFR BLD AUTO: 21.8 % (ref 36–48)
HGB BLD-MCNC: 6.5 G/DL (ref 12–16)
HGB BLD-MCNC: 7.3 G/DL (ref 12–16)
LYMPHOCYTES # BLD: 1.6 K/UL (ref 1–5.1)
LYMPHOCYTES NFR BLD: 16 %
MAGNESIUM SERPL-MCNC: 1.9 MG/DL (ref 1.8–2.4)
MCH RBC QN AUTO: 27.4 PG (ref 26–34)
MCHC RBC AUTO-ENTMCNC: 33.2 G/DL (ref 31–36)
MCV RBC AUTO: 82.4 FL (ref 80–100)
MISCELLANEOUS LAB TEST ORDER: NORMAL
MONOCYTES # BLD: 1.1 K/UL (ref 0–1.3)
MONOCYTES NFR BLD: 10.9 %
NEUTROPHILS # BLD: 6.9 K/UL (ref 1.7–7.7)
NEUTROPHILS NFR BLD: 70.1 %
PERFORMED ON: ABNORMAL
PHOSPHATE SERPL-MCNC: 4.5 MG/DL (ref 2.5–4.9)
PLATELET # BLD AUTO: 172 K/UL (ref 135–450)
PMV BLD AUTO: 9.2 FL (ref 5–10.5)
POTASSIUM SERPL-SCNC: 4.1 MMOL/L (ref 3.5–5.1)
RBC # BLD AUTO: 2.38 M/UL (ref 4–5.2)
SODIUM SERPL-SCNC: 140 MMOL/L (ref 136–145)
WBC # BLD AUTO: 9.9 K/UL (ref 4–11)

## 2024-10-06 PROCEDURE — 2580000003 HC RX 258: Performed by: STUDENT IN AN ORGANIZED HEALTH CARE EDUCATION/TRAINING PROGRAM

## 2024-10-06 PROCEDURE — 30233N1 TRANSFUSION OF NONAUTOLOGOUS RED BLOOD CELLS INTO PERIPHERAL VEIN, PERCUTANEOUS APPROACH: ICD-10-PCS | Performed by: INTERNAL MEDICINE

## 2024-10-06 PROCEDURE — 94761 N-INVAS EAR/PLS OXIMETRY MLT: CPT

## 2024-10-06 PROCEDURE — 36410 VNPNXR 3YR/> PHY/QHP DX/THER: CPT

## 2024-10-06 PROCEDURE — P9016 RBC LEUKOCYTES REDUCED: HCPCS

## 2024-10-06 PROCEDURE — 6370000000 HC RX 637 (ALT 250 FOR IP): Performed by: INTERNAL MEDICINE

## 2024-10-06 PROCEDURE — 86901 BLOOD TYPING SEROLOGIC RH(D): CPT

## 2024-10-06 PROCEDURE — 85025 COMPLETE CBC W/AUTO DIFF WBC: CPT

## 2024-10-06 PROCEDURE — 83735 ASSAY OF MAGNESIUM: CPT

## 2024-10-06 PROCEDURE — 86850 RBC ANTIBODY SCREEN: CPT

## 2024-10-06 PROCEDURE — 36430 TRANSFUSION BLD/BLD COMPNT: CPT

## 2024-10-06 PROCEDURE — 86923 COMPATIBILITY TEST ELECTRIC: CPT

## 2024-10-06 PROCEDURE — 05HB33Z INSERTION OF INFUSION DEVICE INTO RIGHT BASILIC VEIN, PERCUTANEOUS APPROACH: ICD-10-PCS | Performed by: INTERNAL MEDICINE

## 2024-10-06 PROCEDURE — 94640 AIRWAY INHALATION TREATMENT: CPT

## 2024-10-06 PROCEDURE — 80069 RENAL FUNCTION PANEL: CPT

## 2024-10-06 PROCEDURE — 2700000000 HC OXYGEN THERAPY PER DAY

## 2024-10-06 PROCEDURE — 85014 HEMATOCRIT: CPT

## 2024-10-06 PROCEDURE — 1200000000 HC SEMI PRIVATE

## 2024-10-06 PROCEDURE — 85520 HEPARIN ASSAY: CPT

## 2024-10-06 PROCEDURE — 6360000002 HC RX W HCPCS: Performed by: STUDENT IN AN ORGANIZED HEALTH CARE EDUCATION/TRAINING PROGRAM

## 2024-10-06 PROCEDURE — 6370000000 HC RX 637 (ALT 250 FOR IP): Performed by: STUDENT IN AN ORGANIZED HEALTH CARE EDUCATION/TRAINING PROGRAM

## 2024-10-06 PROCEDURE — 36415 COLL VENOUS BLD VENIPUNCTURE: CPT

## 2024-10-06 PROCEDURE — 85018 HEMOGLOBIN: CPT

## 2024-10-06 PROCEDURE — 86900 BLOOD TYPING SEROLOGIC ABO: CPT

## 2024-10-06 RX ORDER — SODIUM CHLORIDE 0.9 % (FLUSH) 0.9 %
5-40 SYRINGE (ML) INJECTION EVERY 12 HOURS SCHEDULED
Status: DISCONTINUED | OUTPATIENT
Start: 2024-10-06 | End: 2024-10-11 | Stop reason: HOSPADM

## 2024-10-06 RX ORDER — INSULIN GLARGINE 100 [IU]/ML
28 INJECTION, SOLUTION SUBCUTANEOUS DAILY
Status: DISCONTINUED | OUTPATIENT
Start: 2024-10-07 | End: 2024-10-11 | Stop reason: HOSPADM

## 2024-10-06 RX ORDER — SODIUM CHLORIDE 9 MG/ML
INJECTION, SOLUTION INTRAVENOUS PRN
Status: DISCONTINUED | OUTPATIENT
Start: 2024-10-06 | End: 2024-10-11 | Stop reason: HOSPADM

## 2024-10-06 RX ORDER — SODIUM CHLORIDE 0.9 % (FLUSH) 0.9 %
5-40 SYRINGE (ML) INJECTION PRN
Status: DISCONTINUED | OUTPATIENT
Start: 2024-10-06 | End: 2024-10-11 | Stop reason: HOSPADM

## 2024-10-06 RX ORDER — INSULIN LISPRO 100 [IU]/ML
7 INJECTION, SOLUTION INTRAVENOUS; SUBCUTANEOUS
Status: DISCONTINUED | OUTPATIENT
Start: 2024-10-07 | End: 2024-10-11 | Stop reason: HOSPADM

## 2024-10-06 RX ORDER — SODIUM CHLORIDE 9 MG/ML
INJECTION, SOLUTION INTRAVENOUS PRN
Status: DISCONTINUED | OUTPATIENT
Start: 2024-10-06 | End: 2024-10-09

## 2024-10-06 RX ADMIN — IPRATROPIUM BROMIDE AND ALBUTEROL SULFATE 1 DOSE: .5; 3 SOLUTION RESPIRATORY (INHALATION) at 13:22

## 2024-10-06 RX ADMIN — ACETAMINOPHEN 650 MG: 325 TABLET ORAL at 14:26

## 2024-10-06 RX ADMIN — NIFEDIPINE 60 MG: 30 TABLET, FILM COATED, EXTENDED RELEASE ORAL at 10:11

## 2024-10-06 RX ADMIN — HEPARIN SODIUM 20 UNITS/KG/HR: 10000 INJECTION, SOLUTION INTRAVENOUS at 14:29

## 2024-10-06 RX ADMIN — INSULIN GLARGINE 24 UNITS: 100 INJECTION, SOLUTION SUBCUTANEOUS at 10:11

## 2024-10-06 RX ADMIN — HEPARIN SODIUM 3900 UNITS: 1000 INJECTION INTRAVENOUS; SUBCUTANEOUS at 11:41

## 2024-10-06 RX ADMIN — Medication 2000 UNITS: at 10:10

## 2024-10-06 RX ADMIN — SPIRONOLACTONE 50 MG: 25 TABLET ORAL at 10:11

## 2024-10-06 RX ADMIN — SODIUM CHLORIDE, PRESERVATIVE FREE 10 ML: 5 INJECTION INTRAVENOUS at 20:27

## 2024-10-06 RX ADMIN — SODIUM BICARBONATE 1300 MG: 650 TABLET ORAL at 20:27

## 2024-10-06 RX ADMIN — NIFEDIPINE 60 MG: 30 TABLET, FILM COATED, EXTENDED RELEASE ORAL at 20:27

## 2024-10-06 RX ADMIN — INSULIN LISPRO 4 UNITS: 100 INJECTION, SOLUTION INTRAVENOUS; SUBCUTANEOUS at 10:11

## 2024-10-06 RX ADMIN — SODIUM BICARBONATE 1300 MG: 650 TABLET ORAL at 10:10

## 2024-10-06 RX ADMIN — CARVEDILOL 25 MG: 25 TABLET, FILM COATED ORAL at 10:10

## 2024-10-06 RX ADMIN — ACETAMINOPHEN 650 MG: 325 TABLET ORAL at 20:27

## 2024-10-06 RX ADMIN — EMPAGLIFLOZIN 10 MG: 10 TABLET, FILM COATED ORAL at 10:11

## 2024-10-06 RX ADMIN — INSULIN LISPRO 2 UNITS: 100 INJECTION, SOLUTION INTRAVENOUS; SUBCUTANEOUS at 12:44

## 2024-10-06 RX ADMIN — INSULIN LISPRO 4 UNITS: 100 INJECTION, SOLUTION INTRAVENOUS; SUBCUTANEOUS at 17:25

## 2024-10-06 RX ADMIN — SODIUM BICARBONATE 1300 MG: 650 TABLET ORAL at 14:26

## 2024-10-06 RX ADMIN — IPRATROPIUM BROMIDE AND ALBUTEROL SULFATE 1 DOSE: .5; 3 SOLUTION RESPIRATORY (INHALATION) at 20:17

## 2024-10-06 RX ADMIN — ROSUVASTATIN 20 MG: 20 TABLET, FILM COATED ORAL at 20:27

## 2024-10-06 RX ADMIN — INSULIN LISPRO 4 UNITS: 100 INJECTION, SOLUTION INTRAVENOUS; SUBCUTANEOUS at 12:44

## 2024-10-06 RX ADMIN — CARVEDILOL 25 MG: 25 TABLET, FILM COATED ORAL at 17:24

## 2024-10-06 ASSESSMENT — PAIN SCALES - GENERAL
PAINLEVEL_OUTOF10: 3
PAINLEVEL_OUTOF10: 0
PAINLEVEL_OUTOF10: 0
PAINLEVEL_OUTOF10: 2
PAINLEVEL_OUTOF10: 0
PAINLEVEL_OUTOF10: 2

## 2024-10-06 NOTE — PROCEDURES
Midline procedure note:    Arrived to the pt room with bedside RN Юлия. Timeout completed. Answered patient questions prior to procedure. Able to access R basilic vein with no difficulty. Good non-pulsatile blood return, flushes easily. Midline dressed with CGH dressing and pt instructed on care. Pressure dressing placed over insertion site due to ongoing oozing, patient currently on heparin gtt. Pt tolerated well. Bed returned to lowest locked position, call light in reach. Reported off to bedside RN.    RIGHT BASILIC VEIN:

## 2024-10-06 NOTE — PROGRESS NOTES
Visit us at [employer] or call us at [phone] [place] [address]    NEPHROLOGY PROGRESS NOTE    Patient: Galina Nick MRN: [id number]     YOB: 1965  Age: 58 y.o.  Sex: female    Unit: [address] ORTH/NEUR Room/Bed: [id number]/4476-01 Location: [facility]     Admitting Physician: DONY THORPE    Primary Care Physician: Tristan eLi MD          LOS: 10 days       Reason for evaluation:   CKD4      SUBJECTIVE:      The patient was seen and examined. Notes and labs reviewed.  There were no complications over night.    Patient's review of systems: comfortable, SOB improved.       OBJECTIVE:     Vitals:    10/06/24 1057 10/06/24 1322 10/06/24 1415 10/06/24 1715   BP: (!) 143/68  127/76 107/64   Pulse: 85 82 76 65   Resp: 16 16 18 18   Temp: 98.3 °F (36.8 °C)  100 °F (37.8 °C) 99.5 °F (37.5 °C)   TempSrc: Oral  Oral Oral   SpO2: 92% 91% 93% 94%   Weight:       Height:           Intake and Output:      Intake/Output Summary (Last 24 hours) at 10/6/2024 1810  Last data filed at 10/6/2024 1415  Gross per 24 hour   Intake 513 ml   Output --   Net 513 ml       Continuous Infusions:   sodium chloride      sodium chloride      heparin (PORCINE) Infusion 20 Units/kg/hr (10/06/24 1719)    sodium chloride Stopped (09/28/24 1727)    dextrose         Current meds:   sodium chloride flush  5-40 mL IntraVENous 2 times per day    ipratropium 0.5 mg-albuterol 2.5 mg  1 Dose Inhalation TID RT    insulin glargine  24 Units SubCUTAneous Daily    insulin lispro  4 Units SubCUTAneous TID WC    [Held by provider] furosemide  40 mg IntraVENous Daily    spironolactone  50 mg Oral Daily    NIFEdipine  60 mg Oral BID    empagliflozin  10 mg Oral Daily    sodium bicarbonate  1,300 mg Oral TID    Vitamin D  2,000 Units Oral Daily    carvedilol  25 mg Oral BID WC    insulin lispro  0-4 Units SubCUTAneous TID WC    insulin lispro  0-4 Units SubCUTAneous Nightly    epoetin raisa-epbx  10,000 Units SubCUTAneous Weekly  Diastolic CHF: CXR pulm edema. Given IV lasix   - Check BNP   - Should be able to come off of furosemide (since she is on spironolactone and Jardiance)    6. DM2: per endocrinology     7. HTN: was on chlorthalidone 25, valsartan 320, nifedipine 60, toprol 50 PTA   Now on nifedipine 60 BID, coreg 25 bid, aldactone 50.  Renin 0.4, erika 3.4 in 1/24   - continue current regimen upon d/c. Do not resume chlorthalidone (and Valsartan) in view of addition of spironolactone and Jardiance.      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 Anemia on CKD: S/P IV iron & on weekly JUN. Hb 11 --> 6.3 range now. S/P PRBC  EGD, colonoscopy this admission unremarkable.  - Continue JUN  - Transfuse prn for Hb <7           Signed By: Broderick John MD

## 2024-10-06 NOTE — CONSENT
Informed Consent for Blood Component Transfusion Note    I have discussed with the patient the rationale for blood component transfusion; its benefits in treating or preventing fatigue, organ damage, or death; and its risk which includes mild transfusion reactions, rare risk of blood borne infection, or more serious but rare reactions. I have discussed the alternatives to transfusion, including the risk and consequences of not receiving transfusion. The patient had an opportunity to ask questions and had agreed to proceed with transfusion of blood components.    Electronically signed by Jorge Odom MD on 10/6/24 at 8:49 AM EDT

## 2024-10-06 NOTE — PROGRESS NOTES
Admit Date: 9/24/2024  Diet: ADULT DIET; Regular; 4 carb choices (60 gm/meal); Low Sodium (2 gm); Low Potassium (Less than 3000 mg/day)  ADULT ORAL NUTRITION SUPPLEMENT; Breakfast, Lunch, Dinner; Renal Oral Supplement    CC: Facial swelling    Interval history:   Overnight, there were no acute events. Patient's vitals remained stable    Patient was seen this morning. I discussed the plan of the day with her in detail. All questions were addressed and answered to patient's satisfaction.  Patient is still requiring a lot of oxygen.  She is on 4 L NC.  Patient denies fevers, chills, nausea, vomiting, chest pain, diarrhea, constipation, dysuria, urinary frequency or urgency.     Plan:     -Hold IV Lasix for today  -Strict I's and O's  -Continue to monitor creatinine   -Wean off O2 as tolerated  -Await venous Dopplers and VQ scan  -Continue heparin drip  -PRBC transfusion for Hgb <7    Assessment:   Galina Nick is a 58 y.o. female with PMH of hypertension, CAD, dyslipidemia, type 2 diabetes mellitus who was admitted with JAYY    JAYY on CKD 3b with metabolic acidosis:  Baseline SCr 1.9.  Peak SCr 3.1.    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    SCr trended down to 2.3 but trending up again.    Restarted IVF. Continue sodium bicarb.  Avoid nephrotoxins.  Monitor BMP.  Follow-up workup ordered per nephrology.    Fluid overload  Patient started to require a lot of oxygen.  CXR done showed bilateral pleural effusions that has been getting worse.    Pleurisy  Patient endorsed that she had new onset left-sided pleuritic chest pain.  D-dimer was elevated to 2.04.  Patient has been in hospital for 12 days.  Due to patient's worsening renal function, CTPA could not be pursued      Uncontrolled hypertension:   In the ED, patient was hypertensive with blood pressure of 191/82.  Discontinued metoprolol, diuretics and ARB.  Monitor on nifedipine and Coreg.     Angioedema involving the face: Improved  Likely secondary

## 2024-10-06 NOTE — PLAN OF CARE
Problem: Discharge Planning  Goal: Discharge to home or other facility with appropriate resources  Outcome: Progressing     Problem: Safety - Adult  Goal: Free from fall injury  Outcome: Progressing     Problem: Chronic Conditions and Co-morbidities  Goal: Patient's chronic conditions and co-morbidity symptoms are monitored and maintained or improved  Outcome: Progressing     Problem: Pain  Goal: Verbalizes/displays adequate comfort level or baseline comfort level  Outcome: Progressing     Problem: Respiratory - Adult  Goal: Achieves optimal ventilation and oxygenation  Outcome: Progressing     Problem: Gastrointestinal - Adult  Goal: Minimal or absence of nausea and vomiting  Outcome: Progressing  Goal: Maintains or returns to baseline bowel function  Outcome: Progressing  Goal: Maintains adequate nutritional intake  Outcome: Progressing  Goal: Establish and maintain optimal ostomy function  Outcome: Progressing     Problem: Metabolic/Fluid and Electrolytes - Adult  Goal: Electrolytes maintained within normal limits  Outcome: Progressing  Goal: Hemodynamic stability and optimal renal function maintained  Outcome: Progressing  Goal: Glucose maintained within prescribed range  Outcome: Progressing     Problem: Hematologic - Adult  Goal: Maintains hematologic stability  Outcome: Progressing     Problem: Nutrition Deficit:  Goal: Optimize nutritional status  Outcome: Progressing

## 2024-10-06 NOTE — PROGRESS NOTES
Endocrinology    Patient remains dyspneic, on 4 liters nasal canula oxygen with oximetry 91 to 95% in bed, at rest.  She had IV laxix earlier today for pulmonary congestion verified by x ray. Serum creatinine remains at 2.9 mg/dl.    Pulse 66 Resp 18 /88 Temp 99.6 deg F    Alert oriented, seems comfortable    Lungs: Clear to auscultation    Assessment:  1 Anemia: hgb up to 7.8 this AM.  2. Receiving end of first bottle of albumin IV and lungs are clear.  3. Renal insufficiency probable stage 3b.  Agree diabetic nephropathy is likely.  Type 2 IDDM, needs slightly more insulin; basal dose alone is not liely to contol blood sugars as she continues to have at least one daily > 200 mg/dl and these are before meal rather than after meal blood subars; the sooner postprandial sugars would almost certainly be higher.    Plan:  She agrees to mealtime insulin Humalog about 4 units + sliding scale.    Incease Lantus somewhat as FBS also higher than desired  Lasix again tonight if she develops pulmonary congestion after albumin, nurse has my number if needed.    LESLEY Hartman M.D.

## 2024-10-07 ENCOUNTER — APPOINTMENT (OUTPATIENT)
Dept: NUCLEAR MEDICINE | Age: 59
DRG: 915 | End: 2024-10-07
Payer: COMMERCIAL

## 2024-10-07 ENCOUNTER — APPOINTMENT (OUTPATIENT)
Dept: VASCULAR LAB | Age: 59
DRG: 915 | End: 2024-10-07
Attending: STUDENT IN AN ORGANIZED HEALTH CARE EDUCATION/TRAINING PROGRAM
Payer: COMMERCIAL

## 2024-10-07 ENCOUNTER — APPOINTMENT (OUTPATIENT)
Dept: CT IMAGING | Age: 59
DRG: 915 | End: 2024-10-07
Attending: STUDENT IN AN ORGANIZED HEALTH CARE EDUCATION/TRAINING PROGRAM
Payer: COMMERCIAL

## 2024-10-07 ENCOUNTER — APPOINTMENT (OUTPATIENT)
Age: 59
DRG: 915 | End: 2024-10-07
Attending: STUDENT IN AN ORGANIZED HEALTH CARE EDUCATION/TRAINING PROGRAM
Payer: COMMERCIAL

## 2024-10-07 LAB
ALBUMIN SERPL-MCNC: 3.2 G/DL (ref 3.4–5)
ANION GAP SERPL CALCULATED.3IONS-SCNC: 13 MMOL/L (ref 3–16)
ANTI-XA UNFRAC HEPARIN: 0.55 IU/ML (ref 0.3–0.7)
BASOPHILS # BLD: 0.1 K/UL (ref 0–0.2)
BASOPHILS NFR BLD: 0.5 %
BUN SERPL-MCNC: 27 MG/DL (ref 7–20)
CALCIUM SERPL-MCNC: 8.5 MG/DL (ref 8.3–10.6)
CHLORIDE SERPL-SCNC: 100 MMOL/L (ref 99–110)
CO2 SERPL-SCNC: 24 MMOL/L (ref 21–32)
CREAT SERPL-MCNC: 3.1 MG/DL (ref 0.6–1.1)
DEPRECATED RDW RBC AUTO: 14 % (ref 12.4–15.4)
ECHO BSA: 2.21 M2
ECHO BSA: 2.21 M2
EOSINOPHIL # BLD: 0.2 K/UL (ref 0–0.6)
EOSINOPHIL NFR BLD: 2.2 %
GFR SERPLBLD CREATININE-BSD FMLA CKD-EPI: 17 ML/MIN/{1.73_M2}
GLUCOSE BLD-MCNC: 184 MG/DL (ref 70–99)
GLUCOSE BLD-MCNC: 185 MG/DL (ref 70–99)
GLUCOSE BLD-MCNC: 247 MG/DL (ref 70–99)
GLUCOSE BLD-MCNC: 94 MG/DL (ref 70–99)
GLUCOSE SERPL-MCNC: 188 MG/DL (ref 70–99)
HCT VFR BLD AUTO: 22.7 % (ref 36–48)
HGB BLD-MCNC: 7.5 G/DL (ref 12–16)
LYMPHOCYTES # BLD: 2.2 K/UL (ref 1–5.1)
LYMPHOCYTES NFR BLD: 19.8 %
MAGNESIUM SERPL-MCNC: 2 MG/DL (ref 1.8–2.4)
MCH RBC QN AUTO: 26.9 PG (ref 26–34)
MCHC RBC AUTO-ENTMCNC: 33.1 G/DL (ref 31–36)
MCV RBC AUTO: 81.5 FL (ref 80–100)
MONOCYTES # BLD: 1 K/UL (ref 0–1.3)
MONOCYTES NFR BLD: 9.1 %
NEUTROPHILS # BLD: 7.7 K/UL (ref 1.7–7.7)
NEUTROPHILS NFR BLD: 68.4 %
PERFORMED ON: ABNORMAL
PERFORMED ON: NORMAL
PHOSPHATE SERPL-MCNC: 4.3 MG/DL (ref 2.5–4.9)
PLATELET # BLD AUTO: 187 K/UL (ref 135–450)
PMV BLD AUTO: 9.5 FL (ref 5–10.5)
POTASSIUM SERPL-SCNC: 3.7 MMOL/L (ref 3.5–5.1)
PROCALCITONIN SERPL IA-MCNC: 0.25 NG/ML (ref 0–0.15)
PROT PATTERN UR ELPH-IMP: NORMAL
RBC # BLD AUTO: 2.79 M/UL (ref 4–5.2)
SODIUM SERPL-SCNC: 137 MMOL/L (ref 136–145)
WBC # BLD AUTO: 11.2 K/UL (ref 4–11)

## 2024-10-07 PROCEDURE — 6360000002 HC RX W HCPCS: Performed by: STUDENT IN AN ORGANIZED HEALTH CARE EDUCATION/TRAINING PROGRAM

## 2024-10-07 PROCEDURE — 93970 EXTREMITY STUDY: CPT

## 2024-10-07 PROCEDURE — 6370000000 HC RX 637 (ALT 250 FOR IP): Performed by: HOSPITALIST

## 2024-10-07 PROCEDURE — 2580000003 HC RX 258: Performed by: STUDENT IN AN ORGANIZED HEALTH CARE EDUCATION/TRAINING PROGRAM

## 2024-10-07 PROCEDURE — 93970 EXTREMITY STUDY: CPT | Performed by: INTERNAL MEDICINE

## 2024-10-07 PROCEDURE — A9558 XE133 XENON 10MCI: HCPCS | Performed by: STUDENT IN AN ORGANIZED HEALTH CARE EDUCATION/TRAINING PROGRAM

## 2024-10-07 PROCEDURE — 36415 COLL VENOUS BLD VENIPUNCTURE: CPT

## 2024-10-07 PROCEDURE — 6360000002 HC RX W HCPCS: Performed by: HOSPITALIST

## 2024-10-07 PROCEDURE — 6370000000 HC RX 637 (ALT 250 FOR IP): Performed by: INTERNAL MEDICINE

## 2024-10-07 PROCEDURE — 78582 LUNG VENTILAT&PERFUS IMAGING: CPT

## 2024-10-07 PROCEDURE — 83735 ASSAY OF MAGNESIUM: CPT

## 2024-10-07 PROCEDURE — 6370000000 HC RX 637 (ALT 250 FOR IP): Performed by: STUDENT IN AN ORGANIZED HEALTH CARE EDUCATION/TRAINING PROGRAM

## 2024-10-07 PROCEDURE — A9540 TC99M MAA: HCPCS | Performed by: STUDENT IN AN ORGANIZED HEALTH CARE EDUCATION/TRAINING PROGRAM

## 2024-10-07 PROCEDURE — 94761 N-INVAS EAR/PLS OXIMETRY MLT: CPT

## 2024-10-07 PROCEDURE — 84145 PROCALCITONIN (PCT): CPT

## 2024-10-07 PROCEDURE — 85520 HEPARIN ASSAY: CPT

## 2024-10-07 PROCEDURE — 80069 RENAL FUNCTION PANEL: CPT

## 2024-10-07 PROCEDURE — 71250 CT THORAX DX C-: CPT

## 2024-10-07 PROCEDURE — 3430000000 HC RX DIAGNOSTIC RADIOPHARMACEUTICAL: Performed by: STUDENT IN AN ORGANIZED HEALTH CARE EDUCATION/TRAINING PROGRAM

## 2024-10-07 PROCEDURE — 1200000000 HC SEMI PRIVATE

## 2024-10-07 PROCEDURE — 85025 COMPLETE CBC W/AUTO DIFF WBC: CPT

## 2024-10-07 PROCEDURE — 2700000000 HC OXYGEN THERAPY PER DAY

## 2024-10-07 PROCEDURE — 94640 AIRWAY INHALATION TREATMENT: CPT

## 2024-10-07 RX ORDER — XENON XE-133 10 MCI/1
6.56 GAS RESPIRATORY (INHALATION)
Status: COMPLETED | OUTPATIENT
Start: 2024-10-07 | End: 2024-10-07

## 2024-10-07 RX ORDER — FUROSEMIDE 10 MG/ML
60 INJECTION INTRAMUSCULAR; INTRAVENOUS 2 TIMES DAILY
Status: COMPLETED | OUTPATIENT
Start: 2024-10-07 | End: 2024-10-10

## 2024-10-07 RX ORDER — SODIUM BICARBONATE 650 MG/1
1300 TABLET ORAL 2 TIMES DAILY
Status: DISCONTINUED | OUTPATIENT
Start: 2024-10-07 | End: 2024-10-08

## 2024-10-07 RX ORDER — SODIUM CHLORIDE 0.9 % (FLUSH) 0.9 %
10 SYRINGE (ML) INJECTION PRN
Status: DISCONTINUED | OUTPATIENT
Start: 2024-10-07 | End: 2024-10-11 | Stop reason: HOSPADM

## 2024-10-07 RX ORDER — HEPARIN SODIUM 5000 [USP'U]/ML
5000 INJECTION, SOLUTION INTRAVENOUS; SUBCUTANEOUS EVERY 8 HOURS
Status: DISCONTINUED | OUTPATIENT
Start: 2024-10-08 | End: 2024-10-11 | Stop reason: HOSPADM

## 2024-10-07 RX ADMIN — IPRATROPIUM BROMIDE AND ALBUTEROL SULFATE 1 DOSE: .5; 3 SOLUTION RESPIRATORY (INHALATION) at 15:04

## 2024-10-07 RX ADMIN — EMPAGLIFLOZIN 10 MG: 10 TABLET, FILM COATED ORAL at 10:33

## 2024-10-07 RX ADMIN — CARVEDILOL 25 MG: 25 TABLET, FILM COATED ORAL at 10:33

## 2024-10-07 RX ADMIN — Medication 2000 UNITS: at 10:33

## 2024-10-07 RX ADMIN — CARVEDILOL 25 MG: 25 TABLET, FILM COATED ORAL at 17:47

## 2024-10-07 RX ADMIN — XENON XE-133 6.56 MILLICURIE: 10 GAS RESPIRATORY (INHALATION) at 09:01

## 2024-10-07 RX ADMIN — ROSUVASTATIN 20 MG: 20 TABLET, FILM COATED ORAL at 20:14

## 2024-10-07 RX ADMIN — INSULIN LISPRO 7 UNITS: 100 INJECTION, SOLUTION INTRAVENOUS; SUBCUTANEOUS at 10:33

## 2024-10-07 RX ADMIN — HEPARIN SODIUM 20 UNITS/KG/HR: 10000 INJECTION, SOLUTION INTRAVENOUS at 03:25

## 2024-10-07 RX ADMIN — SODIUM CHLORIDE, PRESERVATIVE FREE 10 ML: 5 INJECTION INTRAVENOUS at 20:17

## 2024-10-07 RX ADMIN — NIFEDIPINE 60 MG: 30 TABLET, FILM COATED, EXTENDED RELEASE ORAL at 10:33

## 2024-10-07 RX ADMIN — FUROSEMIDE 60 MG: 10 INJECTION, SOLUTION INTRAMUSCULAR; INTRAVENOUS at 18:44

## 2024-10-07 RX ADMIN — Medication 5.04 MILLICURIE: at 09:01

## 2024-10-07 RX ADMIN — INSULIN LISPRO 7 UNITS: 100 INJECTION, SOLUTION INTRAVENOUS; SUBCUTANEOUS at 12:20

## 2024-10-07 RX ADMIN — SODIUM CHLORIDE, PRESERVATIVE FREE 10 ML: 5 INJECTION INTRAVENOUS at 09:02

## 2024-10-07 RX ADMIN — IPRATROPIUM BROMIDE AND ALBUTEROL SULFATE 1 DOSE: .5; 3 SOLUTION RESPIRATORY (INHALATION) at 21:47

## 2024-10-07 RX ADMIN — SODIUM BICARBONATE 1300 MG: 650 TABLET ORAL at 20:14

## 2024-10-07 RX ADMIN — SPIRONOLACTONE 50 MG: 25 TABLET ORAL at 10:32

## 2024-10-07 RX ADMIN — SODIUM CHLORIDE, PRESERVATIVE FREE 10 ML: 5 INJECTION INTRAVENOUS at 10:36

## 2024-10-07 RX ADMIN — INSULIN GLARGINE 28 UNITS: 100 INJECTION, SOLUTION SUBCUTANEOUS at 10:34

## 2024-10-07 RX ADMIN — SODIUM BICARBONATE 1300 MG: 650 TABLET ORAL at 10:32

## 2024-10-07 RX ADMIN — NIFEDIPINE 60 MG: 30 TABLET, FILM COATED, EXTENDED RELEASE ORAL at 20:14

## 2024-10-07 ASSESSMENT — PAIN SCALES - GENERAL
PAINLEVEL_OUTOF10: 0
PAINLEVEL_OUTOF10: 0

## 2024-10-07 NOTE — PROGRESS NOTES
Admit Date: 9/24/2024  Diet: ADULT DIET; Regular; 4 carb choices (60 gm/meal); Low Sodium (2 gm); Low Potassium (Less than 3000 mg/day)  ADULT ORAL NUTRITION SUPPLEMENT; Breakfast, Lunch, Dinner; Renal Oral Supplement    CC: Facial swelling    Interval history:   Overnight, there were no acute events. Patient's vitals remained stable    Patient was seen this morning. I discussed the plan of the day with her in detail. All questions were addressed and answered to patient's satisfaction.  patient is still requiring 6 L NC.  patient denies fevers, chills, nausea, vomiting, chest pain, diarrhea, constipation, dysuria, urinary frequency or urgency.     Plan:     -Per nephrology, hold Aldactone and start p.o. Lasix  -CT chest pending to evaluate for pulmonary edema versus PNA  -Strict I's and O's  -Continue to monitor creatinine   -Wean off O2 as tolerated  -Stop heparin drip in light of low probability for PE on VQ scan   -Monitor H&H and transfuse for Hgb <7  -Per nephrology, monitor inpatient and see how creatinine responds to changes currently being made    Assessment:   Galina Nick is a 58 y.o. female with PMH of hypertension, CAD, dyslipidemia, type 2 diabetes mellitus who was admitted with JAYY    JAYY on CKD 3b with metabolic acidosis:  Baseline SCr 1.9.  Peak SCr 3.1.    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    SCr trended down to 2.3 but trending up again.    Restarted IVF. Continue sodium bicarb.  Avoid nephrotoxins.  Monitor BMP.  Follow-up workup ordered per nephrology.    Fluid overload  Patient started to require a lot of oxygen.  CXR done showed bilateral pleural effusions that has been getting worse.    Pleurisy  Patient endorsed that she had new onset left-sided pleuritic chest pain.  D-dimer was elevated to 2.04.  Patient has been in hospital for 12 days.  Due to patient's worsening renal function, CTPA could not be pursued      Uncontrolled hypertension:   In the ED, patient was  hours.  BNP: No results for input(s): \"BNP\" in the last 72 hours.  Lipids: No results for input(s): \"CHOL\", \"HDL\" in the last 72 hours.    Invalid input(s): \"LDLCALCU\", \"TRIGLYCERIDE\"  ABGs:  No results for input(s): \"PHART\", \"GOS0HLV\", \"PO2ART\", \"GZF9BIQ\", \"BEART\", \"THGBART\", \"W2IHDPXA\", \"KGE6XDU\" in the last 72 hours.    INR: No results for input(s): \"INR\" in the last 72 hours.  Lactate: No results for input(s): \"LACTATE\" in the last 72 hours.  Cultures:  -----------------------------------------------------------------  RAD:   Vascular duplex upper extremity venous bilateral         Vascular duplex lower extremity venous bilateral         NM LUNG VENT/PERFUSION (VQ)   Final Result   Low probability for pulmonary embolus.         XR CHEST PORTABLE   Final Result   Interval worsening of bilateral airspace opacities with trace effusions.   Findings may represent edema and/or infection.         XR CHEST PORTABLE   Final Result   Slightly increasing vascular prominence may reflect developing/worsening   failure and basilar opacity suggesting atelectasis and potentially early   effusions.         XR CHEST PORTABLE   Final Result   Mild bibasilar airspace disease which could reflect atelectasis, less likely   pneumonia         CT FACIAL BONES WO CONTRAST   Final Result   No acute facial bone trauma.         NM GI BLOOD LOSS    (Results Pending)   CT CHEST WO CONTRAST    (Results Pending)       Medications:     Scheduled Meds:   sodium bicarbonate  1,300 mg Oral BID    sodium chloride flush  5-40 mL IntraVENous 2 times per day    insulin glargine  28 Units SubCUTAneous Daily    insulin lispro  7 Units SubCUTAneous TID WC    ipratropium 0.5 mg-albuterol 2.5 mg  1 Dose Inhalation TID RT    furosemide  40 mg IntraVENous Daily    [Held by provider] spironolactone  50 mg Oral Daily    NIFEdipine  60 mg Oral BID    empagliflozin  10 mg Oral Daily    Vitamin D  2,000 Units Oral Daily    carvedilol  25 mg Oral BID WC    insulin

## 2024-10-07 NOTE — PROGRESS NOTES
Endocrinology    Patient still oxygen dependent with relatively stable serum creatinine 3.0 mg/dl; however, her hemoglobin dropped from 7.8 to 6.5 requiring a transfusion of packed RBC's with follow-up hemoglobin of 7.5.    Temp 100.3 deg F Resp 16 to 18 pulse 67 /66 Oximetry 91% on 6 liters/min nasal canula oxygen    Assessment:  Type 2 now IDDM with blood sugars 189 to 297, still higher than optimal despite adding fixed dose of Humalog at meals; also FBS is still high despite increased Lantus dose  Occult GI bleeding strongly suspect with succen drop in hemoglobin; GI bleeding scan might help.    Plan:  Increase Lantus to 28 units daiiiy in AM  2. Increase meal time fixed Humalog dose to 7 units instead of 4 units.  3. GI bleeding scan in AM    LESLEY Hartman M.D.

## 2024-10-07 NOTE — PROGRESS NOTES
Visit us at [employer] or call us at [address] [place] [address]    NEPHROLOGY PROGRESS NOTE    Patient: Galina Nick MRN: [id number]     YOB: 1965  Age: 58 y.o.  Sex: female    Unit: [address] ORTH/NEUR Room/Bed: 4TN-4476/4476-01 Location: [facility]     Admitting Physician: DONY THORPE    Primary Care Physician: Tristan Lei MD          LOS: 11 days       Reason for evaluation:   CKD4      SUBJECTIVE:      The patient was seen and examined. Notes and labs reviewed.  There were no complications over night.    Patient's review of systems: comfortable, SOB improved.       OBJECTIVE:     Vitals:    10/07/24 0509 10/07/24 1012 10/07/24 1030 10/07/24 1130   BP: 130/71  (!) 147/74 (!) 156/77   Pulse: 66 73 72 75   Resp: 16   18   Temp: 98.9 °F (37.2 °C)  98.7 °F (37.1 °C) 98.5 °F (36.9 °C)   TempSrc: Oral  Oral Oral   SpO2: 93%  95% 94%   Weight:       Height:           Intake and Output:      Intake/Output Summary (Last 24 hours) at 10/7/2024 1227  Last data filed at 10/6/2024 1715  Gross per 24 hour   Intake 593 ml   Output --   Net 593 ml       Continuous Infusions:   sodium chloride      sodium chloride      sodium chloride Stopped (09/28/24 1727)    dextrose         Current meds:   sodium bicarbonate  1,300 mg Oral BID    [START ON 10/8/2024] heparin (porcine)  5,000 Units SubCUTAneous Q8H    sodium chloride flush  5-40 mL IntraVENous 2 times per day    insulin glargine  28 Units SubCUTAneous Daily    insulin lispro  7 Units SubCUTAneous TID WC    ipratropium 0.5 mg-albuterol 2.5 mg  1 Dose Inhalation TID RT    furosemide  40 mg IntraVENous Daily    [Held by provider] spironolactone  50 mg Oral Daily    NIFEdipine  60 mg Oral BID    empagliflozin  10 mg Oral Daily    Vitamin D  2,000 Units Oral Daily    carvedilol  25 mg Oral BID WC    insulin lispro  0-4 Units SubCUTAneous TID WC    insulin lispro  0-4 Units SubCUTAneous Nightly    epoetin raisa-epbx  10,000 Units SubCUTAneous

## 2024-10-07 NOTE — PROGRESS NOTES
NM Lung VQ Scan and NM GI Bleed Scan both ordered over the weekend. These tests use the same isotope, so only one can be done today, with at least a 24 hour wait before doing the second. Discussed with night shift and decided to do the VQ first since it had been ordered the longest and uses a lower dose. Will do GI Bleed tomorrow if still needed. Day shift RN and patient made aware.    Claudia Hunter, LENORE, RT(MR)

## 2024-10-08 ENCOUNTER — APPOINTMENT (OUTPATIENT)
Age: 59
DRG: 915 | End: 2024-10-08
Attending: STUDENT IN AN ORGANIZED HEALTH CARE EDUCATION/TRAINING PROGRAM
Payer: COMMERCIAL

## 2024-10-08 ENCOUNTER — APPOINTMENT (OUTPATIENT)
Dept: NUCLEAR MEDICINE | Age: 59
DRG: 915 | End: 2024-10-08
Payer: COMMERCIAL

## 2024-10-08 PROBLEM — E11.9 DIABETES MELLITUS (HCC): Status: ACTIVE | Noted: 2019-11-07

## 2024-10-08 PROBLEM — N18.4 ACUTE KIDNEY INJURY SUPERIMPOSED ON STAGE 4 CHRONIC KIDNEY DISEASE (HCC): Status: ACTIVE | Noted: 2024-06-18

## 2024-10-08 PROBLEM — N18.32 ACUTE KIDNEY INJURY SUPERIMPOSED ON STAGE 3B CHRONIC KIDNEY DISEASE (HCC): Status: ACTIVE | Noted: 2024-06-18

## 2024-10-08 LAB
ALBUMIN SERPL-MCNC: 3 G/DL (ref 3.4–5)
ANION GAP SERPL CALCULATED.3IONS-SCNC: 13 MMOL/L (ref 3–16)
BASOPHILS # BLD: 0.1 K/UL (ref 0–0.2)
BASOPHILS NFR BLD: 0.5 %
BUN SERPL-MCNC: 31 MG/DL (ref 7–20)
CALCIUM SERPL-MCNC: 8.6 MG/DL (ref 8.3–10.6)
CHLORIDE SERPL-SCNC: 101 MMOL/L (ref 99–110)
CO2 SERPL-SCNC: 25 MMOL/L (ref 21–32)
CREAT SERPL-MCNC: 3.4 MG/DL (ref 0.6–1.1)
DEPRECATED RDW RBC AUTO: 13.8 % (ref 12.4–15.4)
ECHO AO ASC DIAM: 3.3 CM
ECHO AO ASCENDING AORTA INDEX: 1.57 CM/M2
ECHO AO ROOT DIAM: 2.6 CM
ECHO AO ROOT INDEX: 1.24 CM/M2
ECHO AV AREA PEAK VELOCITY: 1.9 CM2
ECHO AV AREA VTI: 2.1 CM2
ECHO AV AREA/BSA PEAK VELOCITY: 0.9 CM2/M2
ECHO AV AREA/BSA VTI: 1 CM2/M2
ECHO AV MEAN GRADIENT: 10 MMHG
ECHO AV MEAN VELOCITY: 1.5 M/S
ECHO AV PEAK GRADIENT: 18 MMHG
ECHO AV PEAK VELOCITY: 2.1 M/S
ECHO AV VELOCITY RATIO: 0.76
ECHO AV VTI: 44.6 CM
ECHO BSA: 2.16 M2
ECHO EST RA PRESSURE: 3 MMHG
ECHO LA AREA 2C: 31.6 CM2
ECHO LA AREA 4C: 27.4 CM2
ECHO LA DIAMETER INDEX: 2.19 CM/M2
ECHO LA DIAMETER: 4.6 CM
ECHO LA MAJOR AXIS: 6.5 CM
ECHO LA MINOR AXIS: 6.7 CM
ECHO LA TO AORTIC ROOT RATIO: 1.77
ECHO LA VOL BP: 109 ML (ref 22–52)
ECHO LA VOL MOD A2C: 125 ML (ref 22–52)
ECHO LA VOL MOD A4C: 94 ML (ref 22–52)
ECHO LA VOL/BSA BIPLANE: 52 ML/M2 (ref 16–34)
ECHO LA VOLUME INDEX MOD A2C: 60 ML/M2 (ref 16–34)
ECHO LA VOLUME INDEX MOD A4C: 45 ML/M2 (ref 16–34)
ECHO LV E' LATERAL VELOCITY: 8.1 CM/S
ECHO LV E' SEPTAL VELOCITY: 9.8 CM/S
ECHO LV EDV A2C: 92 ML
ECHO LV EDV A4C: 102 ML
ECHO LV EDV INDEX A4C: 49 ML/M2
ECHO LV EDV NDEX A2C: 44 ML/M2
ECHO LV EJECTION FRACTION A2C: 59 %
ECHO LV EJECTION FRACTION A4C: 72 %
ECHO LV EJECTION FRACTION BIPLANE: 65 % (ref 55–100)
ECHO LV ESV A2C: 38 ML
ECHO LV ESV A4C: 29 ML
ECHO LV ESV INDEX A2C: 18 ML/M2
ECHO LV ESV INDEX A4C: 14 ML/M2
ECHO LV FRACTIONAL SHORTENING: 39 % (ref 28–44)
ECHO LV INTERNAL DIMENSION DIASTOLE INDEX: 2.1 CM/M2
ECHO LV INTERNAL DIMENSION DIASTOLIC: 4.4 CM (ref 3.9–5.3)
ECHO LV INTERNAL DIMENSION SYSTOLIC INDEX: 1.29 CM/M2
ECHO LV INTERNAL DIMENSION SYSTOLIC: 2.7 CM
ECHO LV IVSD: 1.4 CM (ref 0.6–0.9)
ECHO LV MASS 2D: 227.5 G (ref 67–162)
ECHO LV MASS INDEX 2D: 108.3 G/M2 (ref 43–95)
ECHO LV POSTERIOR WALL DIASTOLIC: 1.3 CM (ref 0.6–0.9)
ECHO LV RELATIVE WALL THICKNESS RATIO: 0.59
ECHO LVOT AREA: 2.5 CM2
ECHO LVOT AV VTI INDEX: 0.82
ECHO LVOT DIAM: 1.8 CM
ECHO LVOT MEAN GRADIENT: 6 MMHG
ECHO LVOT PEAK GRADIENT: 10 MMHG
ECHO LVOT PEAK VELOCITY: 1.6 M/S
ECHO LVOT STROKE VOLUME INDEX: 44.2 ML/M2
ECHO LVOT SV: 92.8 ML
ECHO LVOT VTI: 36.5 CM
ECHO MV A VELOCITY: 1.51 M/S
ECHO MV E VELOCITY: 1.42 M/S
ECHO MV E/A RATIO: 0.94
ECHO MV E/E' LATERAL: 17.53
ECHO MV E/E' RATIO (AVERAGED): 16.01
ECHO MV E/E' SEPTAL: 14.49
ECHO MV REGURGITANT ALIASING (NYQUIST) VELOCITY: 39 CM/S
ECHO MV REGURGITANT PEAK GRADIENT: 108 MMHG
ECHO MV REGURGITANT PEAK VELOCITY: 5.2 M/S
ECHO MV REGURGITANT RADIUS PISA: 0.5 CM
ECHO MV REGURGITANT VTIA: 167 CM
ECHO PULMONARY ARTERY END DIASTOLIC PRESSURE: 7 MMHG
ECHO PV MAX VELOCITY: 1.1 M/S
ECHO PV PEAK GRADIENT: 5 MMHG
ECHO PV REGURGITANT MAX VELOCITY: 1.3 M/S
ECHO RA AREA 4C: 12.9 CM2
ECHO RA END SYSTOLIC VOLUME APICAL 4 CHAMBER INDEX BSA: 12 ML/M2
ECHO RA VOLUME: 26 ML
ECHO RIGHT VENTRICULAR SYSTOLIC PRESSURE (RVSP): 22 MMHG
ECHO RV BASAL DIMENSION: 3.7 CM
ECHO RV FREE WALL PEAK S': 13.1 CM/S
ECHO RV LONGITUDINAL DIMENSION: 8.2 CM
ECHO RV MID DIMENSION: 2.1 CM
ECHO RV TAPSE: 2.9 CM (ref 1.7–?)
ECHO TV REGURGITANT MAX VELOCITY: 2.18 M/S
ECHO TV REGURGITANT PEAK GRADIENT: 19 MMHG
EOSINOPHIL # BLD: 0.2 K/UL (ref 0–0.6)
EOSINOPHIL NFR BLD: 1.9 %
FERRITIN SERPL IA-MCNC: 454 NG/ML (ref 15–150)
GFR SERPLBLD CREATININE-BSD FMLA CKD-EPI: 15 ML/MIN/{1.73_M2}
GLUCOSE BLD-MCNC: 114 MG/DL (ref 70–99)
GLUCOSE BLD-MCNC: 160 MG/DL (ref 70–99)
GLUCOSE BLD-MCNC: 169 MG/DL (ref 70–99)
GLUCOSE SERPL-MCNC: 161 MG/DL (ref 70–99)
HCT VFR BLD AUTO: 22.6 % (ref 36–48)
HGB BLD-MCNC: 7.4 G/DL (ref 12–16)
IRON SATN MFR SERPL: 8 % (ref 15–50)
IRON SERPL-MCNC: 14 UG/DL (ref 37–145)
LYMPHOCYTES # BLD: 1 K/UL (ref 1–5.1)
LYMPHOCYTES NFR BLD: 8.8 %
MAGNESIUM SERPL-MCNC: 1.9 MG/DL (ref 1.8–2.4)
MCH RBC QN AUTO: 26.8 PG (ref 26–34)
MCHC RBC AUTO-ENTMCNC: 33 G/DL (ref 31–36)
MCV RBC AUTO: 81.4 FL (ref 80–100)
MONOCYTES # BLD: 0.9 K/UL (ref 0–1.3)
MONOCYTES NFR BLD: 7.9 %
NEUTROPHILS # BLD: 9.5 K/UL (ref 1.7–7.7)
NEUTROPHILS NFR BLD: 80.9 %
NT-PROBNP SERPL-MCNC: 5169 PG/ML (ref 0–124)
PERFORMED ON: ABNORMAL
PHOSPHATE SERPL-MCNC: 4.1 MG/DL (ref 2.5–4.9)
PLATELET # BLD AUTO: 219 K/UL (ref 135–450)
PMV BLD AUTO: 9.4 FL (ref 5–10.5)
POTASSIUM SERPL-SCNC: 3.8 MMOL/L (ref 3.5–5.1)
RBC # BLD AUTO: 2.77 M/UL (ref 4–5.2)
SODIUM SERPL-SCNC: 139 MMOL/L (ref 136–145)
TIBC SERPL-MCNC: 173 UG/DL (ref 260–445)
WBC # BLD AUTO: 11.7 K/UL (ref 4–11)

## 2024-10-08 PROCEDURE — 51702 INSERT TEMP BLADDER CATH: CPT | Performed by: NURSE PRACTITIONER

## 2024-10-08 PROCEDURE — 99223 1ST HOSP IP/OBS HIGH 75: CPT | Performed by: INTERNAL MEDICINE

## 2024-10-08 PROCEDURE — 3430000000 HC RX DIAGNOSTIC RADIOPHARMACEUTICAL: Performed by: INTERNAL MEDICINE

## 2024-10-08 PROCEDURE — 6370000000 HC RX 637 (ALT 250 FOR IP): Performed by: INTERNAL MEDICINE

## 2024-10-08 PROCEDURE — 6370000000 HC RX 637 (ALT 250 FOR IP): Performed by: STUDENT IN AN ORGANIZED HEALTH CARE EDUCATION/TRAINING PROGRAM

## 2024-10-08 PROCEDURE — 93306 TTE W/DOPPLER COMPLETE: CPT | Performed by: INTERNAL MEDICINE

## 2024-10-08 PROCEDURE — 83735 ASSAY OF MAGNESIUM: CPT

## 2024-10-08 PROCEDURE — 2580000003 HC RX 258: Performed by: STUDENT IN AN ORGANIZED HEALTH CARE EDUCATION/TRAINING PROGRAM

## 2024-10-08 PROCEDURE — 1200000000 HC SEMI PRIVATE

## 2024-10-08 PROCEDURE — A9560 TC99M LABELED RBC: HCPCS | Performed by: INTERNAL MEDICINE

## 2024-10-08 PROCEDURE — 6360000002 HC RX W HCPCS: Performed by: STUDENT IN AN ORGANIZED HEALTH CARE EDUCATION/TRAINING PROGRAM

## 2024-10-08 PROCEDURE — 93306 TTE W/DOPPLER COMPLETE: CPT

## 2024-10-08 PROCEDURE — 6360000002 HC RX W HCPCS: Performed by: HOSPITALIST

## 2024-10-08 PROCEDURE — 83880 ASSAY OF NATRIURETIC PEPTIDE: CPT

## 2024-10-08 PROCEDURE — 85025 COMPLETE CBC W/AUTO DIFF WBC: CPT

## 2024-10-08 PROCEDURE — 80069 RENAL FUNCTION PANEL: CPT

## 2024-10-08 PROCEDURE — 82728 ASSAY OF FERRITIN: CPT

## 2024-10-08 PROCEDURE — 83540 ASSAY OF IRON: CPT

## 2024-10-08 PROCEDURE — 83550 IRON BINDING TEST: CPT

## 2024-10-08 PROCEDURE — 94640 AIRWAY INHALATION TREATMENT: CPT

## 2024-10-08 PROCEDURE — 2580000003 HC RX 258: Performed by: INTERNAL MEDICINE

## 2024-10-08 PROCEDURE — 78278 ACUTE GI BLOOD LOSS IMAGING: CPT

## 2024-10-08 RX ORDER — SODIUM CHLORIDE 0.9 % (FLUSH) 0.9 %
5-40 SYRINGE (ML) INJECTION PRN
Status: DISCONTINUED | OUTPATIENT
Start: 2024-10-08 | End: 2024-10-11 | Stop reason: HOSPADM

## 2024-10-08 RX ADMIN — INSULIN LISPRO 7 UNITS: 100 INJECTION, SOLUTION INTRAVENOUS; SUBCUTANEOUS at 17:33

## 2024-10-08 RX ADMIN — SODIUM CHLORIDE, PRESERVATIVE FREE 10 ML: 5 INJECTION INTRAVENOUS at 07:50

## 2024-10-08 RX ADMIN — CARVEDILOL 25 MG: 25 TABLET, FILM COATED ORAL at 17:33

## 2024-10-08 RX ADMIN — INSULIN LISPRO 7 UNITS: 100 INJECTION, SOLUTION INTRAVENOUS; SUBCUTANEOUS at 12:00

## 2024-10-08 RX ADMIN — IPRATROPIUM BROMIDE AND ALBUTEROL SULFATE 1 DOSE: .5; 3 SOLUTION RESPIRATORY (INHALATION) at 13:52

## 2024-10-08 RX ADMIN — INSULIN GLARGINE 28 UNITS: 100 INJECTION, SOLUTION SUBCUTANEOUS at 11:46

## 2024-10-08 RX ADMIN — NIFEDIPINE 60 MG: 30 TABLET, FILM COATED, EXTENDED RELEASE ORAL at 21:48

## 2024-10-08 RX ADMIN — Medication 2000 UNITS: at 11:45

## 2024-10-08 RX ADMIN — SODIUM CHLORIDE, PRESERVATIVE FREE 10 ML: 5 INJECTION INTRAVENOUS at 21:49

## 2024-10-08 RX ADMIN — HEPARIN SODIUM 5000 UNITS: 5000 INJECTION INTRAVENOUS; SUBCUTANEOUS at 21:48

## 2024-10-08 RX ADMIN — ROSUVASTATIN 20 MG: 20 TABLET, FILM COATED ORAL at 21:48

## 2024-10-08 RX ADMIN — CARVEDILOL 25 MG: 25 TABLET, FILM COATED ORAL at 11:45

## 2024-10-08 RX ADMIN — HEPARIN SODIUM 5000 UNITS: 5000 INJECTION INTRAVENOUS; SUBCUTANEOUS at 05:30

## 2024-10-08 RX ADMIN — NIFEDIPINE 60 MG: 30 TABLET, FILM COATED, EXTENDED RELEASE ORAL at 11:45

## 2024-10-08 RX ADMIN — SODIUM CHLORIDE, PRESERVATIVE FREE 10 ML: 5 INJECTION INTRAVENOUS at 11:46

## 2024-10-08 RX ADMIN — Medication 26.07 MILLICURIE: at 07:50

## 2024-10-08 RX ADMIN — FUROSEMIDE 60 MG: 10 INJECTION, SOLUTION INTRAMUSCULAR; INTRAVENOUS at 11:47

## 2024-10-08 RX ADMIN — HEPARIN SODIUM 5000 UNITS: 5000 INJECTION INTRAVENOUS; SUBCUTANEOUS at 14:30

## 2024-10-08 RX ADMIN — FUROSEMIDE 60 MG: 10 INJECTION, SOLUTION INTRAMUSCULAR; INTRAVENOUS at 17:34

## 2024-10-08 NOTE — CONSULTS
[facility]   CONSULTATION  [phone]      Chief Complaint   Patient presents with    Facial Swelling     Pt noticed some mild facial swelling starting yesterday. She took an otc allergy pill and then went to bed. This morning she woke up to significant facial swelling. Denies any change in beauty products or diet. Pt states she did have a flu shot Friday morning but has never had a reaction to a vaccine before.          History of Present Illness:  Galina Nick is a 58 y.o. patient with a PMH of CAD, hypertension, CKD, diabetes mellitus who presented to the hospital with complaints of facial swelling.  The patient is well-known to me in regards to cardiovascular care.  She states that she developed facial swelling, but no tongue swelling, and presented to the ER for evaluation on 9/24/2024.  She was subsequently admitted and continues to be hospitalized.  She denies chest discomfort or shortness of breath.  She states she was given steroids, Benadryl and Pepcid.  The thought is that she may have developed an allergic reaction to valsartan.  She had previously been on losartan for many years.  She was seen by ENT service.  She had also had worsening anemia requiring blood transfusion.    Past Medical History:   has a past medical history of CAD (coronary artery disease), Diabetes mellitus (HCC), Dyslipidemia, Hypertension, and NSTEMI (non-ST elevated myocardial infarction) ([facility]).    Surgical History:   has a past surgical history that includes Breast biopsy; Cardiac procedure (N/A, 6/21/2024); Upper gastrointestinal endoscopy (N/A, 10/3/2024); and Colonoscopy (N/A, 10/4/2024).     Social History:   reports that she has never smoked. She has never used smokeless tobacco. She reports that she does not drink alcohol and does not use drugs.     Family History:  family history includes Diabetes in her father, maternal grandfather, maternal grandmother, paternal grandfather, and paternal  are  seen.     Punctate areas of increased density are seen in the areas of basilar lung  consolidation.  Appearance could be secondary to sequelae of aspiration or  calcified granulomatous disease     Body wall anasarca, suggesting fluid overload    Venous duplex upper extremity 10/7/2024:    Chronic non-occlusive superficial vein thrombosis in the cephalic vein of the right arm at zone 3 and zone 6    Acute occlusive superficial vein thrombosis in the cephalic vein of the left arm at zone 6.    Venous duplex lower extremity 10/7/2024:  No DVT or SVT    VQ scan 10/7/2024:  IMPRESSION:  Low probability for pulmonary embolus.      EGD 10/4/2024:  Colon:   Sigmoid diverticulosis.   Otherwise normal colon to the TI.   Normal colored bowel effluent  Fair prep  Anemia likely anemia of chronic disease and kidney disease.         Ok for dc from gi perspective.     Cardiac cath 6/21/2024:  ANGIOGRAM/CORONARY ARTERIOGRAM:     Dominance nml   Left main artery nml   Left anterior descending artery Prox 60%   Diagonals nml   Left circumflex artery Mid 60%   Obtuse Marginals nml      Right coronary artery Prox 55%, mid 60%   Posterior descending artery Posterior lateral branch nml  nml         IMPRESSION/SUMMARY  ~Coronary Angiography w/ moderate nonobstructive CAD    Echo 5/31/2024:    Left Ventricle: Normal left ventricular systolic function with a visually estimated EF of 55 - 60%. Left ventricle size is normal. Normal wall thickness. Normal wall motion. Grade II diastolic dysfunction with increased LAP.    Right Ventricle: Right ventricle is mildly dilated. Normal systolic function. TAPSE is 2.5 cm. RV Peak S' is 11 cm/s.    Aortic Valve: Trileaflet valve.    Mitral Valve: Mild regurgitation with a centrally directed jet.    Tricuspid Valve: Moderate regurgitation with an eccentrically directed jet and may underestimate severity. The estimated RVSP is 35 mmHg.    Image quality is adequate.     Myoview stress test

## 2024-10-08 NOTE — PROGRESS NOTES
bilaterally without Rales/Wheezes/Rhonchi.  Cardiovascular: Regular rate and rhythm with normal S1/S2 without murmurs, rubs or gallops.  Abdomen: Soft, non-tender, non-distended with normal bowel sounds.  Musculoskeletal: No clubbing, cyanosis or edema bilaterally.  Full range of motion without deformity.  Skin: Skin color, texture, turgor normal.  No rashes or lesions.  Neurologic:  Neurovascularly intact without any focal sensory/motor deficits. Cranial nerves: II-XII intact, grossly non-focal.  Psychiatric: Alert and oriented, thought content appropriate, normal insight  Capillary Refill: Brisk, 3 seconds, normal   Peripheral Pulses: +2 palpable, equal bilaterally   Periorbital edema resolved.  Facial swelling with mild erythema resolving.    Review of Systems  - Negative except Interval History    LABS:    CBC:   Recent Labs     10/06/24  0611 10/06/24  1649 10/07/24  0515 10/08/24  0539   WBC 9.9  --  11.2* 11.7*   HGB 6.5* 7.3* 7.5* 7.4*   HCT 19.6* 21.8* 22.7* 22.6*     --  187 219   MCV 82.4  --  81.5 81.4     Renal:    Recent Labs     10/06/24  0611 10/07/24  0515 10/08/24  0539    137 139   K 4.1 3.7 3.8    100 101   CO2 22 24 25   BUN 23* 27* 31*   CREATININE 3.0* 3.1* 3.4*   GLUCOSE 189* 188* 161*   CALCIUM 8.1* 8.5 8.6   MG 1.90 2.00 1.90   PHOS 4.5 4.3 4.1   ANIONGAP 16 13 13     Hepatic:   No results for input(s): \"AST\", \"ALT\", \"BILITOT\", \"BILIDIR\", \"LABALBU\", \"ALKPHOS\" in the last 72 hours.    Invalid input(s): \"PROT\"    Troponin: No results for input(s): \"TROPONINI\" in the last 72 hours.  BNP: No results for input(s): \"BNP\" in the last 72 hours.  Lipids: No results for input(s): \"CHOL\", \"HDL\" in the last 72 hours.    Invalid input(s): \"LDLCALCU\", \"TRIGLYCERIDE\"  ABGs:  No results for input(s): \"PHART\", \"LSM2QIE\", \"PO2ART\", \"TQS0YFQ\", \"BEART\", \"THGBART\", \"S0CAQYMS\", \"DHZ5MFJ\" in the last 72 hours.    INR: No results for input(s): \"INR\" in the last 72 hours.  Lactate: No results for  input(s): \"LACTATE\" in the last 72 hours.  Cultures:  -----------------------------------------------------------------  RAD:   NM GI BLOOD LOSS   Final Result   No evidence of active GI bleeding during acquisition.         CT CHEST WO CONTRAST   Final Result   Patchy ground-glass opacities are seen throughout the lungs bilaterally   either due to pneumonia or edema.  Scattered areas of septal thickening are   seen suggesting a component of fluid overload.  Small pleural effusions are   seen.      Punctate areas of increased density are seen in the areas of basilar lung   consolidation.  Appearance could be secondary to sequelae of aspiration or   calcified granulomatous disease      Body wall anasarca, suggesting fluid overload         Vascular duplex upper extremity venous bilateral   Final Result      Vascular duplex lower extremity venous bilateral   Final Result      NM LUNG VENT/PERFUSION (VQ)   Final Result   Low probability for pulmonary embolus.         XR CHEST PORTABLE   Final Result   Interval worsening of bilateral airspace opacities with trace effusions.   Findings may represent edema and/or infection.         XR CHEST PORTABLE   Final Result   Slightly increasing vascular prominence may reflect developing/worsening   failure and basilar opacity suggesting atelectasis and potentially early   effusions.         XR CHEST PORTABLE   Final Result   Mild bibasilar airspace disease which could reflect atelectasis, less likely   pneumonia         CT FACIAL BONES WO CONTRAST   Final Result   No acute facial bone trauma.             Medications:     Scheduled Meds:   heparin (porcine)  5,000 Units SubCUTAneous Q8H    furosemide  60 mg IntraVENous BID    sodium chloride flush  5-40 mL IntraVENous 2 times per day    insulin glargine  28 Units SubCUTAneous Daily    insulin lispro  7 Units SubCUTAneous TID WC    ipratropium 0.5 mg-albuterol 2.5 mg  1 Dose Inhalation TID RT    NIFEdipine  60 mg Oral BID    Vitamin D   2,000 Units Oral Daily    carvedilol  25 mg Oral BID WC    insulin lispro  0-4 Units SubCUTAneous TID WC    insulin lispro  0-4 Units SubCUTAneous Nightly    epoetin raisa-epbx  10,000 Units SubCUTAneous Weekly    rosuvastatin  20 mg Oral Nightly    sodium chloride flush  5-40 mL IntraVENous 2 times per day     Continuous Infusions:   sodium chloride      sodium chloride      sodium chloride Stopped (09/28/24 1817)    dextrose       PRN Meds:sodium chloride flush, sodium chloride flush, sodium chloride flush, sodium chloride, sodium chloride, ipratropium 0.5 mg-albuterol 2.5 mg, sodium chloride flush, sodium chloride, potassium chloride **OR** potassium alternative oral replacement **OR** potassium chloride, magnesium sulfate, ondansetron **OR** ondansetron, polyethylene glycol, acetaminophen **OR** acetaminophen, dextrose bolus **OR** dextrose bolus, glucagon (rDNA), dextrose, diphenhydrAMINE

## 2024-10-08 NOTE — PLAN OF CARE
Problem: Discharge Planning  Goal: Discharge to home or other facility with appropriate resources  Outcome: Progressing     Problem: Safety - Adult  Goal: Free from fall injury  Outcome: Progressing     Problem: Chronic Conditions and Co-morbidities  Goal: Patient's chronic conditions and co-morbidity symptoms are monitored and maintained or improved  Outcome: Progressing     Problem: Pain  Goal: Verbalizes/displays adequate comfort level or baseline comfort level  Outcome: Progressing     Problem: Respiratory - Adult  Goal: Achieves optimal ventilation and oxygenation  Outcome: Progressing     Problem: Gastrointestinal - Adult  Goal: Minimal or absence of nausea and vomiting  Outcome: Progressing     Problem: Gastrointestinal - Adult  Goal: Maintains adequate nutritional intake  Outcome: Progressing     Problem: Metabolic/Fluid and Electrolytes - Adult  Goal: Electrolytes maintained within normal limits  Outcome: Progressing

## 2024-10-08 NOTE — PROGRESS NOTES
Visit us at [employer] or call us at [address] [place] [address]    NEPHROLOGY PROGRESS NOTE    Patient: Galina Nick MRN: [id number]     YOB: 1965  Age: 58 y.o.  Sex: female    Unit: [address] ORTH/NEUR Room/Bed: 4TN-4476/4476-01 Location: [facility]     Admitting Physician: DONY THORPE    Primary Care Physician: Tristan Lei MD          LOS: 12 days       Reason for evaluation:   CKD4      SUBJECTIVE:      The patient was seen and examined. Notes and labs reviewed.  There were no complications over night.    Patient's review of systems: comfortable, SOB improved.   Wants to go home. On 4.5L NC    OBJECTIVE:     Vitals:    10/08/24 0016 10/08/24 0533 10/08/24 1027 10/08/24 1130   BP: 137/80 (!) 144/84 (!) 144/84 120/66   Pulse: 78 69  76   Resp: 18 16  18   Temp: 99.2 °F (37.3 °C) 98.7 °F (37.1 °C)  98 °F (36.7 °C)   TempSrc: Oral Oral  Oral   SpO2: 93% 94%     Weight:   97.1 kg (214 lb)    Height:   1.727 m (5' 8\")        Intake and Output:      Intake/Output Summary (Last 24 hours) at 10/8/2024 1151  Last data filed at 10/7/2024 1800  Gross per 24 hour   Intake 480 ml   Output --   Net 480 ml       Continuous Infusions:   sodium chloride      sodium chloride      sodium chloride Stopped (09/28/24 1727)    dextrose         Current meds:   heparin (porcine)  5,000 Units SubCUTAneous Q8H    furosemide  60 mg IntraVENous BID    sodium chloride flush  5-40 mL IntraVENous 2 times per day    insulin glargine  28 Units SubCUTAneous Daily    insulin lispro  7 Units SubCUTAneous TID WC    ipratropium 0.5 mg-albuterol 2.5 mg  1 Dose Inhalation TID RT    NIFEdipine  60 mg Oral BID    Vitamin D  2,000 Units Oral Daily    carvedilol  25 mg Oral BID WC    insulin lispro  0-4 Units SubCUTAneous TID WC    insulin lispro  0-4 Units SubCUTAneous Nightly    epoetin raisa-epbx  10,000 Units SubCUTAneous Weekly    rosuvastatin  20 mg Oral Nightly    sodium chloride flush  5-40 mL IntraVENous 2

## 2024-10-08 NOTE — PROGRESS NOTES
Shift assessment completed, pt is alert and oriented, VSS, independent in room, call light within reach, denies any pain or other needs at this time, see flowsheets and MAR, will monitor pt. The care plan and education has been reviewed and mutually agreed upon with the patient.      Unna Boot Text: An Unna boot was placed to help immobilize the limb and facilitate more rapid healing.

## 2024-10-09 LAB
ALBUMIN SERPL-MCNC: 2.8 G/DL (ref 3.4–5)
ALP SERPL-CCNC: 84 U/L (ref 40–129)
ALT SERPL-CCNC: 16 U/L (ref 10–40)
ANION GAP SERPL CALCULATED.3IONS-SCNC: 10 MMOL/L (ref 3–16)
AST SERPL-CCNC: 16 U/L (ref 15–37)
BASOPHILS # BLD: 0.1 K/UL (ref 0–0.2)
BASOPHILS NFR BLD: 0.8 %
BILIRUB DIRECT SERPL-MCNC: <0.1 MG/DL (ref 0–0.3)
BILIRUB INDIRECT SERPL-MCNC: ABNORMAL MG/DL (ref 0–1)
BILIRUB SERPL-MCNC: <0.2 MG/DL (ref 0–1)
BUN SERPL-MCNC: 31 MG/DL (ref 7–20)
CALCIUM SERPL-MCNC: 8.5 MG/DL (ref 8.3–10.6)
CHLORIDE SERPL-SCNC: 101 MMOL/L (ref 99–110)
CO2 SERPL-SCNC: 28 MMOL/L (ref 21–32)
CREAT SERPL-MCNC: 3.3 MG/DL (ref 0.6–1.1)
DEPRECATED RDW RBC AUTO: 14 % (ref 12.4–15.4)
EOSINOPHIL # BLD: 0.3 K/UL (ref 0–0.6)
EOSINOPHIL NFR BLD: 3.9 %
GFR SERPLBLD CREATININE-BSD FMLA CKD-EPI: 16 ML/MIN/{1.73_M2}
GLUCOSE BLD-MCNC: 118 MG/DL (ref 70–99)
GLUCOSE BLD-MCNC: 155 MG/DL (ref 70–99)
GLUCOSE BLD-MCNC: 226 MG/DL (ref 70–99)
GLUCOSE BLD-MCNC: 243 MG/DL (ref 70–99)
GLUCOSE BLD-MCNC: 56 MG/DL (ref 70–99)
GLUCOSE BLD-MCNC: 94 MG/DL (ref 70–99)
GLUCOSE SERPL-MCNC: 246 MG/DL (ref 70–99)
HCT VFR BLD AUTO: 22.7 % (ref 36–48)
HGB BLD-MCNC: 7.5 G/DL (ref 12–16)
LYMPHOCYTES # BLD: 1.3 K/UL (ref 1–5.1)
LYMPHOCYTES NFR BLD: 15.3 %
MAGNESIUM SERPL-MCNC: 2.02 MG/DL (ref 1.8–2.4)
MCH RBC QN AUTO: 27.2 PG (ref 26–34)
MCHC RBC AUTO-ENTMCNC: 33.2 G/DL (ref 31–36)
MCV RBC AUTO: 82 FL (ref 80–100)
MONOCYTES # BLD: 0.8 K/UL (ref 0–1.3)
MONOCYTES NFR BLD: 9.3 %
NEUTROPHILS # BLD: 5.8 K/UL (ref 1.7–7.7)
NEUTROPHILS NFR BLD: 70.7 %
PERFORMED ON: ABNORMAL
PERFORMED ON: NORMAL
PHOSPHATE SERPL-MCNC: 4 MG/DL (ref 2.5–4.9)
PLATELET # BLD AUTO: 236 K/UL (ref 135–450)
PMV BLD AUTO: 9.4 FL (ref 5–10.5)
POTASSIUM SERPL-SCNC: 3.7 MMOL/L (ref 3.5–5.1)
PROT SERPL-MCNC: 5.7 G/DL (ref 6.4–8.2)
RBC # BLD AUTO: 2.77 M/UL (ref 4–5.2)
SODIUM SERPL-SCNC: 139 MMOL/L (ref 136–145)
WBC # BLD AUTO: 8.2 K/UL (ref 4–11)

## 2024-10-09 PROCEDURE — 6360000002 HC RX W HCPCS: Performed by: STUDENT IN AN ORGANIZED HEALTH CARE EDUCATION/TRAINING PROGRAM

## 2024-10-09 PROCEDURE — 6370000000 HC RX 637 (ALT 250 FOR IP): Performed by: INTERNAL MEDICINE

## 2024-10-09 PROCEDURE — 36415 COLL VENOUS BLD VENIPUNCTURE: CPT

## 2024-10-09 PROCEDURE — 2580000003 HC RX 258: Performed by: INTERNAL MEDICINE

## 2024-10-09 PROCEDURE — 2580000003 HC RX 258: Performed by: STUDENT IN AN ORGANIZED HEALTH CARE EDUCATION/TRAINING PROGRAM

## 2024-10-09 PROCEDURE — 99232 SBSQ HOSP IP/OBS MODERATE 35: CPT | Performed by: INTERNAL MEDICINE

## 2024-10-09 PROCEDURE — 99232 SBSQ HOSP IP/OBS MODERATE 35: CPT | Performed by: NURSE PRACTITIONER

## 2024-10-09 PROCEDURE — 80076 HEPATIC FUNCTION PANEL: CPT

## 2024-10-09 PROCEDURE — 6360000002 HC RX W HCPCS: Performed by: INTERNAL MEDICINE

## 2024-10-09 PROCEDURE — 85025 COMPLETE CBC W/AUTO DIFF WBC: CPT

## 2024-10-09 PROCEDURE — 83735 ASSAY OF MAGNESIUM: CPT

## 2024-10-09 PROCEDURE — 6360000002 HC RX W HCPCS: Performed by: HOSPITALIST

## 2024-10-09 PROCEDURE — 80069 RENAL FUNCTION PANEL: CPT

## 2024-10-09 PROCEDURE — 1200000000 HC SEMI PRIVATE

## 2024-10-09 PROCEDURE — 6370000000 HC RX 637 (ALT 250 FOR IP): Performed by: STUDENT IN AN ORGANIZED HEALTH CARE EDUCATION/TRAINING PROGRAM

## 2024-10-09 RX ORDER — IPRATROPIUM BROMIDE AND ALBUTEROL SULFATE 2.5; .5 MG/3ML; MG/3ML
1 SOLUTION RESPIRATORY (INHALATION) EVERY 4 HOURS PRN
Status: DISCONTINUED | OUTPATIENT
Start: 2024-10-09 | End: 2024-10-09 | Stop reason: SDUPTHER

## 2024-10-09 RX ADMIN — INSULIN GLARGINE 28 UNITS: 100 INJECTION, SOLUTION SUBCUTANEOUS at 08:07

## 2024-10-09 RX ADMIN — NIFEDIPINE 60 MG: 30 TABLET, FILM COATED, EXTENDED RELEASE ORAL at 08:07

## 2024-10-09 RX ADMIN — CARVEDILOL 25 MG: 25 TABLET, FILM COATED ORAL at 08:07

## 2024-10-09 RX ADMIN — HEPARIN SODIUM 5000 UNITS: 5000 INJECTION INTRAVENOUS; SUBCUTANEOUS at 05:15

## 2024-10-09 RX ADMIN — Medication 2000 UNITS: at 08:06

## 2024-10-09 RX ADMIN — NIFEDIPINE 60 MG: 30 TABLET, FILM COATED, EXTENDED RELEASE ORAL at 22:28

## 2024-10-09 RX ADMIN — HEPARIN SODIUM 5000 UNITS: 5000 INJECTION INTRAVENOUS; SUBCUTANEOUS at 14:30

## 2024-10-09 RX ADMIN — FUROSEMIDE 60 MG: 10 INJECTION, SOLUTION INTRAMUSCULAR; INTRAVENOUS at 09:00

## 2024-10-09 RX ADMIN — INSULIN LISPRO 7 UNITS: 100 INJECTION, SOLUTION INTRAVENOUS; SUBCUTANEOUS at 11:47

## 2024-10-09 RX ADMIN — INSULIN LISPRO 7 UNITS: 100 INJECTION, SOLUTION INTRAVENOUS; SUBCUTANEOUS at 08:08

## 2024-10-09 RX ADMIN — SODIUM CHLORIDE 125 MG: 9 INJECTION, SOLUTION INTRAVENOUS at 10:24

## 2024-10-09 RX ADMIN — INSULIN LISPRO 7 UNITS: 100 INJECTION, SOLUTION INTRAVENOUS; SUBCUTANEOUS at 17:54

## 2024-10-09 RX ADMIN — ROSUVASTATIN 20 MG: 20 TABLET, FILM COATED ORAL at 22:28

## 2024-10-09 RX ADMIN — SODIUM CHLORIDE, PRESERVATIVE FREE 10 ML: 5 INJECTION INTRAVENOUS at 08:09

## 2024-10-09 RX ADMIN — CARVEDILOL 25 MG: 25 TABLET, FILM COATED ORAL at 16:15

## 2024-10-09 RX ADMIN — HEPARIN SODIUM 5000 UNITS: 5000 INJECTION INTRAVENOUS; SUBCUTANEOUS at 22:28

## 2024-10-09 RX ADMIN — SODIUM CHLORIDE, PRESERVATIVE FREE 10 ML: 5 INJECTION INTRAVENOUS at 08:07

## 2024-10-09 RX ADMIN — SODIUM CHLORIDE, PRESERVATIVE FREE 10 ML: 5 INJECTION INTRAVENOUS at 22:29

## 2024-10-09 RX ADMIN — FUROSEMIDE 60 MG: 10 INJECTION, SOLUTION INTRAMUSCULAR; INTRAVENOUS at 16:17

## 2024-10-09 RX ADMIN — INSULIN LISPRO 2 UNITS: 100 INJECTION, SOLUTION INTRAVENOUS; SUBCUTANEOUS at 08:09

## 2024-10-09 RX ADMIN — IPRATROPIUM BROMIDE AND ALBUTEROL SULFATE 1 DOSE: .5; 3 SOLUTION RESPIRATORY (INHALATION) at 14:54

## 2024-10-09 ASSESSMENT — PAIN DESCRIPTION - ORIENTATION: ORIENTATION: LEFT;UPPER

## 2024-10-09 ASSESSMENT — PAIN DESCRIPTION - LOCATION: LOCATION: ABDOMEN

## 2024-10-09 ASSESSMENT — PAIN DESCRIPTION - FREQUENCY: FREQUENCY: INTERMITTENT

## 2024-10-09 ASSESSMENT — PAIN SCALES - WONG BAKER
WONGBAKER_NUMERICALRESPONSE: NO HURT

## 2024-10-09 ASSESSMENT — PAIN DESCRIPTION - PAIN TYPE: TYPE: ACUTE PAIN

## 2024-10-09 ASSESSMENT — PAIN DESCRIPTION - DESCRIPTORS: DESCRIPTORS: SORE

## 2024-10-09 ASSESSMENT — PAIN SCALES - GENERAL: PAINLEVEL_OUTOF10: 0

## 2024-10-09 NOTE — PROGRESS NOTES
Visit us at [facility] or call us at [phone] [place] [address]    NEPHROLOGY PROGRESS NOTE    Patient: Galina Nick MRN: [id number]     YOB: 1965  Age: 58 y.o.  Sex: female    Unit: [address] ORTH/NEUR Room/Bed: 4TN-4476/4476-01 Location: [facility]     Admitting Physician: DONY THORPE    Primary Care Physician: Tristan Lei MD          LOS: 13 days       Reason for evaluation:   CKD4      SUBJECTIVE:      The patient was seen and examined. Notes and labs reviewed.  There were no complications over night.    Patient's review of systems: comfortable, SOB improved.   Wants to go home. On 2L NC    OBJECTIVE:     Vitals:    10/08/24 2135 10/09/24 0013 10/09/24 0509 10/09/24 0800   BP: (!) 144/76 (!) 147/67 (!) 147/84 (!) 144/84   Pulse: 67 72 71 74   Resp: 18 16 16 18   Temp: 98.8 °F (37.1 °C) 98.6 °F (37 °C) 99 °F (37.2 °C) 98.1 °F (36.7 °C)   TempSrc: Oral Oral Oral Oral   SpO2: 95% 95% 90% 96%   Weight:   92.2 kg (203 lb 4.2 oz)    Height:           Intake and Output:    No intake or output data in the 24 hours ending 10/09/24 1150      Continuous Infusions:   sodium chloride      sodium chloride      sodium chloride Stopped (09/28/24 1727)    dextrose         Current meds:   ferric gluconate  125 mg IntraVENous Daily    heparin (porcine)  5,000 Units SubCUTAneous Q8H    furosemide  60 mg IntraVENous BID    sodium chloride flush  5-40 mL IntraVENous 2 times per day    insulin glargine  28 Units SubCUTAneous Daily    insulin lispro  7 Units SubCUTAneous TID WC    NIFEdipine  60 mg Oral BID    Vitamin D  2,000 Units Oral Daily    carvedilol  25 mg Oral BID WC    insulin lispro  0-4 Units SubCUTAneous TID WC    insulin lispro  0-4 Units SubCUTAneous Nightly    epoetin raisa-epbx  10,000 Units SubCUTAneous Weekly    rosuvastatin  20 mg Oral Nightly    sodium chloride flush  5-40 mL IntraVENous 2 times per day        Exam:   CONSTITUTIONAL/PSYCHIATRY: awake, alert and oriented x3.  nifedipine 60, toprol 50 PTA   Now on nifedipine 60 BID, coreg 25 bid, holding aldactone. Do not resume chlorthalidone and valsartan on discharge.   Renin 0.4, erika 3.4 in 1/24     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 Anemia on CKD: S/P IV iron & on weekly JUN. Hb 11 --> 6.3 range now. S/P PRBC  EGD, colonoscopy this admission unremarkable.  - Continue JUN  - Transfuse prn for Hb <7       Signed By: Addie Naidu DO

## 2024-10-09 NOTE — PROGRESS NOTES
Admit Date: 9/24/2024  Diet: ADULT DIET; Regular; 4 carb choices (60 gm/meal); Low Sodium (2 gm); Low Potassium (Less than 3000 mg/day)  ADULT ORAL NUTRITION SUPPLEMENT; Breakfast, Lunch, Dinner; Renal Oral Supplement    CC: Facial swelling    Interval history:   Overnight, there were no acute events. Patient's vitals remained stable.    Patient seen and examined. No interval events.   Denies any complaints. No pain, fever or chills.    Weaned off supplemental oxygen.    Assessment/plan:   Galina Nick is a 58 y.o. female with PMH of hypertension, CAD, dyslipidemia, type 2 diabetes mellitus who was admitted with JAYY    JAYY on CKD 3b with metabolic acidosis:  Baseline SCr 1.9.  Peaked at 3.1 and trended down to 2.3 but now up to 3.4.    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    IV fluids and sodium bicarb discontinued.    Avoid nephrotoxins.  Monitor BM, strict I's and O's.  Nephrology consult appreciated.    Fluid overload with acute hypoxia:  CXR done showed bilateral pleural effusions that has been getting worse.  CT chest reviewed with pulmonary edema and lateral effusion.  Wean down oxygen as tolerated.  Continue IV Lasix.    Pleurisy:  Patient endorsed that she had new onset left-sided pleuritic chest pain.    D-dimer was elevated to 2.04.  Patient has been in hospital for 12 days.    Due to patient's worsening renal function, CTPA could not be pursued.  VQ scan with low probability for PE.  Heparin drip discontinued.      Uncontrolled hypertension:   In the ED, patient was hypertensive with blood pressure of 191/82.  Discontinued metoprolol, diuretics and ARB.  BP now controlled on Lasix, nifedipine and Coreg.     Angioedema involving the face: Resolved.  Likely secondary to Diovan use; discontinued.    Received Benadryl 25 mg, Pepcid 20 mg, Solu-Medrol 125 mg in the ED.  No further doses of steroids received due to worsening hyperglycemia.  C4 complement elevated.        T2DM not on long-term  intact, grossly non-focal.  Psychiatric: Alert and oriented, thought content appropriate, normal insight  Capillary Refill: Brisk, 3 seconds, normal   Peripheral Pulses: +2 palpable, equal bilaterally   Periorbital edema resolved.  Facial swelling with mild erythema resolving.    Review of Systems  - Negative except Interval History    LABS:    CBC:   Recent Labs     10/07/24  0515 10/08/24  0539 10/09/24  0516   WBC 11.2* 11.7* 8.2   HGB 7.5* 7.4* 7.5*   HCT 22.7* 22.6* 22.7*    219 236   MCV 81.5 81.4 82.0     Renal:    Recent Labs     10/07/24  0515 10/08/24  0539 10/09/24  0515    139 139   K 3.7 3.8 3.7    101 101   CO2 24 25 28   BUN 27* 31* 31*   CREATININE 3.1* 3.4* 3.3*   GLUCOSE 188* 161* 246*   CALCIUM 8.5 8.6 8.5   MG 2.00 1.90 2.02   PHOS 4.3 4.1 4.0   ANIONGAP 13 13 10     Hepatic:   Recent Labs     10/09/24  0515   AST 16   ALT 16   BILITOT <0.2   BILIDIR <0.1   ALKPHOS 84       Troponin: No results for input(s): \"TROPONINI\" in the last 72 hours.  BNP: No results for input(s): \"BNP\" in the last 72 hours.  Lipids: No results for input(s): \"CHOL\", \"HDL\" in the last 72 hours.    Invalid input(s): \"LDLCALCU\", \"TRIGLYCERIDE\"  ABGs:  No results for input(s): \"PHART\", \"ZFR7CLR\", \"PO2ART\", \"AWM2VCM\", \"BEART\", \"THGBART\", \"O5NBVLHX\", \"YHM7XXN\" in the last 72 hours.    INR: No results for input(s): \"INR\" in the last 72 hours.  Lactate: No results for input(s): \"LACTATE\" in the last 72 hours.  Cultures:  -----------------------------------------------------------------  RAD:   NM GI BLOOD LOSS   Final Result   No evidence of active GI bleeding during acquisition.         CT CHEST WO CONTRAST   Final Result   Patchy ground-glass opacities are seen throughout the lungs bilaterally   either due to pneumonia or edema.  Scattered areas of septal thickening are   seen suggesting a component of fluid overload.  Small pleural effusions are   seen.      Punctate areas of increased density are seen in  the areas of basilar lung   consolidation.  Appearance could be secondary to sequelae of aspiration or   calcified granulomatous disease      Body wall anasarca, suggesting fluid overload         Vascular duplex upper extremity venous bilateral   Final Result      Vascular duplex lower extremity venous bilateral   Final Result      NM LUNG VENT/PERFUSION (VQ)   Final Result   Low probability for pulmonary embolus.         XR CHEST PORTABLE   Final Result   Interval worsening of bilateral airspace opacities with trace effusions.   Findings may represent edema and/or infection.         XR CHEST PORTABLE   Final Result   Slightly increasing vascular prominence may reflect developing/worsening   failure and basilar opacity suggesting atelectasis and potentially early   effusions.         XR CHEST PORTABLE   Final Result   Mild bibasilar airspace disease which could reflect atelectasis, less likely   pneumonia         CT FACIAL BONES WO CONTRAST   Final Result   No acute facial bone trauma.             Medications:     Scheduled Meds:   ferric gluconate  125 mg IntraVENous Daily    heparin (porcine)  5,000 Units SubCUTAneous Q8H    furosemide  60 mg IntraVENous BID    sodium chloride flush  5-40 mL IntraVENous 2 times per day    insulin glargine  28 Units SubCUTAneous Daily    insulin lispro  7 Units SubCUTAneous TID WC    NIFEdipine  60 mg Oral BID    Vitamin D  2,000 Units Oral Daily    carvedilol  25 mg Oral BID WC    insulin lispro  0-4 Units SubCUTAneous TID WC    insulin lispro  0-4 Units SubCUTAneous Nightly    epoetin raisa-epbx  10,000 Units SubCUTAneous Weekly    rosuvastatin  20 mg Oral Nightly    sodium chloride flush  5-40 mL IntraVENous 2 times per day     Continuous Infusions:   sodium chloride      sodium chloride      sodium chloride Stopped (09/28/24 1727)    dextrose       PRN Meds:sodium chloride flush, sodium chloride flush, sodium chloride flush, sodium chloride, sodium chloride, ipratropium 0.5

## 2024-10-09 NOTE — PROGRESS NOTES
[facility] Daily Progress Note      Admit Date:  9/24/2024      Cardiology consult: Swelling    Subjective:  Ms. Nick states that she is improving from some symptoms of low blood sugar.  No complaints of chest discomfort.      Old notes reviewed  Telemetry reviewd  EKG personally reviewed  CXR personally reviewed  Echo, cath, and medications and labs reviewed  High complexity/medical decision making due to extensive data review, extensive history review, independent review of data  High risk due to acute illness, evaluation of drug-drug interactions, medication management and diagnostic interventions      History of present illness:   Galina Nick has a PMH of  PMH of CAD, hypertension, CKD, diabetes mellitus who presented to the hospital with complaints of facial swelling.  The patient is well-known to me in regards to cardiovascular care.  She states that she developed facial swelling, but no tongue swelling, and presented to the ER for evaluation on 9/24/2024.  She was subsequently admitted and continues to be hospitalized.  She denies chest discomfort or shortness of breath.  She states she was given steroids, Benadryl and Pepcid.  The thought is that she may have developed an allergic reaction to valsartan.  She had previously been on losartan for many years.  She was seen by ENT service.  She had also had worsening anemia requiring blood transfusion. .       Objective:   /65   Pulse 69   Temp 98.1 °F (36.7 °C) (Oral)   Resp 18   Ht 1.727 m (5' 8\")   Wt 92.2 kg (203 lb 4.2 oz)   LMP 06/01/2011   SpO2 96%   BMI 30.91 kg/m²   No intake or output data in the 24 hours ending 10/09/24 1619    Physical Exam:  General:  Awake, alert, oriented x 3, NAD  Skin:  Warm and dry  Neck:  JVD elevated  Chest:  crackles  Cardiovascular:  RRR S1S2, no S3, 2/6 systolic throughout the precordium  Abdomen:  Soft, ND, NT, No HSM  Extremities:  No edema    Medications:    ferric gluconate  125 mg  and shortness of breath.   High risk study due to significant SBP decrease with exercise.        Cardiac CTA 4/4/24:  Moderate phase misregistration artifact limits evaluation of the proximal LAD.  Plaque is seen in the proximal 3rd.  No flow limiting stenosis noted with the aforementioned limitations. Distal to this point, left anterior descending coronary artery is visualized to the cardiac apex. Left anterior descending coronary artery gives rise to patent diagonal branch.   No significant plaque or flow limiting stenosis noted in the circumflex with the reservation that the distal circumflex is not well evaluated secondary to phase misregistration artifact     Small amount of plaque is seen in the right coronary artery with mild luminal narrowing proximally   Calcium score 19.  This is in the 70th percentile for patient age     Scattered punctate noncalcified pulmonary nodules.  Please see follow-up recommendations below     CTA pelvis 2/7/2024:  CTA PELVIS:   Vascular: There is normal patency of the bilateral iliac and visualized femoral arteries with no aneurysm.   Nonvascular: A cystic structure in the right ovary measures up to 1.7 cm. The bladder and uterus are unremarkable.  There is no free fluid.   IMPRESSION:  1. Mild atherosclerosis with normal patency of the branches of the abdominal aorta including the bilateral renal arteries.  There is a small accessory artery on the right supplying the lower pole.  2. Thickening of the stomach and adjacent inflammatory changes suggesting gastritis.  Trace free fluid in Alfaro's pouch in the right pericolic gutter is favored to be related.  3. No extravasation of contrast in the bowel to suggest active  gastrointestinal bleeding.  4. 1.7 cm cyst or dominant follicle in the right ovary.  No follow-up imaging is recommended.     Renal duplex 1/24/24  Summary   There is 60-99% stenosis of the proximal right renal artery(ies).  The right renal vein is patent, however  Appreciate CHF service consultation and assistance.       Acute kidney injury superimposed on stage 4 chronic kidney disease (HCC)  Plan: Baseline creatinine was around 2.0 significantly increased.  Nephrology following.       Anemia  Plan: Significantly worsened.  RBCs received.  Currently 7.5.  Would maintain hemoglobin above 8 given cardiac status.  Iron therapy ordered.       Nonrheumatic mitral valve regurgitation  Plan: Previously moderate.  Optimization of blood pressure is very important in its management.       Coronary artery disease due to lipid rich plaque  Plan: Moderate nonobstructive.  Risk factor modification.       Dyslipidemia  Plan: Statin for LDL less than 70.       Diabetes mellitus (HCC)  Plan: Per primary service.        Plan: Very complex patient.  Trend proBNP.  Diuresis to euvolemia.  Needs blood pressure control.  Ideally, Hgb > 8.           Timo Gamez MD 10/9/2024 4:19 PM

## 2024-10-09 NOTE — PROGRESS NOTES
Shift assessment complete at 2135.  VSS w/ elevated BP, A&Ox4, BS active, on nasal cannula 2.5 L satting well.  Breath sounds clear but diminished with occasional cough.  LUE still edematous.  Care plan discussed, patient mutually agrees.  Call light within reach, no further needs at this time.    Claudia Linton RN

## 2024-10-09 NOTE — PROGRESS NOTES
[facility]  HEART FAILURE  Progress Note      Admit Date 9/24/2024     Reason for Consult:      Reason for Consultation/Chief Complaint: face swelling    HPI:    Galina Nick is a 58 y.o. female with PMH NObCAD, HTN, renal stenosis, MR, HFpEF admitted with facial swelling. ARB restarted in July this year      Subjective:  Patient is being seen for CHF. There were no acute overnight cardiac events.   Today Ms. Nick feels well and denies chest pain, shortness of breath, palpitations, or dizziness  I spent 10 minutes educating the patient on heart failure, medications, lifestyle modifications and dietary guidelines, echo reviewed with her      Baseline Weight: uk   Wt Readings from Last 3 Encounters:   10/09/24 92.2 kg (203 lb 4.2 oz)   08/01/24 80.8 kg (178 lb 2.1 oz)   07/30/24 80.6 kg (177 lb 9.6 oz)         Cardiac Testing: Echo 10/8/24:     Left Ventricle: Normal left ventricular systolic function with a visually estimated EF of 60 - 65%. EF by 2D Simpsons Biplane is 65%. Left ventricle size is normal. Mild posterior thickening. Moderate septal thickening. Normal wall motion. Grade II diastolic dysfunction with increased LAP.    Aortic Valve: Trileaflet valve.    Mitral Valve: Mild to moderate regurgitation.    Tricuspid Valve: Mild regurgitation with an eccentrically directed jet and may underestimate severity. The estimated RVSP is 22 mmHg.    Left Atrium: Left atrium is severely dilated.    Interatrial Septum: Agitated saline study was negative with and without provocation.    Pericardium: Trivial pericardial effusion present. No indication of cardiac tamponade.    Image quality is adequate.  Cardiac CTA 4/4/24  Moderate phase misregistration artifact limits evaluation of the proximal  LAD.  Plaque is seen in the proximal 3rd.  No flow limiting stenosis noted  with the aforementioned limitations. Distal to this point, left anterior  descending coronary artery is visualized to the cardiac apex.  estimated EF of 55 - 60%. Left ventricle size is normal. Normal wall thickness. Normal wall motion. Grade II diastolic dysfunction with increased LAP.    Right Ventricle: Right ventricle is mildly dilated. Normal systolic function. TAPSE is 2.5 cm. RV Peak S' is 11 cm/s.    Aortic Valve: Trileaflet valve.    Mitral Valve: Mild regurgitation with a centrally directed jet.    Tricuspid Valve: Moderate regurgitation with an eccentrically directed jet and may underestimate severity. The estimated RVSP is 35 mmHg.    Image quality is adequate.     4/26/24 Stress test   Conclusions    Perfusion:   There is a small sized area of mildly reduced perfusion in the basal and mid   inferior and inferolateral segment at rest with improvement at stress. There   is a second small area of mildly reduced perfusion in the mid anteroseptal   segment at rest with improvement at rest. Both defects appear consistent   with artifact.  No evidence of ischemia   Overall:   There is no evidence of ischemia on perfusion imaging   Systolic blood pressure dropped form 183 to 148 with submaximal exercise and   patient reported significant fatigue and shortness of breath.   High risk study due to significant SBP decrease with exercise.      [facility]: Last Angiogram: 6/21/24  ANGIOGRAM/CORONARY ARTERIOGRAM:     Dominance nml   Left main artery nml   Left anterior descending artery Prox 60%   Diagonals nml   Left circumflex artery Mid 60%   Obtuse Marginals nml      Right coronary artery Prox 55%, mid 60%   Posterior descending artery Posterior lateral branch nml  nml         IMPRESSION/SUMMARY  ~Coronary Angiography w/ moderate nonobstructive CAD      NYHA Class III    Objective:   BP (!) 144/84   Pulse 74   Temp 98.1 °F (36.7 °C) (Oral)   Resp 18   Ht 1.727 m (5' 8\")   Wt 92.2 kg (203 lb 4.2 oz)   LMP 06/01/2011   SpO2 96%   BMI 30.91 kg/m²   No intake or output data in the 24 hours ending 10/09/24 1019   No intake/output data

## 2024-10-10 LAB
ALBUMIN SERPL-MCNC: 2.8 G/DL (ref 3.4–5)
ANION GAP SERPL CALCULATED.3IONS-SCNC: 10 MMOL/L (ref 3–16)
BACTERIA URNS QL MICRO: ABNORMAL /HPF
BASOPHILS # BLD: 0.1 K/UL (ref 0–0.2)
BASOPHILS NFR BLD: 0.9 %
BILIRUB UR QL STRIP.AUTO: NEGATIVE
BUN SERPL-MCNC: 30 MG/DL (ref 7–20)
CALCIUM SERPL-MCNC: 8.4 MG/DL (ref 8.3–10.6)
CHLORIDE SERPL-SCNC: 102 MMOL/L (ref 99–110)
CLARITY UR: CLEAR
CO2 SERPL-SCNC: 27 MMOL/L (ref 21–32)
COLOR UR: YELLOW
CREAT SERPL-MCNC: 2.7 MG/DL (ref 0.6–1.1)
DEPRECATED RDW RBC AUTO: 13.7 % (ref 12.4–15.4)
EOSINOPHIL # BLD: 0.2 K/UL (ref 0–0.6)
EOSINOPHIL NFR BLD: 3.3 %
EPI CELLS #/AREA URNS AUTO: 1 /HPF (ref 0–5)
GFR SERPLBLD CREATININE-BSD FMLA CKD-EPI: 20 ML/MIN/{1.73_M2}
GLUCOSE BLD-MCNC: 132 MG/DL (ref 70–99)
GLUCOSE BLD-MCNC: 180 MG/DL (ref 70–99)
GLUCOSE BLD-MCNC: 184 MG/DL (ref 70–99)
GLUCOSE BLD-MCNC: 211 MG/DL (ref 70–99)
GLUCOSE BLD-MCNC: 260 MG/DL (ref 70–99)
GLUCOSE SERPL-MCNC: 174 MG/DL (ref 70–99)
GLUCOSE UR STRIP.AUTO-MCNC: 500 MG/DL
HCT VFR BLD AUTO: 23.5 % (ref 36–48)
HGB BLD-MCNC: 7.8 G/DL (ref 12–16)
HGB UR QL STRIP.AUTO: NEGATIVE
HYALINE CASTS #/AREA URNS AUTO: 1 /LPF (ref 0–8)
KETONES UR STRIP.AUTO-MCNC: NEGATIVE MG/DL
LEUKOCYTE ESTERASE UR QL STRIP.AUTO: NEGATIVE
LYMPHOCYTES # BLD: 1.1 K/UL (ref 1–5.1)
LYMPHOCYTES NFR BLD: 15.7 %
MAGNESIUM SERPL-MCNC: 1.82 MG/DL (ref 1.8–2.4)
MCH RBC QN AUTO: 27.1 PG (ref 26–34)
MCHC RBC AUTO-ENTMCNC: 33.2 G/DL (ref 31–36)
MCV RBC AUTO: 81.5 FL (ref 80–100)
MONOCYTES # BLD: 0.7 K/UL (ref 0–1.3)
MONOCYTES NFR BLD: 10.3 %
NEUTROPHILS # BLD: 5 K/UL (ref 1.7–7.7)
NEUTROPHILS NFR BLD: 69.8 %
NITRITE UR QL STRIP.AUTO: NEGATIVE
PERFORMED ON: ABNORMAL
PH UR STRIP.AUTO: 7.5 [PH] (ref 5–8)
PHOSPHATE SERPL-MCNC: 4.4 MG/DL (ref 2.5–4.9)
PLATELET # BLD AUTO: 247 K/UL (ref 135–450)
PMV BLD AUTO: 9.1 FL (ref 5–10.5)
POTASSIUM SERPL-SCNC: 3.8 MMOL/L (ref 3.5–5.1)
PROT UR STRIP.AUTO-MCNC: 300 MG/DL
RBC # BLD AUTO: 2.88 M/UL (ref 4–5.2)
RBC CLUMPS #/AREA URNS AUTO: 1 /HPF (ref 0–4)
SODIUM SERPL-SCNC: 139 MMOL/L (ref 136–145)
SP GR UR STRIP.AUTO: 1.01 (ref 1–1.03)
UA COMPLETE W REFLEX CULTURE PNL UR: YES
UA DIPSTICK W REFLEX MICRO PNL UR: YES
URN SPEC COLLECT METH UR: ABNORMAL
UROBILINOGEN UR STRIP-ACNC: 0.2 E.U./DL
WBC # BLD AUTO: 7.2 K/UL (ref 4–11)
WBC #/AREA URNS AUTO: 11 /HPF (ref 0–5)

## 2024-10-10 PROCEDURE — 94640 AIRWAY INHALATION TREATMENT: CPT

## 2024-10-10 PROCEDURE — 6370000000 HC RX 637 (ALT 250 FOR IP): Performed by: INTERNAL MEDICINE

## 2024-10-10 PROCEDURE — 85025 COMPLETE CBC W/AUTO DIFF WBC: CPT

## 2024-10-10 PROCEDURE — 1200000000 HC SEMI PRIVATE

## 2024-10-10 PROCEDURE — 2580000003 HC RX 258: Performed by: INTERNAL MEDICINE

## 2024-10-10 PROCEDURE — 36415 COLL VENOUS BLD VENIPUNCTURE: CPT

## 2024-10-10 PROCEDURE — 83735 ASSAY OF MAGNESIUM: CPT

## 2024-10-10 PROCEDURE — 87086 URINE CULTURE/COLONY COUNT: CPT

## 2024-10-10 PROCEDURE — 6360000002 HC RX W HCPCS: Performed by: INTERNAL MEDICINE

## 2024-10-10 PROCEDURE — 6370000000 HC RX 637 (ALT 250 FOR IP): Performed by: STUDENT IN AN ORGANIZED HEALTH CARE EDUCATION/TRAINING PROGRAM

## 2024-10-10 PROCEDURE — 99232 SBSQ HOSP IP/OBS MODERATE 35: CPT | Performed by: NURSE PRACTITIONER

## 2024-10-10 PROCEDURE — 94760 N-INVAS EAR/PLS OXIMETRY 1: CPT

## 2024-10-10 PROCEDURE — 81001 URINALYSIS AUTO W/SCOPE: CPT

## 2024-10-10 PROCEDURE — 80069 RENAL FUNCTION PANEL: CPT

## 2024-10-10 PROCEDURE — 6360000002 HC RX W HCPCS: Performed by: STUDENT IN AN ORGANIZED HEALTH CARE EDUCATION/TRAINING PROGRAM

## 2024-10-10 PROCEDURE — 6360000002 HC RX W HCPCS: Performed by: HOSPITALIST

## 2024-10-10 PROCEDURE — 2580000003 HC RX 258: Performed by: STUDENT IN AN ORGANIZED HEALTH CARE EDUCATION/TRAINING PROGRAM

## 2024-10-10 RX ORDER — FUROSEMIDE 40 MG/1
80 TABLET ORAL DAILY
Status: DISCONTINUED | OUTPATIENT
Start: 2024-10-11 | End: 2024-10-11 | Stop reason: HOSPADM

## 2024-10-10 RX ADMIN — ROSUVASTATIN 20 MG: 20 TABLET, FILM COATED ORAL at 22:00

## 2024-10-10 RX ADMIN — FUROSEMIDE 60 MG: 10 INJECTION, SOLUTION INTRAMUSCULAR; INTRAVENOUS at 17:43

## 2024-10-10 RX ADMIN — EPOETIN ALFA-EPBX 10000 UNITS: 10000 INJECTION, SOLUTION INTRAVENOUS; SUBCUTANEOUS at 17:51

## 2024-10-10 RX ADMIN — CARVEDILOL 25 MG: 25 TABLET, FILM COATED ORAL at 17:43

## 2024-10-10 RX ADMIN — HEPARIN SODIUM 5000 UNITS: 5000 INJECTION INTRAVENOUS; SUBCUTANEOUS at 22:00

## 2024-10-10 RX ADMIN — SODIUM CHLORIDE, PRESERVATIVE FREE 10 ML: 5 INJECTION INTRAVENOUS at 22:00

## 2024-10-10 RX ADMIN — NIFEDIPINE 60 MG: 30 TABLET, FILM COATED, EXTENDED RELEASE ORAL at 22:00

## 2024-10-10 RX ADMIN — INSULIN LISPRO 7 UNITS: 100 INJECTION, SOLUTION INTRAVENOUS; SUBCUTANEOUS at 17:46

## 2024-10-10 RX ADMIN — SODIUM CHLORIDE 125 MG: 9 INJECTION, SOLUTION INTRAVENOUS at 09:31

## 2024-10-10 RX ADMIN — Medication 2000 UNITS: at 08:23

## 2024-10-10 RX ADMIN — SODIUM CHLORIDE, PRESERVATIVE FREE 10 ML: 5 INJECTION INTRAVENOUS at 08:25

## 2024-10-10 RX ADMIN — FUROSEMIDE 60 MG: 10 INJECTION, SOLUTION INTRAMUSCULAR; INTRAVENOUS at 08:23

## 2024-10-10 RX ADMIN — IPRATROPIUM BROMIDE AND ALBUTEROL SULFATE 1 DOSE: .5; 3 SOLUTION RESPIRATORY (INHALATION) at 17:54

## 2024-10-10 RX ADMIN — INSULIN LISPRO 1 UNITS: 100 INJECTION, SOLUTION INTRAVENOUS; SUBCUTANEOUS at 17:46

## 2024-10-10 RX ADMIN — INSULIN GLARGINE 28 UNITS: 100 INJECTION, SOLUTION SUBCUTANEOUS at 08:24

## 2024-10-10 RX ADMIN — SODIUM CHLORIDE, PRESERVATIVE FREE 10 ML: 5 INJECTION INTRAVENOUS at 22:01

## 2024-10-10 RX ADMIN — INSULIN LISPRO 7 UNITS: 100 INJECTION, SOLUTION INTRAVENOUS; SUBCUTANEOUS at 08:24

## 2024-10-10 RX ADMIN — HEPARIN SODIUM 5000 UNITS: 5000 INJECTION INTRAVENOUS; SUBCUTANEOUS at 14:30

## 2024-10-10 RX ADMIN — HEPARIN SODIUM 5000 UNITS: 5000 INJECTION INTRAVENOUS; SUBCUTANEOUS at 06:40

## 2024-10-10 RX ADMIN — SODIUM CHLORIDE, PRESERVATIVE FREE 10 ML: 5 INJECTION INTRAVENOUS at 08:26

## 2024-10-10 RX ADMIN — NIFEDIPINE 60 MG: 30 TABLET, FILM COATED, EXTENDED RELEASE ORAL at 08:23

## 2024-10-10 RX ADMIN — CARVEDILOL 25 MG: 25 TABLET, FILM COATED ORAL at 08:23

## 2024-10-10 ASSESSMENT — PAIN SCALES - WONG BAKER
WONGBAKER_NUMERICALRESPONSE: NO HURT

## 2024-10-10 ASSESSMENT — PAIN SCALES - GENERAL: PAINLEVEL_OUTOF10: 0

## 2024-10-10 ASSESSMENT — PAIN DESCRIPTION - ORIENTATION: ORIENTATION: LEFT;UPPER

## 2024-10-10 ASSESSMENT — PAIN DESCRIPTION - FREQUENCY: FREQUENCY: INTERMITTENT

## 2024-10-10 ASSESSMENT — PAIN DESCRIPTION - PAIN TYPE: TYPE: ACUTE PAIN

## 2024-10-10 ASSESSMENT — PAIN DESCRIPTION - LOCATION: LOCATION: ABDOMEN

## 2024-10-10 ASSESSMENT — PAIN DESCRIPTION - DESCRIPTORS: DESCRIPTORS: ACHING

## 2024-10-10 NOTE — PROGRESS NOTES
Nutrition Note    RECOMMENDATIONS  PO Diet: Removed K+ modifier, okay with Dr. Schwartz   ONS: Continue current ONS   If po intake of meals improves, recommend change to low-calorie option (Glucerna)     ASSESSMENT   Pt triggered for follow-up. On 4 carb choice, low Na+, low K+ diet with variable documented meal intakes but averaging <50% since last RD assessment. Upon visiting, pt reported decreased po intake is r/t a combination of decreased appetite and too restrictive of a diet; asking for low K+ modifier to be removed. Accepting Nepro ordered TID. RD will continue to monitor for adequate po intake.     Malnutrition Status: No malnutrition  Acute Illness    NUTRITION DIAGNOSIS   Inadequate oral intake related to acute injury/trauma as evidenced by intake 0-25%, intake 26-50% (decreased appetite, restrictive diet)    Goals: PO intake 50% or greater, by next RD assessment     NUTRITION RELATED FINDINGS  Objective: Wt still up from admission (14 lb) but trending down, +1.9L per I/Os. POCG 180, 184. LBM 10/9. Trace LUE, facial edema.  Wounds: None    CURRENT NUTRITION THERAPIES  ADULT DIET; Regular; 4 carb choices (60 gm/meal); Low Sodium (2 gm); Low Potassium (Less than 3000 mg/day)  ADULT ORAL NUTRITION SUPPLEMENT; Breakfast, Lunch, Dinner; Renal Oral Supplement     PO Intake: 26-50%, 1-25%   PO Supplement Intake:Unable to assess (but reported accepting)    ANTHROPOMETRICS  Current Height: 172.7 cm (5' 8\")  Current Weight - Scale: 92.2 kg (203 lb 4.2 oz)    Admission weight: 86.1 kg (189 lb 13.1 oz)  Ideal Body Weight (IBW): 140 lbs  (64 kg)        BMI: 30.9    COMPARATIVE STANDARDS  Total Energy Requirements (kcals/day): 3209-8893     Protein (g):         Fluid (mL/day):  8656-5078    EDUCATION  Education completed (DM diet education 10/4)     The patient will be monitored per nutrition standards of care. Consult dietitian if additional nutrition interventions are needed prior to RD reassessment.     Maura  MS Jay, RD, LD    Contact: [phone]

## 2024-10-10 NOTE — PROGRESS NOTES
PM assessment complete, vitals stable, medications given per MAR, midline intact. No complaints of pain, patient stable on room air.

## 2024-10-10 NOTE — PROGRESS NOTES
Visit us at [facility] or call us at [phone] [place] [address]    NEPHROLOGY PROGRESS NOTE    Patient: Galina Nick MRN: [id number]     YOB: 1965  Age: 58 y.o.  Sex: female    Unit: [facility] 4 ORTH/NEUR Room/Bed: 4TN-4476/4476-01 Location: [facility]     Admitting Physician: DONY THORPE    Primary Care Physician: Tristan Lei MD          LOS: 14 days       Reason for evaluation:   CKD4      SUBJECTIVE:      The patient was seen and examined. Notes and labs reviewed.  There were no complications over night.    Patient's review of systems: comfortable, SOB improved.   On room air now. BP getting better and more stable now    OBJECTIVE:     Vitals:    10/09/24 2215 10/10/24 0400 10/10/24 0815 10/10/24 1046   BP: (!) 145/69 (!) 158/79 136/73    Pulse: 70 69 65    Resp: 16 16 18    Temp: 98.4 °F (36.9 °C) 98.7 °F (37.1 °C) 98 °F (36.7 °C)    TempSrc: Oral Oral Oral    SpO2: 92% 90% 94%    Weight:       Height:    1.727 m (5' 8\")       Intake and Output:      Intake/Output Summary (Last 24 hours) at 10/10/2024 1315  Last data filed at 10/10/2024 0642  Gross per 24 hour   Intake 560 ml   Output 850 ml   Net -290 ml         Continuous Infusions:   sodium chloride      sodium chloride Stopped (09/28/24 1727)    dextrose         Current meds:   ferric gluconate  125 mg IntraVENous Daily    heparin (porcine)  5,000 Units SubCUTAneous Q8H    furosemide  60 mg IntraVENous BID    sodium chloride flush  5-40 mL IntraVENous 2 times per day    insulin glargine  28 Units SubCUTAneous Daily    insulin lispro  7 Units SubCUTAneous TID WC    NIFEdipine  60 mg Oral BID    Vitamin D  2,000 Units Oral Daily    carvedilol  25 mg Oral BID WC    insulin lispro  0-4 Units SubCUTAneous TID WC    insulin lispro  0-4 Units SubCUTAneous Nightly    epoetin raisa-epbx  10,000 Units SubCUTAneous Weekly    rosuvastatin  20 mg Oral Nightly    sodium chloride flush  5-40 mL IntraVENous 2 times per day        Exam:    CONSTITUTIONAL/PSYCHIATRY: awake, alert and oriented x3. Not in acute distress   EYES: Conjunctivae: normal.  RESPIRATORY: Respiratory effort: normal. Auscultation: ~CTA  CARDIOVASCULAR: Auscultation: RRR. Edema: 0-trace  GASTROINTESTINAL: Soft, nontender, nondistended.   EXTREMITIES:  No cyanosis or clubbing.  SKIN: Warm and dry. No significant rashes      LABS:   RFP:   Recent Labs     10/08/24  0539 10/09/24  0515 10/10/24  0627    139 139   K 3.8 3.7 3.8    101 102   CO2 25 28 27   BUN 31* 31* 30*   CREATININE 3.4* 3.3* 2.7*   CALCIUM 8.6 8.5 8.4   MG 1.90 2.02 1.82   PHOS 4.1 4.0 4.4       Liver panel:  Recent Labs     10/09/24  0515   AST 16   ALT 16       Endocrine:   @[id number](VitD,PTH,TSH,aldosterone,renin activity,cortisol,metanephrine)@    CBC:   Recent Labs     10/08/24  0539 10/09/24  0516 10/10/24  0627   WBC 11.7* 8.2 7.2   HGB 7.4* 7.5* 7.8*   HCT 22.6* 22.7* 23.5*   MCV 81.4 82.0 81.5    236 247       Iron Panel:   @[id number](FERA,FE)@    Serology: @[id number](ANAS,ANCA,C3,C4,RNP,AGBM,DNA,SSA,SSB,SPEP,UPEP,ESR,HEPT)@    Urine studies: @[id number](UAPR,UCOL,UNAR,UUNR,UCRR,UCLR,UKR,UUAR,UOSM,EOS)@        ASSESSMENT and PLAN:     1. JAYY on CKD3b/4 (baseline SCr 1.9-2.2; followed by Dr Shelley Villasenor): JAYY is mostly hemodynamic. SCr was at baseline on admission   SCr has leveled in the upper 2's after starting Jardiance and other GDMT. Hold jardiance and aldactone. Per CT, has significant volume overload/anasarca.  Will diurese with IV lasix. Transition to PO lasix tomorrow.      2. Facial swelling: ? Angioedema. Attributed to ARB. ARB stopped.       3. Nephrotic range proteinuria: prior serology w/u unremarkable. Attributed to DKD. ARB d/елена per above. Currently off jardiance, will resume this prior to discharge.      4. Metabolic acidosis: On Na Bicarb PO, hold, hopefully this will help with more stable BP.     5. ADHF on chronic ? Diastolic CHF: CXR pulm edema. Diurese with

## 2024-10-10 NOTE — PROGRESS NOTES
Admit Date: 9/24/2024  Diet: ADULT ORAL NUTRITION SUPPLEMENT; Breakfast, Lunch, Dinner; Renal Oral Supplement  ADULT DIET; Regular; 4 carb choices (60 gm/meal); Low Sodium (2 gm)    CC: Facial swelling    Interval history:   Overnight, there were no acute events. Patient's vitals remained stable.    Patient seen and examined. No interval events.   Denies any complaints. No pain, fever or chills.    Weaned off supplemental oxygen.    Assessment/plan:   Galina Nick is a 58 y.o. female with PMH of hypertension, CAD, dyslipidemia, type 2 diabetes mellitus who was admitted with JAYY    JAYY on CKD 3b with metabolic acidosis: Resolving  Baseline SCr 1.9.  Peaked at 3.1 and trended down to 2.3.  Peaked again at 3.4.    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    IV fluids and sodium bicarb discontinued.    Avoid nephrotoxins.  Monitor BM, strict I's and O's.  Nephrology consult appreciated.    Fluid overload with acute hypoxia:  CXR done showed bilateral pleural effusions that has been getting worse.  CT chest reviewed with pulmonary edema and lateral effusion.  Weaned off oxygen.  Continue IV Lasix.    Pleurisy: Resolved.  Patient endorsed that she had new onset left-sided pleuritic chest pain.    D-dimer was elevated to 2.04.  Patient has been in hospital for 12 days.    Due to patient's worsening renal function, CTPA could not be pursued.  VQ scan with low probability for PE.  Heparin drip discontinued.      Uncontrolled hypertension:   In the ED, patient was hypertensive with blood pressure of 191/82.  Discontinued metoprolol, diuretics and ARB.  BP now controlled on Lasix, nifedipine and Coreg.     Angioedema involving the face: Resolved.  Likely secondary to Diovan use; discontinued.    Received Benadryl 25 mg, Pepcid 20 mg, Solu-Medrol 125 mg in the ED.  No further doses of steroids received due to worsening hyperglycemia.  C4 complement elevated.        T2DM not on long-term insulin with hyperglycemia:

## 2024-10-10 NOTE — PROGRESS NOTES
[facility]  HEART FAILURE  Progress Note      Admit Date 9/24/2024     Reason for Consult:      Reason for Consultation/Chief Complaint: face swelling    HPI:    Galina Nick is a 58 y.o. female with PMH NObCAD, HTN, renal stenosis, MR, HFpEF admitted with facial swelling. ARB restarted in July this year, stopped this admit and pt getting diuresed.       Subjective:  Patient is being seen for CHF. There were no acute overnight cardiac events.   Today Ms. Nick feels better today and denies chest pain, shortness of breath, palpitations, or dizziness        Baseline Weight:    Wt Readings from Last 3 Encounters:   10/09/24 92.2 kg (203 lb 4.2 oz)   08/01/24 80.8 kg (178 lb 2.1 oz)   07/30/24 80.6 kg (177 lb 9.6 oz)         Cardiac Testing: Echo 10/8/24:     Left Ventricle: Normal left ventricular systolic function with a visually estimated EF of 60 - 65%. EF by 2D Simpsons Biplane is 65%. Left ventricle size is normal. Mild posterior thickening. Moderate septal thickening. Normal wall motion. Grade II diastolic dysfunction with increased LAP.    Aortic Valve: Trileaflet valve.    Mitral Valve: Mild to moderate regurgitation.    Tricuspid Valve: Mild regurgitation with an eccentrically directed jet and may underestimate severity. The estimated RVSP is 22 mmHg.    Left Atrium: Left atrium is severely dilated.    Interatrial Septum: Agitated saline study was negative with and without provocation.    Pericardium: Trivial pericardial effusion present. No indication of cardiac tamponade.    Image quality is adequate.  Cardiac CTA 4/4/24  Moderate phase misregistration artifact limits evaluation of the proximal  LAD.  Plaque is seen in the proximal 3rd.  No flow limiting stenosis noted  with the aforementioned limitations. Distal to this point, left anterior  descending coronary artery is visualized to the cardiac apex. Left anterior  descending coronary artery gives rise to patent diagonal branch.     No  motion. Grade II diastolic dysfunction with increased LAP.    Right Ventricle: Right ventricle is mildly dilated. Normal systolic function. TAPSE is 2.5 cm. RV Peak S' is 11 cm/s.    Aortic Valve: Trileaflet valve.    Mitral Valve: Mild regurgitation with a centrally directed jet.    Tricuspid Valve: Moderate regurgitation with an eccentrically directed jet and may underestimate severity. The estimated RVSP is 35 mmHg.    Image quality is adequate.     4/26/24 Stress test   Conclusions    Perfusion:   There is a small sized area of mildly reduced perfusion in the basal and mid   inferior and inferolateral segment at rest with improvement at stress. There   is a second small area of mildly reduced perfusion in the mid anteroseptal   segment at rest with improvement at rest. Both defects appear consistent   with artifact.  No evidence of ischemia   Overall:   There is no evidence of ischemia on perfusion imaging   Systolic blood pressure dropped form 183 to 148 with submaximal exercise and   patient reported significant fatigue and shortness of breath.   High risk study due to significant SBP decrease with exercise.      LHC: Last Angiogram: 6/21/24  ANGIOGRAM/CORONARY ARTERIOGRAM:     Dominance nml   Left main artery nml   Left anterior descending artery Prox 60%   Diagonals nml   Left circumflex artery Mid 60%   Obtuse Marginals nml      Right coronary artery Prox 55%, mid 60%   Posterior descending artery Posterior lateral branch nml  nml         IMPRESSION/SUMMARY  ~Coronary Angiography w/ moderate nonobstructive CAD      NYHA Class III    Objective:   /73   Pulse 65   Temp 98 °F (36.7 °C) (Oral)   Resp 18   Ht 1.727 m (5' 8\")   Wt 92.2 kg (203 lb 4.2 oz)   LMP 06/01/2011   SpO2 94%   BMI 30.91 kg/m²     Intake/Output Summary (Last 24 hours) at 10/10/2024 0901  Last data filed at 10/10/2024 0642  Gross per 24 hour   Intake 560 ml   Output 850 ml   Net -290 ml      In: 560 [P.O.:560]  Out: 850  filed at 10/10/2024 0642  Gross per 24 hour   Intake 560 ml   Output 850 ml   Net -290 ml       Lab Data:  CBC:   Lab Results   Component Value Date/Time    WBC 7.2 10/10/2024 06:27 AM    HGB 7.8 10/10/2024 06:27 AM     10/10/2024 06:27 AM     BMP:  Lab Results   Component Value Date/Time     10/10/2024 06:27 AM    K 3.8 10/10/2024 06:27 AM    K 5.2 09/25/2024 05:24 AM     10/10/2024 06:27 AM    CO2 27 10/10/2024 06:27 AM    BUN 30 10/10/2024 06:27 AM    CREATININE 2.7 10/10/2024 06:27 AM    GLUCOSE 174 10/10/2024 06:27 AM     INR: No results found for: \"INR\"     CARDIAC LABS  ENZYMES:No results for input(s): \"CKMB\", \"CKMBINDEX\", \"TROPONINI\" in the last 72 hours.    Invalid input(s): \"CKTOTAL;3\"  FASTING LIPID PANEL:  Lab Results   Component Value Date/Time    HDL 51 07/23/2024 12:41 PM    LDLDIRECT 150 04/28/2023 08:48 AM    TRIG 116 07/23/2024 12:41 PM    TSH 3.47 09/30/2024 07:07 AM     LIVER PROFILE:  Lab Results   Component Value Date/Time    AST 16 10/09/2024 05:15 AM    AST 36 10/02/2024 06:10 AM    ALT 16 10/09/2024 05:15 AM    ALT 41 10/02/2024 06:10 AM     BNP:   Lab Results   Component Value Date/Time    PROBNP 5,169 10/08/2024 05:38 AM    PROBNP 7,264 10/05/2024 05:05 AM    PROBNP 1,704 05/09/2024 10:22 AM    PROBNP 1,216 02/06/2024 05:00 AM    PROBNP 1,650 02/05/2024 02:11 PM     Iron Studies:    Lab Results   Component Value Date/Time    TIBC 173 10/08/2024 05:38 AM    TIBC 238 09/25/2024 05:24 AM    TIBC 193 06/22/2024 05:00 AM    FERRITIN 454.0 10/08/2024 05:38 AM    FERRITIN 315.0 09/25/2024 05:24 AM    FERRITIN 175.3 06/22/2024 05:00 AM     Lab Results   Component Value Date    IRON 14 (L) 10/08/2024    TIBC 173 (L) 10/08/2024    FERRITIN 454.0 (H) 10/08/2024          1. WEIGHT: Admit Weight - Scale: 86.1 kg (189 lb 14.4 oz)      Today  Weight - Scale: 92.2 kg (203 lb 4.2 oz)   2. I/O   Intake/Output Summary (Last 24 hours) at 10/10/2024 0901  Last data filed at 10/10/2024

## 2024-10-10 NOTE — PROGRESS NOTES
Endocrinology    Patient off oxygen and with more stable hemoglobin 7.5 .  Iron studies consistent with iron deficiency seru iron 14 low, % iron saturation 8 also low.    Pulse 70 /69 Resp 16 [place] 98.4     Assessment:  Still fairly severe iron deficiency anemia, likely has occult bleeding though source not so far discovered. GI bleeding scan was neg.She has had small amounts of hematuria 09-24 and 9-, may be worth repeating.  Uncontrolled IDDM, had low blood sugar 56 mg/dl at about 2 PM and later high, after correction.    Improved oxygenation, no longer oxygen dependent.    Plan:  Continue to monitor glycemic control, careful to match mealtime insulin to food consumed; half a smuch carbohydrate as usual should decrease insulin.  Repeat UA    LESLEY Hartman M.D.

## 2024-10-10 NOTE — DISCHARGE INSTR - DIET
Good nutrition is important when healing from an illness, injury, or surgery.  Follow any nutrition recommendations given to you during your hospital stay.   If you were given an oral nutrition supplement while in the hospital, continue to take this supplement at home.  You can take it with meals, in-between meals, and/or before bedtime. These supplements can be purchased at most local grocery stores, pharmacies, and chain super-stores.   If you have any questions about your diet or nutrition, call the hospital and ask for the dietitian.    For nutrition questions after discharge please call the Registered Dietitian at [phone].    Heart Failure Nutrition Therapy  This diet will help you feel better and support your heart by reducing symptoms of fluid retention, shortness of breath and swelling.   You should focus on:  Limiting sodium in your diet by reading labels and limiting foods high in sodium.  Limit your daily sodium intake to 2,000 to 3,000 mg per day.  Select foods with 140 mg of sodium or less per serving.  Foods with more than 300 mg of sodium per serving may not fit into a reduced-sodium meal plan.  Check serving sizes. If you eat more than 1 serving, you will get more sodium than the amount listed.   Limiting fluid in your diet.  Ask your doctor how much fluid you can have per day. In general, a fluid restriction of no more than 64 fluid ounces per day is recommended. This is equivalent to 8 cups of 8 ounces each or about 2 liters daily.   Remember foods that are liquid at room temperature such as popsicles, soup, ice cream and Jell-O are fluids.   Checking your weight to make sure you're not retaining too much fluid.  Weigh yourself every morning. If you gain 3 or more pounds in one day or 5 pounds within 1 week, call your doctor.  Foods to choose and avoid:  Avoid processed foods. Eat more fresh foods.  Fresh and frozen fruits and vegetables are good choices.  Choose fresh meats. Avoid processed

## 2024-10-10 NOTE — PLAN OF CARE
Problem: Discharge Planning  Goal: Discharge to home or other facility with appropriate resources  10/10/2024 1117 by Melita Langston RN  Outcome: Progressing  10/10/2024 1110 by Melita Langston RN  Outcome: Progressing  Flowsheets (Taken 10/9/2024 2215 by Caitlin Mckeon, RN)  Discharge to home or other facility with appropriate resources: Refer to discharge planning if patient needs post-hospital services based on physician order or complex needs related to functional status, cognitive ability or social support system     Problem: Safety - Adult  Goal: Free from fall injury  10/10/2024 1117 by Melita Langston RN  Outcome: Progressing  10/10/2024 1110 by Melita Langston RN  Outcome: Progressing     Problem: Chronic Conditions and Co-morbidities  Goal: Patient's chronic conditions and co-morbidity symptoms are monitored and maintained or improved  10/10/2024 1117 by Melita Langston RN  Outcome: Progressing  10/10/2024 1110 by Melita Langston RN  Outcome: Progressing  Flowsheets (Taken 10/9/2024 2215 by Caitlin Mckeon RN)  Care Plan - Patient's Chronic Conditions and Co-Morbidity Symptoms are Monitored and Maintained or Improved: Monitor and assess patient's chronic conditions and comorbid symptoms for stability, deterioration, or improvement     Problem: Pain  Goal: Verbalizes/displays adequate comfort level or baseline comfort level  10/10/2024 1117 by Melita Langston RN  Outcome: Progressing  10/10/2024 1110 by Melita Langston RN  Outcome: Progressing     Problem: Respiratory - Adult  Goal: Achieves optimal ventilation and oxygenation  10/10/2024 1117 by Melita Langston RN  Outcome: Progressing  10/10/2024 1110 by Melita Langston RN  Outcome: Progressing     Problem: Gastrointestinal - Adult  Goal: Minimal or absence of nausea and vomiting  10/10/2024 1117 by Melita Langston RN  Outcome: Progressing  10/10/2024 1110 by Ivis

## 2024-10-11 VITALS
TEMPERATURE: 98.7 F | BODY MASS INDEX: 30.81 KG/M2 | RESPIRATION RATE: 18 BRPM | SYSTOLIC BLOOD PRESSURE: 150 MMHG | DIASTOLIC BLOOD PRESSURE: 74 MMHG | HEIGHT: 68 IN | WEIGHT: 203.26 LBS | HEART RATE: 82 BPM | OXYGEN SATURATION: 96 %

## 2024-10-11 LAB
ALBUMIN SERPL-MCNC: 3.1 G/DL (ref 3.4–5)
ALP SERPL-CCNC: 90 U/L (ref 40–129)
ALT SERPL-CCNC: 24 U/L (ref 10–40)
ANION GAP SERPL CALCULATED.3IONS-SCNC: 11 MMOL/L (ref 3–16)
AST SERPL-CCNC: 29 U/L (ref 15–37)
BACTERIA UR CULT: NORMAL
BASOPHILS # BLD: 0.1 K/UL (ref 0–0.2)
BASOPHILS NFR BLD: 0.9 %
BILIRUB DIRECT SERPL-MCNC: <0.1 MG/DL (ref 0–0.3)
BILIRUB INDIRECT SERPL-MCNC: ABNORMAL MG/DL (ref 0–1)
BILIRUB SERPL-MCNC: <0.2 MG/DL (ref 0–1)
BUN SERPL-MCNC: 32 MG/DL (ref 7–20)
CALCIUM SERPL-MCNC: 8.7 MG/DL (ref 8.3–10.6)
CHLORIDE SERPL-SCNC: 102 MMOL/L (ref 99–110)
CO2 SERPL-SCNC: 27 MMOL/L (ref 21–32)
CREAT SERPL-MCNC: 2.8 MG/DL (ref 0.6–1.1)
DEPRECATED RDW RBC AUTO: 14.2 % (ref 12.4–15.4)
EOSINOPHIL # BLD: 0.3 K/UL (ref 0–0.6)
EOSINOPHIL NFR BLD: 3.5 %
GFR SERPLBLD CREATININE-BSD FMLA CKD-EPI: 19 ML/MIN/{1.73_M2}
GLUCOSE BLD-MCNC: 125 MG/DL (ref 70–99)
GLUCOSE BLD-MCNC: 184 MG/DL (ref 70–99)
GLUCOSE BLD-MCNC: 189 MG/DL (ref 70–99)
GLUCOSE SERPL-MCNC: 181 MG/DL (ref 70–99)
HCT VFR BLD AUTO: 26.3 % (ref 36–48)
HGB BLD-MCNC: 8.5 G/DL (ref 12–16)
LYMPHOCYTES # BLD: 1.4 K/UL (ref 1–5.1)
LYMPHOCYTES NFR BLD: 18.6 %
MAGNESIUM SERPL-MCNC: 1.78 MG/DL (ref 1.8–2.4)
MCH RBC QN AUTO: 26.5 PG (ref 26–34)
MCHC RBC AUTO-ENTMCNC: 32.2 G/DL (ref 31–36)
MCV RBC AUTO: 82.3 FL (ref 80–100)
MONOCYTES # BLD: 0.7 K/UL (ref 0–1.3)
MONOCYTES NFR BLD: 9.5 %
NEUTROPHILS # BLD: 5.1 K/UL (ref 1.7–7.7)
NEUTROPHILS NFR BLD: 67.5 %
NT-PROBNP SERPL-MCNC: 2898 PG/ML (ref 0–124)
PERFORMED ON: ABNORMAL
PHOSPHATE SERPL-MCNC: 4.2 MG/DL (ref 2.5–4.9)
PLATELET # BLD AUTO: 265 K/UL (ref 135–450)
PMV BLD AUTO: 9.2 FL (ref 5–10.5)
POTASSIUM SERPL-SCNC: 3.7 MMOL/L (ref 3.5–5.1)
PROT SERPL-MCNC: 5.9 G/DL (ref 6.4–8.2)
RBC # BLD AUTO: 3.2 M/UL (ref 4–5.2)
SODIUM SERPL-SCNC: 140 MMOL/L (ref 136–145)
WBC # BLD AUTO: 7.6 K/UL (ref 4–11)

## 2024-10-11 PROCEDURE — 6370000000 HC RX 637 (ALT 250 FOR IP): Performed by: INTERNAL MEDICINE

## 2024-10-11 PROCEDURE — 97161 PT EVAL LOW COMPLEX 20 MIN: CPT

## 2024-10-11 PROCEDURE — 2580000003 HC RX 258: Performed by: INTERNAL MEDICINE

## 2024-10-11 PROCEDURE — 6370000000 HC RX 637 (ALT 250 FOR IP): Performed by: HOSPITALIST

## 2024-10-11 PROCEDURE — 80069 RENAL FUNCTION PANEL: CPT

## 2024-10-11 PROCEDURE — 6360000002 HC RX W HCPCS: Performed by: STUDENT IN AN ORGANIZED HEALTH CARE EDUCATION/TRAINING PROGRAM

## 2024-10-11 PROCEDURE — 97116 GAIT TRAINING THERAPY: CPT

## 2024-10-11 PROCEDURE — 85025 COMPLETE CBC W/AUTO DIFF WBC: CPT

## 2024-10-11 PROCEDURE — 83735 ASSAY OF MAGNESIUM: CPT

## 2024-10-11 PROCEDURE — 6360000002 HC RX W HCPCS: Performed by: INTERNAL MEDICINE

## 2024-10-11 PROCEDURE — 36415 COLL VENOUS BLD VENIPUNCTURE: CPT

## 2024-10-11 PROCEDURE — 80076 HEPATIC FUNCTION PANEL: CPT

## 2024-10-11 PROCEDURE — 2580000003 HC RX 258: Performed by: STUDENT IN AN ORGANIZED HEALTH CARE EDUCATION/TRAINING PROGRAM

## 2024-10-11 PROCEDURE — 83880 ASSAY OF NATRIURETIC PEPTIDE: CPT

## 2024-10-11 PROCEDURE — 99232 SBSQ HOSP IP/OBS MODERATE 35: CPT | Performed by: NURSE PRACTITIONER

## 2024-10-11 RX ORDER — NIFEDIPINE 60 MG/1
60 TABLET, EXTENDED RELEASE ORAL 2 TIMES DAILY
Qty: 90 TABLET | Refills: 1 | Status: SHIPPED | OUTPATIENT
Start: 2024-10-11

## 2024-10-11 RX ORDER — FERROUS SULFATE 325(65) MG
325 TABLET ORAL
Qty: 30 TABLET | Refills: 5 | Status: SHIPPED | OUTPATIENT
Start: 2024-10-11

## 2024-10-11 RX ORDER — INSULIN GLARGINE 100 [IU]/ML
28 INJECTION, SOLUTION SUBCUTANEOUS DAILY
Qty: 5 ADJUSTABLE DOSE PRE-FILLED PEN SYRINGE | Refills: 0 | Status: SHIPPED | OUTPATIENT
Start: 2024-10-11 | End: 2024-10-11

## 2024-10-11 RX ORDER — INSULIN LISPRO 100 [IU]/ML
7 INJECTION, SOLUTION INTRAVENOUS; SUBCUTANEOUS DAILY
Qty: 3 ML | Refills: 1 | Status: SHIPPED | OUTPATIENT
Start: 2024-10-11 | End: 2024-10-11

## 2024-10-11 RX ORDER — INSULIN LISPRO 100 [IU]/ML
7 INJECTION, SOLUTION INTRAVENOUS; SUBCUTANEOUS DAILY
Qty: 3 ML | Refills: 1 | Status: SHIPPED | OUTPATIENT
Start: 2024-10-11

## 2024-10-11 RX ORDER — FUROSEMIDE 80 MG/1
80 TABLET ORAL DAILY
Qty: 60 TABLET | Refills: 3 | Status: SHIPPED | OUTPATIENT
Start: 2024-10-12

## 2024-10-11 RX ORDER — FUROSEMIDE 80 MG/1
80 TABLET ORAL DAILY
Qty: 60 TABLET | Refills: 3 | Status: SHIPPED | OUTPATIENT
Start: 2024-10-12 | End: 2024-10-11

## 2024-10-11 RX ORDER — NIFEDIPINE 60 MG/1
60 TABLET, EXTENDED RELEASE ORAL 2 TIMES DAILY
Qty: 90 TABLET | Refills: 1 | Status: SHIPPED | OUTPATIENT
Start: 2024-10-11 | End: 2024-10-11

## 2024-10-11 RX ORDER — INSULIN GLARGINE 100 [IU]/ML
28 INJECTION, SOLUTION SUBCUTANEOUS DAILY
Qty: 5 ADJUSTABLE DOSE PRE-FILLED PEN SYRINGE | Refills: 0 | Status: SHIPPED | OUTPATIENT
Start: 2024-10-11

## 2024-10-11 RX ADMIN — HEPARIN SODIUM 5000 UNITS: 5000 INJECTION INTRAVENOUS; SUBCUTANEOUS at 14:18

## 2024-10-11 RX ADMIN — INSULIN LISPRO 7 UNITS: 100 INJECTION, SOLUTION INTRAVENOUS; SUBCUTANEOUS at 08:59

## 2024-10-11 RX ADMIN — Medication 2000 UNITS: at 08:58

## 2024-10-11 RX ADMIN — CARVEDILOL 25 MG: 25 TABLET, FILM COATED ORAL at 17:13

## 2024-10-11 RX ADMIN — INSULIN LISPRO 7 UNITS: 100 INJECTION, SOLUTION INTRAVENOUS; SUBCUTANEOUS at 14:00

## 2024-10-11 RX ADMIN — HEPARIN SODIUM 5000 UNITS: 5000 INJECTION INTRAVENOUS; SUBCUTANEOUS at 06:39

## 2024-10-11 RX ADMIN — SODIUM CHLORIDE 125 MG: 9 INJECTION, SOLUTION INTRAVENOUS at 10:07

## 2024-10-11 RX ADMIN — FUROSEMIDE 80 MG: 40 TABLET ORAL at 08:58

## 2024-10-11 RX ADMIN — CARVEDILOL 25 MG: 25 TABLET, FILM COATED ORAL at 08:58

## 2024-10-11 RX ADMIN — NIFEDIPINE 60 MG: 30 TABLET, FILM COATED, EXTENDED RELEASE ORAL at 08:58

## 2024-10-11 RX ADMIN — INSULIN GLARGINE 28 UNITS: 100 INJECTION, SOLUTION SUBCUTANEOUS at 08:58

## 2024-10-11 RX ADMIN — SODIUM CHLORIDE, PRESERVATIVE FREE 10 ML: 5 INJECTION INTRAVENOUS at 08:59

## 2024-10-11 RX ADMIN — SODIUM CHLORIDE, PRESERVATIVE FREE 10 ML: 5 INJECTION INTRAVENOUS at 09:00

## 2024-10-11 RX ADMIN — INSULIN LISPRO 7 UNITS: 100 INJECTION, SOLUTION INTRAVENOUS; SUBCUTANEOUS at 17:13

## 2024-10-11 ASSESSMENT — PAIN SCALES - WONG BAKER
WONGBAKER_NUMERICALRESPONSE: NO HURT

## 2024-10-11 ASSESSMENT — PAIN DESCRIPTION - PAIN TYPE: TYPE: ACUTE PAIN

## 2024-10-11 ASSESSMENT — PAIN DESCRIPTION - ORIENTATION: ORIENTATION: LEFT;UPPER

## 2024-10-11 ASSESSMENT — PAIN DESCRIPTION - LOCATION: LOCATION: ABDOMEN

## 2024-10-11 ASSESSMENT — PAIN DESCRIPTION - DESCRIPTORS: DESCRIPTORS: ACHING

## 2024-10-11 ASSESSMENT — PAIN DESCRIPTION - FREQUENCY: FREQUENCY: INTERMITTENT

## 2024-10-11 ASSESSMENT — PAIN SCALES - GENERAL: PAINLEVEL_OUTOF10: 0

## 2024-10-11 NOTE — DISCHARGE SUMMARY
V2.0  Discharge Summary    Name:  Galina Nick /Age/Sex: 1965 (58 y.o. female)   Admit Date: 2024  Discharge Date: 10/11/24    MRN & CSN:  [id number] & [id number] Encounter Date and Time 10/11/24 1:20 PM EDT    Attending:  Jennifer Schwartz MD Discharging Provider: Jennifer Schwartz MD       Hospital Course:     Brief HPI: Galina Nick is a 58 y.o. female with pmh of  hypertension, CAD, dyslipidemia, type 2 diabetes mellitus who presented with painless facial swelling concerning for ARB related angioedema.  Hospital course prolonged due to uncontrolled hypertension, JAYY on CKD 3, fluid overload with acute hypoxia and worsening anemia.      JAYY on CKD 3b with metabolic acidosis: Resolving  Baseline SCr 1.9.  Peaked at 3.1 and trended down to 2.3.  Peaked again at 3.4.    Nephrology consulted; likely underlying DM nephropathy with ATN.    Patient improved with treatment; Scr on discharge 2.8 .  Aldactone and Jardiance discontinued per nephrology.  Outpatient follow-up with nephrology in 1 to 2 weeks     Fluid overload with acute hypoxia:   Patient with underlying chronic diastolic CHF.    Improved with IV Lasix.  CXR done showed bilateral pleural effusions that has been getting worse.  CT chest reviewed with pulmonary edema and lateral effusion.  TTE 10/8: LVEF 60 to 65%.  Moderate MR.  Weaned off oxygen.  Discharged on Lasix p.o. 80 mg daily.  Outpatient follow-up with cardiology in 1 week.     Pleurisy: Resolved.  Patient endorsed that she had new onset left-sided pleuritic chest pain.    D-dimer was elevated to 2.04.  Patient has been in hospital for 12 days.    Due to patient's worsening renal function, CTPA could not be pursued.  VQ scan with low probability for PE.  Heparin drip discontinued.      Uncontrolled hypertension:   In the ED, patient was hypertensive with blood pressure of 191/82.  Discontinued metoprolol and ARB.  BP now controlled on Lasix, nifedipine and Coreg.     Angioedema  daily (before meals and nightly) ICD code- E11.65     carvedilol 25 MG tablet  Commonly known as: COREG  Take 1 tablet by mouth 2 times daily (with meals)     Dexcom G7 Sensor Misc  1 each by Does not apply route every 10 days     epoetin raisa-epbx 02049 UNIT/ML Soln injection  Commonly known as: RETACRIT  Inject 1 mL into the skin Once a week at 5 PM     ferrous sulfate 325 (65 Fe) MG tablet  Commonly known as: IRON 325  Take 1 tablet by mouth daily (with breakfast)     furosemide 80 MG tablet  Commonly known as: LASIX  Take 1 tablet by mouth daily  Start taking on: October 12, 2024     insulin lispro (1 Unit Dial) 100 UNIT/ML Sopn  Commonly known as: HumaLOG KwikPen  Inject 7 Units into the skin Daily Take only with largest meal of the day     Insulin Pen Needle 32G X 4 MM Misc  1 each by Does not apply route daily     Lancets Misc  1 each by Does not apply route 4 times daily (before meals and nightly)            CHANGE how you take these medications      aspirin 81 MG EC tablet  Take 1 tablet by mouth daily  What changed: when to take this     Lantus SoloStar 100 UNIT/ML injection pen  Generic drug: insulin glargine  Inject 28 Units into the skin Daily  What changed:   how much to take  when to take this     NIFEdipine 60 MG extended release tablet  Commonly known as: PROCARDIA XL  Take 1 tablet by mouth in the morning and at bedtime  What changed: when to take this     Trulicity 0.75 MG/0.5ML Sopn SC injection  Generic drug: dulaglutide  INJECT 0.75 MG UNDER THE SKIN ONCE WEEKLY  What changed:   how much to take  how to take this  when to take this  additional instructions            CONTINUE taking these medications      Cholecalciferol 10 MCG (400 UNIT) Caps Capsule  Commonly known as: Vitamin D     Fish Oil 1200 MG Caps     MULTIVITAMIN ADULTS PO     rosuvastatin 20 MG tablet  Commonly known as: CRESTOR  Take 1 tablet by mouth nightly            STOP taking these medications      chlorthalidone 25 MG  bleeding Additional signs and symptoms: Labs showing patient to be anemic. Got blood transfusion on Sunday. Not passing blood. FINDINGS: Activity is seen within the blood pool, including the great vessels, heart, liver, and spleen.  A small amount of activity accumulates in the bladder over the course of the study. There was no abnormal accumulation of radioactivity in the abdomen to suggest active bleeding during study acquisition.     No evidence of active GI bleeding during acquisition.     CT CHEST WO CONTRAST    Result Date: 10/7/2024  EXAMINATION: CT OF THE CHEST WITHOUT CONTRAST 10/7/2024 2:34 pm TECHNIQUE: CT of the chest was performed without the administration of intravenous contrast. Multiplanar reformatted images are provided for review. Automated exposure control, iterative reconstruction, and/or weight based adjustment of the mA/kV was utilized to reduce the radiation dose to as low as reasonably achievable. COMPARISON: None. HISTORY: ORDERING SYSTEM PROVIDED HISTORY: Evaluate for pulmonary edema vs. PNA TECHNOLOGIST PROVIDED HISTORY: Reason for exam:->Evaluate for pulmonary edema vs. PNA Reason for Exam: Evaluate for pulmonary edema vs. PNA FINDINGS: Mediastinum: Thyroid gland unremarkable.  Small mediastinal and nodes are noted.  No pericardial effusion.  No pericardial calcification noted.  There is mild enlargement of the pulmonary artery measuring up to 3.9 cm. Lungs/pleura: On the left, patchy parenchymal consolidation is seen in the left upper lobe. Small left-sided pleural effusion is seen.  There is adjacent consolidation left lung base.  There is mild volume loss and consolidation is seen laterally in the lingula On the right patchy ground-glass opacity seen in the right upper lobe and right middle lobe.  Bandlike opacity seen in right middle lobe.  Small right-sided pleural effusion is seen.  There is adjacent consolidation seen in the right lung base. Scattered septal thickening are seen.

## 2024-10-11 NOTE — PROGRESS NOTES
CLINICAL PHARMACY NOTE: MEDS TO BEDS    Total # of Prescriptions Filled: 4   The following medications were delivered to the patient:  Trulicity  Humalog  Furosemide  Pen needles    Additional Documentation:  Katiana picked up

## 2024-10-11 NOTE — PLAN OF CARE
Problem: Discharge Planning  Goal: Discharge to home or other facility with appropriate resources  Outcome: Progressing  Flowsheets (Taken 10/10/2024 2145)  Discharge to home or other facility with appropriate resources: Refer to discharge planning if patient needs post-hospital services based on physician order or complex needs related to functional status, cognitive ability or social support system     Problem: Safety - Adult  Goal: Free from fall injury  Outcome: Progressing     Problem: Chronic Conditions and Co-morbidities  Goal: Patient's chronic conditions and co-morbidity symptoms are monitored and maintained or improved  Outcome: Progressing  Flowsheets (Taken 10/10/2024 2145)  Care Plan - Patient's Chronic Conditions and Co-Morbidity Symptoms are Monitored and Maintained or Improved: Monitor and assess patient's chronic conditions and comorbid symptoms for stability, deterioration, or improvement     Problem: Pain  Goal: Verbalizes/displays adequate comfort level or baseline comfort level  Outcome: Progressing     Problem: Respiratory - Adult  Goal: Achieves optimal ventilation and oxygenation  Outcome: Progressing     Problem: Gastrointestinal - Adult  Goal: Minimal or absence of nausea and vomiting  Outcome: Progressing  Goal: Maintains or returns to baseline bowel function  Outcome: Progressing  Goal: Maintains adequate nutritional intake  Outcome: Progressing  Goal: Establish and maintain optimal ostomy function  Outcome: Progressing     Problem: Metabolic/Fluid and Electrolytes - Adult  Goal: Electrolytes maintained within normal limits  Outcome: Progressing  Goal: Hemodynamic stability and optimal renal function maintained  Outcome: Progressing  Flowsheets (Taken 10/10/2024 2145)  Hemodynamic stability and optimal renal function maintained:   Monitor labs and assess for signs and symptoms of volume excess or deficit   Monitor intake, output and patient weight  Goal: Glucose maintained within  prescribed range  Outcome: Progressing  Flowsheets (Taken 10/10/2024 2145)  Glucose maintained within prescribed range: Monitor blood glucose as ordered     Problem: Hematologic - Adult  Goal: Maintains hematologic stability  Outcome: Progressing  Flowsheets (Taken 10/10/2024 2145)  Maintains hematologic stability: Assess for signs and symptoms of bleeding or hemorrhage     Problem: Nutrition Deficit:  Goal: Optimize nutritional status  Outcome: Progressing

## 2024-10-11 NOTE — PLAN OF CARE
Problem: Discharge Planning  Goal: Discharge to home or other facility with appropriate resources  10/11/2024 1135 by Melita Langston RN  Outcome: Progressing  10/11/2024 0310 by Caitlin Mckeon RN  Outcome: Progressing  Flowsheets (Taken 10/10/2024 2145)  Discharge to home or other facility with appropriate resources: Refer to discharge planning if patient needs post-hospital services based on physician order or complex needs related to functional status, cognitive ability or social support system     Problem: Safety - Adult  Goal: Free from fall injury  10/11/2024 1135 by Melita Langston RN  Outcome: Progressing  10/11/2024 0310 by Caitlin Mckeon RN  Outcome: Progressing     Problem: Chronic Conditions and Co-morbidities  Goal: Patient's chronic conditions and co-morbidity symptoms are monitored and maintained or improved  10/11/2024 1135 by Melita Langston RN  Outcome: Progressing  10/11/2024 0310 by Caitlin Mckeon RN  Outcome: Progressing  Flowsheets (Taken 10/10/2024 2145)  Care Plan - Patient's Chronic Conditions and Co-Morbidity Symptoms are Monitored and Maintained or Improved: Monitor and assess patient's chronic conditions and comorbid symptoms for stability, deterioration, or improvement     Problem: Pain  Goal: Verbalizes/displays adequate comfort level or baseline comfort level  10/11/2024 1135 by Melita Langston RN  Outcome: Progressing  10/11/2024 0310 by Caitlin Mckeon RN  Outcome: Progressing     Problem: Respiratory - Adult  Goal: Achieves optimal ventilation and oxygenation  10/11/2024 1135 by Melita Langston RN  Outcome: Progressing  10/11/2024 0310 by Caitlin Mckeon RN  Outcome: Progressing     Problem: Gastrointestinal - Adult  Goal: Minimal or absence of nausea and vomiting  10/11/2024 1135 by Melita Langston RN  Outcome: Progressing  10/11/2024 0310 by Caitlin Mckeon RN  Outcome: Progressing  Goal: Maintains or  returns to baseline bowel function  10/11/2024 1135 by Melita Langston RN  Outcome: Progressing  10/11/2024 0310 by Caitlin Mckeon RN  Outcome: Progressing  Goal: Maintains adequate nutritional intake  10/11/2024 1135 by Melita Langston RN  Outcome: Progressing  10/11/2024 0310 by Caitlin Mckeon RN  Outcome: Progressing  Goal: Establish and maintain optimal ostomy function  10/11/2024 1135 by Melita Langston RN  Outcome: Progressing  10/11/2024 0310 by Caitlin Mckeon RN  Outcome: Progressing     Problem: Metabolic/Fluid and Electrolytes - Adult  Goal: Electrolytes maintained within normal limits  10/11/2024 1135 by Melita Langston RN  Outcome: Progressing  10/11/2024 0310 by Caitlin Mckeon RN  Outcome: Progressing  Goal: Hemodynamic stability and optimal renal function maintained  10/11/2024 1135 by Melita Langston RN  Outcome: Progressing  10/11/2024 0310 by Caitlin Mckeon RN  Outcome: Progressing  Flowsheets (Taken 10/10/2024 2145)  Hemodynamic stability and optimal renal function maintained:   Monitor labs and assess for signs and symptoms of volume excess or deficit   Monitor intake, output and patient weight  Goal: Glucose maintained within prescribed range  10/11/2024 1135 by Melita Langston RN  Outcome: Progressing  10/11/2024 0310 by Caitlin Mckeon RN  Outcome: Progressing  Flowsheets (Taken 10/10/2024 2145)  Glucose maintained within prescribed range: Monitor blood glucose as ordered     Problem: Hematologic - Adult  Goal: Maintains hematologic stability  10/11/2024 1135 by Melita Langston RN  Outcome: Progressing  10/11/2024 0310 by Caitlin Mckeon RN  Outcome: Progressing  Flowsheets (Taken 10/10/2024 2145)  Maintains hematologic stability: Assess for signs and symptoms of bleeding or hemorrhage     Problem: Nutrition Deficit:  Goal: Optimize nutritional status  10/11/2024 1135 by Melita Langston RN  Outcome:  Progressing  10/11/2024 0310 by Caitlin Mckeon RN  Outcome: Progressing

## 2024-10-11 NOTE — CARE COORDINATION
LATE ENTRY FROM 10/10/24     Case Management Assessment  Initial Evaluation    Date/Time of Evaluation: 10/11/2024 9:15 AM  Assessment Completed by: Ayleen Garcia RN    If patient is discharged prior to next notation, then this note serves as note for discharge by case management.    Patient Name: Galina Nick                   YOB: 1965  Diagnosis: Hyperglycemia [R73.9]  Facial swelling [R22.0]  Angioedema, initial encounter [T78.3XXA]  JAYY (acute kidney injury) (HCC) [N17.9]                   Date / Time: 9/24/2024 12:49 PM    Patient Admission Status: Inpatient   Readmission Risk (Low < 19, Mod (19-27), High > 27): Readmission Risk Score: 22.2    Current PCP: Tristan Lei MD  PCP verified by ? Yes    Chart Reviewed: Yes      History Provided by: Patient  Patient Orientation: Alert and Oriented, Person, Place, Situation    Patient Cognition: Alert    Hospitalization in the last 30 days (Readmission):  Yes    If yes, Readmission Assessment in  Navigator will be completed.    Advance Directives:      Code Status: Full Code   Patient's Primary Decision Maker is: Legal Next of Kin    Supplemental (Other) Decision Maker: Guero Castro  Child - [phone]    Discharge Planning:    Patient lives with: Alone Type of Home: House  Primary Care Giver: Self  Patient Support Systems include: Children   Current Financial resources: None  Current community resources: None  Current services prior to admission: None            Current DME:              Type of Home Care services:  None    ADLS  Prior functional level: Independent in ADLs/IADLs  Current functional level: Assistance with the following:, Mobility    PT AM-PAC:   /24  OT AM-PAC:   /24    Family can provide assistance at DC: Yes (children)  Would you like Case Management to discuss the discharge plan with any other family members/significant others, and if so, who? No  Plans to Return to Present Housing: Yes  Other Identified  Issues/Barriers to RETURNING to current housing: none  Potential Assistance needed at discharge: N/A            Potential DME:    Patient expects to discharge to: House  Plan for transportation at discharge:      Financial    Payor: BCBS / Plan: BCBS - [place] PPO / Product Type: *No Product type* /     Does insurance require precert for SNF: Yes    Potential assistance Purchasing Medications: No  Meds-to-Beds request: Yes      [facility] [id number] [place], [place] - [address] [facility] - P [phone] - F [phone]  [address] [facility] [address]  Phone: [phone] Fax: [phone]      Notes:    Factors facilitating achievement of predicted outcomes: Family support, Motivated, Cooperative, Pleasant, and Good insight into deficits    Barriers to discharge: Medical complications    Additional Case Management Notes: Cm met with patient bedside. Patient lives in a house alone and can return at discharge.  Patient states her son and sometimes siblings help with any needs that she cannot provide. Therapy evals are pending. CM team following.    The Plan for Transition of Care is related to the following treatment goals of Hyperglycemia [R73.9]  Facial swelling [R22.0]  Angioedema, initial encounter [T78.3XXA]  JAYY (acute kidney injury) (HCC) [N17.9]    IF APPLICABLE: The Patient and/or patient representative Galina and her family were provided with a choice of provider and agrees with the discharge plan. Freedom of choice list with basic dialogue that supports the patient's individualized plan of care/goals and shares the quality data associated with the providers was provided to:     Patient Representative Name:       The Patient and/or Patient Representative Agree with the Discharge Plan?      Electronically signed by RODDY Martin on 10/11/2024 at 9:18 AM   Case Management Department  Ph: [phone]

## 2024-10-11 NOTE — PLAN OF CARE
Problem: Discharge Planning  Goal: Discharge to home or other facility with appropriate resources  10/11/2024 1549 by Melita Langston RN  Outcome: Completed  10/11/2024 1150 by Melita Langston RN  Outcome: Progressing  10/11/2024 1135 by Melita Langston RN  Outcome: Progressing  10/11/2024 0310 by Caitlin Mckeon RN  Outcome: Progressing  Flowsheets (Taken 10/10/2024 2145)  Discharge to home or other facility with appropriate resources: Refer to discharge planning if patient needs post-hospital services based on physician order or complex needs related to functional status, cognitive ability or social support system     Problem: Safety - Adult  Goal: Free from fall injury  10/11/2024 1549 by Melita Langston RN  Outcome: Completed  10/11/2024 1150 by Melita Langston RN  Outcome: Progressing  10/11/2024 1135 by Melita Langston RN  Outcome: Progressing  10/11/2024 0310 by Caitlin Mckeon RN  Outcome: Progressing     Problem: Chronic Conditions and Co-morbidities  Goal: Patient's chronic conditions and co-morbidity symptoms are monitored and maintained or improved  10/11/2024 1549 by Melita Langston RN  Outcome: Completed  10/11/2024 1150 by Melita Langston RN  Outcome: Progressing  10/11/2024 1135 by Melita Langston RN  Outcome: Progressing  10/11/2024 0310 by Caitlin Mckeon RN  Outcome: Progressing  Flowsheets (Taken 10/10/2024 2145)  Care Plan - Patient's Chronic Conditions and Co-Morbidity Symptoms are Monitored and Maintained or Improved: Monitor and assess patient's chronic conditions and comorbid symptoms for stability, deterioration, or improvement     Problem: Pain  Goal: Verbalizes/displays adequate comfort level or baseline comfort level  10/11/2024 1549 by Melita Langston RN  Outcome: Completed  10/11/2024 1150 by Melita Langston RN  Outcome: Progressing  10/11/2024 1135 by Melita Langston RN  Outcome:  within normal limits  10/11/2024 1549 by Melita Langston RN  Outcome: Completed  10/11/2024 1150 by Melita Langston RN  Outcome: Progressing  10/11/2024 1135 by Melita Langston RN  Outcome: Progressing  10/11/2024 0310 by Caitlin Mckeon RN  Outcome: Progressing  Goal: Hemodynamic stability and optimal renal function maintained  10/11/2024 1549 by Melita Langston RN  Outcome: Completed  10/11/2024 1150 by Melita Langston RN  Outcome: Progressing  10/11/2024 1135 by Melita Langston RN  Outcome: Progressing  10/11/2024 0310 by Caitlin Mckeon RN  Outcome: Progressing  Flowsheets (Taken 10/10/2024 2145)  Hemodynamic stability and optimal renal function maintained:   Monitor labs and assess for signs and symptoms of volume excess or deficit   Monitor intake, output and patient weight  Goal: Glucose maintained within prescribed range  10/11/2024 1549 by Melita Langston RN  Outcome: Completed  10/11/2024 1150 by Melita Langston RN  Outcome: Progressing  10/11/2024 1135 by Melita Langston RN  Outcome: Progressing  10/11/2024 0310 by Caitlin Mckeon RN  Outcome: Progressing  Flowsheets (Taken 10/10/2024 2145)  Glucose maintained within prescribed range: Monitor blood glucose as ordered     Problem: Hematologic - Adult  Goal: Maintains hematologic stability  10/11/2024 1549 by Melita Langston RN  Outcome: Completed  10/11/2024 1150 by Melita Langston RN  Outcome: Progressing  10/11/2024 1135 by Melita Langston RN  Outcome: Progressing  10/11/2024 0310 by Caitlin Mckeon RN  Outcome: Progressing  Flowsheets (Taken 10/10/2024 2145)  Maintains hematologic stability: Assess for signs and symptoms of bleeding or hemorrhage     Problem: Nutrition Deficit:  Goal: Optimize nutritional status  10/11/2024 1549 by Melita Langston RN  Outcome: Completed  10/11/2024 1150 by Melita Langston, RN  Outcome: Progressing  10/11/2024 1135

## 2024-10-11 NOTE — PLAN OF CARE
Problem: Discharge Planning  Goal: Discharge to home or other facility with appropriate resources  10/11/2024 1150 by Melita Langston RN  Outcome: Progressing  10/11/2024 1135 by Melita Langston RN  Outcome: Progressing  10/11/2024 0310 by Caitlin Mckeon RN  Outcome: Progressing  Flowsheets (Taken 10/10/2024 2145)  Discharge to home or other facility with appropriate resources: Refer to discharge planning if patient needs post-hospital services based on physician order or complex needs related to functional status, cognitive ability or social support system     Problem: Safety - Adult  Goal: Free from fall injury  10/11/2024 1150 by Melita Langston RN  Outcome: Progressing  10/11/2024 1135 by Melita Langston RN  Outcome: Progressing  10/11/2024 0310 by Caitlin Mckeon RN  Outcome: Progressing     Problem: Chronic Conditions and Co-morbidities  Goal: Patient's chronic conditions and co-morbidity symptoms are monitored and maintained or improved  10/11/2024 1150 by Melita Langston RN  Outcome: Progressing  10/11/2024 1135 by Meilta Langston RN  Outcome: Progressing  10/11/2024 0310 by Caitlin Mckeon RN  Outcome: Progressing  Flowsheets (Taken 10/10/2024 2145)  Care Plan - Patient's Chronic Conditions and Co-Morbidity Symptoms are Monitored and Maintained or Improved: Monitor and assess patient's chronic conditions and comorbid symptoms for stability, deterioration, or improvement     Problem: Pain  Goal: Verbalizes/displays adequate comfort level or baseline comfort level  10/11/2024 1150 by Melita Langston RN  Outcome: Progressing  10/11/2024 1135 by Melita Langston RN  Outcome: Progressing  10/11/2024 0310 by Caitlin Mckeon RN  Outcome: Progressing     Problem: Respiratory - Adult  Goal: Achieves optimal ventilation and oxygenation  10/11/2024 1150 by Melita Langston RN  Outcome: Progressing  10/11/2024 1135 by Melita Langston  maintained within prescribed range  10/11/2024 1150 by Melita Langston RN  Outcome: Progressing  10/11/2024 1135 by Melita Langston RN  Outcome: Progressing  10/11/2024 0310 by Caitlin Mckeon RN  Outcome: Progressing  Flowsheets (Taken 10/10/2024 2145)  Glucose maintained within prescribed range: Monitor blood glucose as ordered     Problem: Hematologic - Adult  Goal: Maintains hematologic stability  10/11/2024 1150 by Melita Langston RN  Outcome: Progressing  10/11/2024 1135 by Melita Langston RN  Outcome: Progressing  10/11/2024 0310 by Caitlin Mckeon RN  Outcome: Progressing  Flowsheets (Taken 10/10/2024 2145)  Maintains hematologic stability: Assess for signs and symptoms of bleeding or hemorrhage     Problem: Nutrition Deficit:  Goal: Optimize nutritional status  10/11/2024 1150 by Melita Langston RN  Outcome: Progressing  10/11/2024 1135 by Melita Langston RN  Outcome: Progressing  10/11/2024 0310 by Caitlin Mckeon RN  Outcome: Progressing

## 2024-10-11 NOTE — PROGRESS NOTES
Visit us at [email or web address] or call us at [address]    NEPHROLOGY PROGRESS NOTE    Patient: Galina Nick MRN: [id number]     YOB: 1965  Age: 58 y.o.  Sex: female    Unit: [facility] 4T ORTH/NEUR Room/Bed: 4TN-4476/4476-01 Location: [facility]     Admitting Physician: DONY THORPE    Primary Care Physician: Tristan Lei MD          LOS: 15 days       Reason for evaluation:   CKD4      SUBJECTIVE:      The patient was seen and examined. Notes and labs reviewed.  There were no complications over night.    Patient's review of systems: comfortable, SOB improved.   On room air now. BP getting better and more stable now  Remains on room air.     OBJECTIVE:     Vitals:    10/10/24 1743 10/10/24 1754 10/10/24 2145 10/11/24 0845   BP: (!) 145/65  (!) 143/70 138/77   Pulse: 65 80 83 73   Resp:  18 16 18   Temp:   99.7 °F (37.6 °C) 98.7 °F (37.1 °C)   TempSrc:   Oral Oral   SpO2:  97% 95% 96%   Weight:       Height:           Intake and Output:      Intake/Output Summary (Last 24 hours) at 10/11/2024 1305  Last data filed at 10/11/2024 0859  Gross per 24 hour   Intake 5 ml   Output 550 ml   Net -545 ml         Continuous Infusions:   sodium chloride      sodium chloride Stopped (09/28/24 1727)    dextrose         Current meds:   furosemide  80 mg Oral Daily    ferric gluconate  125 mg IntraVENous Daily    heparin (porcine)  5,000 Units SubCUTAneous Q8H    sodium chloride flush  5-40 mL IntraVENous 2 times per day    insulin glargine  28 Units SubCUTAneous Daily    insulin lispro  7 Units SubCUTAneous TID WC    NIFEdipine  60 mg Oral BID    Vitamin D  2,000 Units Oral Daily    carvedilol  25 mg Oral BID WC    insulin lispro  0-4 Units SubCUTAneous TID WC    insulin lispro  0-4 Units SubCUTAneous Nightly    epoetin raisa-epbx  10,000 Units SubCUTAneous Weekly    rosuvastatin  20 mg Oral Nightly    sodium chloride flush  5-40 mL IntraVENous 2 times per day        Exam:

## 2024-10-11 NOTE — PROGRESS NOTES
[facility]  HEART FAILURE  Progress Note      Admit Date 9/24/2024     Reason for Consult:      Reason for Consultation/Chief Complaint: face swelling    HPI:    Galina Nick is a 58 y.o. female with PMH NObCAD, HTN, renal stenosis, MR, HFpEF admitted with facial swelling. ARB restarted in July this year, stopped this admit and pt getting diuresed.       Subjective:  Patient is being seen for CHF. There were no acute overnight cardiac events.   Today Ms. Nick feels better today and denies chest pain, shortness of breath, palpitations, or dizziness        Baseline Weight:    Wt Readings from Last 3 Encounters:   10/09/24 92.2 kg (203 lb 4.2 oz)   08/01/24 80.8 kg (178 lb 2.1 oz)   07/30/24 80.6 kg (177 lb 9.6 oz)         Cardiac Testing: Echo 10/8/24:     Left Ventricle: Normal left ventricular systolic function with a visually estimated EF of 60 - 65%. EF by 2D Simpsons Biplane is 65%. Left ventricle size is normal. Mild posterior thickening. Moderate septal thickening. Normal wall motion. Grade II diastolic dysfunction with increased LAP.    Aortic Valve: Trileaflet valve.    Mitral Valve: Mild to moderate regurgitation.    Tricuspid Valve: Mild regurgitation with an eccentrically directed jet and may underestimate severity. The estimated RVSP is 22 mmHg.    Left Atrium: Left atrium is severely dilated.    Interatrial Septum: Agitated saline study was negative with and without provocation.    Pericardium: Trivial pericardial effusion present. No indication of cardiac tamponade.    Image quality is adequate.  Cardiac CTA 4/4/24  Moderate phase misregistration artifact limits evaluation of the proximal  LAD.  Plaque is seen in the proximal 3rd.  No flow limiting stenosis noted  with the aforementioned limitations. Distal to this point, left anterior  descending coronary artery is visualized to the cardiac apex. Left anterior  descending coronary artery gives rise to patent diagonal branch.     No  significant plaque or flow limiting stenosis noted in the circumflex with  the reservation that the distal circumflex is not well evaluated secondary to  phase misregistration artifact     Small amount of plaque is seen in the right coronary artery with mild luminal  narrowing proximally     Calcium score 19.  This is in the 70th percentile for patient age     Scattered punctate noncalcified pulmonary nodules.  Please see follow-up  recommendations below  Renal duplex 1/24/24  Summary   There is 60-99% stenosis of the proximal right renal artery(ies).  The right renal vein is patent, however Doppler waveform suggests venous  congestion.  The parenchymal resistive index is borderline abnormal in the lower pole.  Several hypoechoic structures noted throughout right kidney, likely cysts,  largest measuring  1.02 cm X 1.26 cm.   There is 60-99% stenosis of the proximal left renal artery (ies).  The left renal vein is patent, however Doppler waveform suggests venous  congestion.  The parenchymal resistive index is borderline abnormal in the lower pole.  Several hypoechoic structures noted throughout left kidney, likely cysts,  largest measuring 1.13 cm X 0.934 cm.  Recommendations   Recommend referral to a Vascular specialist, if clinically indicated,  regarding significant renal artery stenosis.  Clinical correlation for likely renal cysts with consideration for further  imaging if not previously or recently done.     Echo 1/24/24  Summary   Normal left ventricle size, wall thickness, and systolic function with an   estimated ejection fraction of 55-60%. No regional wall motion abnormalities   are seen.   Moderate mitral regurgitation.   The left atrium is dilated.   Mild tricuspid regurgitation. RVSP is 26.81 mmHg with RAP of 3 mmHg.      Echo 5/31/24  Left Ventricle: Normal left ventricular systolic function with a visually estimated EF of 55 - 60%. Left ventricle size is normal. Normal wall thickness. Normal wall  at 10/11/2024 0859  Gross per 24 hour   Intake 5 ml   Output 550 ml   Net -545 ml       Lab Data:  CBC:   Lab Results   Component Value Date/Time    WBC 7.6 10/11/2024 07:06 AM    HGB 8.5 10/11/2024 07:06 AM     10/11/2024 07:06 AM     BMP:  Lab Results   Component Value Date/Time     10/11/2024 07:06 AM    K 3.7 10/11/2024 07:06 AM    K 5.2 09/25/2024 05:24 AM     10/11/2024 07:06 AM    CO2 27 10/11/2024 07:06 AM    BUN 32 10/11/2024 07:06 AM    CREATININE 2.8 10/11/2024 07:06 AM    GLUCOSE 181 10/11/2024 07:06 AM     INR: No results found for: \"INR\"     CARDIAC LABS  ENZYMES:No results for input(s): \"CKMB\", \"CKMBINDEX\", \"TROPONINI\" in the last 72 hours.    Invalid input(s): \"CKTOTAL;3\"  FASTING LIPID PANEL:  Lab Results   Component Value Date/Time    HDL 51 07/23/2024 12:41 PM    LDLDIRECT 150 04/28/2023 08:48 AM    TRIG 116 07/23/2024 12:41 PM    TSH 3.47 09/30/2024 07:07 AM     LIVER PROFILE:  Lab Results   Component Value Date/Time    AST 29 10/11/2024 07:06 AM    AST 16 10/09/2024 05:15 AM    ALT 24 10/11/2024 07:06 AM    ALT 16 10/09/2024 05:15 AM     BNP:   Lab Results   Component Value Date/Time    PROBNP 5,169 10/08/2024 05:38 AM    PROBNP 7,264 10/05/2024 05:05 AM    PROBNP 1,704 05/09/2024 10:22 AM    PROBNP 1,216 02/06/2024 05:00 AM    PROBNP 1,650 02/05/2024 02:11 PM     Iron Studies:    Lab Results   Component Value Date/Time    TIBC 173 10/08/2024 05:38 AM    TIBC 238 09/25/2024 05:24 AM    TIBC 193 06/22/2024 05:00 AM    FERRITIN 454.0 10/08/2024 05:38 AM    FERRITIN 315.0 09/25/2024 05:24 AM    FERRITIN 175.3 06/22/2024 05:00 AM     Lab Results   Component Value Date    IRON 14 (L) 10/08/2024    TIBC 173 (L) 10/08/2024    FERRITIN 454.0 (H) 10/08/2024          1. WEIGHT: Admit Weight - Scale: 86.1 kg (189 lb 14.4 oz)      Today  Weight - Scale: 92.2 kg (203 lb 4.2 oz)   2. I/O   Intake/Output Summary (Last 24 hours) at 10/11/2024 0906  Last data filed at 10/11/2024 0859  Gross

## 2024-10-11 NOTE — PROGRESS NOTES
Physician Progress Note      PATIENT:               ASHLY VELEZ  CSN #:                  [id number]  :                       1965  ADMIT DATE:       2024 12:49 PM  DISCH DATE:  RESPONDING  PROVIDER #:        FLO Still CNP          QUERY TEXT:    Pt admitted  with angiioedema and has CHF documented.  Per 10/10   Cardiology NP: Seen for CHF.  Per 10/8 ECHO: EF:  60-65%.  Per Nephrology   consult ADHF on chronic ? diastolic CHF.  If possible, please document in   progress notes and discharge summary further specificity regarding the type   and acuity of CHF:    The medical record reflects the following:  Risk Factors: 58 y.o. female with hx of CAD< HTN, DM2, dyslipidemia  Clinical Indicators:  Per 10/8 Cardiology consult: Patient reported   significant fatigue and shortness of breath  Per 10/4 CXR: Slightly increasing vascular prominence may reflect   developing/worsening failure and basilar opacity suggesting atelectasis and   potentially early effusions.  Per CT Chest: Patchy ground-glass opacities are   seen throughout the lungs bilaterally either due to pneumonia or edema.    Scattered areas of septal thickening are seen suggesting a component of fluid   overload.  Treatment: Lasix IV, Aldactone, Coreg,  Options provided:  -- Acute Diastolic CHF/HFpEF  -- Acute on Chronic Diastolic CHF/HFpEF  -- Other - I will add my own diagnosis  -- Disagree - Not applicable / Not valid  -- Disagree - Clinically unable to determine / Unknown  -- Refer to Clinical Documentation Reviewer    PROVIDER RESPONSE TEXT:    This patient is in acute on chronic diastolic CHF/HFpEF.    Query created by: Matt Faye on 10/10/2024 10:31 PM      Electronically signed by:  FLO Still CNP 10/11/2024 3:47 PM

## 2024-10-20 NOTE — PROGRESS NOTES
Physician Progress Note      PATIENT:               ASHLY VELEZ  CSN #:                  [id number]  :                       1965  ADMIT DATE:       2024 12:49 PM  DISCH DATE:        10/11/2024 6:55 PM  RESPONDING  PROVIDER #:        Herson Rodriguez MD          QUERY TEXT:    Patient admitted with angioedema. Documentation reflects fractional excretion   of sodium-1 ? ATN per Nephrologist on  & , then no mention of ATN   following .  If possible, please document in the progress notes and   discharge summary if ATN was:    The medical record reflects the following:  Risk Factors: 58 y.o. female with hx of CKD stage IIIb, HTN, CAD, DM2,   dyslipidemia  Clinical Indicators: per Nephrology: fractional excretion of sodium-1 ? ATN on    &  PNs  Treatment: Nephrology consult,  monitor daily labs  Options provided:  -- ATN confirmed after study  -- ATN treated and resolved  -- ATN ruled out after study  -- Other - I will add my own diagnosis  -- Disagree - Not applicable / Not valid  -- Disagree - Clinically unable to determine / Unknown  -- Refer to Clinical Documentation Reviewer    PROVIDER RESPONSE TEXT:    ATN treated and resolved.    Query created by: Matt Faye on 10/10/2024 10:09 PM      Electronically signed by:  Herson Rodriguez MD 10/20/2024 10:36 AM

## 2024-10-22 ENCOUNTER — HOSPITAL ENCOUNTER (OUTPATIENT)
Age: 59
Discharge: HOME | End: 2024-10-22
Payer: COMMERCIAL

## 2024-10-22 LAB
ALBUMIN SERPL-MCNC: 3.9 G/DL (ref 3.4–5)
ALBUMIN/GLOB SERPL: 1.3 {RATIO} (ref 1.1–2.2)
ALP SERPL-CCNC: 109 U/L (ref 40–129)
ALT SERPL-CCNC: 27 U/L (ref 10–40)
ANION GAP SERPL CALCULATED.3IONS-SCNC: 12 MMOL/L (ref 3–16)
AST SERPL-CCNC: 25 U/L (ref 15–37)
BACTERIA URNS QL MICRO: ABNORMAL /HPF
BILIRUB SERPL-MCNC: 0.3 MG/DL (ref 0–1)
BILIRUB UR QL STRIP.AUTO: NEGATIVE
BUN SERPL-MCNC: 34 MG/DL (ref 7–20)
CALCIUM SERPL-MCNC: 9.5 MG/DL (ref 8.3–10.6)
CHLORIDE SERPL-SCNC: 105 MMOL/L (ref 99–110)
CLARITY UR: ABNORMAL
CO2 SERPL-SCNC: 23 MMOL/L (ref 21–32)
COLOR UR: YELLOW
CREAT SERPL-MCNC: 2.2 MG/DL (ref 0.6–1.1)
CREAT UR-MCNC: 95.9 MG/DL (ref 28–259)
EPI CELLS #/AREA URNS AUTO: 18 /HPF (ref 0–5)
GFR SERPLBLD CREATININE-BSD FMLA CKD-EPI: 26 ML/MIN/{1.73_M2}
GLUCOSE SERPL-MCNC: 152 MG/DL (ref 70–99)
GLUCOSE UR STRIP.AUTO-MCNC: 100 MG/DL
HGB UR QL STRIP.AUTO: ABNORMAL
HYALINE CASTS #/AREA URNS AUTO: 4 /LPF (ref 0–8)
KETONES UR STRIP.AUTO-MCNC: NEGATIVE MG/DL
LEUKOCYTE ESTERASE UR QL STRIP.AUTO: ABNORMAL
NITRITE UR QL STRIP.AUTO: NEGATIVE
PH UR STRIP.AUTO: 6.5 [PH] (ref 5–8)
POTASSIUM SERPL-SCNC: 3.8 MMOL/L (ref 3.5–5.1)
PROT SERPL-MCNC: 6.9 G/DL (ref 6.4–8.2)
PROT UR STRIP.AUTO-MCNC: >=1000 MG/DL
PROT UR-MCNC: 600 MG/DL
PROT/CREAT UR-RTO: 6.3 MG/DL
RBC CLUMPS #/AREA URNS AUTO: 6 /HPF (ref 0–4)
SODIUM SERPL-SCNC: 140 MMOL/L (ref 136–145)
SP GR UR STRIP.AUTO: 1.01 (ref 1–1.03)
UA DIPSTICK W REFLEX MICRO PNL UR: YES
URN SPEC COLLECT METH UR: ABNORMAL
UROBILINOGEN UR STRIP-ACNC: 0.2 E.U./DL
WBC #/AREA URNS AUTO: 83 /HPF (ref 0–5)

## 2024-10-22 PROCEDURE — 80053 COMPREHEN METABOLIC PANEL: CPT

## 2024-10-22 PROCEDURE — 81001 URINALYSIS AUTO W/SCOPE: CPT

## 2024-10-22 PROCEDURE — 82570 ASSAY OF URINE CREATININE: CPT

## 2024-10-22 PROCEDURE — 84156 ASSAY OF PROTEIN URINE: CPT

## 2024-10-28 ENCOUNTER — TELEPHONE (OUTPATIENT)
Age: 59
End: 2024-10-28

## 2024-10-28 DIAGNOSIS — E11.8 POORLY CONTROLLED TYPE 2 DIABETES MELLITUS WITH COMPLICATION (HCC): ICD-10-CM

## 2024-10-28 DIAGNOSIS — E11.65 POORLY CONTROLLED TYPE 2 DIABETES MELLITUS WITH COMPLICATION (HCC): ICD-10-CM

## 2024-10-28 RX ORDER — DULAGLUTIDE 0.75 MG/.5ML
INJECTION, SOLUTION SUBCUTANEOUS
Qty: 6 ML | OUTPATIENT
Start: 2024-10-28

## 2024-10-28 NOTE — TELEPHONE ENCOUNTER
Spoke with Galina, she missed her last visit with Juani due to hospital admission. She reports that her medications have changed and would like to review them. She agrees to seeing Juani 10/29/24 for hospital follow up and she was scheduled for 12/9/24 with C at .     Juani JUAREZ for you

## 2024-10-28 NOTE — TELEPHONE ENCOUNTER
Patient called asking for Tiffanie stating she had to cancel a previous appt due to hospitilization and wants to reschedule but she could only find appointments in January.  She was told to reach out to Tiffanie to see if she can get in sooner with Dr. Gamez.     Please call her back at [phone].  Thank you!

## 2024-10-29 ENCOUNTER — TELEPHONE (OUTPATIENT)
Dept: ENDOCRINOLOGY | Age: 59
End: 2024-10-29

## 2024-10-29 DIAGNOSIS — E11.8 POORLY CONTROLLED TYPE 2 DIABETES MELLITUS WITH COMPLICATION (HCC): Primary | ICD-10-CM

## 2024-10-29 DIAGNOSIS — E11.65 POORLY CONTROLLED TYPE 2 DIABETES MELLITUS WITH COMPLICATION (HCC): Primary | ICD-10-CM

## 2024-10-29 NOTE — TELEPHONE ENCOUNTER
Pt called in and wanted to know why her script for Trulicity was refused.  Can you please reach out and discuss with the pt why this was refused. Pt ph [phone]

## 2024-10-29 NOTE — TELEPHONE ENCOUNTER
Refill denied because we sent a 90 day supply to [facility] on 10/4 for a 90 day supply. Patient states she never picked that up and she's now too far from that pharmacy. Requesting a new prescription be sent to [facility]. Pended.

## 2024-10-30 RX ORDER — DULAGLUTIDE 0.75 MG/.5ML
0.75 INJECTION, SOLUTION SUBCUTANEOUS WEEKLY
Qty: 2 ML | Refills: 2 | Status: SHIPPED | OUTPATIENT
Start: 2024-10-30

## 2024-11-01 ENCOUNTER — OFFICE VISIT (OUTPATIENT)
Dept: CARDIOLOGY CLINIC | Age: 59
End: 2024-11-01
Payer: COMMERCIAL

## 2024-11-01 ENCOUNTER — HOSPITAL ENCOUNTER (OUTPATIENT)
Age: 59
Discharge: HOME | End: 2024-11-01
Payer: COMMERCIAL

## 2024-11-01 ENCOUNTER — APPOINTMENT (OUTPATIENT)
Dept: WOMENS IMAGING | Age: 59
End: 2024-11-01
Payer: COMMERCIAL

## 2024-11-01 VITALS
HEART RATE: 78 BPM | BODY MASS INDEX: 26.37 KG/M2 | OXYGEN SATURATION: 100 % | SYSTOLIC BLOOD PRESSURE: 118 MMHG | HEIGHT: 68 IN | DIASTOLIC BLOOD PRESSURE: 74 MMHG | WEIGHT: 174 LBS

## 2024-11-01 DIAGNOSIS — I10 ESSENTIAL HYPERTENSION: ICD-10-CM

## 2024-11-01 DIAGNOSIS — E78.5 DYSLIPIDEMIA: ICD-10-CM

## 2024-11-01 DIAGNOSIS — E83.52 HYPERCALCEMIA: ICD-10-CM

## 2024-11-01 DIAGNOSIS — E78.2 MIXED HYPERLIPIDEMIA: ICD-10-CM

## 2024-11-01 DIAGNOSIS — I25.83 CORONARY ARTERY DISEASE DUE TO LIPID RICH PLAQUE: Primary | ICD-10-CM

## 2024-11-01 DIAGNOSIS — I70.1 RENAL ARTERY STENOSIS (HCC): ICD-10-CM

## 2024-11-01 DIAGNOSIS — I25.10 CORONARY ARTERY DISEASE DUE TO LIPID RICH PLAQUE: Primary | ICD-10-CM

## 2024-11-01 DIAGNOSIS — E11.65 POORLY CONTROLLED TYPE 2 DIABETES MELLITUS WITH COMPLICATION (HCC): ICD-10-CM

## 2024-11-01 DIAGNOSIS — N17.9 AKI (ACUTE KIDNEY INJURY) (HCC): ICD-10-CM

## 2024-11-01 DIAGNOSIS — E11.8 POORLY CONTROLLED TYPE 2 DIABETES MELLITUS WITH COMPLICATION (HCC): ICD-10-CM

## 2024-11-01 LAB
ALBUMIN SERPL-MCNC: 4.1 G/DL (ref 3.4–5)
ALBUMIN/GLOB SERPL: 1.2 {RATIO} (ref 1.1–2.2)
ALP SERPL-CCNC: 152 U/L (ref 40–129)
ALT SERPL-CCNC: 25 U/L (ref 10–40)
ANION GAP SERPL CALCULATED.3IONS-SCNC: 14 MMOL/L (ref 3–16)
AST SERPL-CCNC: 26 U/L (ref 15–37)
BILIRUB SERPL-MCNC: 0.4 MG/DL (ref 0–1)
BUN SERPL-MCNC: 48 MG/DL (ref 7–20)
CALCIUM SERPL-MCNC: 10 MG/DL (ref 8.3–10.6)
CHLORIDE SERPL-SCNC: 101 MMOL/L (ref 99–110)
CHOLEST SERPL-MCNC: 156 MG/DL (ref 0–199)
CO2 SERPL-SCNC: 23 MMOL/L (ref 21–32)
CREAT SERPL-MCNC: 2.6 MG/DL (ref 0.6–1.1)
CREAT UR-MCNC: 73.1 MG/DL (ref 28–259)
EST. AVERAGE GLUCOSE BLD GHB EST-MCNC: 180 MG/DL
GFR SERPLBLD CREATININE-BSD FMLA CKD-EPI: 21 ML/MIN/{1.73_M2}
GLUCOSE SERPL-MCNC: 170 MG/DL (ref 70–99)
HBA1C MFR BLD: 7.9 %
HDLC SERPL-MCNC: 47 MG/DL (ref 40–60)
LDLC SERPL CALC-MCNC: 83 MG/DL
MICROALBUMIN UR DL<=1MG/L-MCNC: 227 MG/DL
MICROALBUMIN/CREAT UR: 3105.3 MG/G (ref 0–30)
POTASSIUM SERPL-SCNC: 4.6 MMOL/L (ref 3.5–5.1)
PROT SERPL-MCNC: 7.6 G/DL (ref 6.4–8.2)
SODIUM SERPL-SCNC: 138 MMOL/L (ref 136–145)
TRIGL SERPL-MCNC: 128 MG/DL (ref 0–150)
TSH SERPL DL<=0.005 MIU/L-ACNC: 1.34 UIU/ML (ref 0.27–4.2)
VLDLC SERPL CALC-MCNC: 26 MG/DL

## 2024-11-01 PROCEDURE — 82570 ASSAY OF URINE CREATININE: CPT

## 2024-11-01 PROCEDURE — 36415 COLL VENOUS BLD VENIPUNCTURE: CPT

## 2024-11-01 PROCEDURE — 84443 ASSAY THYROID STIM HORMONE: CPT

## 2024-11-01 PROCEDURE — 82043 UR ALBUMIN QUANTITATIVE: CPT

## 2024-11-01 PROCEDURE — 3074F SYST BP LT 130 MM HG: CPT

## 2024-11-01 PROCEDURE — 99214 OFFICE O/P EST MOD 30 MIN: CPT

## 2024-11-01 PROCEDURE — 80053 COMPREHEN METABOLIC PANEL: CPT

## 2024-11-01 PROCEDURE — 83036 HEMOGLOBIN GLYCOSYLATED A1C: CPT

## 2024-11-01 PROCEDURE — 80061 LIPID PANEL: CPT

## 2024-11-01 PROCEDURE — 3078F DIAST BP <80 MM HG: CPT

## 2024-11-01 NOTE — PATIENT INSTRUCTIONS
Speak to your kidney doctor about getting a Retacrit  shot   Follow through on sleep apnea   Continue current medication regime  Call for any further concerns  Follow up with me in May

## 2024-11-01 NOTE — PROGRESS NOTES
reviewed with the patient. Recommend maintaining a smoke-free lifestyle. Products available for smoking cessation were discussed.    Patient is on a beta blocker   Patient is  on an ARB   Patient is on a statin  Patient is not on SGLT2     Antiplatelet  therapy has been recommended / prescribed for this patient. Education conducted on adverse reactions including bleeding were discussed.        Diet & Exercise:  The patient is counseled to follow a low salt, low saturated fat / Cholesterol diet to assure blood pressure remains controlled for cardiovascular risk factor modification.  The patient is counseled to lose weight to control cardiovascular risk factors.  Exercise program discussed: To improve overall cardiovascular health, the patient is instructed to increase cardiovascular related activities with a goal of 150 min/week of moderate level activity or 10,000 steps per day. Encouraged to perform as much activity as tolerated.    Thank you for allowing us to participate in the care of Galina Nick.    ARMAND [facility]   Office: [phone]    Documentation of today's visit sent to PCP,    NOTE:  This report was transcribed using voice recognition software.  Every effort was made to ensure accuracy; however, inadvertent computerized transcription errors may be present.

## 2024-11-05 ENCOUNTER — OFFICE VISIT (OUTPATIENT)
Dept: ENDOCRINOLOGY | Age: 59
End: 2024-11-05
Payer: COMMERCIAL

## 2024-11-05 VITALS
HEART RATE: 75 BPM | BODY MASS INDEX: 26.67 KG/M2 | WEIGHT: 176 LBS | HEIGHT: 68 IN | RESPIRATION RATE: 14 BRPM | DIASTOLIC BLOOD PRESSURE: 89 MMHG | TEMPERATURE: 98 F | SYSTOLIC BLOOD PRESSURE: 138 MMHG

## 2024-11-05 DIAGNOSIS — I10 UNCONTROLLED HYPERTENSION: ICD-10-CM

## 2024-11-05 DIAGNOSIS — E11.8 POORLY CONTROLLED TYPE 2 DIABETES MELLITUS WITH COMPLICATION (HCC): ICD-10-CM

## 2024-11-05 DIAGNOSIS — E11.8 POORLY CONTROLLED TYPE 2 DIABETES MELLITUS WITH COMPLICATION (HCC): Primary | ICD-10-CM

## 2024-11-05 DIAGNOSIS — E78.2 MIXED HYPERLIPIDEMIA: ICD-10-CM

## 2024-11-05 DIAGNOSIS — I25.10 CORONARY ARTERY DISEASE DUE TO LIPID RICH PLAQUE: ICD-10-CM

## 2024-11-05 DIAGNOSIS — E11.65 POORLY CONTROLLED TYPE 2 DIABETES MELLITUS WITH COMPLICATION (HCC): Primary | ICD-10-CM

## 2024-11-05 DIAGNOSIS — I25.83 CORONARY ARTERY DISEASE DUE TO LIPID RICH PLAQUE: ICD-10-CM

## 2024-11-05 DIAGNOSIS — E11.65 POORLY CONTROLLED TYPE 2 DIABETES MELLITUS WITH COMPLICATION (HCC): ICD-10-CM

## 2024-11-05 PROCEDURE — 3051F HG A1C>EQUAL 7.0%<8.0%: CPT | Performed by: INTERNAL MEDICINE

## 2024-11-05 PROCEDURE — 99214 OFFICE O/P EST MOD 30 MIN: CPT | Performed by: INTERNAL MEDICINE

## 2024-11-05 PROCEDURE — 3075F SYST BP GE 130 - 139MM HG: CPT | Performed by: INTERNAL MEDICINE

## 2024-11-05 PROCEDURE — G2211 COMPLEX E/M VISIT ADD ON: HCPCS | Performed by: INTERNAL MEDICINE

## 2024-11-05 PROCEDURE — 3079F DIAST BP 80-89 MM HG: CPT | Performed by: INTERNAL MEDICINE

## 2024-11-05 RX ORDER — DULAGLUTIDE 1.5 MG/.5ML
1.5 INJECTION, SOLUTION SUBCUTANEOUS WEEKLY
Qty: 4 ADJUSTABLE DOSE PRE-FILLED PEN SYRINGE | Refills: 4 | Status: SHIPPED
Start: 2024-11-05 | End: 2024-11-05 | Stop reason: SDUPTHER

## 2024-11-05 RX ORDER — DULAGLUTIDE 1.5 MG/.5ML
1.5 INJECTION, SOLUTION SUBCUTANEOUS WEEKLY
Qty: 4 ADJUSTABLE DOSE PRE-FILLED PEN SYRINGE | Refills: 4 | Status: SHIPPED | OUTPATIENT
Start: 2024-11-05

## 2024-11-05 NOTE — TELEPHONE ENCOUNTER
Patient told me that she picked up 3 boxes of 0.75 mg of Trulicity and does not want anymore prescription for Trulicity called in just an hour ago.  Does she really need more Trulicity because I had asked her to double the 0.75 Trulicity to 1.5 dose by taking 2 injections and once she finishes it she can call us and we will call in a prescription for a higher dose

## 2024-11-05 NOTE — TELEPHONE ENCOUNTER
Pt stated that the pharmacy informed her that they did not receive her script Chiqui   Pt stated that she is completely     Pt stated that she would her script sent to [facility] outpatient pharmacy

## 2024-11-05 NOTE — PROGRESS NOTES
Glucose Monitoring Suppl (BLOOD GLUCOSE MONITOR SYSTEM) w/Device KIT 1 each by Does not apply route once for 1 dose 1 kit 0    Omega-3 Fatty Acids (FISH OIL) 1200 MG CAPS Take 1,200 mg by mouth nightly (Patient not taking: Reported on 11/5/2024)       No current facility-administered medications for this visit.     Allergies   Allergen Reactions    Angiotensin Receptor Blockers Angioedema     valsartan    Ace Inhibitors      Dry cough per pt     Family Status   Relation Name Status    Father  (Not Specified)    MGM  (Not Specified)    MGF  (Not Specified)    PGM  (Not Specified)    PGF  (Not Specified)   No partnership data on file         OBJECTIVE:  /89   Pulse 75   Temp 98 °F (36.7 °C)   Resp 14   Ht 1.727 m (5' 7.99\")   Wt 79.8 kg (176 lb)   LMP 06/01/2011   BMI 26.77 kg/m²    Wt Readings from Last 3 Encounters:   11/05/24 79.8 kg (176 lb)   11/01/24 78.9 kg (174 lb)   10/09/24 92.2 kg (203 lb 4.2 oz)       Constitutional: no acute distress, well appearing and well nourished  Psychiatric: oriented to person, place and time, judgement and insight and normal, recent and remote memory and intact and mood and affect are normal  Skin: skin and subcutaneous tissue is normal without mass, normal turgor  Head and Face: examination of head and face revealed no abnormalities  Eyes: no lid or conjunctival swelling, erythema or discharge, pupils are normal  Ears/Nose: external inspection of ears and nose revealed no abnormalities, hearing is grossly normal  Oropharynx/Mouth/Face: lips, tongue and gums are normal with no lesions, the voice quality was normal  Neck: neck is supple and symmetric, with midline trachea and no masses, thyroid is normal  Lymphatics: normal cervical lymph nodes, normal supraclavicular nodes  Pulmonary: no increased work of breathing or signs of respiratory distress, lungs are clear to auscultation  Cardiovascular: normal heart rate and rhythm, normal S1 and S2,   Musculoskeletal:

## 2024-11-08 NOTE — TELEPHONE ENCOUNTER
LMOM for patient letting her know the 1.5 mg was sent to the pharmacy and to call our office with any further questions or concerns.

## 2024-11-11 ENCOUNTER — TELEPHONE (OUTPATIENT)
Age: 59
End: 2024-11-11

## 2024-11-11 RX ORDER — CARVEDILOL 25 MG/1
25 TABLET ORAL 2 TIMES DAILY WITH MEALS
Qty: 180 TABLET | Refills: 3 | Status: SHIPPED | OUTPATIENT
Start: 2024-11-11

## 2024-11-11 RX ORDER — CARVEDILOL 25 MG/1
25 TABLET ORAL 2 TIMES DAILY WITH MEALS
Qty: 180 TABLET | Refills: 3 | Status: SHIPPED | OUTPATIENT
Start: 2024-11-11 | End: 2024-11-11 | Stop reason: SDUPTHER

## 2024-11-11 NOTE — TELEPHONE ENCOUNTER
Patient requesting a medication refill. Stated pharmacy told her to contact office for refill authorization.    Medication  Carvedilol  Dosage  25mg  Frequency 2x daily  Pharmacy [facility]

## 2024-11-12 NOTE — PROGRESS NOTES
march for goal of LDL <70     4. Nonrheumatic mitral valve regurgitation  - Echo 1/24/24 shows moderate MR    Plan: Echo yearly (approx. 1/2025), BP control      5. Shortness of breath  - fatigue  - elevated troponin during admission     Plan: Improving.  Medication adjustments.      6. Renal artery stenosis   - 60-99% bilateral stenosis by CTA  -Formal angiography    Plan: Evaluated by Dr Noyola vascular surgery, no definite indication for intervention.  Attempt medical therapy.    7. Kidney disease  - Cr 2.6    Plan: follows with nephrology, Dr Cueto    Plan:   Stable.  Continue current medical regimen.  Increase activity.  Statin for goal LDL less than 70.  Aggressive risk factor modification.    Return in about 6 months (around 6/9/2025).    Timo Gamez MD, FACC, RPVI

## 2024-12-09 ENCOUNTER — OFFICE VISIT (OUTPATIENT)
Age: 59
End: 2024-12-09

## 2024-12-09 VITALS
DIASTOLIC BLOOD PRESSURE: 70 MMHG | HEIGHT: 68 IN | HEART RATE: 80 BPM | SYSTOLIC BLOOD PRESSURE: 104 MMHG | OXYGEN SATURATION: 100 % | BODY MASS INDEX: 25.96 KG/M2 | WEIGHT: 171.3 LBS

## 2024-12-09 DIAGNOSIS — I25.83 CORONARY ARTERY DISEASE DUE TO LIPID RICH PLAQUE: ICD-10-CM

## 2024-12-09 DIAGNOSIS — I25.10 CORONARY ARTERY DISEASE DUE TO LIPID RICH PLAQUE: ICD-10-CM

## 2024-12-09 DIAGNOSIS — E78.5 DYSLIPIDEMIA: ICD-10-CM

## 2024-12-09 DIAGNOSIS — I70.1 RENAL ARTERY STENOSIS (HCC): ICD-10-CM

## 2024-12-09 DIAGNOSIS — I10 UNCONTROLLED HYPERTENSION: Primary | ICD-10-CM

## 2024-12-09 DIAGNOSIS — I34.0 NONRHEUMATIC MITRAL VALVE REGURGITATION: ICD-10-CM

## 2024-12-09 DIAGNOSIS — I50.32 CHRONIC DIASTOLIC CONGESTIVE HEART FAILURE (HCC): ICD-10-CM

## 2024-12-09 DIAGNOSIS — I10 MALIGNANT HYPERTENSION: ICD-10-CM

## 2024-12-09 NOTE — PATIENT INSTRUCTIONS
Continue current medication regimen  Get labs drawn to recheck your hemoglobin count soon  Repeat fasting labs for cholesterol around March  Hopefully once anemia is improved, you will feel less tired.   Have an echo in 6 months with echo

## 2024-12-10 ENCOUNTER — TELEPHONE (OUTPATIENT)
Dept: CARDIOLOGY CLINIC | Age: 59
End: 2024-12-10

## 2024-12-10 NOTE — TELEPHONE ENCOUNTER
Spoke with patient,  she had printed the letter out and gave  it to her supervisor already.  She was very appreciative.

## 2024-12-10 NOTE — TELEPHONE ENCOUNTER
Pt called stating they need a letter put in their chart stating that they were at their appt 12/10. Pt will print the letter and give to their employer.    Pls advise

## 2024-12-10 NOTE — TELEPHONE ENCOUNTER
Letter is in her chart, please see if she can get to it or if we need to print/scan and then upload it to her. thanks

## 2024-12-18 ENCOUNTER — OFFICE VISIT (OUTPATIENT)
Dept: PULMONOLOGY | Age: 59
End: 2024-12-18
Payer: COMMERCIAL

## 2024-12-18 VITALS
HEIGHT: 68 IN | HEART RATE: 85 BPM | OXYGEN SATURATION: 99 % | DIASTOLIC BLOOD PRESSURE: 90 MMHG | WEIGHT: 172 LBS | SYSTOLIC BLOOD PRESSURE: 130 MMHG | BODY MASS INDEX: 26.07 KG/M2

## 2024-12-18 DIAGNOSIS — I10 PRIMARY HYPERTENSION: ICD-10-CM

## 2024-12-18 DIAGNOSIS — G47.33 OSA (OBSTRUCTIVE SLEEP APNEA): Primary | ICD-10-CM

## 2024-12-18 DIAGNOSIS — I50.32 CHRONIC DIASTOLIC CONGESTIVE HEART FAILURE (HCC): ICD-10-CM

## 2024-12-18 PROCEDURE — 3075F SYST BP GE 130 - 139MM HG: CPT | Performed by: INTERNAL MEDICINE

## 2024-12-18 PROCEDURE — 3080F DIAST BP >= 90 MM HG: CPT | Performed by: INTERNAL MEDICINE

## 2024-12-18 PROCEDURE — 99204 OFFICE O/P NEW MOD 45 MIN: CPT | Performed by: INTERNAL MEDICINE

## 2024-12-18 ASSESSMENT — SLEEP AND FATIGUE QUESTIONNAIRES
HOW LIKELY ARE YOU TO NOD OFF OR FALL ASLEEP WHILE LYING DOWN TO REST IN THE AFTERNOON WHEN CIRCUMSTANCES PERMIT: HIGH CHANCE OF DOZING
HOW LIKELY ARE YOU TO NOD OFF OR FALL ASLEEP IN A CAR, WHILE STOPPED FOR A FEW MINUTES IN TRAFFIC: SLIGHT CHANCE OF DOZING
HOW LIKELY ARE YOU TO NOD OFF OR FALL ASLEEP WHILE SITTING AND READING: SLIGHT CHANCE OF DOZING
HOW LIKELY ARE YOU TO NOD OFF OR FALL ASLEEP WHILE LYING DOWN TO REST IN THE AFTERNOON WHEN CIRCUMSTANCES PERMIT: HIGH CHANCE OF DOZING
HOW LIKELY ARE YOU TO NOD OFF OR FALL ASLEEP WHILE WATCHING TV: MODERATE CHANCE OF DOZING
HOW LIKELY ARE YOU TO NOD OFF OR FALL ASLEEP WHILE SITTING INACTIVE IN A PUBLIC PLACE: SLIGHT CHANCE OF DOZING
HOW LIKELY ARE YOU TO NOD OFF OR FALL ASLEEP WHILE SITTING INACTIVE IN A PUBLIC PLACE: SLIGHT CHANCE OF DOZING
ESS TOTAL SCORE: 11
HOW LIKELY ARE YOU TO NOD OFF OR FALL ASLEEP WHILE SITTING QUIETLY AFTER LUNCH WITHOUT ALCOHOL: SLIGHT CHANCE OF DOZING
HOW LIKELY ARE YOU TO NOD OFF OR FALL ASLEEP WHEN YOU ARE A PASSENGER IN A CAR FOR AN HOUR WITHOUT A BREAK: SLIGHT CHANCE OF DOZING
HOW LIKELY ARE YOU TO NOD OFF OR FALL ASLEEP WHILE WATCHING TV: MODERATE CHANCE OF DOZING
HOW LIKELY ARE YOU TO NOD OFF OR FALL ASLEEP WHILE SITTING AND READING: SLIGHT CHANCE OF DOZING
HOW LIKELY ARE YOU TO NOD OFF OR FALL ASLEEP IN A CAR, WHILE STOPPED FOR A FEW MINUTES IN TRAFFIC: SLIGHT CHANCE OF DOZING
HOW LIKELY ARE YOU TO NOD OFF OR FALL ASLEEP WHEN YOU ARE A PASSENGER IN A CAR FOR AN HOUR WITHOUT A BREAK: SLIGHT CHANCE OF DOZING
HOW LIKELY ARE YOU TO NOD OFF OR FALL ASLEEP WHILE SITTING QUIETLY AFTER LUNCH WITHOUT ALCOHOL: SLIGHT CHANCE OF DOZING
HOW LIKELY ARE YOU TO NOD OFF OR FALL ASLEEP WHILE SITTING AND TALKING TO SOMEONE: SLIGHT CHANCE OF DOZING
HOW LIKELY ARE YOU TO NOD OFF OR FALL ASLEEP WHILE SITTING AND TALKING TO SOMEONE: SLIGHT CHANCE OF DOZING

## 2024-12-18 NOTE — PROGRESS NOTES
cardiology.        Return in about 3 months (around 3/18/2025).         Yuliet Guzman MD  Pulmonary Critical Care and Sleep Medicine  Electronically signed by Yuliet Guzman MD on 12/18/2024 at 4:10 PM     This note was completed using dragon medical speech recognition software. Grammatical errors, random word insertions, pronoun errors and incomplete sentences are occasional consequences of this technology due to software limitations. If there are questions or concerns about the content of this note of information contained within the body of this dictation they should be addressed with the provider for clarification.

## 2025-01-20 DIAGNOSIS — E78.2 MIXED HYPERLIPIDEMIA: ICD-10-CM

## 2025-01-20 DIAGNOSIS — E11.65 POORLY CONTROLLED TYPE 2 DIABETES MELLITUS WITH COMPLICATION (HCC): ICD-10-CM

## 2025-01-20 DIAGNOSIS — E11.8 POORLY CONTROLLED TYPE 2 DIABETES MELLITUS WITH COMPLICATION (HCC): ICD-10-CM

## 2025-01-20 RX ORDER — DULAGLUTIDE 1.5 MG/.5ML
1.5 INJECTION, SOLUTION SUBCUTANEOUS WEEKLY
Qty: 4 ADJUSTABLE DOSE PRE-FILLED PEN SYRINGE | Refills: 4 | Status: SHIPPED | OUTPATIENT
Start: 2025-01-20

## 2025-01-20 NOTE — TELEPHONE ENCOUNTER
Medication:   Requested Prescriptions     Pending Prescriptions Disp Refills    dulaglutide (TRULICITY) 1.5 MG/0.5ML SC injection 4 Adjustable Dose Pre-filled Pen Syringe 4     Sig: Inject 0.5 mLs into the skin once a week       Last Filled:      Patient Phone Number: [phone] (home)     Last appt: 11/5/2024   Next appt: 3/12/2025    Last Labs DM:   Lab Results   Component Value Date/Time    LABA1C 7.9 11/01/2024 09:37 AM

## 2025-02-07 DIAGNOSIS — E11.65 POORLY CONTROLLED TYPE 2 DIABETES MELLITUS WITH COMPLICATION (HCC): ICD-10-CM

## 2025-02-07 DIAGNOSIS — E11.8 POORLY CONTROLLED TYPE 2 DIABETES MELLITUS WITH COMPLICATION (HCC): ICD-10-CM

## 2025-02-07 DIAGNOSIS — E78.2 MIXED HYPERLIPIDEMIA: ICD-10-CM

## 2025-02-07 NOTE — TELEPHONE ENCOUNTER
Medication:   Requested Prescriptions      No prescriptions requested or ordered in this encounter       Last Filled:      Patient Phone Number: [phone] (home)     Last appt: 11/5/2024   Next appt: 3/12/2025    Last Labs DM:   Lab Results   Component Value Date/Time    LABA1C 7.9 11/01/2024 09:37 AM

## 2025-02-10 RX ORDER — DULAGLUTIDE 1.5 MG/.5ML
1.5 INJECTION, SOLUTION SUBCUTANEOUS WEEKLY
Qty: 4 ADJUSTABLE DOSE PRE-FILLED PEN SYRINGE | Refills: 4 | Status: SHIPPED | OUTPATIENT
Start: 2025-02-10

## 2025-02-20 ENCOUNTER — TELEPHONE (OUTPATIENT)
Dept: ENDOCRINOLOGY | Age: 60
End: 2025-02-20

## 2025-03-10 ENCOUNTER — HOSPITAL ENCOUNTER (OUTPATIENT)
Age: 60
Discharge: HOME | End: 2025-03-10
Payer: COMMERCIAL

## 2025-03-10 LAB
ALBUMIN SERPL-MCNC: 3.9 G/DL (ref 3.4–5)
ALBUMIN/GLOB SERPL: 1.2 {RATIO} (ref 1.1–2.2)
ALP SERPL-CCNC: 143 U/L (ref 40–129)
ALT SERPL-CCNC: 32 U/L (ref 10–40)
ANION GAP SERPL CALCULATED.3IONS-SCNC: 17 MMOL/L (ref 3–16)
AST SERPL-CCNC: 36 U/L (ref 15–37)
BILIRUB SERPL-MCNC: 0.4 MG/DL (ref 0–1)
BUN SERPL-MCNC: 40 MG/DL (ref 7–20)
CALCIUM SERPL-MCNC: 9.4 MG/DL (ref 8.3–10.6)
CHLORIDE SERPL-SCNC: 102 MMOL/L (ref 99–110)
CO2 SERPL-SCNC: 19 MMOL/L (ref 21–32)
CREAT SERPL-MCNC: 2.2 MG/DL (ref 0.6–1.1)
DEPRECATED RDW RBC AUTO: 14.4 % (ref 12.4–15.4)
FERRITIN SERPL IA-MCNC: 539 NG/ML (ref 15–150)
GFR SERPLBLD CREATININE-BSD FMLA CKD-EPI: 25 ML/MIN/{1.73_M2}
GLUCOSE SERPL-MCNC: 121 MG/DL (ref 70–99)
HCT VFR BLD AUTO: 29.8 % (ref 36–48)
HGB BLD-MCNC: 10.1 G/DL (ref 12–16)
IRON SATN MFR SERPL: 22 % (ref 15–50)
IRON SERPL-MCNC: 65 UG/DL (ref 37–145)
MCH RBC QN AUTO: 27.9 PG (ref 26–34)
MCHC RBC AUTO-ENTMCNC: 33.8 G/DL (ref 31–36)
MCV RBC AUTO: 82.6 FL (ref 80–100)
PLATELET # BLD AUTO: 217 K/UL (ref 135–450)
PMV BLD AUTO: 9.4 FL (ref 5–10.5)
POTASSIUM SERPL-SCNC: 3.7 MMOL/L (ref 3.5–5.1)
PROT SERPL-MCNC: 7.1 G/DL (ref 6.4–8.2)
PTH-INTACT SERPL-MCNC: 161 PG/ML (ref 14–72)
RBC # BLD AUTO: 3.61 M/UL (ref 4–5.2)
SODIUM SERPL-SCNC: 138 MMOL/L (ref 136–145)
TIBC SERPL-MCNC: 296 UG/DL (ref 260–445)
WBC # BLD AUTO: 8.5 K/UL (ref 4–11)

## 2025-03-10 PROCEDURE — 83550 IRON BINDING TEST: CPT

## 2025-03-10 PROCEDURE — 36415 COLL VENOUS BLD VENIPUNCTURE: CPT

## 2025-03-10 PROCEDURE — 83970 ASSAY OF PARATHORMONE: CPT

## 2025-03-10 PROCEDURE — 82306 VITAMIN D 25 HYDROXY: CPT

## 2025-03-10 PROCEDURE — 80053 COMPREHEN METABOLIC PANEL: CPT

## 2025-03-10 PROCEDURE — 85027 COMPLETE CBC AUTOMATED: CPT

## 2025-03-10 PROCEDURE — 82728 ASSAY OF FERRITIN: CPT

## 2025-03-10 PROCEDURE — 83540 ASSAY OF IRON: CPT

## 2025-03-11 LAB — 25(OH)D3 SERPL-MCNC: 20.1 NG/ML

## 2025-03-12 ENCOUNTER — TELEPHONE (OUTPATIENT)
Dept: ENDOCRINOLOGY | Age: 60
End: 2025-03-12

## 2025-03-12 NOTE — TELEPHONE ENCOUNTER
Pt called in very upset on the last person she spoke to about not being able to make her appt. She had a very unexpected death and would like not to be marked a no show please.

## 2025-03-17 ENCOUNTER — TELEPHONE (OUTPATIENT)
Dept: CARDIOLOGY CLINIC | Age: 60
End: 2025-03-17

## 2025-03-17 ENCOUNTER — HOSPITAL ENCOUNTER (OUTPATIENT)
Age: 60
Discharge: HOME | End: 2025-03-17
Payer: COMMERCIAL

## 2025-03-17 LAB
ALBUMIN SERPL-MCNC: 4.2 G/DL (ref 3.4–5)
ALP SERPL-CCNC: 150 U/L (ref 40–129)
ALT SERPL-CCNC: 36 U/L (ref 10–40)
ANION GAP SERPL CALCULATED.3IONS-SCNC: 14 MMOL/L (ref 3–16)
AST SERPL-CCNC: 28 U/L (ref 15–37)
BASOPHILS # BLD: 0.1 K/UL (ref 0–0.2)
BASOPHILS NFR BLD: 0.6 %
BILIRUB DIRECT SERPL-MCNC: 0.2 MG/DL (ref 0–0.3)
BILIRUB INDIRECT SERPL-MCNC: 0.2 MG/DL (ref 0–1)
BILIRUB SERPL-MCNC: 0.4 MG/DL (ref 0–1)
BUN SERPL-MCNC: 35 MG/DL (ref 7–20)
CALCIUM SERPL-MCNC: 9.7 MG/DL (ref 8.3–10.6)
CHLORIDE SERPL-SCNC: 100 MMOL/L (ref 99–110)
CHOLEST SERPL-MCNC: 121 MG/DL (ref 0–199)
CO2 SERPL-SCNC: 24 MMOL/L (ref 21–32)
CREAT SERPL-MCNC: 2.4 MG/DL (ref 0.6–1.1)
DEPRECATED RDW RBC AUTO: 14.7 % (ref 12.4–15.4)
EOSINOPHIL # BLD: 0.4 K/UL (ref 0–0.6)
EOSINOPHIL NFR BLD: 3.7 %
EST. AVERAGE GLUCOSE BLD GHB EST-MCNC: 217.3 MG/DL
GFR SERPLBLD CREATININE-BSD FMLA CKD-EPI: 23 ML/MIN/{1.73_M2}
GLUCOSE SERPL-MCNC: 150 MG/DL (ref 70–99)
HBA1C MFR BLD: 9.2 %
HCT VFR BLD AUTO: 34.5 % (ref 36–48)
HDLC SERPL-MCNC: 45 MG/DL (ref 40–60)
HGB BLD-MCNC: 11.1 G/DL (ref 12–16)
LDLC SERPL CALC-MCNC: 56 MG/DL
LYMPHOCYTES # BLD: 2.2 K/UL (ref 1–5.1)
LYMPHOCYTES NFR BLD: 20 %
MCH RBC QN AUTO: 27.6 PG (ref 26–34)
MCHC RBC AUTO-ENTMCNC: 32.2 G/DL (ref 31–36)
MCV RBC AUTO: 85.6 FL (ref 80–100)
MONOCYTES # BLD: 0.7 K/UL (ref 0–1.3)
MONOCYTES NFR BLD: 6.6 %
NEUTROPHILS # BLD: 7.5 K/UL (ref 1.7–7.7)
NEUTROPHILS NFR BLD: 69.1 %
PHOSPHATE SERPL-MCNC: 3.9 MG/DL (ref 2.5–4.9)
PLATELET # BLD AUTO: 240 K/UL (ref 135–450)
PMV BLD AUTO: 9 FL (ref 5–10.5)
POTASSIUM SERPL-SCNC: 3.8 MMOL/L (ref 3.5–5.1)
PROT SERPL-MCNC: 7.3 G/DL (ref 6.4–8.2)
RBC # BLD AUTO: 4.03 M/UL (ref 4–5.2)
SODIUM SERPL-SCNC: 138 MMOL/L (ref 136–145)
T4 SERPL-MCNC: 7.5 UG/DL (ref 4.5–10.9)
TRIGL SERPL-MCNC: 102 MG/DL (ref 0–150)
TSH SERPL DL<=0.005 MIU/L-ACNC: 0.97 UIU/ML (ref 0.27–4.2)
VLDLC SERPL CALC-MCNC: 20 MG/DL
WBC # BLD AUTO: 10.9 K/UL (ref 4–11)

## 2025-03-17 PROCEDURE — 83036 HEMOGLOBIN GLYCOSYLATED A1C: CPT

## 2025-03-17 PROCEDURE — 80061 LIPID PANEL: CPT

## 2025-03-17 PROCEDURE — 80076 HEPATIC FUNCTION PANEL: CPT

## 2025-03-17 PROCEDURE — 84436 ASSAY OF TOTAL THYROXINE: CPT

## 2025-03-17 PROCEDURE — 84443 ASSAY THYROID STIM HORMONE: CPT

## 2025-03-17 PROCEDURE — 85025 COMPLETE CBC W/AUTO DIFF WBC: CPT

## 2025-03-17 PROCEDURE — 80069 RENAL FUNCTION PANEL: CPT

## 2025-03-17 PROCEDURE — 36415 COLL VENOUS BLD VENIPUNCTURE: CPT

## 2025-03-17 NOTE — TELEPHONE ENCOUNTER
Please let patient know that Dr Gamez looked into the Kerendia and it should be fine to take with her lasix based on our resources.

## 2025-03-17 NOTE — TELEPHONE ENCOUNTER
Pt asking to speak to Tiffanie. States she seen her Kidney doctor and has had some medication changes and would like to address with Tiffanie.

## 2025-03-17 NOTE — TELEPHONE ENCOUNTER
Spoke with patient. She was put on Kerendia by her nephrologist, however, when reading about it she wanted to make sure it is ok to take with her lasix 80 mg. Will discuss with [facility] and call her back. She v/u

## 2025-04-07 ENCOUNTER — HOSPITAL ENCOUNTER (OUTPATIENT)
Age: 60
Discharge: HOME | End: 2025-04-07
Payer: COMMERCIAL

## 2025-04-07 ENCOUNTER — ANCILLARY PROCEDURE (OUTPATIENT)
Dept: CARDIOLOGY CLINIC | Age: 60
End: 2025-04-07

## 2025-04-07 ENCOUNTER — OFFICE VISIT (OUTPATIENT)
Dept: CARDIOLOGY CLINIC | Age: 60
End: 2025-04-07
Payer: COMMERCIAL

## 2025-04-07 VITALS
BODY MASS INDEX: 25.66 KG/M2 | SYSTOLIC BLOOD PRESSURE: 142 MMHG | HEART RATE: 74 BPM | DIASTOLIC BLOOD PRESSURE: 80 MMHG | WEIGHT: 169.3 LBS | HEIGHT: 68 IN | OXYGEN SATURATION: 99 %

## 2025-04-07 DIAGNOSIS — R42 LIGHTHEADEDNESS: ICD-10-CM

## 2025-04-07 DIAGNOSIS — R42 LIGHTHEADEDNESS: Primary | ICD-10-CM

## 2025-04-07 DIAGNOSIS — I50.32 CHRONIC DIASTOLIC CONGESTIVE HEART FAILURE (HCC): ICD-10-CM

## 2025-04-07 DIAGNOSIS — I70.1 RENAL ARTERY STENOSIS: ICD-10-CM

## 2025-04-07 DIAGNOSIS — I25.10 CORONARY ARTERY DISEASE DUE TO LIPID RICH PLAQUE: ICD-10-CM

## 2025-04-07 DIAGNOSIS — R06.02 SHORTNESS OF BREATH: ICD-10-CM

## 2025-04-07 DIAGNOSIS — N17.9 AKI (ACUTE KIDNEY INJURY): ICD-10-CM

## 2025-04-07 DIAGNOSIS — E78.5 DYSLIPIDEMIA: ICD-10-CM

## 2025-04-07 DIAGNOSIS — I34.0 NONRHEUMATIC MITRAL VALVE REGURGITATION: ICD-10-CM

## 2025-04-07 DIAGNOSIS — I10 ESSENTIAL HYPERTENSION: ICD-10-CM

## 2025-04-07 DIAGNOSIS — I25.83 CORONARY ARTERY DISEASE DUE TO LIPID RICH PLAQUE: ICD-10-CM

## 2025-04-07 LAB
ANION GAP SERPL CALCULATED.3IONS-SCNC: 12 MMOL/L (ref 3–16)
BUN SERPL-MCNC: 28 MG/DL (ref 7–20)
CALCIUM SERPL-MCNC: 9.5 MG/DL (ref 8.3–10.6)
CHLORIDE SERPL-SCNC: 105 MMOL/L (ref 99–110)
CO2 SERPL-SCNC: 24 MMOL/L (ref 21–32)
CREAT SERPL-MCNC: 2.3 MG/DL (ref 0.6–1.1)
ECHO BSA: 1.92 M2
GFR SERPLBLD CREATININE-BSD FMLA CKD-EPI: 24 ML/MIN/{1.73_M2}
GLUCOSE SERPL-MCNC: 169 MG/DL (ref 70–99)
MAGNESIUM SERPL-MCNC: 2.85 MG/DL (ref 1.8–2.4)
POTASSIUM SERPL-SCNC: 4.5 MMOL/L (ref 3.5–5.1)
SODIUM SERPL-SCNC: 141 MMOL/L (ref 136–145)

## 2025-04-07 PROCEDURE — 36415 COLL VENOUS BLD VENIPUNCTURE: CPT

## 2025-04-07 PROCEDURE — 3077F SYST BP >= 140 MM HG: CPT

## 2025-04-07 PROCEDURE — 83735 ASSAY OF MAGNESIUM: CPT

## 2025-04-07 PROCEDURE — 3078F DIAST BP <80 MM HG: CPT

## 2025-04-07 PROCEDURE — 80048 BASIC METABOLIC PNL TOTAL CA: CPT

## 2025-04-07 PROCEDURE — 99214 OFFICE O/P EST MOD 30 MIN: CPT

## 2025-04-07 RX ORDER — FUROSEMIDE 80 MG/1
80 TABLET ORAL DAILY
Qty: 60 TABLET | Refills: 3 | Status: SHIPPED | OUTPATIENT
Start: 2025-04-07

## 2025-04-07 RX ORDER — FINERENONE 10 MG/1
10 TABLET, FILM COATED ORAL
COMMUNITY

## 2025-04-07 NOTE — PATIENT INSTRUCTIONS
Get sleep study completed as recommended by Dr. Guzman  Stress test for shortness of breath   Holter  monitor for lightheadedness   Magnesium   Blood work today   Return in 6 weeks or test dependent

## 2025-04-08 ENCOUNTER — RESULTS FOLLOW-UP (OUTPATIENT)
Dept: CARDIOLOGY CLINIC | Age: 60
End: 2025-04-08

## 2025-04-18 ENCOUNTER — HOSPITAL ENCOUNTER (OUTPATIENT)
Age: 60
Discharge: HOME | End: 2025-04-20
Payer: COMMERCIAL

## 2025-04-18 VITALS
WEIGHT: 165 LBS | RESPIRATION RATE: 16 BRPM | BODY MASS INDEX: 25.01 KG/M2 | HEART RATE: 73 BPM | SYSTOLIC BLOOD PRESSURE: 133 MMHG | HEIGHT: 68 IN | DIASTOLIC BLOOD PRESSURE: 72 MMHG

## 2025-04-18 VITALS — WEIGHT: 165 LBS | HEIGHT: 68 IN | BODY MASS INDEX: 25.01 KG/M2

## 2025-04-18 DIAGNOSIS — R06.02 SHORTNESS OF BREATH: ICD-10-CM

## 2025-04-18 LAB
ECHO BSA: 1.89 M2
NUC STRESS EJECTION FRACTION: 65 %
NUC STRESS LV EDV: 113 ML (ref 56–104)
NUC STRESS LV ESV: 40 ML (ref 19–49)
NUC STRESS LV MASS: 152 G
STRESS BASELINE DIAS BP: 72 MMHG
STRESS BASELINE HR: 75 BPM
STRESS BASELINE SYS BP: 133 MMHG
STRESS ESTIMATED WORKLOAD: 1 METS
STRESS EXERCISE DUR MIN: 4 MIN
STRESS EXERCISE DUR SEC: 0 SEC
STRESS O2 SAT PEAK: 98 %
STRESS O2 SAT REST: 98 %
STRESS PEAK DIAS BP: 73 MMHG
STRESS PEAK SYS BP: 121 MMHG
STRESS PERCENT HR ACHIEVED: 47 %
STRESS POST PEAK HR: 77 BPM
STRESS RATE PRESSURE PRODUCT: 9317 BPM*MMHG
STRESS TARGET HR: 163 BPM
TID: 1.14

## 2025-04-18 PROCEDURE — 78452 HT MUSCLE IMAGE SPECT MULT: CPT | Performed by: INTERNAL MEDICINE

## 2025-04-18 PROCEDURE — 93016 CV STRESS TEST SUPVJ ONLY: CPT | Performed by: INTERNAL MEDICINE

## 2025-04-18 PROCEDURE — A9502 TC99M TETROFOSMIN: HCPCS

## 2025-04-18 PROCEDURE — 3430000000 HC RX DIAGNOSTIC RADIOPHARMACEUTICAL

## 2025-04-18 PROCEDURE — 6360000002 HC RX W HCPCS: Performed by: INTERNAL MEDICINE

## 2025-04-18 PROCEDURE — 78452 HT MUSCLE IMAGE SPECT MULT: CPT

## 2025-04-18 PROCEDURE — 6360000002 HC RX W HCPCS

## 2025-04-18 PROCEDURE — 93018 CV STRESS TEST I&R ONLY: CPT | Performed by: INTERNAL MEDICINE

## 2025-04-18 PROCEDURE — 93017 CV STRESS TEST TRACING ONLY: CPT

## 2025-04-18 RX ORDER — REGADENOSON 0.08 MG/ML
0.4 INJECTION, SOLUTION INTRAVENOUS
Status: COMPLETED | OUTPATIENT
Start: 2025-04-18 | End: 2025-04-18

## 2025-04-18 RX ORDER — AMINOPHYLLINE 25 MG/ML
100 INJECTION, SOLUTION INTRAVENOUS ONCE
Status: COMPLETED | OUTPATIENT
Start: 2025-04-18 | End: 2025-04-18

## 2025-04-18 RX ADMIN — TETROFOSMIN 11.2 MILLICURIE: 1.38 INJECTION, POWDER, LYOPHILIZED, FOR SOLUTION INTRAVENOUS at 07:25

## 2025-04-18 RX ADMIN — REGADENOSON 0.4 MG: 0.08 INJECTION, SOLUTION INTRAVENOUS at 08:34

## 2025-04-18 RX ADMIN — AMINOPHYLLINE 100 MG: 25 INJECTION, SOLUTION INTRAVENOUS at 08:46

## 2025-04-18 RX ADMIN — TETROFOSMIN 33.1 MILLICURIE: 1.38 INJECTION, POWDER, LYOPHILIZED, FOR SOLUTION INTRAVENOUS at 08:37

## 2025-04-22 ENCOUNTER — RESULTS FOLLOW-UP (OUTPATIENT)
Dept: CARDIOLOGY CLINIC | Age: 60
End: 2025-04-22

## 2025-05-14 LAB — ECHO BSA: 1.92 M2

## 2025-05-19 ENCOUNTER — OFFICE VISIT (OUTPATIENT)
Dept: CARDIOLOGY CLINIC | Age: 60
End: 2025-05-19
Payer: COMMERCIAL

## 2025-05-19 VITALS
HEIGHT: 68 IN | HEART RATE: 71 BPM | OXYGEN SATURATION: 99 % | BODY MASS INDEX: 25.75 KG/M2 | DIASTOLIC BLOOD PRESSURE: 70 MMHG | SYSTOLIC BLOOD PRESSURE: 138 MMHG | WEIGHT: 169.9 LBS

## 2025-05-19 DIAGNOSIS — I25.83 CORONARY ARTERY DISEASE DUE TO LIPID RICH PLAQUE: ICD-10-CM

## 2025-05-19 DIAGNOSIS — N17.9 AKI (ACUTE KIDNEY INJURY): ICD-10-CM

## 2025-05-19 DIAGNOSIS — E78.5 DYSLIPIDEMIA: ICD-10-CM

## 2025-05-19 DIAGNOSIS — I10 ESSENTIAL HYPERTENSION: ICD-10-CM

## 2025-05-19 DIAGNOSIS — I50.32 CHRONIC DIASTOLIC CONGESTIVE HEART FAILURE (HCC): ICD-10-CM

## 2025-05-19 DIAGNOSIS — I25.10 CORONARY ARTERY DISEASE DUE TO LIPID RICH PLAQUE: ICD-10-CM

## 2025-05-19 DIAGNOSIS — I34.0 NONRHEUMATIC MITRAL VALVE REGURGITATION: ICD-10-CM

## 2025-05-19 DIAGNOSIS — I70.1 RENAL ARTERY STENOSIS: ICD-10-CM

## 2025-05-19 DIAGNOSIS — R42 LIGHTHEADEDNESS: Primary | ICD-10-CM

## 2025-05-19 PROCEDURE — 99214 OFFICE O/P EST MOD 30 MIN: CPT

## 2025-05-19 PROCEDURE — 3075F SYST BP GE 130 - 139MM HG: CPT

## 2025-05-19 PROCEDURE — 3078F DIAST BP <80 MM HG: CPT

## 2025-05-19 NOTE — PROGRESS NOTES
software.  Every effort was made to ensure accuracy; however, inadvertent computerized transcription errors may be present.

## 2025-05-19 NOTE — PATIENT INSTRUCTIONS
Continue current medication regime   Follow up with Dr. Gamez as instructed  Make sure you get echo before you see him    today

## 2025-05-20 ENCOUNTER — TELEPHONE (OUTPATIENT)
Dept: CARDIOLOGY CLINIC | Age: 60
End: 2025-05-20

## 2025-05-20 NOTE — TELEPHONE ENCOUNTER
Spoke with patient,  she is talking with her insurance company so she knows what her portion of the bill she will need to pay.     The insurance company asked her if the Echo is being ordered with/without a doppler.

## 2025-05-20 NOTE — TELEPHONE ENCOUNTER
There is no dye used, if the echo tech needs to get clearer pictures, they can opt for Definity contrast. (This is not the same as a CT scan contrast)

## 2025-05-20 NOTE — TELEPHONE ENCOUNTER
Pt is scheduled for an Echo on 05/30 Pt is wanting to know is there any dye involved.  If so she needs to know due to health reason.  Please call the Pt to discuss her concerns.  Thank you

## 2025-05-28 RX ORDER — NIFEDIPINE 60 MG/1
60 TABLET, EXTENDED RELEASE ORAL 2 TIMES DAILY
Qty: 90 TABLET | Refills: 1 | Status: SHIPPED | OUTPATIENT
Start: 2025-05-28

## 2025-05-28 NOTE — TELEPHONE ENCOUNTER
Medication Refill    Medication needing refilled:  NIFEdipine     Dosage of the medication:  60mg    How are you taking this medication (QD, BID, TID, QID, PRN):   Take 1 tablet by mouth in the morning and at bedtime     30 or 90 day supply called in:  180    When will you run out of your medication:    Which Pharmacy are we sending the medication to?:     [id number]   [address] [place] [address]  Phone:   [phone]   Fax:   [phone]

## 2025-05-30 ENCOUNTER — HOSPITAL ENCOUNTER (OUTPATIENT)
Age: 60
Discharge: HOME | End: 2025-05-30
Attending: INTERNAL MEDICINE
Payer: COMMERCIAL

## 2025-05-30 ENCOUNTER — TELEPHONE (OUTPATIENT)
Dept: CARDIOLOGY CLINIC | Age: 60
End: 2025-05-30

## 2025-05-30 VITALS
DIASTOLIC BLOOD PRESSURE: 70 MMHG | WEIGHT: 160 LBS | HEIGHT: 68 IN | BODY MASS INDEX: 24.25 KG/M2 | SYSTOLIC BLOOD PRESSURE: 138 MMHG

## 2025-05-30 DIAGNOSIS — I25.10 CORONARY ARTERY DISEASE DUE TO LIPID RICH PLAQUE: ICD-10-CM

## 2025-05-30 DIAGNOSIS — I25.83 CORONARY ARTERY DISEASE DUE TO LIPID RICH PLAQUE: ICD-10-CM

## 2025-05-30 DIAGNOSIS — I50.32 CHRONIC DIASTOLIC CONGESTIVE HEART FAILURE (HCC): ICD-10-CM

## 2025-05-30 LAB
ECHO AO ASC DIAM: 3.3 CM
ECHO AO ASCENDING AORTA INDEX: 1.77 CM/M2
ECHO AO ROOT DIAM: 2.5 CM
ECHO AO ROOT INDEX: 1.34 CM/M2
ECHO AV AREA PEAK VELOCITY: 2.3 CM2
ECHO AV AREA VTI: 2.2 CM2
ECHO AV AREA/BSA PEAK VELOCITY: 1.2 CM2/M2
ECHO AV AREA/BSA VTI: 1.2 CM2/M2
ECHO AV MEAN GRADIENT: 6 MMHG
ECHO AV MEAN VELOCITY: 1.1 M/S
ECHO AV PEAK GRADIENT: 10 MMHG
ECHO AV PEAK VELOCITY: 1.6 M/S
ECHO AV VELOCITY RATIO: 0.81
ECHO AV VTI: 34.5 CM
ECHO BSA: 1.87 M2
ECHO EST RA PRESSURE: 3 MMHG
ECHO LA AREA 2C: 19 CM2
ECHO LA AREA 4C: 17 CM2
ECHO LA DIAMETER INDEX: 1.88 CM/M2
ECHO LA DIAMETER: 3.5 CM
ECHO LA MAJOR AXIS: 4.8 CM
ECHO LA MINOR AXIS: 4.8 CM
ECHO LA TO AORTIC ROOT RATIO: 1.4
ECHO LA VOL BP: 53 ML (ref 22–52)
ECHO LA VOL MOD A2C: 60 ML (ref 22–52)
ECHO LA VOL MOD A4C: 46 ML (ref 22–52)
ECHO LA VOL/BSA BIPLANE: 28 ML/M2 (ref 16–34)
ECHO LA VOLUME INDEX MOD A2C: 32 ML/M2 (ref 16–34)
ECHO LA VOLUME INDEX MOD A4C: 25 ML/M2 (ref 16–34)
ECHO LV E' LATERAL VELOCITY: 6.53 CM/S
ECHO LV E' SEPTAL VELOCITY: 4.81 CM/S
ECHO LV EDV A2C: 94 ML
ECHO LV EDV A4C: 106 ML
ECHO LV EDV INDEX A4C: 57 ML/M2
ECHO LV EDV NDEX A2C: 51 ML/M2
ECHO LV EF PHYSICIAN: 60 %
ECHO LV EJECTION FRACTION A2C: 64 %
ECHO LV EJECTION FRACTION A4C: 60 %
ECHO LV EJECTION FRACTION BIPLANE: 63 % (ref 55–100)
ECHO LV ESV A2C: 34 ML
ECHO LV ESV A4C: 42 ML
ECHO LV ESV INDEX A2C: 18 ML/M2
ECHO LV ESV INDEX A4C: 23 ML/M2
ECHO LV FRACTIONAL SHORTENING: 32 % (ref 28–44)
ECHO LV INTERNAL DIMENSION DIASTOLE INDEX: 1.99 CM/M2
ECHO LV INTERNAL DIMENSION DIASTOLIC: 3.7 CM (ref 3.9–5.3)
ECHO LV INTERNAL DIMENSION SYSTOLIC INDEX: 1.34 CM/M2
ECHO LV INTERNAL DIMENSION SYSTOLIC: 2.5 CM
ECHO LV IVSD: 1.8 CM (ref 0.6–0.9)
ECHO LV MASS 2D: 176.6 G (ref 67–162)
ECHO LV MASS INDEX 2D: 94.9 G/M2 (ref 43–95)
ECHO LV POSTERIOR WALL DIASTOLIC: 0.9 CM (ref 0.6–0.9)
ECHO LV RELATIVE WALL THICKNESS RATIO: 0.49
ECHO LVOT AREA: 2.8 CM2
ECHO LVOT AV VTI INDEX: 0.78
ECHO LVOT DIAM: 1.9 CM
ECHO LVOT MEAN GRADIENT: 4 MMHG
ECHO LVOT PEAK GRADIENT: 7 MMHG
ECHO LVOT PEAK VELOCITY: 1.3 M/S
ECHO LVOT STROKE VOLUME INDEX: 41 ML/M2
ECHO LVOT SV: 76.2 ML
ECHO LVOT VTI: 26.9 CM
ECHO MV A VELOCITY: 1.09 M/S
ECHO MV AREA VTI: 2.8 CM2
ECHO MV E DECELERATION TIME (DT): 290 MS
ECHO MV E VELOCITY: 0.84 M/S
ECHO MV E/A RATIO: 0.77
ECHO MV E/E' LATERAL: 12.86
ECHO MV E/E' RATIO (AVERAGED): 15.16
ECHO MV E/E' SEPTAL: 17.46
ECHO MV LVOT VTI INDEX: 1.01
ECHO MV MAX VELOCITY: 1.2 M/S
ECHO MV MEAN GRADIENT: 2 MMHG
ECHO MV MEAN VELOCITY: 0.7 M/S
ECHO MV PEAK GRADIENT: 6 MMHG
ECHO MV VTI: 27.3 CM
ECHO PV ACCELERATION TIME (AT): 119 MS
ECHO PV MAX VELOCITY: 0.9 M/S
ECHO PV PEAK GRADIENT: 3 MMHG
ECHO RA AREA 4C: 11.4 CM2
ECHO RA END SYSTOLIC VOLUME APICAL 4 CHAMBER INDEX BSA: 13 ML/M2
ECHO RA VOLUME: 24 ML
ECHO RIGHT VENTRICULAR SYSTOLIC PRESSURE (RVSP): 16 MMHG
ECHO RV BASAL DIMENSION: 3.7 CM
ECHO RV FREE WALL PEAK S': 10 CM/S
ECHO RV LONGITUDINAL DIMENSION: 5.8 CM
ECHO RV MID DIMENSION: 2.9 CM
ECHO RV TAPSE: 1.9 CM (ref 1.7–?)
ECHO TV REGURGITANT MAX VELOCITY: 1.83 M/S
ECHO TV REGURGITANT PEAK GRADIENT: 13 MMHG

## 2025-05-30 PROCEDURE — 93306 TTE W/DOPPLER COMPLETE: CPT

## 2025-05-30 PROCEDURE — 93306 TTE W/DOPPLER COMPLETE: CPT | Performed by: INTERNAL MEDICINE

## 2025-05-30 NOTE — TELEPHONE ENCOUNTER
Pt called to ask the office that she had an appt today at [facility], They were not able to give he Pt an note for her employer that she was there today.  Pt is requesting for a note for her employer stating that she did have an appt today 05/30 at [facility].  Please upload the note in Pt [employer].    [facility]    Thank you

## 2025-06-02 NOTE — TELEPHONE ENCOUNTER
Please offer a letter that pt was seen at [facility] on 5/30/25. She was there for an echo, however, was not provided with a note for work. Does not need to be signed by [facility]. Can be a general note stating she was there for testing. Thank you.

## 2025-06-03 NOTE — PROGRESS NOTES
place, and time.   Psychiatric:         Mood and Affect: Mood normal.         Behavior: Behavior normal.         Thought Content: Thought content normal.         Judgment: Judgment normal.           LABS:  CBC:   Lab Results   Component Value Date/Time    WBC 10.9 03/17/2025 03:08 PM    RBC 4.03 03/17/2025 03:08 PM    HGB 11.1 03/17/2025 03:08 PM    HCT 34.5 03/17/2025 03:08 PM    MCV 85.6 03/17/2025 03:08 PM    RDW 14.7 03/17/2025 03:08 PM     03/17/2025 03:08 PM     CMP:   Lab Results   Component Value Date/Time     04/07/2025 11:16 AM    K 4.5 04/07/2025 11:16 AM    K 5.2 09/25/2024 05:24 AM     04/07/2025 11:16 AM    CO2 24 04/07/2025 11:16 AM    BUN 28 04/07/2025 11:16 AM    CREATININE 2.3 04/07/2025 11:16 AM    GFRAA >60 10/13/2022 07:46 AM    AGRATIO 1.2 03/10/2025 02:37 PM    LABGLOM 24 04/07/2025 11:16 AM    LABGLOM 48 04/19/2024 03:00 PM    GLUCOSE 169 04/07/2025 11:16 AM    CALCIUM 9.5 04/07/2025 11:16 AM    BILITOT 0.5 06/05/2025 03:31 PM    ALKPHOS 153 06/05/2025 03:31 PM    AST 28 06/05/2025 03:31 PM    ALT 26 06/05/2025 03:31 PM     LIPIDS:   Lab Results   Component Value Date/Time    HDL 54 06/05/2025 03:31 PM    LDL 69 06/05/2025 03:31 PM     01/20/2024 08:54 AM     PT/INR: No results found for: \"PTINR\"    Echo 5/30/25    Left Ventricle: Normal left ventricular systolic function with a visually estimated EF of 55 - 60%. Left ventricle size is normal. Normal wall thickness. Normal wall motion. Normal diastolic function.    Right Ventricle: Right ventricle size is normal. Normal systolic function.    Aortic Valve: No regurgitation. No stenosis.    Mitral Valve: Trace regurgitation.    Tricuspid Valve: Trace regurgitation.    Left Atrium: Left atrium size is normal.    Image quality is adequate.    Stress 4/18/25    Stress Combined Conclusion: Normal pharmacological myocardial perfusion study. Findings suggest a low risk of cardiac events.    Stress Function: Left ventricular

## 2025-06-04 NOTE — PATIENT INSTRUCTIONS
Continue your medications  Cardiac work up has been normal  Agree with sleep study   Lasix 40 mg repeat bmp labs in 2-4 weeks   Crestor to 40 mg and repeat labs in 3 months   Follow up in 1 year, 6 months with Juani

## 2025-06-05 ENCOUNTER — HOSPITAL ENCOUNTER (OUTPATIENT)
Age: 60
Discharge: HOME | End: 2025-06-05
Payer: COMMERCIAL

## 2025-06-05 LAB
ALBUMIN SERPL-MCNC: 4.3 G/DL (ref 3.4–5)
ALP SERPL-CCNC: 153 U/L (ref 40–129)
ALT SERPL-CCNC: 26 U/L (ref 10–40)
AST SERPL-CCNC: 28 U/L (ref 15–37)
BILIRUB DIRECT SERPL-MCNC: 0.2 MG/DL (ref 0–0.3)
BILIRUB INDIRECT SERPL-MCNC: 0.3 MG/DL (ref 0–1)
BILIRUB SERPL-MCNC: 0.5 MG/DL (ref 0–1)
CHOLEST SERPL-MCNC: 147 MG/DL (ref 0–199)
HDLC SERPL-MCNC: 54 MG/DL (ref 40–60)
LDLC SERPL CALC-MCNC: 69 MG/DL
PROT SERPL-MCNC: 7.5 G/DL (ref 6.4–8.2)
TRIGL SERPL-MCNC: 119 MG/DL (ref 0–150)
VLDLC SERPL CALC-MCNC: 24 MG/DL

## 2025-06-05 PROCEDURE — 36415 COLL VENOUS BLD VENIPUNCTURE: CPT

## 2025-06-05 PROCEDURE — 80076 HEPATIC FUNCTION PANEL: CPT

## 2025-06-05 PROCEDURE — 80061 LIPID PANEL: CPT

## 2025-06-06 ENCOUNTER — OFFICE VISIT (OUTPATIENT)
Dept: CARDIOLOGY CLINIC | Age: 60
End: 2025-06-06
Payer: COMMERCIAL

## 2025-06-06 VITALS
BODY MASS INDEX: 25.01 KG/M2 | HEIGHT: 68 IN | DIASTOLIC BLOOD PRESSURE: 66 MMHG | HEART RATE: 74 BPM | SYSTOLIC BLOOD PRESSURE: 116 MMHG | WEIGHT: 165 LBS | OXYGEN SATURATION: 99 %

## 2025-06-06 DIAGNOSIS — I34.0 NONRHEUMATIC MITRAL VALVE REGURGITATION: ICD-10-CM

## 2025-06-06 DIAGNOSIS — I25.10 CORONARY ARTERY DISEASE DUE TO LIPID RICH PLAQUE: ICD-10-CM

## 2025-06-06 DIAGNOSIS — I10 UNCONTROLLED HYPERTENSION: Primary | ICD-10-CM

## 2025-06-06 DIAGNOSIS — E78.5 DYSLIPIDEMIA: ICD-10-CM

## 2025-06-06 DIAGNOSIS — I25.83 CORONARY ARTERY DISEASE DUE TO LIPID RICH PLAQUE: ICD-10-CM

## 2025-06-06 PROCEDURE — 3074F SYST BP LT 130 MM HG: CPT | Performed by: INTERNAL MEDICINE

## 2025-06-06 PROCEDURE — 3078F DIAST BP <80 MM HG: CPT | Performed by: INTERNAL MEDICINE

## 2025-06-06 PROCEDURE — G2211 COMPLEX E/M VISIT ADD ON: HCPCS | Performed by: INTERNAL MEDICINE

## 2025-06-06 PROCEDURE — 99214 OFFICE O/P EST MOD 30 MIN: CPT | Performed by: INTERNAL MEDICINE

## 2025-06-06 RX ORDER — FUROSEMIDE 40 MG/1
40 TABLET ORAL DAILY
Qty: 30 TABLET | Refills: 1
Start: 2025-06-06

## 2025-06-06 RX ORDER — ROSUVASTATIN CALCIUM 40 MG/1
40 TABLET, COATED ORAL NIGHTLY
Qty: 90 TABLET | Refills: 3 | Status: SHIPPED | OUTPATIENT
Start: 2025-06-06

## 2025-07-29 ENCOUNTER — HOSPITAL ENCOUNTER (OUTPATIENT)
Age: 60
Discharge: HOME | End: 2025-07-29
Payer: COMMERCIAL

## 2025-07-29 LAB
ALBUMIN SERPL-MCNC: 4.3 G/DL (ref 3.4–5)
ALBUMIN/GLOB SERPL: 1.3 {RATIO} (ref 1.1–2.2)
ALP SERPL-CCNC: 170 U/L (ref 40–129)
ALT SERPL-CCNC: 19 U/L (ref 10–40)
ANION GAP SERPL CALCULATED.3IONS-SCNC: 15 MMOL/L (ref 3–16)
AST SERPL-CCNC: 20 U/L (ref 15–37)
BACTERIA URNS QL MICRO: ABNORMAL /HPF
BILIRUB SERPL-MCNC: 0.3 MG/DL (ref 0–1)
BILIRUB UR QL STRIP.AUTO: NEGATIVE
BUN SERPL-MCNC: 54 MG/DL (ref 7–20)
CALCIUM SERPL-MCNC: 9.7 MG/DL (ref 8.3–10.6)
CHLORIDE SERPL-SCNC: 99 MMOL/L (ref 99–110)
CLARITY UR: ABNORMAL
CO2 SERPL-SCNC: 22 MMOL/L (ref 21–32)
COLOR UR: YELLOW
CREAT SERPL-MCNC: 2.4 MG/DL (ref 0.6–1.1)
CREAT UR-MCNC: 59.4 MG/DL (ref 28–259)
DEPRECATED RDW RBC AUTO: 14.2 % (ref 12.4–15.4)
EPI CELLS #/AREA URNS AUTO: 2 /HPF (ref 0–5)
GFR SERPLBLD CREATININE-BSD FMLA CKD-EPI: 23 ML/MIN/{1.73_M2}
GLUCOSE SERPL-MCNC: 230 MG/DL (ref 70–99)
GLUCOSE UR STRIP.AUTO-MCNC: >=1000 MG/DL
HCT VFR BLD AUTO: 33.1 % (ref 36–48)
HGB BLD-MCNC: 11.1 G/DL (ref 12–16)
HGB UR QL STRIP.AUTO: ABNORMAL
HYALINE CASTS #/AREA URNS AUTO: 1 /LPF (ref 0–8)
KETONES UR STRIP.AUTO-MCNC: NEGATIVE MG/DL
LEUKOCYTE ESTERASE UR QL STRIP.AUTO: ABNORMAL
MCH RBC QN AUTO: 27.3 PG (ref 26–34)
MCHC RBC AUTO-ENTMCNC: 33.4 G/DL (ref 31–36)
MCV RBC AUTO: 81.5 FL (ref 80–100)
NITRITE UR QL STRIP.AUTO: NEGATIVE
PH UR STRIP.AUTO: 5.5 [PH] (ref 5–8)
PLATELET # BLD AUTO: 251 K/UL (ref 135–450)
PMV BLD AUTO: 9.5 FL (ref 5–10.5)
POTASSIUM SERPL-SCNC: 4 MMOL/L (ref 3.5–5.1)
PROT SERPL-MCNC: 7.7 G/DL (ref 6.4–8.2)
PROT UR STRIP.AUTO-MCNC: 100 MG/DL
PROT UR-MCNC: 135 MG/DL
PROT/CREAT UR-RTO: 2.3 MG/DL
RBC # BLD AUTO: 4.06 M/UL (ref 4–5.2)
RBC CLUMPS #/AREA URNS AUTO: 0 /HPF (ref 0–4)
SODIUM SERPL-SCNC: 136 MMOL/L (ref 136–145)
SP GR UR STRIP.AUTO: 1.01 (ref 1–1.03)
UA DIPSTICK W REFLEX MICRO PNL UR: YES
URN SPEC COLLECT METH UR: ABNORMAL
UROBILINOGEN UR STRIP-ACNC: 0.2 E.U./DL
WBC # BLD AUTO: 8.6 K/UL (ref 4–11)
WBC #/AREA URNS AUTO: 926 /HPF (ref 0–5)

## 2025-07-29 PROCEDURE — 82570 ASSAY OF URINE CREATININE: CPT

## 2025-07-29 PROCEDURE — 81001 URINALYSIS AUTO W/SCOPE: CPT

## 2025-07-29 PROCEDURE — 84156 ASSAY OF PROTEIN URINE: CPT

## 2025-07-29 PROCEDURE — 85027 COMPLETE CBC AUTOMATED: CPT

## 2025-07-29 PROCEDURE — 80053 COMPREHEN METABOLIC PANEL: CPT

## 2025-09-02 DIAGNOSIS — E78.2 MIXED HYPERLIPIDEMIA: ICD-10-CM

## 2025-09-02 DIAGNOSIS — E11.65 POORLY CONTROLLED TYPE 2 DIABETES MELLITUS WITH COMPLICATION (HCC): ICD-10-CM

## 2025-09-02 DIAGNOSIS — E11.8 POORLY CONTROLLED TYPE 2 DIABETES MELLITUS WITH COMPLICATION (HCC): ICD-10-CM

## 2025-09-02 RX ORDER — DULAGLUTIDE 1.5 MG/.5ML
1.5 INJECTION, SOLUTION SUBCUTANEOUS WEEKLY
Qty: 4 ADJUSTABLE DOSE PRE-FILLED PEN SYRINGE | Refills: 0 | Status: SHIPPED | OUTPATIENT
Start: 2025-09-02

## 2025-09-06 ENCOUNTER — HOSPITAL ENCOUNTER (OUTPATIENT)
Age: 60
Discharge: HOME | End: 2025-09-06
Payer: COMMERCIAL

## 2025-09-06 DIAGNOSIS — E11.65 POORLY CONTROLLED TYPE 2 DIABETES MELLITUS WITH COMPLICATION (HCC): ICD-10-CM

## 2025-09-06 DIAGNOSIS — E11.8 POORLY CONTROLLED TYPE 2 DIABETES MELLITUS WITH COMPLICATION (HCC): ICD-10-CM

## 2025-09-06 DIAGNOSIS — E78.2 MIXED HYPERLIPIDEMIA: ICD-10-CM

## 2025-09-06 LAB
ALBUMIN SERPL-MCNC: 4.2 G/DL (ref 3.4–5)
ALBUMIN/GLOB SERPL: 1.3 {RATIO} (ref 1.1–2.2)
ALP SERPL-CCNC: 145 U/L (ref 40–129)
ALT SERPL-CCNC: 43 U/L (ref 10–40)
ANION GAP SERPL CALCULATED.3IONS-SCNC: 14 MMOL/L (ref 3–16)
AST SERPL-CCNC: 33 U/L (ref 15–37)
BILIRUB SERPL-MCNC: 0.5 MG/DL (ref 0–1)
BUN SERPL-MCNC: 37 MG/DL (ref 7–20)
CALCIUM SERPL-MCNC: 9.8 MG/DL (ref 8.3–10.6)
CHLORIDE SERPL-SCNC: 103 MMOL/L (ref 99–110)
CHOLEST SERPL-MCNC: 115 MG/DL (ref 0–199)
CO2 SERPL-SCNC: 25 MMOL/L (ref 21–32)
CREAT SERPL-MCNC: 2.6 MG/DL (ref 0.6–1.1)
CREAT UR-MCNC: 83.4 MG/DL (ref 28–259)
GFR SERPLBLD CREATININE-BSD FMLA CKD-EPI: 21 ML/MIN/{1.73_M2}
GLUCOSE SERPL-MCNC: 116 MG/DL (ref 70–99)
HDLC SERPL-MCNC: 41 MG/DL (ref 40–60)
LDLC SERPL CALC-MCNC: 49 MG/DL
MICROALBUMIN UR DL<=1MG/L-MCNC: 169 MG/DL
MICROALBUMIN/CREAT UR: 2026.4 MG/G (ref 0–30)
POTASSIUM SERPL-SCNC: 4.2 MMOL/L (ref 3.5–5.1)
PROT SERPL-MCNC: 7.4 G/DL (ref 6.4–8.2)
SODIUM SERPL-SCNC: 142 MMOL/L (ref 136–145)
TRIGL SERPL-MCNC: 125 MG/DL (ref 0–150)
TSH SERPL DL<=0.005 MIU/L-ACNC: 0.68 UIU/ML (ref 0.27–4.2)
VLDLC SERPL CALC-MCNC: 25 MG/DL

## 2025-09-06 PROCEDURE — 82570 ASSAY OF URINE CREATININE: CPT

## 2025-09-06 PROCEDURE — 80053 COMPREHEN METABOLIC PANEL: CPT

## 2025-09-06 PROCEDURE — 80061 LIPID PANEL: CPT

## 2025-09-06 PROCEDURE — 36415 COLL VENOUS BLD VENIPUNCTURE: CPT

## 2025-09-06 PROCEDURE — 82043 UR ALBUMIN QUANTITATIVE: CPT

## 2025-09-06 PROCEDURE — 84443 ASSAY THYROID STIM HORMONE: CPT

## 2025-09-06 PROCEDURE — 83036 HEMOGLOBIN GLYCOSYLATED A1C: CPT

## 2025-09-07 LAB
EST. AVERAGE GLUCOSE BLD GHB EST-MCNC: 191.5 MG/DL
HBA1C MFR BLD: 8.3 %

## (undated) DEVICE — BW-412T DISP COMBO CLEANING BRUSH: Brand: SINGLE USE COMBINATION CLEANING BRUSH

## (undated) DEVICE — SINGLE USE AIR/WATER, SUCTION AND BIOPSY VALVES SET: Brand: ORCAPOD™

## (undated) DEVICE — ENDOSCOPIC KIT 6X3/16 FT COLON W/ 1.1 OZ 2 GWN W/O BRSH

## (undated) DEVICE — CATH LAB PACK: Brand: MEDLINE INDUSTRIES, INC.

## (undated) DEVICE — AIR/WATER CLEANING ADAPTER FOR OLYMPUS® GI ENDOSCOPE: Brand: BULLDOG®

## (undated) DEVICE — 260 CM J TIP WIRE .035

## (undated) DEVICE — GLIDESHEATH SLENDER ACCESS KIT: Brand: GLIDESHEATH SLENDER

## (undated) DEVICE — CATHETER DIAG L100CM DIA5.2FR 0.038IN POLYUR COR JR4 STD

## (undated) DEVICE — PAD, DEFIB, ADULT, RADIOTRANS, PHYSIO: Brand: MEDLINE

## (undated) DEVICE — Device: Brand: NOMOLINE™ LH ADULT NASAL CO2 CANNULA WITH O2 4M

## (undated) DEVICE — MOUTHPIECE ENDOSCP L CTRL OPN AND SIDE PORTS DISP

## (undated) DEVICE — CATHETER 5FR JL3.5 CORDIS 100CM

## (undated) DEVICE — KIT AT-X65 ANGIOTOUCH HAND CONTROLLER

## (undated) DEVICE — TR BAND RADIAL ARTERY COMPRESSION DEVICE: Brand: TR BAND

## (undated) DEVICE — SOLUTION IRRIG 500ML STRL H2O NONPYROGENIC

## (undated) DEVICE — DRAPE,ANGIO,BRACH,STERILE,38X44: Brand: MEDLINE